# Patient Record
Sex: FEMALE | Race: WHITE | Employment: OTHER | RURAL
[De-identification: names, ages, dates, MRNs, and addresses within clinical notes are randomized per-mention and may not be internally consistent; named-entity substitution may affect disease eponyms.]

---

## 2017-01-06 ENCOUNTER — OFFICE VISIT (OUTPATIENT)
Dept: FAMILY MEDICINE CLINIC | Age: 47
End: 2017-01-06

## 2017-01-06 VITALS — TEMPERATURE: 98.3 F

## 2017-01-06 DIAGNOSIS — Z91.09 MULTIPLE ENVIRONMENTAL ALLERGIES: Primary | ICD-10-CM

## 2017-01-06 NOTE — PROGRESS NOTES
Progress Note    Patient: Claire Martinez MRN: [id number]  SSN: [id number]    YOB: 1970  Age: 40 y.o. Sex: female        Chief Complaint   Patient presents with    Immunization/Injection         Subjective: Claire Martinez is a 40 y.o. female who presents for routine allergy injection. She denies any symptoms , reactions or allergies that would exclude them from being immunized today. Risks and adverse reactions were discussed and the VIS was given to them. All questions were addressed. She was observed for 15 min post injection. There were no reactions observed. Cindy Sheth, NP        Encounter Diagnoses   Name Primary?  Multiple environmental allergies Yes             Current and past medical information:    Current Medications after this visit[de-identified]   Current Outpatient Prescriptions   Medication Sig    allergy injection 1 ml left upper and 1 ml left lower arm    allergy injection 1 ml right arm    fentaNYL (DURAGESIC) 75 mcg/hr 1 Patch by TransDERmal route every fourty-eight (48) hours for 30 days. For chronic pain. [employer] brand only  Indications: CHRONIC PAIN WITH OPIOID TOLERANCE, SEVERE PAIN WITH OPIOID TOLERANCE    oxyCODONE-acetaminophen (PERCOCET 10)  mg per tablet Take 0.5-1 Tabs by mouth every six (6) hours as needed for Pain for up to 30 days. Indications: PAIN    glucose blood VI test strips (ON CALL EXPRESS TEST STRIP) strip Test blood sugar twice daily DX: E11.9    Lancets misc Test Blood Sugar twice daily. DX Code: E11.9    pravastatin (PRAVACHOL) 40 mg tablet Take 1 Tab by mouth nightly for 360 days.  fenofibrate nanocrystallized (TRICOR) 145 mg tablet Take 1 Tab by mouth daily for 360 days. Indications: HYPERTRIGLYCERIDEMIA    acyclovir (ZOVIRAX) 400 mg tablet Take 1 Tab by mouth daily for 90 days. Indications: GENITAL HERPES SIMPLEX    glimepiride (AMARYL) 4 mg tablet TAKE ONE TABLET BY MOUTH EVERY MORNING.     polyethylene glycol (MIRALAX) 17 gram/dose powder     DEXILANT 60 mg CpDB TAKE ONE CAPSULE BY MOUTH EVERY DAY    topiramate (TOPAMAX) 25 mg tablet TAKE ONE TABLET BY MOUTH AT BEDTIME    ranitidine (ZANTAC) 150 mg tablet TAKE ONE TABLET BY MOUTH TWICE DAILY    naproxen (NAPROSYN) 500 mg tablet TAKE ONE TABLET BY MOUTH TWICE A DAY WITH FOOD    lidocaine (XYLOCAINE) 2 % solution Take 5 mL by mouth as needed for Pain.  fluticasone-salmeterol (ADVAIR DISKUS) 250-50 mcg/dose diskus inhaler Take 1 Puff by inhalation every twelve (12) hours.  albuterol (PROAIR HFA) 90 mcg/actuation inhaler Take 2 Puffs by inhalation every four (4) hours as needed for Wheezing. Take 2 Puffs inhaled by mouth Every 4 Hours.  Blood-Glucose Meter (FREESTYLE FREEDOM LITE) monitoring kit Check Blood sugar once daily    tiZANidine (ZANAFLEX) 4 mg tablet Take 1 Tab by mouth two (2) times a day.  senna (SENNA) 8.6 mg tablet Take 1 Tab by mouth daily.  cyclobenzaprine (FLEXERIL) 10 mg tablet Take 1 Tab by mouth three (3) times daily as needed for Muscle Spasm(s) for up to 30 days. As needed  Indications: MUSCLE SPASM    rizatriptan (MAXALT) 10 mg tablet TAKE ONE TABLET BY MOUTH ONCE AS NEEDED MAY REPEAT IN 2 HOURS IF NEEDED    guaiFENesin ER (MUCINEX) 600 mg ER tablet Take 1 Tab by mouth two (2) times a day.  EPINEPHrine (EPIPEN) 0.3 mg/0.3 mL (1:1,000) injection 0.3 mL by IntraMUSCular route once as needed for 1 dose.  fluticasone (FLONASE) 50 mcg/actuation nasal spray Apply one-two sprays to skin prior to patch change for skin irritation. No current facility-administered medications for this visit.         Patient Active Problem List    Diagnosis Date Noted    Multiple environmental allergies 01/19/2015    Musculoskeletal pain 09/16/2014    Migraine without aura 07/09/2013    Chronic generalized pain disorder 09/08/2012    Encounter for long-term (current) use of high-risk medication 09/08/2012    Headache 09/08/2012    Persistent disorder of initiating or maintaining sleep 09/08/2012    Fibromyalgia syndrome 08/08/2012    Restless leg syndrome 08/08/2012    Obesity 06/11/2012    Diabetes mellitus ([address] 06/11/2012    Hypertension 06/11/2012       Past Medical History   Diagnosis Date    Depression     Diabetes ([facility]     Dysthymic disorder     Fibromyalgia     Fibromyalgia syndrome 8/8/2012    Headache(784.0) 9/8/2012    Hypercholesterolemia     Left ear pain     Migraine     Mixed hyperlipidemia     Musculoskeletal pain 9/16/2014       Allergies   Allergen Reactions    Nexium [Esomeprazole Magnesium] Unknown (comments)    Pcn [Penicillins] Unknown (comments)       Past Surgical History   Procedure Laterality Date    Hx heent      Hx gyn       Partial Hysterectomy    Hx tubal ligation         Social History     Social History    Marital status: SINGLE     Spouse name: N/A    Number of children: N/A    Years of education: N/A     Social History Main Topics    Smoking status: Former Smoker    Smokeless tobacco: Never Used      Comment: quit in 2010    Alcohol use No    Drug use: No    Sexual activity: Not Asked     Other Topics Concern    None     Social History Narrative       Review of Systems   Constitutional: Negative. Negative for chills, diaphoresis, fever, malaise/fatigue and weight loss. HENT: Negative for congestion, hearing loss and sore throat. Eyes: Negative. Negative for blurred vision, double vision and photophobia. Respiratory: Negative. Negative for cough, hemoptysis, sputum production, shortness of breath and wheezing. Cardiovascular: Negative. Negative for chest pain, palpitations, orthopnea, claudication, leg swelling and PND. Gastrointestinal: Negative. Negative for abdominal pain, blood in stool, constipation, diarrhea, heartburn, melena, nausea and vomiting. Genitourinary: Negative. Negative for dysuria, flank pain, frequency, hematuria and urgency. Musculoskeletal: Negative. Negative for back pain, falls, joint pain, myalgias and neck pain. Skin: Negative. Neurological: Negative. Negative for dizziness, tingling, tremors, sensory change, speech change, focal weakness, seizures, loss of consciousness, weakness and headaches. Endo/Heme/Allergies: Positive for environmental allergies. Negative for polydipsia. Does not bruise/bleed easily. Psychiatric/Behavioral: Negative. Negative for depression, hallucinations, memory loss, substance abuse and suicidal ideas. The patient is not nervous/anxious and does not have insomnia. Objective:     Vitals:    01/06/17 1320   Temp: 98.3 °F (36.8 °C)   TempSrc: Oral      There is no height or weight on file to calculate BMI. Physical Exam   Constitutional: She is oriented to person, place, and time and well-developed, well-nourished, and in no distress. No distress. HENT:   Head: Normocephalic and atraumatic. Right Ear: External ear normal.   Left Ear: External ear normal.   Nose: Nose normal.   Mouth/Throat: Uvula is midline and oropharynx is clear and moist. No oropharyngeal exudate. Neck: Trachea normal and normal range of motion. Neck supple. No JVD present. Carotid bruit is not present. No tracheal deviation present. No thyroid mass and no thyromegaly present. Cardiovascular: Normal rate, regular rhythm, normal heart sounds and intact distal pulses. Exam reveals no gallop and no friction rub. No murmur heard. Pulmonary/Chest: Effort normal and breath sounds normal. No stridor. No respiratory distress. She has no wheezes. She has no rales. She exhibits no tenderness. Abdominal: Soft. Bowel sounds are normal. She exhibits no distension and no mass. There is no tenderness. There is no rebound and no guarding. Musculoskeletal: Normal range of motion. She exhibits no edema, tenderness or deformity. Lymphadenopathy:     She has no cervical adenopathy.    Neurological: She is alert and oriented to person, place, and time. She has normal reflexes. She displays normal reflexes. No cranial nerve deficit. She exhibits normal muscle tone. Gait normal. Coordination normal. GCS score is 15. Skin: Skin is warm and dry. No rash noted. She is not diaphoretic. No erythema. No pallor. Psychiatric: Mood, memory, affect and judgment normal.   Nursing note and vitals reviewed. Health Maintenance Due   Topic Date Due    Pneumococcal 19-64 Medium Risk (1 of 1 - PPSV23) 09/26/1990    EYE EXAM RETINAL OR DILATED Q1  03/10/2016    BREAST CANCER SCRN MAMMOGRAM  06/02/2016       Assessment and orders:       ICD-10-CM ICD-9-CM    1. Multiple environmental allergies Z91.09 V15.09 KS IMMUNOTHERAPY, 2+ INJECTIONS      allergy injection      allergy injection         Plan of care:  Discussed diagnoses in detail with patient. Medication risks/benefits/side effects discussed with patient. All of the patient's questions were addressed. The patient understands and agrees with our plan of care. The patient knows to call back if they are unsure of or forget any changes we discussed today or if the symptoms change. The patient received an After-Visit Summary which contains VS, orders, medication list and allergy list. This can be used as a \"mini-medical record\" should they have to seek medical care while out of town.     Patient Care Team:  Maximilian Griffith MD as PCP - General (Family Practice)  Julia Lima MD (Podiatry)  Pricila Hunter, KUMAR as Ambulatory Care Navigator ([facility])  Alexandre Taylor MD as Physician (Neurology)  Liana Vann, [address] (Physician Assistant)  Karime Lee MD (Rheumatology)  Esha Sexton MD as Physician (Sleep Medicine)    Follow-up Disposition: Not on File    Future Appointments  Date Time Provider Dary Buck   11/20/2017 10:00 AM Esha Sexton  [address]       Signed By: Kelly Romo NP     January 6, 2017

## 2017-01-26 ENCOUNTER — OFFICE VISIT (OUTPATIENT)
Dept: PAIN MANAGEMENT | Age: 47
End: 2017-01-26

## 2017-01-26 VITALS
SYSTOLIC BLOOD PRESSURE: 122 MMHG | DIASTOLIC BLOOD PRESSURE: 84 MMHG | WEIGHT: 189 LBS | HEART RATE: 94 BPM | BODY MASS INDEX: 28.64 KG/M2 | HEIGHT: 68 IN

## 2017-01-26 DIAGNOSIS — M79.7 FIBROMYALGIA SYNDROME: ICD-10-CM

## 2017-01-26 DIAGNOSIS — G89.4 CHRONIC PAIN SYNDROME: Primary | ICD-10-CM

## 2017-01-26 DIAGNOSIS — G89.29 CHRONIC GENERALIZED PAIN DISORDER: ICD-10-CM

## 2017-01-26 DIAGNOSIS — R52 CHRONIC GENERALIZED PAIN DISORDER: ICD-10-CM

## 2017-01-26 DIAGNOSIS — Z79.899 ENCOUNTER FOR LONG-TERM (CURRENT) USE OF HIGH-RISK MEDICATION: ICD-10-CM

## 2017-01-26 RX ORDER — FENTANYL 25 UG/1
1 PATCH TRANSDERMAL
Qty: 15 PATCH | Refills: 0 | Status: SHIPPED | OUTPATIENT
Start: 2017-01-26 | End: 2017-01-26 | Stop reason: SDUPTHER

## 2017-01-26 RX ORDER — OXYCODONE AND ACETAMINOPHEN 10; 325 MG/1; MG/1
1 TABLET ORAL
Qty: 120 TAB | Refills: 0 | Status: SHIPPED | OUTPATIENT
Start: 2017-01-26 | End: 2017-03-22 | Stop reason: SDUPTHER

## 2017-01-26 RX ORDER — FENTANYL 50 UG/1
1 PATCH TRANSDERMAL
Qty: 15 PATCH | Refills: 0 | Status: SHIPPED | OUTPATIENT
Start: 2017-01-26 | End: 2017-01-26 | Stop reason: SDUPTHER

## 2017-01-26 RX ORDER — FENTANYL 25 UG/1
1 PATCH TRANSDERMAL
Qty: 15 PATCH | Refills: 0 | Status: SHIPPED | OUTPATIENT
Start: 2017-01-26 | End: 2017-03-10

## 2017-01-26 RX ORDER — FENTANYL 50 UG/1
1 PATCH TRANSDERMAL
Qty: 15 PATCH | Refills: 0 | Status: SHIPPED | OUTPATIENT
Start: 2017-01-26 | End: 2017-03-22 | Stop reason: SDUPTHER

## 2017-01-26 RX ORDER — OXYCODONE AND ACETAMINOPHEN 10; 325 MG/1; MG/1
1 TABLET ORAL
Qty: 120 TAB | Refills: 0 | Status: SHIPPED | OUTPATIENT
Start: 2017-01-26 | End: 2017-01-26 | Stop reason: SDUPTHER

## 2017-01-26 NOTE — MR AVS SNAPSHOT
Visit Information Date & Time Provider Department Dept. Phone Encounter #  
 1/26/2017  2:40 PM Gary Don, [address] [place] [address] for Pain Management [phone] Follow-up Instructions Return in about 2 months (around 3/26/2017). Your Appointments 11/20/2017 10:00 AM  
Any with Mayr Trevizo MD  
454 VSS Monitoring ([facility]) Appt Note: yearly follow up, bring machine. [address] [facility] [address] [id number] [facility] [id number] Upcoming Health Maintenance Date Due Pneumococcal 19-64 Medium Risk (1 of 1 - PPSV23) 9/26/1990 EYE EXAM RETINAL OR DILATED Q1 3/10/2016 BREAST CANCER SCRN MAMMOGRAM 6/2/2016 HEMOGLOBIN A1C Q6M 4/4/2017 FOOT EXAM Q1 10/4/2017 MICROALBUMIN Q1 10/4/2017 LIPID PANEL Q1 10/4/2017 DTaP/Tdap/Td series (2 - Td) 9/26/2026 Allergies as of 1/26/2017  Review Complete On: 1/26/2017 By: YADIRA Hendrickson Severity Noted Reaction Type Reactions Nexium [Esomeprazole Magnesium]  06/11/2012    Unknown (comments) Pcn [Penicillins]  06/11/2012    Unknown (comments) Current Immunizations  Reviewed on 10/4/2016 Name Date Influenza Vaccine 9/26/2016, 10/20/2015, 10/9/2014, 10/1/2013 Tdap 9/26/2016 Not reviewed this visit Vitals BP Pulse Height(growth percentile) Weight(growth percentile) BMI OB Status 122/84 94 5' 8\" (1.727 m) 189 lb (85.7 kg) 28.74 kg/m2 Hysterectomy Smoking Status Former Smoker Vitals History BMI and BSA Data Body Mass Index Body Surface Area 28.74 kg/m 2 2.03 m 2 Preferred Pharmacy Pharmacy Name Phone [facility] [phone] Your Updated Medication List  
  
   
This list is accurate as of: 1/26/17  3:09 PM.  Always use your most recent med list.  
  
  
  
  
 albuterol 90 mcg/actuation inhaler Commonly known as:  PROAIR HFA Take 2 Puffs by inhalation every four (4) hours as needed for Wheezing. Take 2 Puffs inhaled by mouth Every 4 Hours. * allergy injection 1 ml left upper and 1 ml left lower arm * allergy injection 1 ml right arm Blood-Glucose Meter monitoring kit Commonly known as:  FREESTYLE FREEDOM LITE Check Blood sugar once daily  
  
 cyclobenzaprine 10 mg tablet Commonly known as:  FLEXERIL Take 1 Tab by mouth three (3) times daily as needed for Muscle Spasm(s) for up to 30 days. As needed  Indications: MUSCLE SPASM DEXILANT 60 mg Cpdb Generic drug:  Dexlansoprazole TAKE ONE CAPSULE BY MOUTH EVERY DAY  
  
 EPINEPHrine 0.3 mg/0.3 mL injection Commonly known as:  EPIPEN  
0.3 mL by IntraMUSCular route once as needed for 1 dose. fenofibrate nanocrystallized 145 mg tablet Commonly known as:  [employer] Take 1 Tab by mouth daily for 360 days. Indications: HYPERTRIGLYCERIDEMIA * fentaNYL 25 mcg/hr PATCH Commonly known as:  DURAGESIC  
1 Patch by TransDERmal route every fourty-eight (48) hours. Max Daily Amount: 1 Patch. For chronic pain (due to fill 1/26/17) [employer] brand only * fentaNYL 50 mcg/hr PATCH Commonly known as:  DURAGESIC  
1 Patch by TransDERmal route every fourty-eight (48) hours. Max Daily Amount: 1 Patch. For chronic pain (due to fill 2/24/17) [employer] brand only  
  
 fluticasone 50 mcg/actuation nasal spray Commonly known as:  Bishop Keller Apply one-two sprays to skin prior to patch change for skin irritation. fluticasone-salmeterol 250-50 mcg/dose diskus inhaler Commonly known as:  ADVAIR DISKUS Take 1 Puff by inhalation every twelve (12) hours. glimepiride 4 mg tablet Commonly known as:  AMARYL  
TAKE ONE TABLET BY MOUTH EVERY MORNING. glucose blood VI test strips strip Commonly known as:  ON CALL EXPRESS TEST STRIP Test blood sugar twice daily DX: E11.9  
  
 guaiFENesin  mg ER tablet Commonly known as:  [employer] Take 1 Tab by mouth two (2) times a day. Lancets Misc Test Blood Sugar twice daily. DX Code: E11.9  
  
 lidocaine 2 % solution Commonly known as:  XYLOCAINE Take 5 mL by mouth as needed for Pain. naproxen 500 mg tablet Commonly known as:  NAPROSYN  
TAKE ONE TABLET BY MOUTH TWICE A DAY WITH FOOD  
  
 oxyCODONE-acetaminophen  mg per tablet Commonly known as:  PERCOCET 10 Take 1 Tab by mouth every six (6) hours as needed for Pain for up to 30 days. Max Daily Amount: 4 Tabs. Due to fill 2/24/17  
  
 polyethylene glycol 17 gram/dose powder Commonly known as:  MIRALAX  
  
 pravastatin 40 mg tablet Commonly known as:  PRAVACHOL Take 1 Tab by mouth nightly for 360 days. raNITIdine 150 mg tablet Commonly known as:  ZANTAC TAKE ONE TABLET BY MOUTH TWICE DAILY  
  
 rizatriptan 10 mg tablet Commonly known as:  Kristi Cory TAKE ONE TABLET BY MOUTH ONCE AS NEEDED MAY REPEAT IN 2 HOURS IF NEEDED Senna 8.6 mg tablet Generic drug:  senna Take 1 Tab by mouth daily. tiZANidine 4 mg tablet Commonly known as:  Joeline Mourning Take 1 Tab by mouth two (2) times a day. topiramate 25 mg tablet Commonly known as:  TOPAMAX TAKE ONE TABLET BY MOUTH AT BEDTIME  
  
 * Notice: This list has 4 medication(s) that are the same as other medications prescribed for you. Read the directions carefully, and ask your doctor or other care provider to review them with you. Prescriptions Printed Refills  
 oxyCODONE-acetaminophen (PERCOCET 10)  mg per tablet 0 Sig: Take 1 Tab by mouth every six (6) hours as needed for Pain for up to 30 days. Max Daily Amount: 4 Tabs. Due to fill 2/24/17 Class: Print  Route: Oral  
 fentaNYL (DURAGESIC) 25 mcg/hr PATCH 0  
 Si Patch by TransDERmal route every fourty-eight (48) hours. Max Daily Amount: 1 Patch. For chronic pain (due to fill 17) [employer]in[employer]ro[employer] brand only Class: Print Route: TransDERmal  
 fentaNYL (DURAGESIC) 50 mcg/hr PATCH 0 Si Patch by TransDERmal route every fourty-eight (48) hours. Max Daily Amount: 1 Patch. For chronic pain (due to fill 17) mallinckrodt brand only Class: Print Route: TransDERmal  
  
Follow-up Instructions Return in about 2 months (around 3/26/2017). Patient Instructions 1. Restart fentanyl 25 mcg every 48 hours for chronic pain; month two you will increase to 50 mcg every 48 hours 2  Continue percocet as prescribed 3  Follow up in one month Introducing hospitals & [facility] SERVICES! Effie Randolph introduces [employer] patient portal. Now you can access parts of your medical record, email your doctor's office, and request medication refills online. 1. In your internet browser, go to [email or web address] 2. Click on the First Time User? Click Here link in the Sign In box. You will see the New Member Sign Up page. 3. Enter your [employer] Access Code exactly as it appears below. You will not need to use this code after youve completed the sign-up process. If you do not sign up before the expiration date, you must request a new code. · [employer] Access Code: [email or web address] Expires: 3/7/2017  9:06 AM 
 
4. Enter the last four digits of your Social Security Number (xxxx) and Date of Birth (mm/dd/yyyy) as indicated and click Submit. You will be taken to the next sign-up page. 5. Create a [employer] ID. This will be your [employer] login ID and cannot be changed, so think of one that is secure and easy to remember. 6. Create a [employer] password. You can change your password at any time. 7. Enter your Password Reset Question and Answer. This can be used at a later time if you forget your password. 8. Enter your e-mail address. You will receive e-mail notification when new information is available in [address] 9. Click Sign Up. You can now view and download portions of your medical record. 10. Click the Download Summary menu link to download a portable copy of your medical information. If you have questions, please visit the Frequently Asked Questions section of the [employer] website. Remember, [employer] is NOT to be used for urgent needs. For medical emergencies, dial 911. Now available from your iPhone and Android! Please provide this summary of care documentation to your next provider. Your primary care clinician is listed as Alicja Cleveland. If you have any questions after today's visit, please call [phone].

## 2017-01-26 NOTE — PROGRESS NOTES
HISTORY OF PRESENT ILLNESS  Darrian Borges is a 40 y.o. female. HPI  The patient presents today for follow up of chronic generalized pain related to FMS. She also suffers from peripheral neuropathy and headaches. Her son was recently involved in a critical MVA which is why she missed her last appointment and had to reschedule. She has not had a patch on in a couple of weeks. She was stretching her percocet out. She had to go to the ED for increased pain/withdrawals. She tried calling here but did not receive a phone call back. She went to [facility] ED. She received ativan, morphine and phenergan. She received a prescription for ativan. The patient denies any significant changes since last f/u. She tolerates medications without side effects. Darrian Borges reports no change in sleep or constipation. The patient reports 70% relief with current medications. She describes her pain as constant and burning. Weather, activity, stress and lack of sleep all seem to aggravate her pain. Rest and medication alleviate her pain. She does not work. 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 She does not ride the horse. She has had PT in the past and it was not beneficial.  She does use compounding cream which helps some. She has used a TENS unit and it aggravate her pain level. She never had any sort of injections. She is skeptical of having injections. She reports the topamax manages her migraine headaches. She has not had any migraine headaches since taking the topamax. The patient reports functional improvement and QOL with pain medication. Review of Systems   Constitutional: Positive for fever and malaise/fatigue. Negative for chills and weight loss. HENT: Positive for congestion and sore throat. Eyes: Negative for blurred vision and double vision. Respiratory: Positive for cough and shortness of breath (from URI/h/o asthma).          Nonsmoker/quit 4.5 years ago Cardiovascular: Negative for chest pain. Gastrointestinal: Positive for heartburn. Negative for constipation, diarrhea, nausea and vomiting. Genitourinary: Negative. Musculoskeletal: Positive for back pain, joint pain, myalgias and neck pain. Negative for falls. Skin: Positive for rash. Neurological: Positive for dizziness, tingling, sensory change and headaches (sees a \"specialist for that\"). Negative for tremors, seizures and weakness. Endo/Heme/Allergies: Positive for environmental allergies (gets injections every other weeks for seasonal allergies). Does not bruise/bleed easily. Psychiatric/Behavioral: Positive for depression. Negative for suicidal ideas. The patient is nervous/anxious and has insomnia. Does not see psychiatry       Physical Exam   Constitutional: She is oriented to person, place, and time. She appears well-developed and well-nourished. No distress. HENT:   Head: Normocephalic. Eyes: Conjunctivae and EOM are normal. Pupils are equal, round, and reactive to light.   glasses   Neck: Normal range of motion. Painful ROM   Pulmonary/Chest: Effort normal. No respiratory distress. Musculoskeletal: She exhibits tenderness (neck/lumbar). She exhibits no edema. Cervical back: She exhibits decreased range of motion, tenderness, pain and spasm. Lumbar back: She exhibits decreased range of motion, tenderness, pain and spasm. Neurological: She is alert and oriented to person, place, and time. No cranial nerve deficit (grossly). Gait (antalgic) abnormal.   CN 2-12 grossly intact   Psychiatric: She has a normal mood and affect. Her behavior is normal. Judgment and thought content normal.   Nursing note and vitals reviewed. ASSESSMENT and PLAN  Encounter Diagnoses   Name Primary?     Chronic pain syndrome Yes    Encounter for long-term (current) use of high-risk medication     Fibromyalgia syndrome     Chronic generalized pain disorder      Orders Placed This Encounter    DISCONTD: fentaNYL (Elridge Diaz) 25 mcg/hr PATCH    DISCONTD: oxyCODONE-acetaminophen (PERCOCET 10)  mg per tablet    oxyCODONE-acetaminophen (PERCOCET 10)  mg per tablet    DISCONTD: fentaNYL (DURAGESIC) 50 mcg/hr PATCH    fentaNYL (DURAGESIC) 25 mcg/hr PATCH    fentaNYL (DURAGESIC) 50 mcg/hr PATCH     Patient Instructions   1.   Restart fentanyl 25 mcg every 48 hours for chronic pain; month two you will increase to 50 mcg every 48 hours  2  Continue percocet as prescribed  3  Follow up in one month

## 2017-01-26 NOTE — PATIENT INSTRUCTIONS
1.  Restart fentanyl 25 mcg every 48 hours for chronic pain; month two you will increase to 50 mcg every 48 hours  2  Continue percocet as prescribed  3  Follow up in one month

## 2017-01-26 NOTE — PROGRESS NOTES
Nursing Notes    Patient presents to the office today in follow-up. Patient rates her pain at 6/10 on the numerical pain scale. Reviewed medications with counts as follows:    Rx Date filled Qty Dispensed Pill Count Last Dose Short   Fentanyl 75 mcg/hr  12/23/16 15 0 Last patch was worn 01/18/17 no   Percocet 10/325 mg 12/23/16 120 0 today no                            POC UDS was not performed in office today    Any new labs or imaging since last appointment? NO    Have you been to an emergency room (ER) or urgent care clinic since your last visit? YES. Pt went to the ER because she was withdrawling from her medication. She ran out because she had to reschedule her appt. Pt's son was in critical condition           Have you been hospitalized since your last visit? NO     If yes, where, when, and reason for visit? Have you seen or consulted any other health care providers outside of the [address] [place] [address]  since your last visit? NO     If yes, where, when, and reason for visit? HM deferred to pcp.

## 2017-01-31 ENCOUNTER — OFFICE VISIT (OUTPATIENT)
Dept: FAMILY MEDICINE CLINIC | Age: 47
End: 2017-01-31

## 2017-01-31 VITALS — TEMPERATURE: 97.5 F

## 2017-01-31 DIAGNOSIS — Z91.09 MULTIPLE ENVIRONMENTAL ALLERGIES: Primary | ICD-10-CM

## 2017-01-31 RX ORDER — LORAZEPAM 1 MG/1
TABLET ORAL
COMMUNITY
Start: 2017-01-25 | End: 2019-04-11

## 2017-01-31 NOTE — PROGRESS NOTES
Messi Tristan is a 40 y.o. female who presents for routine immunizations. She denies any symptoms , reactions or allergies that would exclude them from being immunized today. Risks and adverse reactions were discussed and the VIS was given to them. All questions were addressed. She was observed for 15 min post injection. There were no reactions observed.     Cindy Barros, NP

## 2017-02-22 ENCOUNTER — OFFICE VISIT (OUTPATIENT)
Dept: FAMILY MEDICINE CLINIC | Age: 47
End: 2017-02-22

## 2017-02-22 VITALS
TEMPERATURE: 97.4 F | BODY MASS INDEX: 28.67 KG/M2 | WEIGHT: 189.2 LBS | RESPIRATION RATE: 20 BRPM | SYSTOLIC BLOOD PRESSURE: 121 MMHG | OXYGEN SATURATION: 97 % | HEART RATE: 76 BPM | HEIGHT: 68 IN | DIASTOLIC BLOOD PRESSURE: 79 MMHG

## 2017-02-22 DIAGNOSIS — Z91.09 MULTIPLE ENVIRONMENTAL ALLERGIES: Primary | ICD-10-CM

## 2017-02-22 NOTE — PROGRESS NOTES
Reviewed record in preparation for visit and have necessary documentation  Pt did not bring medication to office visit for review  Information was given to pt on Advanced Directives, Living Will  opportunity was given for questions  Goals that were addressed and/or need to be completed during or after this appointment include   Health Maintenance Due   Topic Date Due    Pneumococcal 19-64 Medium Risk (1 of 1 - PPSV23) 09/26/1990    EYE EXAM RETINAL OR DILATED Q1  03/10/2016    BREAST CANCER SCRN MAMMOGRAM  06/02/2016

## 2017-02-22 NOTE — PROGRESS NOTES
Divine Gloria is a 40 y.o. female who presents for routine allergy injections. She denies any symptoms , reactions or allergies that would exclude them from being immunized today. Risks and adverse reactions were discussed and the VIS was given to them. All questions were addressed. She was observed for 15 min post injection. There were no reactions observed.     Cindy Dewitt, NP

## 2017-03-10 ENCOUNTER — OFFICE VISIT (OUTPATIENT)
Dept: FAMILY MEDICINE CLINIC | Age: 47
End: 2017-03-10

## 2017-03-10 VITALS
BODY MASS INDEX: 28.79 KG/M2 | SYSTOLIC BLOOD PRESSURE: 103 MMHG | HEART RATE: 83 BPM | HEIGHT: 68 IN | WEIGHT: 190 LBS | RESPIRATION RATE: 16 BRPM | OXYGEN SATURATION: 96 % | TEMPERATURE: 98.3 F | DIASTOLIC BLOOD PRESSURE: 63 MMHG

## 2017-03-10 DIAGNOSIS — R10.13 DYSPEPSIA: ICD-10-CM

## 2017-03-10 DIAGNOSIS — J45.30 MILD PERSISTENT ASTHMA WITHOUT COMPLICATION: ICD-10-CM

## 2017-03-10 DIAGNOSIS — E11.40 TYPE 2 DIABETES MELLITUS WITH DIABETIC NEUROPATHY, WITHOUT LONG-TERM CURRENT USE OF INSULIN (HCC): Primary | ICD-10-CM

## 2017-03-10 DIAGNOSIS — G43.019 INTRACTABLE MIGRAINE WITHOUT AURA AND WITHOUT STATUS MIGRAINOSUS: ICD-10-CM

## 2017-03-10 DIAGNOSIS — Z91.09 MULTIPLE ENVIRONMENTAL ALLERGIES: ICD-10-CM

## 2017-03-10 DIAGNOSIS — E78.2 MIXED HYPERLIPIDEMIA: ICD-10-CM

## 2017-03-10 DIAGNOSIS — K21.9 GASTROESOPHAGEAL REFLUX DISEASE WITHOUT ESOPHAGITIS: ICD-10-CM

## 2017-03-10 DIAGNOSIS — M79.18 MUSCULOSKELETAL PAIN: ICD-10-CM

## 2017-03-10 RX ORDER — OXYCODONE AND ACETAMINOPHEN 10; 325 MG/1; MG/1
1 TABLET ORAL
COMMUNITY
End: 2017-05-17 | Stop reason: SDUPTHER

## 2017-03-10 RX ORDER — KETOCONAZOLE 20 MG/ML
SHAMPOO TOPICAL
Qty: 120 ML | Refills: 2 | Status: SHIPPED | OUTPATIENT
Start: 2017-03-10 | End: 2017-07-29 | Stop reason: SDUPTHER

## 2017-03-10 RX ORDER — RIZATRIPTAN BENZOATE 10 MG/1
TABLET ORAL
Qty: 90 TAB | Refills: 1 | Status: SHIPPED | OUTPATIENT
Start: 2017-03-10 | End: 2019-04-03 | Stop reason: SDUPTHER

## 2017-03-10 RX ORDER — FLUTICASONE PROPIONATE 50 MCG
SPRAY, SUSPENSION (ML) NASAL
Qty: 1 BOTTLE | Refills: 5 | Status: SHIPPED | OUTPATIENT
Start: 2017-03-10 | End: 2018-02-22 | Stop reason: SDUPTHER

## 2017-03-10 RX ORDER — FLUOCINONIDE 0.5 MG/G
CREAM TOPICAL 2 TIMES DAILY
Qty: 15 G | Refills: 2 | Status: SHIPPED | OUTPATIENT
Start: 2017-03-10 | End: 2017-07-29 | Stop reason: SDUPTHER

## 2017-03-10 RX ORDER — RANITIDINE 150 MG/1
TABLET, FILM COATED ORAL
Qty: 180 TAB | Refills: 1 | Status: SHIPPED | OUTPATIENT
Start: 2017-03-10 | End: 2017-08-26 | Stop reason: SDUPTHER

## 2017-03-10 RX ORDER — FLUTICASONE PROPIONATE AND SALMETEROL 250; 50 UG/1; UG/1
1 POWDER RESPIRATORY (INHALATION) EVERY 12 HOURS
Qty: 3 INHALER | Refills: 1 | Status: SHIPPED | OUTPATIENT
Start: 2017-03-10 | End: 2017-08-28 | Stop reason: SDUPTHER

## 2017-03-10 RX ORDER — FENOFIBRATE 145 MG/1
145 TABLET, COATED ORAL DAILY
Qty: 90 TAB | Refills: 1 | Status: SHIPPED | OUTPATIENT
Start: 2017-03-10 | End: 2017-11-24 | Stop reason: SDUPTHER

## 2017-03-10 RX ORDER — TOPIRAMATE 25 MG/1
TABLET ORAL
Qty: 90 TAB | Refills: 1 | Status: SHIPPED | OUTPATIENT
Start: 2017-03-10 | End: 2019-04-03 | Stop reason: SDUPTHER

## 2017-03-10 RX ORDER — TIZANIDINE 4 MG/1
4 TABLET ORAL 2 TIMES DAILY
Qty: 60 TAB | Refills: 0 | Status: CANCELLED | OUTPATIENT
Start: 2017-03-10

## 2017-03-10 RX ORDER — PRAVASTATIN SODIUM 40 MG/1
40 TABLET ORAL
Qty: 90 TAB | Refills: 1 | Status: SHIPPED | OUTPATIENT
Start: 2017-03-10 | End: 2017-06-14 | Stop reason: SDUPTHER

## 2017-03-10 RX ORDER — DEXLANSOPRAZOLE 60 MG/1
CAPSULE, DELAYED RELEASE ORAL
Qty: 90 CAP | Refills: 1 | Status: SHIPPED | OUTPATIENT
Start: 2017-03-10 | End: 2018-06-14 | Stop reason: SDUPTHER

## 2017-03-10 RX ORDER — ALBUTEROL SULFATE 90 UG/1
2 AEROSOL, METERED RESPIRATORY (INHALATION)
Qty: 1 INHALER | Refills: 2 | Status: SHIPPED | OUTPATIENT
Start: 2017-03-10

## 2017-03-10 NOTE — MR AVS SNAPSHOT
Visit Information Date & Time Provider Department Dept. Phone Encounter #  
 3/10/2017  8:20 AM Krunal Fong MD [address] [place] [address] [phone] [id number] Your Appointments 3/14/2017  9:00 AM  
ROUTINE CARE with Krunal Fong MD  
[address] [place] [address]) Appt Note: allergy injection [address]  
[phone]  
  
   
 [place] [address]  
  
    
 3/22/2017  2:40 PM  
Follow Up with YADIRA Neely [facility] (ELLIE SCHEDULING) Appt Note: return in 2 months [address] [address]  
[phone] [address] [facility]  
  
    
 4/4/2017  9:00 AM  
ROUTINE CARE with Krunal Fong MD  
[address] [place] [address]) Appt Note: [facility] [address] [facility] [facility] [address]  
[phone]  
  
    
 4/25/2017 10:20 AM  
ROUTINE CARE with Krunal Fong MD  
[address] [place] [address]) Appt Note: allergy injection [address] [facility] [place] [address] [address]  
[phone] 5/16/2017 10:20 AM  
ROUTINE CARE with Krunal Fong MD  
[address] [place] [address]) Appt Note: allergy injection [address]  
[phone]  
  
    
 6/6/2017 10:00 AM  
ROUTINE CARE with Krunal Fong MD  
[address] [place] [address]) Appt Note: allergy injection [address] [facility] [place] [address] [address]  
[phone]  
  
    
 11/20/2017 10:00 AM  
Any with Barb Henderson MD  
[email or web address] ([address]) Appt Note: yearly follow up, bring machine. [address] [facility] [address] [id number] [phone] [address] [phone] Upcoming Health Maintenance Date Due Pneumococcal 19-64 Medium Risk (1 of 1 - PPSV23) 9/26/1990 EYE EXAM RETINAL OR DILATED Q1 3/10/2016 BREAST CANCER SCRN MAMMOGRAM 6/2/2016 HEMOGLOBIN A1C Q6M 4/4/2017 FOOT EXAM Q1 10/4/2017 MICROALBUMIN Q1 10/4/2017 LIPID PANEL Q1 10/4/2017 DTaP/Tdap/Td series (2 - Td) 9/26/2026 Allergies as of 3/10/2017  Review Complete On: 3/10/2017 By: Corey Maynard LPN Severity Noted Reaction Type Reactions Esomeprazole Magnesium  10/19/2010    Hives Nexium [Esomeprazole Magnesium]  06/11/2012    Unknown (comments) Pcn [Penicillins]  06/11/2012    Unknown (comments) Current Immunizations  Reviewed on 10/4/2016 Name Date Influenza Vaccine 9/26/2016, 10/20/2015, 10/9/2014, 10/1/2013 Tdap 9/26/2016 Not reviewed this visit You Were Diagnosed With   
  
 Codes Comments Type 2 diabetes mellitus with diabetic neuropathy, without long-term current use of insulin (HCC)    -  Primary ICD-10-CM: E11.40 ICD-9-CM: 250.60, 357.2 Mixed hyperlipidemia     ICD-10-CM: E78.2 ICD-9-CM: 272.2 Mild persistent asthma without complication     TVK-54-KN: J45.30 ICD-9-CM: 493.90 Intractable migraine without aura and without status migrainosus     ICD-10-CM: G43.019 
ICD-9-CM: 346.11 Gastroesophageal reflux disease without esophagitis     ICD-10-CM: K21.9 ICD-9-CM: 530.81 Dyspepsia     ICD-10-CM: R10.13 ICD-9-CM: 536.8 Musculoskeletal pain     ICD-10-CM: M79.1 ICD-9-CM: 729.1 Multiple environmental allergies     ICD-10-CM: Z91.09 
ICD-9-CM: V15.09 Vitals BP Pulse Temp Resp Height(growth percentile) Weight(growth percentile) 103/63 (BP 1 Location: Right arm, BP Patient Position: Sitting) 83 98.3 °F (36.8 °C) (Oral) 16 5' 8\" (1.727 m) 190 lb (86.2 kg) SpO2 BMI OB Status Smoking Status 96% 28.89 kg/m2 Hysterectomy Former Smoker Vitals History BMI and BSA Data Body Mass Index Body Surface Area  
 28.89 kg/m 2 2.03 m 2 Preferred Pharmacy Pharmacy Name Phone [facility] [phone] Your Updated Medication List  
  
   
This list is accurate as of: 3/10/17  9:12 AM.  Always use your most recent med list.  
  
  
  
  
 albuterol 90 mcg/actuation inhaler Commonly known as:  PROAIR HFA Take 2 Puffs by inhalation every four (4) hours as needed for Wheezing. Take 2 Puffs inhaled by mouth Every 4 Hours. * allergy injection 1 ml left upper and 1 ml left lower arm * allergy injection 1 ml right arm * allergy injection Vial B left arm 1.0ml  
  
 * allergy injection Vial A right arm 1.0ml  
  
 * allergy injection Vial C left arm 1.0ml Blood-Glucose Meter monitoring kit Commonly known as:  FREESTYLE FREEDOM LITE Check Blood sugar once daily  
  
 cyclobenzaprine 10 mg tablet Commonly known as:  FLEXERIL Take 1 Tab by mouth three (3) times daily as needed for Muscle Spasm(s) for up to 30 days. As needed  Indications: MUSCLE SPASM Dexlansoprazole 60 mg Cpdb Commonly known as:  DEXILANT TAKE ONE CAPSULE BY MOUTH EVERY DAY  
  
 EPINEPHrine 0.3 mg/0.3 mL injection Commonly known as:  EPIPEN  
0.3 mL by IntraMUSCular route once as needed for 1 dose. fenofibrate nanocrystallized 145 mg tablet Commonly known as:  [employer] Take 1 Tab by mouth daily for 360 days. Indications: HYPERTRIGLYCERIDEMIA  
  
 fentaNYL 50 mcg/hr PATCH Commonly known as:  DURAGESIC  
1 Patch by TransDERmal route every fourty-eight (48) hours. Max Daily Amount: 1 Patch. For chronic pain (due to fill 2/24/17) CricHQkrPlanZapt brand only  
  
 fluocinoNIDE 0.05 % topical cream  
Commonly known as:  LIDEX Apply  to affected area two (2) times a day. fluticasone 50 mcg/actuation nasal spray Commonly known as:  Sindhu Serene Apply one-two sprays to skin prior to patch change for skin irritation. fluticasone-salmeterol 250-50 mcg/dose diskus inhaler Commonly known as:  ADVAIR DISKUS Take 1 Puff by inhalation every twelve (12) hours. glimepiride 4 mg tablet Commonly known as:  AMARYL  
TAKE ONE TABLET BY MOUTH EVERY MORNING. glucose blood VI test strips strip Commonly known as:  [employer] EXPRESS TEST STRIP Test blood sugar twice daily DX: E11.9  
  
 guaiFENesin  mg ER tablet Commonly known as:  [employer] Take 1 Tab by mouth two (2) times a day.  
  
 ketoconazole 2 % shampoo Commonly known as:  NIZORAL Apply to scalp and rinse daily, prn Lancets Misc Test Blood Sugar twice daily. DX Code: E11.9  
  
 lidocaine 2 % solution Commonly known as:  XYLOCAINE Take 5 mL by mouth as needed for Pain. LORazepam 1 mg tablet Commonly known as:  ATIVAN  
  
 naproxen 500 mg tablet Commonly known as:  NAPROSYN  
TAKE ONE TABLET BY MOUTH TWICE A DAY WITH FOOD PERCOCET  mg per tablet Generic drug:  oxyCODONE-acetaminophen Take 1 Tab by mouth.  
  
 polyethylene glycol 17 gram/dose powder Commonly known as:  MIRALAX  
  
 pravastatin 40 mg tablet Commonly known as:  PRAVACHOL Take 1 Tab by mouth nightly for 360 days. raNITIdine 150 mg tablet Commonly known as:  ZANTAC TAKE ONE TABLET BY MOUTH TWICE DAILY  
  
 rizatriptan 10 mg tablet Commonly known as:  Adelene Canal TAKE ONE TABLET BY MOUTH ONCE AS NEEDED MAY REPEAT IN 2 HOURS IF NEEDED  
  
 topiramate 25 mg tablet Commonly known as:  TOPAMAX TAKE ONE TABLET BY MOUTH AT BEDTIME  
  
 * Notice: This list has 5 medication(s) that are the same as other medications prescribed for you. Read the directions carefully, and ask your doctor or other care provider to review them with you. Prescriptions Sent to Pharmacy  Refills  
 pravastatin (PRAVACHOL) 40 mg tablet 1  
 Sig: Take 1 Tab by mouth nightly for 360 days. Class: Normal  
 Pharmacy:  [address] Ph #: [phone] Route: Oral  
 fenofibrate nanocrystallized (TRICOR) 145 mg tablet 1 Sig: Take 1 Tab by mouth daily for 360 days. Indications: HYPERTRIGLYCERIDEMIA Class: Normal  
 Pharmacy:  [address] Ph #: [phone] Route: Oral  
 Dexlansoprazole (DEXILANT) 60 mg CpDB 1 Sig: TAKE ONE CAPSULE BY MOUTH EVERY DAY Class: Normal  
 Pharmacy:  [address] Ph #: [phone]  
 topiramate (TOPAMAX) 25 mg tablet 1 Sig: TAKE ONE TABLET BY MOUTH AT BEDTIME Class: Normal  
 Pharmacy:  [address] Ph #: [phone]  
 raNITIdine (ZANTAC) 150 mg tablet 1 Sig: TAKE ONE TABLET BY MOUTH TWICE DAILY Class: Normal  
 Pharmacy:  [address] Ph #: [phone]  
 fluticasone-salmeterol (ADVAIR DISKUS) 250-50 mcg/dose diskus inhaler 1 Sig: Take 1 Puff by inhalation every twelve (12) hours. Class: Normal  
 Pharmacy:  [address] Ph #: [phone] Route: Inhalation  
 albuterol (PROAIR HFA) 90 mcg/actuation inhaler 2 Sig: Take 2 Puffs by inhalation every four (4) hours as needed for Wheezing. Take 2 Puffs inhaled by mouth Every 4 Hours. Class: Normal  
 Pharmacy:  [address] Ph #: [phone] Route: Inhalation  
 rizatriptan (MAXALT) 10 mg tablet 1 Sig: TAKE ONE TABLET BY MOUTH ONCE AS NEEDED MAY REPEAT IN 2 HOURS IF NEEDED Class: Normal  
 Pharmacy:  [address] Ph #: [phone]  
 fluticasone (FLONASE) 50 mcg/actuation nasal spray 5 Sig: Apply one-two sprays to skin prior to patch change for skin irritation. Class: Normal  
 Pharmacy:  [address] Ph #: [phone] ketoconazole (NIZORAL) 2 % shampoo 2 Sig: Apply to scalp and rinse daily, prn  
 Class: Normal  
 Pharmacy:  [address] Ph #: [phone]  
 fluocinoNIDE (LIDEX) 0.05 % topical cream 2 Sig: Apply  to affected area two (2) times a day. Class: Normal  
 Pharmacy:  [address] Ph #: [phone] Route: Topical  
  
We Performed the Following CBC W/O DIFF [83559 CPT(R)] HEMOGLOBIN A1C WITH EAG [89197 CPT(R)] LIPID PANEL [33475 CPT(R)] METABOLIC PANEL, COMPREHENSIVE [18720 CPT(R)] REFERRAL TO GASTROENTEROLOGY [QMP49 Custom] Comments:  
 Please evaluate patient for episodes of abdominal pain and irregular BMs. Dr. Davina Bray TSH 3RD GENERATION [81457 CPT(R)] Referral Information Referral ID Referred By Referred To  
  
 [id number] Blossom VELASQUEZ Not Available Visits Status Start Date End Date 1 New Request 3/10/17 3/10/18 If your referral has a status of pending review or denied, additional information will be sent to support the outcome of this decision. Patient Instructions Learning About Diabetes Food Guidelines Your Care Instructions Meal planning is important to manage diabetes. It helps keep your blood sugar at a target level (which you set with your doctor). You don't have to eat special foods. You can eat what your family eats, including sweets once in a while. But you do have to pay attention to how often you eat and how much you eat of certain foods. You may want to work with a dietitian or a certified diabetes educator (CDE) to help you plan meals and snacks. A dietitian or CDE can also help you lose weight if that is one of your goals. What should you know about eating carbs? Managing the amount of carbohydrate (carbs) you eat is an important part of healthy meals when you have diabetes. Carbohydrate is found in many foods. · Learn which foods have carbs. And learn the amounts of carbs in different foods. ¨ Bread, cereal, pasta, and rice have about 15 grams of carbs in a serving. A serving is 1 slice of bread (1 ounce), ½ cup of cooked cereal, or 1/3 cup of cooked pasta or rice. ¨ Fruits have 15 grams of carbs in a serving. A serving is 1 small fresh fruit, such as an apple or orange; ½ of a banana; ½ cup of cooked or canned fruit; ½ cup of fruit juice; 1 cup of melon or raspberries; or 2 tablespoons of dried fruit. ¨ Milk and no-sugar-added yogurt have 15 grams of carbs in a serving. A serving is 1 cup of milk or 2/3 cup of no-sugar-added yogurt. ¨ Starchy vegetables have 15 grams of carbs in a serving. A serving is ½ cup of mashed potatoes or sweet potato; 1 cup winter squash; ½ of a small baked potato; ½ cup of cooked beans; or ½ cup cooked corn or green peas. · Learn how much carbs to eat each day and at each meal. A dietitian or CDE can teach you how to keep track of the amount of carbs you eat. This is called carbohydrate counting. · If you are not sure how to count carbohydrate grams, use the Plate Method to plan meals. It is a good, quick way to make sure that you have a balanced meal. It also helps you spread carbs throughout the day. ¨ Divide your plate by types of foods. Put non-starchy vegetables on half the plate, meat or other protein food on one-quarter of the plate, and a grain or starchy vegetable in the final quarter of the plate. To this you can add a small piece of fruit and 1 cup of milk or yogurt, depending on how many carbs you are supposed to eat at a meal. 
· Try to eat about the same amount of carbs at each meal. Do not \"save up\" your daily allowance of carbs to eat at one meal. 
· Proteins have very little or no carbs per serving. Examples of proteins are beef, chicken, turkey, fish, eggs, tofu, cheese, cottage cheese, and peanut butter.  A serving size of meat is 3 ounces, which is about the size of a deck of cards. Examples of meat substitute serving sizes (equal to 1 ounce of meat) are 1/4 cup of cottage cheese, 1 egg, 1 tablespoon of peanut butter, and ½ cup of tofu. How can you eat out and still eat healthy? · Learn to estimate the serving sizes of foods that have carbohydrate. If you measure food at home, it will be easier to estimate the amount in a serving of restaurant food. · If the meal you order has too much carbohydrate (such as potatoes, corn, or baked beans), ask to have a low-carbohydrate food instead. Ask for a salad or green vegetables. · If you use insulin, check your blood sugar before and after eating out to help you plan how much to eat in the future. · If you eat more carbohydrate at a meal than you had planned, take a walk or do other exercise. This will help lower your blood sugar. What else should you know? · Limit saturated fat, such as the fat from meat and dairy products. This is a healthy choice because people who have diabetes are at higher risk of heart disease. So choose lean cuts of meat and nonfat or low-fat dairy products. Use olive or canola oil instead of butter or shortening when cooking. · Don't skip meals. Your blood sugar may drop too low if you skip meals and take insulin or certain medicines for diabetes. · Check with your doctor before you drink alcohol. Alcohol can cause your blood sugar to drop too low. Alcohol can also cause a bad reaction if you take certain diabetes medicines. Follow-up care is a key part of your treatment and safety. Be sure to make and go to all appointments, and call your doctor if you are having problems. It's also a good idea to know your test results and keep a list of the medicines you take. Where can you learn more? Go to http://carolina-raiza.info/. Enter D547 in the search box to learn more about \"Learning About Diabetes Food Guidelines. \" Current as of: May 23, 2016 Content Version: 11.1 © 3535-0637 Healthwise, Incorporated. Care instructions adapted under license by [employer] (which disclaims liability or warranty for this information). If you have questions about a medical condition or this instruction, always ask your healthcare professional. [address] any warranty or liability for your use of this information. Introducing [facility]! Ni Lee introduces ideaForge patient portal. Now you can access parts of your medical record, email your doctor's office, and request medication refills online. 1. In your internet browser, go to https://[email or web address]. [employer]/Gu[employer] Systems 2. Click on the First Time User? Click Here link in the Sign In box. You will see the New Member Sign Up page. 3. Enter your ideaForge Access Code exactly as it appears below. You will not need to use this code after youve completed the sign-up process. If you do not sign up before the expiration date, you must request a new code. · ideaForge Access Code: [email or web address] Expires: 6/8/2017  9:12 AM 
 
4. Enter the last four digits of your Social Security Number (xxxx) and Date of Birth (mm/dd/yyyy) as indicated and click Submit. You will be taken to the next sign-up page. 5. Create a ideaForge ID. This will be your ideaForge login ID and cannot be changed, so think of one that is secure and easy to remember. 6. Create a ideaForge password. You can change your password at any time. 7. Enter your Password Reset Question and Answer. This can be used at a later time if you forget your password. 8. Enter your e-mail address. You will receive e-mail notification when new information is available in [address] 9. Click Sign Up. You can now view and download portions of your medical record. 10. Click the Download Summary menu link to download a portable copy of your medical information.  
 
If you have questions, please visit the Frequently Asked Questions section of the Meograph. Remember, [employer] is NOT to be used for urgent needs. For medical emergencies, dial 911. Now available from your iPhone and Android! Please provide this summary of care documentation to your next provider. Your primary care clinician is listed as Elisha Seay. If you have any questions after today's visit, please call [phone].

## 2017-03-10 NOTE — PATIENT INSTRUCTIONS

## 2017-03-10 NOTE — PROGRESS NOTES
Reviewed record in preparation for visit and have necessary documentation  Pt did not bring medication to office visit for review  Opportunity was given for questions  Goals that were addressed and/or need to be completed after this appointment include   Health Maintenance Due   Topic Date Due    Pneumococcal 19-64 Medium Risk (1 of 1 - PPSV23) 09/26/1990    EYE EXAM RETINAL OR DILATED Q1  03/10/2016    BREAST CANCER SCRN MAMMOGRAM  06/02/2016    HEMOGLOBIN A1C Q6M  04/04/2017

## 2017-03-11 LAB
ALBUMIN SERPL-MCNC: 4.2 G/DL (ref 3.5–5.5)
ALBUMIN/GLOB SERPL: 1.5 {RATIO} (ref 1.1–2.5)
ALP SERPL-CCNC: 107 IU/L (ref 39–117)
ALT SERPL-CCNC: 19 IU/L (ref 0–32)
AST SERPL-CCNC: 10 IU/L (ref 0–40)
BILIRUB SERPL-MCNC: 0.4 MG/DL (ref 0–1.2)
BUN SERPL-MCNC: 9 MG/DL (ref 6–24)
BUN/CREAT SERPL: 13 (ref 9–23)
CALCIUM SERPL-MCNC: 9.4 MG/DL (ref 8.7–10.2)
CHLORIDE SERPL-SCNC: 97 MMOL/L (ref 96–106)
CHOLEST SERPL-MCNC: 194 MG/DL (ref 100–199)
CO2 SERPL-SCNC: 26 MMOL/L (ref 18–29)
CREAT SERPL-MCNC: 0.71 MG/DL (ref 0.57–1)
ERYTHROCYTE [DISTWIDTH] IN BLOOD BY AUTOMATED COUNT: 14.8 % (ref 12.3–15.4)
EST. AVERAGE GLUCOSE BLD GHB EST-MCNC: 169 MG/DL
GLOBULIN SER CALC-MCNC: 2.8 G/DL (ref 1.5–4.5)
GLUCOSE SERPL-MCNC: 231 MG/DL (ref 65–99)
HBA1C MFR BLD: 7.5 % (ref 4.8–5.6)
HCT VFR BLD AUTO: 38.6 % (ref 34–46.6)
HDLC SERPL-MCNC: 30 MG/DL
HGB BLD-MCNC: 12.7 G/DL (ref 11.1–15.9)
LDLC SERPL CALC-MCNC: 136 MG/DL (ref 0–99)
MCH RBC QN AUTO: 27.7 PG (ref 26.6–33)
MCHC RBC AUTO-ENTMCNC: 32.9 G/DL (ref 31.5–35.7)
MCV RBC AUTO: 84 FL (ref 79–97)
PLATELET # BLD AUTO: 251 X10E3/UL (ref 150–379)
POTASSIUM SERPL-SCNC: 4.8 MMOL/L (ref 3.5–5.2)
PROT SERPL-MCNC: 7 G/DL (ref 6–8.5)
RBC # BLD AUTO: 4.58 X10E6/UL (ref 3.77–5.28)
SODIUM SERPL-SCNC: 138 MMOL/L (ref 134–144)
TRIGL SERPL-MCNC: 138 MG/DL (ref 0–149)
TSH SERPL DL<=0.005 MIU/L-ACNC: 1.14 UIU/ML (ref 0.45–4.5)
VLDLC SERPL CALC-MCNC: 28 MG/DL (ref 5–40)
WBC # BLD AUTO: 7 X10E3/UL (ref 3.4–10.8)

## 2017-03-15 ENCOUNTER — TELEPHONE (OUTPATIENT)
Dept: FAMILY MEDICINE CLINIC | Age: 47
End: 2017-03-15

## 2017-03-15 ENCOUNTER — OFFICE VISIT (OUTPATIENT)
Dept: FAMILY MEDICINE CLINIC | Age: 47
End: 2017-03-15

## 2017-03-15 VITALS
DIASTOLIC BLOOD PRESSURE: 68 MMHG | WEIGHT: 193 LBS | OXYGEN SATURATION: 97 % | BODY MASS INDEX: 29.25 KG/M2 | RESPIRATION RATE: 16 BRPM | HEART RATE: 77 BPM | TEMPERATURE: 98.1 F | SYSTOLIC BLOOD PRESSURE: 108 MMHG | HEIGHT: 68 IN

## 2017-03-15 DIAGNOSIS — E11.40 TYPE 2 DIABETES MELLITUS WITH DIABETIC NEUROPATHY, WITHOUT LONG-TERM CURRENT USE OF INSULIN (HCC): Primary | ICD-10-CM

## 2017-03-15 DIAGNOSIS — Z91.09 MULTIPLE ENVIRONMENTAL ALLERGIES: ICD-10-CM

## 2017-03-15 DIAGNOSIS — Z12.31 SCREENING MAMMOGRAM, ENCOUNTER FOR: ICD-10-CM

## 2017-03-15 NOTE — PATIENT INSTRUCTIONS
Your doctor has recommended that you have an eye exam done. You can contact one of the below offices to schedule your own appointment. Once you have the visit, please ask their office to send us a copy for your record here. Jono Mayorga                     [phone]  Dr. Homero Leal                          [phone]  Dr. Luke Ewing                           [phone]  [address]             [phone]         Mammogram: About This Test  What is it? A mammogram is an X-ray of the breast that is used to screen for breast cancer. This test can find tumors that are too small for you or your doctor to feel. Cancer is most easily treated and cured when it is found at an early stage. Why is this test done? A mammogram is done to:  · Look for breast cancer in women who don't have symptoms. · Find breast cancer in women who have symptoms. Symptoms of breast cancer may include a lump or thickening in the breast, nipple discharge, or dimpling of the skin on one area of the breast.  · Find an area of suspicious breast tissue to remove for an exam under a microscope (biopsy). How can you prepare for the test?  · Tell your doctor if you:  ¨ Are or might be pregnant. ¨ Are breastfeeding. ¨ Have breast implants. ¨ Have previously had a breast biopsy. · On the day of the test, don't use any deodorant, perfume, powders, or ointments. What happens before the test?  · You will need to take off any jewelry that might interfere with the X-ray pictures. · You will need to take off your clothes above the waist.  · You will be given a cloth or paper gown to use during the test.  What happens during the test?  · You usually stand during a mammogram.  · One at a time, your breasts will be placed on a flat plate that contains the X-ray film.   · Another plate is then pressed firmly against your breast to help flatten out the breast tissue. You may be asked to lift your arm. · For a few seconds while the X-ray picture is being taken, you will need to hold your breath. · At least two pictures are taken of each breast. One is taken from the top and one from the side. What else should you know about the test?  · The X-ray plate will feel cold when you place your breast on it. Having your breasts flattened and squeezed isn't comfortable. But it is necessary to flatten out the breast tissue to get the best pictures. · Mammograms do not prevent breast cancer or reduce a woman's risk of developing cancer. · Most things that are found during a mammogram are not breast cancer. How long does the test take? · The test will take about 10 to 15 minutes. You may be in the clinic for up to an hour. What happens after the test?  · You will probably be able to go home right away. · You can go back to your usual activities right away. Follow-up care is a key part of your treatment and safety. Be sure to make and go to all appointments, and call your doctor if you are having problems. It's also a good idea to keep a list of the medicines you take. Ask your doctor when you can expect to have your test results. Where can you learn more? Go to http://carolina-raiza.info/. Enter C948 in the search box to learn more about \"Mammogram: About This Test.\"  Current as of: July 26, 2016  Content Version: 11.1  © 2764-0720 [employer]. Care instructions adapted under license by [employer] (which disclaims liability or warranty for this information). If you have questions about a medical condition or this instruction, always ask your healthcare professional. [place] [address] any warranty or liability for your use of this information.

## 2017-03-15 NOTE — PROGRESS NOTES
Reviewed record in preparation for visit and have necessary documentation  Pt did not bring medication to office visit for review    Goals that were addressed and/or need to be completed during or after this appointment include   Health Maintenance Due   Topic Date Due    Pneumococcal 19-64 Medium Risk (1 of 1 - PPSV23) 09/26/1990    EYE EXAM RETINAL OR DILATED Q1  03/10/2016    BREAST CANCER SCRN MAMMOGRAM  06/02/2016

## 2017-03-15 NOTE — LETTER
3/15/2017 10:28 AM 
 
Ms. Trell Mendoza [address] [facility] [address] [id number] Dear Trell Mendoza: 
 
Please find your most recent results below. Resulted Orders METABOLIC PANEL, COMPREHENSIVE Result Value Ref Range Glucose 231 (H) 65 - 99 mg/dL BUN 9 6 - 24 mg/dL Creatinine 0.71 0.57 - 1.00 mg/dL GFR est non- >59 mL/min/1.73 GFR est  >59 mL/min/1.73  
 BUN/Creatinine ratio 13 9 - 23 Sodium 138 134 - 144 mmol/L Potassium 4.8 3.5 - 5.2 mmol/L Chloride 97 96 - 106 mmol/L  
 CO2 26 18 - 29 mmol/L Calcium 9.4 8.7 - 10.2 mg/dL Protein, total 7.0 6.0 - 8.5 g/dL Albumin 4.2 3.5 - 5.5 g/dL GLOBULIN, TOTAL 2.8 1.5 - 4.5 g/dL A-G Ratio 1.5 1.1 - 2.5 Comment: **Effective March 13, 2017 the reference interval** 
  for A/G Ratio will be changing to: Age                Male          Female 0 -  7 days       1.1 - 2.3       1.1 - 2.3 
          8 - 30 days       1.2 - 2.8       1.2 - 2.8 
          1 -  6 months     1.3 - 3.6       1.3 - 3.6 
   7 months -  5 years      1.5 - 2.6       1.5 - 2.6 
             > 5 years      1.2 - 2.2       1.2 - 2.2 Bilirubin, total 0.4 0.0 - 1.2 mg/dL Alk. phosphatase 107 39 - 117 IU/L  
 AST (SGOT) 10 0 - 40 IU/L  
 ALT (SGPT) 19 0 - 32 IU/L Narrative Performed at:  [address]  [id number] : Brayden Smyth MD, Phone:  [phone] LIPID PANEL Result Value Ref Range Cholesterol, total 194 100 - 199 mg/dL Triglyceride 138 0 - 149 mg/dL HDL Cholesterol 30 (L) >39 mg/dL VLDL, calculated 28 5 - 40 mg/dL LDL, calculated 136 (H) 0 - 99 mg/dL Narrative Performed at:  [address]  [id number] : Brayden Smyth MD, Phone:  [phone] HEMOGLOBIN A1C WITH EAG Result Value Ref Range Hemoglobin A1c 7.5 (H) 4.8 - 5.6 % Comment: Pre-diabetes: 5.7 - 6.4 Diabetes: >6.4 Glycemic control for adults with diabetes: <7.0 Estimated average glucose 169 mg/dL Narrative Performed at:  [address]  [id number] : Jagruti Malone MD, Phone:  [phone] CBC W/O DIFF Result Value Ref Range WBC 7.0 3.4 - 10.8 x10E3/uL  
 RBC 4.58 3.77 - 5.28 x10E6/uL HGB 12.7 11.1 - 15.9 g/dL HCT 38.6 34.0 - 46.6 % MCV 84 79 - 97 fL  
 MCH 27.7 26.6 - 33.0 pg  
 MCHC 32.9 31.5 - 35.7 g/dL  
 RDW 14.8 12.3 - 15.4 % PLATELET 472 681 - 190 x10E3/uL Narrative Performed at:  [address]  [id number] : Jagruti Malone MD, Phone:  [phone] TSH 3RD GENERATION Result Value Ref Range TSH 1.140 0.450 - 4.500 uIU/mL Narrative Performed at:  [address]  [id number] : Jagruti Malone MD, Phone:  [phone] Please call me if you have any questions: [phone] Sincerely, Alicja Cleveland MD

## 2017-03-15 NOTE — MR AVS SNAPSHOT
Visit Information Date & Time Provider Department Dept. Phone Encounter #  
 3/15/2017  9:40 AM Alicja Cleveland MD 7 [facility] [id number] Your Appointments 3/22/2017  2:40 PM  
Follow Up with YADIRA Branch [facility] (ELLIE SCHEDULING) Appt Note: return in 2 months [address] [id number]  
[phone]  
  
    
 4/4/2017  9:00 AM  
ROUTINE CARE with Alicja Cleveland MD  
[address] [place] [address]) Appt Note: allergy injection [address]  
  
   
 [address] [address]  
  
    
 4/25/2017 10:20 AM  
ROUTINE CARE with Alicja Cleveland MD  
[address] [place] [address]) Appt Note: allergy injection [address] [address]  
[phone] 5/16/2017 10:20 AM  
ROUTINE CARE with Alicja Cleveland MD  
[address] [place] [address]) Appt Note: allergy injection [address]  
[phone]  
  
    
 6/6/2017 10:00 AM  
ROUTINE CARE with Alicja Cleveland MD  
[address] [place] [address]) Appt Note: allergy injection [address] [address]  
[phone]  
  
    
 11/20/2017 10:00 AM  
Any with Elizabeth Plunkett MD  
[address] [facility] ([facility]) Appt Note: yearly follow up, bring machine. [address] [id number] [address] [facility] [address] Upcoming Health Maintenance Date Due Pneumococcal 19-64 Medium Risk (1 of 1 - PPSV23) 9/26/1990 EYE EXAM RETINAL OR DILATED Q1 3/10/2016 BREAST CANCER SCRN MAMMOGRAM 6/2/2016 HEMOGLOBIN A1C Q6M 9/10/2017 FOOT EXAM Q1 10/4/2017 MICROALBUMIN Q1 10/4/2017 LIPID PANEL Q1 3/10/2018 DTaP/Tdap/Td series (2 - Td) 9/26/2026 Allergies as of 3/15/2017  Review Complete On: 3/15/2017 By: Carlos Brown MD  
  
 Severity Noted Reaction Type Reactions Esomeprazole Magnesium  10/19/2010    Hives Nexium [Esomeprazole Magnesium]  06/11/2012    Unknown (comments) Pcn [Penicillins]  06/11/2012    Unknown (comments) Current Immunizations  Reviewed on 10/4/2016 Name Date Influenza Vaccine 9/26/2016, 10/20/2015, 10/9/2014, 10/1/2013 Tdap 9/26/2016 Not reviewed this visit You Were Diagnosed With   
  
 Codes Comments Type 2 diabetes mellitus with diabetic neuropathy, without long-term current use of insulin (HCC)    -  Primary ICD-10-CM: E11.40 ICD-9-CM: 250.60, 357.2 Multiple environmental allergies     ICD-10-CM: Z91.09 
ICD-9-CM: V15.09 Screening mammogram, encounter for     ICD-10-CM: Z12.31 
ICD-9-CM: V76.12 Vitals BP Pulse Temp Resp Height(growth percentile) Weight(growth percentile) 108/68 (BP 1 Location: Right arm, BP Patient Position: Sitting) 77 98.1 °F (36.7 °C) (Oral) 16 5' 8\" (1.727 m) 193 lb (87.5 kg) SpO2 BMI OB Status Smoking Status 97% 29.35 kg/m2 Hysterectomy Former Smoker Vitals History BMI and BSA Data Body Mass Index Body Surface Area  
 29.35 kg/m 2 2.05 m 2 Preferred Pharmacy Pharmacy Name Phone [address] [address] [phone] Your Updated Medication List  
  
   
This list is accurate as of: 3/15/17 10:31 AM.  Always use your most recent med list.  
  
  
  
  
 albuterol 90 mcg/actuation inhaler Commonly known as:  PROAIR HFA Take 2 Puffs by inhalation every four (4) hours as needed for Wheezing. Take 2 Puffs inhaled by mouth Every 4 Hours. * allergy injection Set c Left arm * allergy injection Set B Left arm * allergy injection Set A Right arm Blood-Glucose Meter monitoring kit Commonly known as:  FREESTYLE FREEDOM LITE Check Blood sugar once daily  
  
 cyclobenzaprine 10 mg tablet Commonly known as:  FLEXERIL Take 1 Tab by mouth three (3) times daily as needed for Muscle Spasm(s) for up to 30 days. As needed  Indications: MUSCLE SPASM Dexlansoprazole 60 mg Cpdb Commonly known as:  DEXILANT TAKE ONE CAPSULE BY MOUTH EVERY DAY  
  
 dulaglutide 0.75 mg/0.5 mL sub-q pen Commonly known as:  TRULICITY  
0.5 mL by SubCUTAneous route every seven (7) days. EPINEPHrine 0.3 mg/0.3 mL injection Commonly known as:  EPIPEN  
0.3 mL by IntraMUSCular route once as needed for 1 dose. fenofibrate nanocrystallized 145 mg tablet Commonly known as:  [employer] Take 1 Tab by mouth daily for 360 days. Indications: HYPERTRIGLYCERIDEMIA  
  
 fentaNYL 50 mcg/hr PATCH Commonly known as:  DURAGESIC  
1 Patch by TransDERmal route every fourty-eight (48) hours. Max Daily Amount: 1 Patch. For chronic pain (due to fill 2/24/17) [employer] brand only  
  
 fluocinoNIDE 0.05 % topical cream  
Commonly known as:  LIDEX Apply  to affected area two (2) times a day. fluticasone 50 mcg/actuation nasal spray Commonly known as:  Kimberly Reges Apply one-two sprays to skin prior to patch change for skin irritation. fluticasone-salmeterol 250-50 mcg/dose diskus inhaler Commonly known as:  ADVAIR DISKUS Take 1 Puff by inhalation every twelve (12) hours. glimepiride 4 mg tablet Commonly known as:  AMARYL  
TAKE ONE TABLET BY MOUTH EVERY MORNING. glucose blood VI test strips strip Commonly known as:  ON CALL EXPRESS TEST STRIP Test blood sugar twice daily DX: E11.9  
  
 guaiFENesin  mg ER tablet Commonly known as:  [employer] Take 1 Tab by mouth two (2) times a day.  
  
 ketoconazole 2 % shampoo Commonly known as:  NIZORAL Apply to scalp and rinse daily, prn Lancets Misc Test Blood Sugar twice daily. DX Code: E11.9  
  
 lidocaine 2 % solution Commonly known as:  XYLOCAINE Take 5 mL by mouth as needed for Pain. LORazepam 1 mg tablet Commonly known as:  ATIVAN  
  
 naproxen 500 mg tablet Commonly known as:  NAPROSYN  
TAKE ONE TABLET BY MOUTH TWICE A DAY WITH FOOD PERCOCET  mg per tablet Generic drug:  oxyCODONE-acetaminophen Take 1 Tab by mouth.  
  
 polyethylene glycol 17 gram/dose powder Commonly known as:  MIRALAX  
  
 pravastatin 40 mg tablet Commonly known as:  PRAVACHOL Take 1 Tab by mouth nightly for 360 days. raNITIdine 150 mg tablet Commonly known as:  ZANTAC TAKE ONE TABLET BY MOUTH TWICE DAILY  
  
 rizatriptan 10 mg tablet Commonly known as:  Micheal Em TAKE ONE TABLET BY MOUTH ONCE AS NEEDED MAY REPEAT IN 2 HOURS IF NEEDED  
  
 topiramate 25 mg tablet Commonly known as:  TOPAMAX TAKE ONE TABLET BY MOUTH AT BEDTIME  
  
 * Notice: This list has 3 medication(s) that are the same as other medications prescribed for you. Read the directions carefully, and ask your doctor or other care provider to review them with you. Prescriptions Sent to Pharmacy Refills  
 dulaglutide (TRULICITY) 8.48 LR/0.6 mL sub-q pen 0 Si.5 mL by SubCUTAneous route every seven (7) days. Class: Normal  
 Pharmacy: [address] [place] [address] #: [phone] Route: SubCUTAneous We Performed the Following IMMUNOTHERAPY, 2+ INJECTIONS [id number] CPT(R)] REFERRAL TO OPTOMETRY [id number] Custom] Comments:  
 Patient with DM2 in need of eye exam. 
Patient will make appointment. Mitch. To-Do List   
 03/15/2017 Imaging:  ESSENCE MAMMO BI SCREENING INCL CAD Referral Information Referral ID Referred By Referred To  
  
 [id number] Erika VELASQUEZ Not Available Visits Status Start Date End Date 1 New Request 3/15/17 3/15/18 If your referral has a status of pending review or denied, additional information will be sent to support the outcome of this decision. Patient Instructions Your doctor has recommended that you have an eye exam done. You can contact one of the below offices to schedule your own appointment. Once you have the visit, please ask their office to send us a copy for your record here. [address] [facility]                     [phone] Dr. Farida Bunn                          [phone] Dr. Mireille Wade                           [phone] [address] [place] [address]                               [phone] [address] [place] [address] Physicians             [phone] Mammogram: About This Test 
What is it? A mammogram is an X-ray of the breast that is used to screen for breast cancer. This test can find tumors that are too small for you or your doctor to feel. Cancer is most easily treated and cured when it is found at an early stage. Why is this test done? A mammogram is done to: 
· Look for breast cancer in women who don't have symptoms. · Find breast cancer in women who have symptoms. Symptoms of breast cancer may include a lump or thickening in the breast, nipple discharge, or dimpling of the skin on one area of the breast. 
· Find an area of suspicious breast tissue to remove for an exam under a microscope (biopsy). How can you prepare for the test? 
· Tell your doctor if you: ¨ Are or might be pregnant. ¨ Are breastfeeding. ¨ Have breast implants. ¨ Have previously had a breast biopsy. · On the day of the test, don't use any deodorant, perfume, powders, or ointments. What happens before the test? 
· You will need to take off any jewelry that might interfere with the X-ray pictures.  
· You will need to take off your clothes above the waist. 
· You will be given a cloth or paper gown to use during the test. 
 What happens during the test? 
· You usually stand during a mammogram. 
· One at a time, your breasts will be placed on a flat plate that contains the X-ray film. · Another plate is then pressed firmly against your breast to help flatten out the breast tissue. You may be asked to lift your arm. · For a few seconds while the X-ray picture is being taken, you will need to hold your breath. · At least two pictures are taken of each breast. One is taken from the top and one from the side. What else should you know about the test? 
· The X-ray plate will feel cold when you place your breast on it. Having your breasts flattened and squeezed isn't comfortable. But it is necessary to flatten out the breast tissue to get the best pictures. · Mammograms do not prevent breast cancer or reduce a woman's risk of developing cancer. · Most things that are found during a mammogram are not breast cancer. How long does the test take? · The test will take about 10 to 15 minutes. You may be in the clinic for up to an hour. What happens after the test? 
· You will probably be able to go home right away. · You can go back to your usual activities right away. Follow-up care is a key part of your treatment and safety. Be sure to make and go to all appointments, and call your doctor if you are having problems. It's also a good idea to keep a list of the medicines you take. Ask your doctor when you can expect to have your test results. Where can you learn more? Go to http://carolina-raiza.info/. Enter N283 in the search box to learn more about \"Mammogram: About This Test.\" Current as of: July 26, 2016 Content Version: 11.1 © 0655-6559 [employer]. Care instructions adapted under license by [employer] (which disclaims liability or warranty for this information).  If you have questions about a medical condition or this instruction, always ask your healthcare professional. [employer] disclaims any warranty or liability for your use of this information. Introducing [facility]! [facility] introduces The Smartphone Physical patient portal. Now you can access parts of your medical record, email your doctor's office, and request medication refills online. 1. In your internet browser, go to [email or web address] 2. Click on the First Time User? Click Here link in the Sign In box. You will see the New Member Sign Up page. 3. Enter your The Smartphone Physical Access Code exactly as it appears below. You will not need to use this code after youve completed the sign-up process. If you do not sign up before the expiration date, you must request a new code. · The Smartphone Physical Access Code: [email or web address] Expires: 6/8/2017 10:12 AM 
 
4. Enter the last four digits of your Social Security Number (xxxx) and Date of Birth (mm/dd/yyyy) as indicated and click Submit. You will be taken to the next sign-up page. 5. Create a The Smartphone Physical ID. This will be your The Smartphone Physical login ID and cannot be changed, so think of one that is secure and easy to remember. 6. Create a The Smartphone Physical password. You can change your password at any time. 7. Enter your Password Reset Question and Answer. This can be used at a later time if you forget your password. 8. Enter your e-mail address. You will receive e-mail notification when new information is available in [address] 9. Click Sign Up. You can now view and download portions of your medical record. 10. Click the Download Summary menu link to download a portable copy of your medical information. If you have questions, please visit the Frequently Asked Questions section of the The Smartphone Physical website. Remember, The Smartphone Physical is NOT to be used for urgent needs. For medical emergencies, dial 911. Now available from your iPhone and Android! Please provide this summary of care documentation to your next provider. Your primary care clinician is listed as Alicja Cleveland. If you have any questions after today's visit, please call [phone].

## 2017-03-17 NOTE — PROGRESS NOTES
Chief Complaint   Patient presents with    Medication Refill     also needs cream and shampoo, not on med list    Request For New Medication     Trulicity     she is a 40y.o. year old female who presents for allergy injection. Patient with multiple co-morbidities. Patient with hx of DM2, HTN, HLD, migraine, RLS and chronic pain. She is due for lab work. Patient feeling good sans other complaints or concerns at this time. Overall the patient feels well with good energy level. Insulin dependence: no   Pertinent Labs:     Lab Results   Component Value Date/Time    Hemoglobin A1c 7.5 03/10/2017 09:37 AM         Body mass index is 28.89 kg/(m^2). Lab Results  Component Value Date/Time   Cholesterol, total 194 03/10/2017 09:37 AM   HDL Cholesterol 30 03/10/2017 09:37 AM   LDL, calculated 136 03/10/2017 09:37 AM   Triglyceride 138 03/10/2017 09:37 AM         Wt Readings from Last 3 Encounters:   03/10/17 190 lb (86.2 kg)   02/22/17 189 lb 3.2 oz (85.8 kg)        Medications, diet and exercise as means of diabetic control with a goal of an A1C of less than 7.0% discussed. Diabetic foot care and annual eye exam discussed as well. Check blood sugars while fasting just before breakfast on most days and occasionally before dinner. Write down readings in a diabetic log book and bring them to the next visit. Call the office for fasting sugars over 200 or below 75 on two or more occasions. Call immediately if having symptoms of high sugar (frequent urination, always thirsty) or low sugar (dizzy, lethargic, sweaty, nauseated, headache). Our overall goal is to reduce or eliminate the long term consequences of poorly controlled diabetes. Patient expresses understanding and agreement with our plan of care.     Patient Active Problem List   Diagnosis Code    Obesity E66.9    Diabetes mellitus ([facility] [address] E11.9    Hypertension I10    Fibromyalgia syndrome M79.7    Restless leg syndrome G25.81    Chronic generalized pain disorder R52, G89.29    Encounter for long-term (current) use of high-risk medication Z79.899    Headache R51    Persistent disorder of initiating or maintaining sleep G47.00    Migraine without aura G43.009    Musculoskeletal pain M79.1    Multiple environmental allergies Z91.09     Past Surgical History:   Procedure Laterality Date    HX GYN      Partial Hysterectomy    HX HEENT      HX TUBAL LIGATION       Social History     Social History    Marital status: SINGLE     Spouse name: N/A    Number of children: N/A    Years of education: N/A     Occupational History    Not on file. Social History Main Topics    Smoking status: Former Smoker    Smokeless tobacco: Never Used      Comment: quit in 2010    Alcohol use No    Drug use: No    Sexual activity: Not on file     Other Topics Concern    Not on file     Social History Narrative     Family History   Problem Relation Age of Onset    Alcohol abuse Father     Diabetes Mother     Hypertension Mother     Heart Disease Mother     Lupus Mother     Sleep Apnea Mother     Depression Mother     Cancer Maternal Aunt     Arthritis-osteo Other     Asthma Other     Diabetes Other     Elevated Lipids Other     Headache Other     Heart Disease Other     Hypertension Other     Migraines Other     Stroke Other     Bleeding Prob Neg Hx     Lung Disease Neg Hx     Psychiatric Disorder Neg Hx     Mental Retardation Neg Hx      Current Outpatient Prescriptions   Medication Sig    oxyCODONE-acetaminophen (PERCOCET)  mg per tablet Take 1 Tab by mouth.  pravastatin (PRAVACHOL) 40 mg tablet Take 1 Tab by mouth nightly for 360 days.  fenofibrate nanocrystallized (TRICOR) 145 mg tablet Take 1 Tab by mouth daily for 360 days.  Indications: HYPERTRIGLYCERIDEMIA    Dexlansoprazole (DEXILANT) 60 mg CpDB TAKE ONE CAPSULE BY MOUTH EVERY DAY    topiramate (TOPAMAX) 25 mg tablet TAKE ONE TABLET BY MOUTH AT BEDTIME    raNITIdine (ZANTAC) 150 mg tablet TAKE ONE TABLET BY MOUTH TWICE DAILY    fluticasone-salmeterol (ADVAIR DISKUS) 250-50 mcg/dose diskus inhaler Take 1 Puff by inhalation every twelve (12) hours.  albuterol (PROAIR HFA) 90 mcg/actuation inhaler Take 2 Puffs by inhalation every four (4) hours as needed for Wheezing. Take 2 Puffs inhaled by mouth Every 4 Hours.  rizatriptan (MAXALT) 10 mg tablet TAKE ONE TABLET BY MOUTH ONCE AS NEEDED MAY REPEAT IN 2 HOURS IF NEEDED    fluticasone (FLONASE) 50 mcg/actuation nasal spray Apply one-two sprays to skin prior to patch change for skin irritation.  ketoconazole (NIZORAL) 2 % shampoo Apply to scalp and rinse daily, prn    fluocinoNIDE (LIDEX) 0.05 % topical cream Apply  to affected area two (2) times a day.  fentaNYL (DURAGESIC) 50 mcg/hr PATCH 1 Patch by TransDERmal route every fourty-eight (48) hours. Max Daily Amount: 1 Patch. For chronic pain (due to fill 2/24/17) [employer] brand only    glucose blood VI test strips (ON CALL EXPRESS TEST STRIP) strip Test blood sugar twice daily DX: E11.9    Lancets misc Test Blood Sugar twice daily. DX Code: E11.9    glimepiride (AMARYL) 4 mg tablet TAKE ONE TABLET BY MOUTH EVERY MORNING.  polyethylene glycol (MIRALAX) 17 gram/dose powder     naproxen (NAPROSYN) 500 mg tablet TAKE ONE TABLET BY MOUTH TWICE A DAY WITH FOOD    lidocaine (XYLOCAINE) 2 % solution Take 5 mL by mouth as needed for Pain.  Blood-Glucose Meter (FREESTYLE FREEDOM LITE) monitoring kit Check Blood sugar once daily    guaiFENesin ER (MUCINEX) 600 mg ER tablet Take 1 Tab by mouth two (2) times a day.  EPINEPHrine (EPIPEN) 0.3 mg/0.3 mL (1:1,000) injection 0.3 mL by IntraMUSCular route once as needed for 1 dose.  allergy injection Set c Left arm    allergy injection Set B Left arm    allergy injection Set A Right arm    dulaglutide (TRULICITY) 4.76 CD/6.7 mL sub-q pen 0.5 mL by SubCUTAneous route every seven (7) days.     LORazepam (ATIVAN) 1 mg tablet     cyclobenzaprine (FLEXERIL) 10 mg tablet Take 1 Tab by mouth three (3) times daily as needed for Muscle Spasm(s) for up to 30 days. As needed  Indications: MUSCLE SPASM     No current facility-administered medications for this visit. Allergies   Allergen Reactions    Esomeprazole Magnesium Hives    Nexium [Esomeprazole Magnesium] Unknown (comments)    Pcn [Penicillins] Unknown (comments)       Review of Systems:  Constitutional: Negative for fatigue, malaise  Resp: Negative for cough, wheezing or SOB  CV: Negative for chest pain, dizziness or palpitations  GI: Negative for nausea or abdominal pain  MS: Negative myalgias or arthralgias   Neuro: Negative for HA, weakness or paresthesia  Psych: Negative for depression or anxiety     Vitals:    03/10/17 0821   BP: 103/63   Pulse: 83   Resp: 16   Temp: 98.3 °F (36.8 °C)   TempSrc: Oral   SpO2: 96%   Weight: 190 lb (86.2 kg)   Height: 5' 8\" (1.727 m)       Physical Examination:  General: Well developed, well nourished, in no acute distress  Head: Normocephalic, atraumatic  Eyes: Sclera clear, EOMI  Neck: Normal range of motion  Respiratory: symmetrical, unlabored effort  Cardiovascular: Regular rate and rhythm  Extremities: Full range of motion, normal gait  Neurologic: No focal deficits  Psych: Active, alert and oriented. Affect appropriate     Max Ashton was seen today for medication refill and request for new medication. Diagnoses and all orders for this visit:    Type 2 diabetes mellitus with diabetic neuropathy, without long-term current use of insulin (HCC)  -     METABOLIC PANEL, COMPREHENSIVE  -     LIPID PANEL  -     HEMOGLOBIN A1C WITH EAG  -     CBC W/O DIFF  -     TSH 3RD GENERATION    Mixed hyperlipidemia  -     pravastatin (PRAVACHOL) 40 mg tablet; Take 1 Tab by mouth nightly for 360 days. -     fenofibrate nanocrystallized (TRICOR) 145 mg tablet; Take 1 Tab by mouth daily for 360 days.  Indications: HYPERTRIGLYCERIDEMIA  -     METABOLIC PANEL, COMPREHENSIVE  -     LIPID PANEL    Mild persistent asthma without complication  -     fluticasone-salmeterol (ADVAIR DISKUS) 250-50 mcg/dose diskus inhaler; Take 1 Puff by inhalation every twelve (12) hours. -     albuterol (PROAIR HFA) 90 mcg/actuation inhaler; Take 2 Puffs by inhalation every four (4) hours as needed for Wheezing. Take 2 Puffs inhaled by mouth Every 4 Hours. -     CBC W/O DIFF    Intractable migraine without aura and without status migrainosus  -     topiramate (TOPAMAX) 25 mg tablet; TAKE ONE TABLET BY MOUTH AT BEDTIME  -     rizatriptan (MAXALT) 10 mg tablet; TAKE ONE TABLET BY MOUTH ONCE AS NEEDED MAY REPEAT IN 2 HOURS IF NEEDED    Gastroesophageal reflux disease without esophagitis  -     Dexlansoprazole (DEXILANT) 60 mg CpDB; TAKE ONE CAPSULE BY MOUTH EVERY DAY  -     raNITIdine (ZANTAC) 150 mg tablet; TAKE ONE TABLET BY MOUTH TWICE DAILY  -     CBC W/O DIFF  -     REFERRAL TO GASTROENTEROLOGY    Dyspepsia  -     REFERRAL TO GASTROENTEROLOGY    Musculoskeletal pain    Multiple environmental allergies  -     fluticasone (FLONASE) 50 mcg/actuation nasal spray; Apply one-two sprays to skin prior to patch change for skin irritation. Other orders  -     ketoconazole (NIZORAL) 2 % shampoo; Apply to scalp and rinse daily, prn  -     fluocinoNIDE (LIDEX) 0.05 % topical cream; Apply  to affected area two (2) times a day. I have discussed the diagnosis with the patient and the intended plan as seen in the above orders. The patient expresses understanding and agreement with our plan of care. The patient has received an after-visit summary. The patient knows to call our office if there are any questions or concerns regarding diagnosis and treatment plans. I have discussed medication side effects and warnings with the patient as well. Follow-up Disposition:  Return in about 1 week (around 3/17/2017), or if symptoms worsen or fail to improve.

## 2017-03-21 NOTE — PROGRESS NOTES
Chief Complaint   Patient presents with    Allergy Injection    Results     she is a 40y.o. year old female who presents for allergy injection. Patient with multiple co-morbidities. Patient with hx of DM2, HTN, HLD, migraine, RLS and chronic pain. She is wanting to try GLP-1 agonist for her diabetes. Patient feeling good sans other complaints or concerns at this time. Overall the patient feels well with good energy level. Insulin dependence: no   Pertinent Labs:     Lab Results   Component Value Date/Time    Hemoglobin A1c 7.5 03/10/2017 09:37 AM         Body mass index is 29.35 kg/(m^2). Lab Results  Component Value Date/Time   Cholesterol, total 194 03/10/2017 09:37 AM   HDL Cholesterol 30 03/10/2017 09:37 AM   LDL, calculated 136 03/10/2017 09:37 AM   Triglyceride 138 03/10/2017 09:37 AM         Wt Readings from Last 3 Encounters:   03/10/17 190 lb (86.2 kg)   02/22/17 189 lb 3.2 oz (85.8 kg)        Medications, diet and exercise as means of diabetic control with a goal of an A1C of less than 7.0% discussed. Diabetic foot care and annual eye exam discussed as well. Check blood sugars while fasting just before breakfast on most days and occasionally before dinner. Write down readings in a diabetic log book and bring them to the next visit. Call the office for fasting sugars over 200 or below 75 on two or more occasions. Call immediately if having symptoms of high sugar (frequent urination, always thirsty) or low sugar (dizzy, lethargic, sweaty, nauseated, headache). Our overall goal is to reduce or eliminate the long term consequences of poorly controlled diabetes. Patient expresses understanding and agreement with our plan of care.     Patient Active Problem List   Diagnosis Code    Obesity E66.9    Diabetes mellitus ([facility] [address] E11.9    Hypertension I10    Fibromyalgia syndrome M79.7    Restless leg syndrome G25.81    Chronic generalized pain disorder R52, Y60.13    Encounter for long-term (current) use of high-risk medication Z79.899    Headache R51    Persistent disorder of initiating or maintaining sleep G47.00    Migraine without aura G43.009    Musculoskeletal pain M79.1    Multiple environmental allergies Z91.09     Past Surgical History:   Procedure Laterality Date    HX GYN      Partial Hysterectomy    HX HEENT      HX TUBAL LIGATION       Social History     Social History    Marital status: SINGLE     Spouse name: N/A    Number of children: N/A    Years of education: N/A     Occupational History    Not on file. Social History Main Topics    Smoking status: Former Smoker    Smokeless tobacco: Never Used      Comment: quit in 2010    Alcohol use No    Drug use: No    Sexual activity: Not on file     Other Topics Concern    Not on file     Social History Narrative     Family History   Problem Relation Age of Onset    Alcohol abuse Father     Diabetes Mother     Hypertension Mother     Heart Disease Mother     Lupus Mother     Sleep Apnea Mother     Depression Mother     Cancer Maternal Aunt     Arthritis-osteo Other     Asthma Other     Diabetes Other     Elevated Lipids Other     Headache Other     Heart Disease Other     Hypertension Other     Migraines Other     Stroke Other     Bleeding Prob Neg Hx     Lung Disease Neg Hx     Psychiatric Disorder Neg Hx     Mental Retardation Neg Hx      Current Outpatient Prescriptions   Medication Sig    allergy injection Set c Left arm    allergy injection Set B Left arm    allergy injection Set A Right arm    dulaglutide (TRULICITY) 4.90 NM/9.1 mL sub-q pen 0.5 mL by SubCUTAneous route every seven (7) days.  oxyCODONE-acetaminophen (PERCOCET)  mg per tablet Take 1 Tab by mouth.  pravastatin (PRAVACHOL) 40 mg tablet Take 1 Tab by mouth nightly for 360 days.  fenofibrate nanocrystallized (TRICOR) 145 mg tablet Take 1 Tab by mouth daily for 360 days.  Indications: HYPERTRIGLYCERIDEMIA    Dexlansoprazole (DEXILANT) 60 mg CpDB TAKE ONE CAPSULE BY MOUTH EVERY DAY    topiramate (TOPAMAX) 25 mg tablet TAKE ONE TABLET BY MOUTH AT BEDTIME    raNITIdine (ZANTAC) 150 mg tablet TAKE ONE TABLET BY MOUTH TWICE DAILY    fluticasone-salmeterol (ADVAIR DISKUS) 250-50 mcg/dose diskus inhaler Take 1 Puff by inhalation every twelve (12) hours.  albuterol (PROAIR HFA) 90 mcg/actuation inhaler Take 2 Puffs by inhalation every four (4) hours as needed for Wheezing. Take 2 Puffs inhaled by mouth Every 4 Hours.  rizatriptan (MAXALT) 10 mg tablet TAKE ONE TABLET BY MOUTH ONCE AS NEEDED MAY REPEAT IN 2 HOURS IF NEEDED    fluticasone (FLONASE) 50 mcg/actuation nasal spray Apply one-two sprays to skin prior to patch change for skin irritation.  ketoconazole (NIZORAL) 2 % shampoo Apply to scalp and rinse daily, prn    fluocinoNIDE (LIDEX) 0.05 % topical cream Apply  to affected area two (2) times a day.  LORazepam (ATIVAN) 1 mg tablet     fentaNYL (DURAGESIC) 50 mcg/hr PATCH 1 Patch by TransDERmal route every fourty-eight (48) hours. Max Daily Amount: 1 Patch. For chronic pain (due to fill 2/24/17) [employer] brand only    glucose blood VI test strips (ON CALL EXPRESS TEST STRIP) strip Test blood sugar twice daily DX: E11.9    Lancets misc Test Blood Sugar twice daily. DX Code: E11.9    glimepiride (AMARYL) 4 mg tablet TAKE ONE TABLET BY MOUTH EVERY MORNING.  polyethylene glycol (MIRALAX) 17 gram/dose powder     naproxen (NAPROSYN) 500 mg tablet TAKE ONE TABLET BY MOUTH TWICE A DAY WITH FOOD    lidocaine (XYLOCAINE) 2 % solution Take 5 mL by mouth as needed for Pain.  Blood-Glucose Meter (FREESTYLE FREEDOM LITE) monitoring kit Check Blood sugar once daily    guaiFENesin ER (MUCINEX) 600 mg ER tablet Take 1 Tab by mouth two (2) times a day.  EPINEPHrine (EPIPEN) 0.3 mg/0.3 mL (1:1,000) injection 0.3 mL by IntraMUSCular route once as needed for 1 dose.     cyclobenzaprine (FLEXERIL) 10 mg tablet Take 1 Tab by mouth three (3) times daily as needed for Muscle Spasm(s) for up to 30 days. As needed  Indications: MUSCLE SPASM     No current facility-administered medications for this visit. Allergies   Allergen Reactions    Esomeprazole Magnesium Hives    Nexium [Esomeprazole Magnesium] Unknown (comments)    Pcn [Penicillins] Unknown (comments)       Review of Systems:  Constitutional: Negative for fatigue, malaise  Resp: Negative for cough, wheezing or SOB  CV: Negative for chest pain, dizziness or palpitations  GI: Negative for nausea or abdominal pain  MS: Negative myalgias or arthralgias   Neuro: Negative for HA, weakness or paresthesia  Psych: Negative for depression or anxiety     Vitals:    03/15/17 0937   BP: 108/68   Pulse: 77   Resp: 16   Temp: 98.1 °F (36.7 °C)   TempSrc: Oral   SpO2: 97%   Weight: 193 lb (87.5 kg)   Height: 5' 8\" (1.727 m)       Physical Examination:  General: Well developed, well nourished, in no acute distress  Head: Normocephalic, atraumatic  Eyes: Sclera clear, EOMI  Neck: Normal range of motion  Respiratory: symmetrical, unlabored effort  Cardiovascular: Regular rate and rhythm  Extremities: Full range of motion, normal gait  Neurologic: No focal deficits  Psych: Active, alert and oriented. Affect appropriate     [facility] was seen today for medication refill and request for new medication. Diagnoses and all orders for this visit:    [facility] was seen today for allergy injection and results. Diagnoses and all orders for this visit:    Type 2 diabetes mellitus with diabetic neuropathy, without long-term current use of insulin ([facility])  -     REFERRAL TO OPTOMETRY  -     dulaglutide (TRULICITY) 3.13 OL/7.0 mL sub-q pen; 0.5 mL by SubCUTAneous route every seven (7) days. Multiple environmental allergies  -     allergy injection; Set c Left arm  -     allergy injection; Set B Left arm  -     allergy injection;  Set A Right arm  -     IMMUNOTHERAPY, 2+ INJECTIONS    Screening mammogram, encounter for  -     [facility] MAMMO BI SCREENING INCL CAD; Future    Other orders  -     Cancel: OK IMMUNOTHERAPY, 2+ INJECTIONS         I have discussed the diagnosis with the patient and the intended plan as seen in the above orders. The patient expresses understanding and agreement with our plan of care. The patient has received an after-visit summary. The patient knows to call our office if there are any questions or concerns regarding diagnosis and treatment plans. I have discussed medication side effects and warnings with the patient as well. Over half of the 25 minutes face to face with Messi Tristan consisted of counseling and discussing treatment plans in reference to her DM2 management and multiple co-morbidities.     Follow-up Disposition: Not on File

## 2017-03-22 ENCOUNTER — OFFICE VISIT (OUTPATIENT)
Dept: PAIN MANAGEMENT | Age: 47
End: 2017-03-22

## 2017-03-22 VITALS
SYSTOLIC BLOOD PRESSURE: 125 MMHG | WEIGHT: 193 LBS | BODY MASS INDEX: 29.35 KG/M2 | HEART RATE: 81 BPM | DIASTOLIC BLOOD PRESSURE: 74 MMHG

## 2017-03-22 DIAGNOSIS — M79.7 FIBROMYALGIA: ICD-10-CM

## 2017-03-22 DIAGNOSIS — G47.00 PERSISTENT DISORDER OF INITIATING OR MAINTAINING SLEEP: ICD-10-CM

## 2017-03-22 DIAGNOSIS — G89.4 CHRONIC PAIN SYNDROME: Primary | ICD-10-CM

## 2017-03-22 DIAGNOSIS — G63 POLYNEUROPATHY ASSOCIATED WITH UNDERLYING DISEASE (HCC): ICD-10-CM

## 2017-03-22 DIAGNOSIS — Z79.899 ENCOUNTER FOR LONG-TERM (CURRENT) USE OF MEDICATIONS: ICD-10-CM

## 2017-03-22 LAB
ALCOHOL UR POC: NORMAL
AMPHETAMINES UR POC: NEGATIVE
BARBITURATES UR POC: NEGATIVE
BENZODIAZEPINES UR POC: NEGATIVE
BUPRENORPHINE UR POC: NEGATIVE
CANNABINOIDS UR POC: NEGATIVE
CARISOPRODOL UR POC: NORMAL
COCAINE UR POC: NEGATIVE
FENTANYL UR POC: NORMAL
MDMA/ECSTASY UR POC: NEGATIVE
METHADONE UR POC: NEGATIVE
METHAMPHETAMINE UR POC: NEGATIVE
METHYLPHENIDATE UR POC: NORMAL
OPIATES UR POC: NEGATIVE
OXYCODONE UR POC: NORMAL
PHENCYCLIDINE UR POC: NEGATIVE
PROPOXYPHENE UR POC: NORMAL
TRAMADOL UR POC: NORMAL
TRICYCLICS UR POC: NORMAL

## 2017-03-22 RX ORDER — NALOXONE HYDROCHLORIDE 4 MG/.1ML
4 SPRAY NASAL AS NEEDED
Qty: 1 BOX | Refills: 1 | Status: SHIPPED | OUTPATIENT
Start: 2017-03-22

## 2017-03-22 RX ORDER — FENTANYL 50 UG/1
1 PATCH TRANSDERMAL
Qty: 15 PATCH | Refills: 0 | Status: SHIPPED | OUTPATIENT
Start: 2017-03-22 | End: 2017-05-17 | Stop reason: SDUPTHER

## 2017-03-22 RX ORDER — FENTANYL 50 UG/1
1 PATCH TRANSDERMAL
Qty: 15 PATCH | Refills: 0 | Status: SHIPPED | OUTPATIENT
Start: 2017-03-22 | End: 2017-09-05 | Stop reason: SDUPTHER

## 2017-03-22 RX ORDER — OXYCODONE AND ACETAMINOPHEN 10; 325 MG/1; MG/1
1 TABLET ORAL
Qty: 120 TAB | Refills: 0 | Status: SHIPPED | OUTPATIENT
Start: 2017-03-22 | End: 2017-04-21

## 2017-03-22 NOTE — PROGRESS NOTES
Nursing Notes    Patient presents to the office today in follow-up. Patient rates her pain at 5/10 on the numerical pain scale. Reviewed medications with counts as follows:    Rx Date filled Qty Dispensed Pill Count Last Dose Short   Fentanyl 50 2/27/17 15 2+1 on Placed 3/21/17 no   Percocet  2/27/17 120 62 3/21/17 no         Comments:     POC UDS was performed in office today    Any new labs or imaging since last appointment? YES, blood work for diabetic check    Have you been to an emergency room (ER) or urgent care clinic since your last visit? NO            Have you been hospitalized since your last visit? NO     If yes, where, when, and reason for visit? Have you seen or consulted any other health care providers outside of the [address] [place] [address]  since your last visit? NO     If yes, where, when, and reason for visit? Ms. Ludmila Spear has a reminder for a \"due or due soon\" health maintenance. I have asked that she contact her primary care provider for follow-up on this health maintenance.

## 2017-03-22 NOTE — PATIENT INSTRUCTIONS
1.  Continue medications as prescribed. 2.  Follow up in two months  3.   Naloxone nasal spray issued

## 2017-03-22 NOTE — MR AVS SNAPSHOT
Visit Information Date & Time Provider Department Dept. Phone Encounter #  
 3/22/2017  2:40 PM Nathan Blancas, [address] [place] [address] for Pain Management [phone] [id number] Follow-up Instructions Return in about 2 months (around 5/22/2017). Your Appointments 4/4/2017  9:00 AM  
ROUTINE CARE with Vicky eBaver MD  
[facility]) Appt Note: [facility] [address] [facility] [address]  
  
   
 [address] [facility] [address] [place] [id number]  
  
    
 4/25/2017 10:20 AM  
ROUTINE CARE with Vicky Beaver MD  
[address] [facility]) Appt Note: [facility] injection [address] [place] [address]  
[phone] 5/16/2017 10:20 AM  
ROUTINE CARE with Vicky Beaver MD  
[facility]) Appt Note: [facility] [address] [facility] [address] [facility]   
[phone]  
  
    
 6/6/2017 10:00 AM  
ROUTINE CARE with Vicky Beaver MD  
[address] [facility]) Appt Note: [facility] [address] [facility] [address] [place] [address]  
[phone]  
  
    
 11/20/2017 10:00 AM  
Any with Claudette Sensing, MD  
[address] ([facility]) Appt Note: yearly follow up, bring machine. [address] [id number] [address] [facility] [phone] Upcoming Health Maintenance Date Due Pneumococcal 19-64 Medium Risk (1 of 1 - PPSV23) 9/26/1990 EYE EXAM RETINAL OR DILATED Q1 3/10/2016 BREAST CANCER SCRN MAMMOGRAM 6/2/2016 HEMOGLOBIN A1C Q6M 9/10/2017 FOOT EXAM Q1 10/4/2017 MICROALBUMIN Q1 10/4/2017 LIPID PANEL Q1 3/10/2018 DTaP/Tdap/Td series (2 - Td) 9/26/2026 Allergies as of 3/22/2017  Review Complete On: 3/22/2017 By: YADIRA Kemp Severity Noted Reaction Type Reactions Esomeprazole Magnesium  10/19/2010    Hives Nexium [Esomeprazole Magnesium]  06/11/2012    Unknown (comments) Pcn [Penicillins]  06/11/2012    Unknown (comments) Current Immunizations  Reviewed on 10/4/2016 Name Date Influenza Vaccine 9/26/2016, 10/20/2015, 10/9/2014, 10/1/2013 Tdap 9/26/2016 Not reviewed this visit You Were Diagnosed With   
  
 Codes Comments Chronic pain syndrome    -  Primary ICD-10-CM: G89.4 ICD-9-CM: 338.4 Encounter for long-term (current) use of medications     ICD-10-CM: Z79.899 ICD-9-CM: V58.69 Chronic migraine     ICD-10-CM: Y81.378 ICD-9-CM: 346.70 Persistent disorder of initiating or maintaining sleep     ICD-10-CM: G47.00 ICD-9-CM: 307.42 Fibromyalgia     ICD-10-CM: M79.7 ICD-9-CM: 729.1 Polyneuropathy associated with underlying disease ([facility] [address]     ICD-10-CM: G25 ICD-9-CM: 357.4 Vitals BP Pulse Weight(growth percentile) BMI OB Status Smoking Status 125/74 81 193 lb (87.5 kg) 29.35 kg/m2 Hysterectomy Former Smoker BMI and BSA Data Body Mass Index Body Surface Area  
 29.35 kg/m 2 2.05 m 2 Preferred Pharmacy Pharmacy Name Phone [address] [place], [place] - [address] [phone] Your Updated Medication List  
  
   
This list is accurate as of: 3/22/17  3:35 PM.  Always use your most recent med list.  
  
  
  
  
 albuterol 90 mcg/actuation inhaler Commonly known as:  PROAIR HFA Take 2 Puffs by inhalation every four (4) hours as needed for Wheezing. Take 2 Puffs inhaled by mouth Every 4 Hours. * allergy injection Set c Left arm * allergy injection Set B Left arm * allergy injection Set A Right arm Blood-Glucose Meter monitoring kit Commonly known as:  FREESTYLE FREEDOM LITE  
 Check Blood sugar once daily  
  
 cyclobenzaprine 10 mg tablet Commonly known as:  FLEXERIL Take 1 Tab by mouth three (3) times daily as needed for Muscle Spasm(s) for up to 30 days. As needed  Indications: MUSCLE SPASM Dexlansoprazole 60 mg Cpdb Commonly known as:  DEXILANT TAKE ONE CAPSULE BY MOUTH EVERY DAY  
  
 dulaglutide 0.75 mg/0.5 mL sub-q pen Commonly known as:  TRULICITY  
0.5 mL by SubCUTAneous route every seven (7) days. EPINEPHrine 0.3 mg/0.3 mL injection Commonly known as:  EPIPEN  
0.3 mL by IntraMUSCular route once as needed for 1 dose. fenofibrate nanocrystallized 145 mg tablet Commonly known as:  [employer] Take 1 Tab by mouth daily for 360 days. Indications: HYPERTRIGLYCERIDEMIA * fentaNYL 50 mcg/hr PATCH Commonly known as:  DURAGESIC  
1 Patch by TransDERmal route every fourty-eight (48) hours. Max Daily Amount: 1 Patch. For chronic pain (due to fill 4/26/17) [employer]ckrodt brand only * fentaNYL 50 mcg/hr PATCH Commonly known as:  DURAGESIC  
1 Patch by TransDERmal route every fourty-eight (48) hours. Max Daily Amount: 1 Patch. For chronic pain (due to fill 3/28/17)  
  
 fluocinoNIDE 0.05 % topical cream  
Commonly known as:  LIDEX Apply  to affected area two (2) times a day. fluticasone 50 mcg/actuation nasal spray Commonly known as:  Wilkerson Muse Apply one-two sprays to skin prior to patch change for skin irritation. fluticasone-salmeterol 250-50 mcg/dose diskus inhaler Commonly known as:  ADVAIR DISKUS Take 1 Puff by inhalation every twelve (12) hours. glimepiride 4 mg tablet Commonly known as:  AMARYL  
TAKE ONE TABLET BY MOUTH EVERY MORNING. glucose blood VI test strips strip Commonly known as:  [employer] TEST STRIP Test blood sugar twice daily DX: E11.9  
  
 guaiFENesin  mg ER tablet Commonly known as:  [employer] Take 1 Tab by mouth two (2) times a day.  
  
 ketoconazole 2 % shampoo Commonly known as:  NIZORAL Apply to scalp and rinse daily, prn Lancets Misc Test Blood Sugar twice daily. DX Code: E11.9  
  
 lidocaine 2 % solution Commonly known as:  XYLOCAINE Take 5 mL by mouth as needed for Pain. LORazepam 1 mg tablet Commonly known as:  ATIVAN  
  
 naloxone 4 mg/actuation Spry 4 mg by Nasal route as needed for up to 2 doses. Indications: OPIOID TOXICITY  
  
 naproxen 500 mg tablet Commonly known as:  NAPROSYN  
TAKE ONE TABLET BY MOUTH TWICE A DAY WITH FOOD * PERCOCET  mg per tablet Generic drug:  oxyCODONE-acetaminophen Take 1 Tab by mouth. * oxyCODONE-acetaminophen  mg per tablet Commonly known as:  PERCOCET 10 Take 1 Tab by mouth every six (6) hours as needed for Pain for up to 30 days. Max Daily Amount: 4 Tabs. Due to fill 4/26/17 * oxyCODONE-acetaminophen  mg per tablet Commonly known as:  PERCOCET 10 Take 1 Tab by mouth every six (6) hours as needed for Pain for up to 30 days. Max Daily Amount: 4 Tabs. Due to fill 3/28/17  
  
 polyethylene glycol 17 gram/dose powder Commonly known as:  MIRALAX  
  
 pravastatin 40 mg tablet Commonly known as:  PRAVACHOL Take 1 Tab by mouth nightly for 360 days. raNITIdine 150 mg tablet Commonly known as:  ZANTAC TAKE ONE TABLET BY MOUTH TWICE DAILY  
  
 rizatriptan 10 mg tablet Commonly known as:  Gudelia Poot TAKE ONE TABLET BY MOUTH ONCE AS NEEDED MAY REPEAT IN 2 HOURS IF NEEDED  
  
 topiramate 25 mg tablet Commonly known as:  TOPAMAX TAKE ONE TABLET BY MOUTH AT BEDTIME  
  
 * Notice: This list has 8 medication(s) that are the same as other medications prescribed for you. Read the directions carefully, and ask your doctor or other care provider to review them with you. Prescriptions Printed  Refills  
 oxyCODONE-acetaminophen (PERCOCET 10)  mg per tablet 0  
 Sig: Take 1 Tab by mouth every six (6) hours as needed for Pain for up to 30 days. Max Daily Amount: 4 Tabs. Due to fill 17 Class: Print Route: Oral  
 oxyCODONE-acetaminophen (PERCOCET 10)  mg per tablet 0 Sig: Take 1 Tab by mouth every six (6) hours as needed for Pain for up to 30 days. Max Daily Amount: 4 Tabs. Due to fill 3/28/17 Class: Print Route: Oral  
 fentaNYL (DURAGESIC) 50 mcg/hr PATCH 0 Si Patch by TransDERmal route every fourty-eight (48) hours. Max Daily Amount: 1 Patch. For chronic pain (due to fill 17) mallinckrodt brand only Class: Print Route: TransDERmal  
 fentaNYL (DURAGESIC) 50 mcg/hr PATCH 0 Si Patch by TransDERmal route every fourty-eight (48) hours. Max Daily Amount: 1 Patch. For chronic pain (due to fill 3/28/17) Class: Print Route: TransDERmal  
  
Prescriptions Sent to Pharmacy Refills  
 naloxone 4 mg/actuation spry 1 Si mg by Nasal route as needed for up to 2 doses. Indications: OPIOID TOXICITY Class: Normal  
 Pharmacy: [address] [facility] #: [phone] Route: Nasal  
  
We Performed the Following AMB POC DRUG SCREEN () [ [facility]] DRUG SCREEN [[id number] Custom] Follow-up Instructions Return in about 2 months (around 2017). Patient Instructions 1. Continue medications as prescribed. 2.  Follow up in two months 3. Naloxone nasal spray issued [facility]! Irais Vergara introduces [employer] patient portal. Now you can access parts of your medical record, email your doctor's office, and request medication refills online. 1. In your internet browser, go to [email or web address] 2. Click on the First Time User? Click Here link in the Sign In box. You will see the New Member Sign Up page. 3. Enter your [employer] Access Code exactly as it appears below.  You will not need to use this code after youve completed the sign-up process. If you do not sign up before the expiration date, you must request a new code. · [employer] Access Code: [email or web address] Expires: 6/8/2017 10:12 AM 
 
4. Enter the last four digits of your Social Security Number (xxxx) and Date of Birth (mm/dd/yyyy) as indicated and click Submit. You will be taken to the next sign-up page. 5. Create a [employer] ID. This will be your [employer] login ID and cannot be changed, so think of one that is secure and easy to remember. 6. Create a [employer] password. You can change your password at any time. 7. Enter your Password Reset Question and Answer. This can be used at a later time if you forget your password. 8. Enter your e-mail address. You will receive e-mail notification when new information is available in [address] 9. Click Sign Up. You can now view and download portions of your medical record. 10. Click the Download Summary menu link to download a portable copy of your medical information. If you have questions, please visit the Frequently Asked Questions section of the [employer] website. Remember, [employer] is NOT to be used for urgent needs. For medical emergencies, dial 911. Now available from your iPhone and Android! Please provide this summary of care documentation to your next provider. Your primary care clinician is listed as Ivan Abdullahi. If you have any questions after today's visit, please call [phone].

## 2017-03-22 NOTE — PROGRESS NOTES
HISTORY OF PRESENT ILLNESS  Park Shankar is a 40 y.o. female. HPI  The patient presents today for follow up of chronic generalized pain related to FMS. She also suffers from peripheral neuropathy and headaches. Because the patient's current regimen places him/her at increased risk for possible overdose, a prescription for naloxone nasal spray is being provided. The patient understands that this medication is only to be used in the setting of a possible overdose and that inadvertent use of this medication could precipitate overt withdrawal.    Measuring clinical outcomes of chronic pain patients: score 12 ; the lower the number the better the outcome. She tolerated titrating back up on the fentanyl. She was on 75 mcg prior to having to make the adjustment when her son was in the MVA. Discussion today about staying with her current regimen for now. She reports she was alternating between the dilaudid and percocet for breakthrough. Advised that we will keep her where she is now and reevaluate her doses at subsequent appointments. She agrees with staying with current doses which she tolerates and is managing her pain for the most part, increases her function and quality of life. She describes her pain as constant and burning. Weather, activity, stress and lack of sleep all seem to aggravate her pain. Rest and medication alleviate her pain. She does not work. 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 She does not ride the horse. She has had PT in the past and it was not beneficial.  She does use compounding cream which helps some. She has used a TENS unit and it aggravate her pain level. She never had any sort of injections. She is skeptical of having injections. She reports the topamax manages her migraine headaches. She has not had any migraine headaches since taking the topamax. The patient denies any significant changes since last f/u.   She tolerates medications without side effects. Any Blanco reports no change in sleep or constipation. She does not take a sleep aid. She is on cpap machine. She is taking miralax for constipation. The patient reports 40% relief with current medications. The patient reports functional improvement and QOL with pain medication.  appears consistent  UDS 9/15/16 reviewed and appears consistent    Review of Systems   Constitutional: Positive for fever and malaise/fatigue. Negative for chills and weight loss. HENT: Positive for congestion and sore throat. Eyes: Negative for blurred vision and double vision. Respiratory: Positive for cough and shortness of breath (from URI/h/o asthma). Nonsmoker/quit 4.5 years ago   Cardiovascular: Negative for chest pain. Gastrointestinal: Positive for heartburn. Negative for constipation, diarrhea, nausea and vomiting. Genitourinary: Negative. Musculoskeletal: Positive for back pain, joint pain, myalgias and neck pain. Negative for falls. Skin: Positive for rash. Neurological: Positive for dizziness, tingling, sensory change and headaches (sees a \"specialist for that\"). Negative for tremors, seizures and weakness. Endo/Heme/Allergies: Positive for environmental allergies (gets injections every other weeks for seasonal allergies). Does not bruise/bleed easily. Psychiatric/Behavioral: Positive for depression. Negative for suicidal ideas. The patient is nervous/anxious and has insomnia. Does not see psychiatry       Physical Exam   Constitutional: She is oriented to person, place, and time. She appears well-developed and well-nourished. No distress. HENT:   Head: Normocephalic. Eyes: Conjunctivae and EOM are normal. Pupils are equal, round, and reactive to light.   glasses   Neck: Normal range of motion. Painful ROM   Pulmonary/Chest: Effort normal. No respiratory distress. Musculoskeletal: She exhibits tenderness (neck/lumbar). She exhibits no edema. Cervical back: She exhibits decreased range of motion, tenderness, pain and spasm. Lumbar back: She exhibits decreased range of motion, tenderness, pain and spasm. Neurological: She is alert and oriented to person, place, and time. No cranial nerve deficit (grossly). Gait (antalgic) abnormal.   CN 2-12 grossly intact   Psychiatric: She has a normal mood and affect. Her behavior is normal. Judgment and thought content normal.   Nursing note and vitals reviewed. ASSESSMENT and PLAN  Encounter Diagnoses   Name Primary?  Chronic pain syndrome Yes    Encounter for long-term (current) use of medications     Chronic migraine     Persistent disorder of initiating or maintaining sleep     Fibromyalgia     Polyneuropathy associated with underlying disease ([facility] [address]      Orders Placed This Encounter    DRUG SCREEN    AMB POC DRUG SCREEN ()    naloxone 4 mg/actuation spry    oxyCODONE-acetaminophen (PERCOCET 10)  mg per tablet    oxyCODONE-acetaminophen (PERCOCET 10)  mg per tablet    fentaNYL (DURAGESIC) 50 mcg/hr PATCH    fentaNYL (DURAGESIC) 50 mcg/hr PATCH      Patient Instructions   1. Continue medications as prescribed. 2.  Follow up in two months  3. Naloxone nasal spray issued      40 minutes spent in visit face to face with the patient.     >50% of time spent counseling and coordinating care

## 2017-04-04 ENCOUNTER — OFFICE VISIT (OUTPATIENT)
Dept: FAMILY MEDICINE CLINIC | Age: 47
End: 2017-04-04

## 2017-04-04 VITALS
DIASTOLIC BLOOD PRESSURE: 60 MMHG | HEIGHT: 68 IN | SYSTOLIC BLOOD PRESSURE: 88 MMHG | HEART RATE: 68 BPM | RESPIRATION RATE: 18 BRPM | TEMPERATURE: 97.8 F

## 2017-04-04 DIAGNOSIS — Z91.09 MULTIPLE ENVIRONMENTAL ALLERGIES: Primary | ICD-10-CM

## 2017-04-21 DIAGNOSIS — E11.40 TYPE 2 DIABETES MELLITUS WITH DIABETIC NEUROPATHY, WITHOUT LONG-TERM CURRENT USE OF INSULIN (HCC): ICD-10-CM

## 2017-04-25 ENCOUNTER — OFFICE VISIT (OUTPATIENT)
Dept: FAMILY MEDICINE CLINIC | Age: 47
End: 2017-04-25

## 2017-04-25 VITALS
HEART RATE: 77 BPM | DIASTOLIC BLOOD PRESSURE: 74 MMHG | HEIGHT: 68 IN | OXYGEN SATURATION: 96 % | WEIGHT: 192 LBS | TEMPERATURE: 98.2 F | RESPIRATION RATE: 18 BRPM | SYSTOLIC BLOOD PRESSURE: 115 MMHG | BODY MASS INDEX: 29.1 KG/M2

## 2017-04-25 DIAGNOSIS — Z91.09 MULTIPLE ENVIRONMENTAL ALLERGIES: Primary | ICD-10-CM

## 2017-05-11 ENCOUNTER — OFFICE VISIT (OUTPATIENT)
Dept: FAMILY MEDICINE CLINIC | Age: 47
End: 2017-05-11

## 2017-05-11 VITALS
TEMPERATURE: 97.7 F | RESPIRATION RATE: 16 BRPM | HEART RATE: 77 BPM | OXYGEN SATURATION: 96 % | HEIGHT: 68 IN | BODY MASS INDEX: 28.64 KG/M2 | WEIGHT: 189 LBS | SYSTOLIC BLOOD PRESSURE: 96 MMHG | DIASTOLIC BLOOD PRESSURE: 68 MMHG

## 2017-05-11 DIAGNOSIS — Z91.09 MULTIPLE ENVIRONMENTAL ALLERGIES: ICD-10-CM

## 2017-05-11 DIAGNOSIS — F32.9 DEPRESSION, REACTIVE: Primary | ICD-10-CM

## 2017-05-11 RX ORDER — LUBIPROSTONE 24 UG/1
CAPSULE, GELATIN COATED ORAL
COMMUNITY
Start: 2017-04-25 | End: 2021-11-12

## 2017-05-11 RX ORDER — ERYTHROMYCIN 250 MG/1
TABLET, DELAYED RELEASE ORAL
COMMUNITY
Start: 2017-04-25 | End: 2018-09-20 | Stop reason: ALTCHOICE

## 2017-05-11 RX ORDER — FLUOXETINE HYDROCHLORIDE 20 MG/1
20 CAPSULE ORAL DAILY
Qty: 30 CAP | Refills: 2 | Status: SHIPPED | OUTPATIENT
Start: 2017-05-11 | End: 2018-11-02

## 2017-05-11 NOTE — MR AVS SNAPSHOT
Visit Information Date & Time Provider Department Dept. Phone Encounter #  
 5/11/2017  3:10 PM Artist Liang, [address] [phone] [id number] Your Appointments 6/5/2017  3:15 PM  
Follow Up with Marc Arredondo MD  
[address] [facility] [address]) Appt Note: return in 2 months; follow up [address] [address]  
[phone] [facility] [address] [address]  
  
    
 6/6/2017 10:00 AM  
ROUTINE CARE with Tameka Saenz MD  
[address]) Appt Note: allergy injection 2005 A [facility] [address]   
[phone]  
  
   
 [address] [place] [address]  
  
    
 11/20/2017 10:00 AM  
Any with Paz Bonilla MD  
[address] ([address]) Appt Note: yearly follow up, bring machine. [address] [employer] [place] [id number] 0238 [facility] [id number] Upcoming Health Maintenance Date Due Pneumococcal 19-64 Medium Risk (1 of 1 - PPSV23) 9/26/1990 EYE EXAM RETINAL OR DILATED Q1 3/10/2016 BREAST CANCER SCRN MAMMOGRAM 6/2/2016 INFLUENZA AGE 9 TO ADULT 8/1/2017 HEMOGLOBIN A1C Q6M 9/10/2017 FOOT EXAM Q1 10/4/2017 MICROALBUMIN Q1 10/4/2017 LIPID PANEL Q1 3/10/2018 DTaP/Tdap/Td series (2 - Td) 9/26/2026 Allergies as of 5/11/2017  Review Complete On: 5/11/2017 By: Arlene Nichols LPN Severity Noted Reaction Type Reactions Esomeprazole Magnesium  10/19/2010    Hives Nexium [Esomeprazole Magnesium]  06/11/2012    Unknown (comments) Pcn [Penicillins]  06/11/2012    Unknown (comments) Current Immunizations  Reviewed on 10/4/2016 Name Date Influenza Vaccine 9/26/2016, 10/20/2015, 10/9/2014, 10/1/2013 Tdap 9/26/2016 Not reviewed this visit You Were Diagnosed With   
  
 Codes Comments Depression, reactive    -  Primary ICD-10-CM: F32.9 ICD-9-CM: 300.4 Multiple environmental allergies     ICD-10-CM: Z91.09 
ICD-9-CM: V15.09 Vitals BP Pulse Temp Resp Height(growth percentile) Weight(growth percentile) 96/68 (BP 1 Location: Right arm, BP Patient Position: Sitting) 77 97.7 °F (36.5 °C) (Oral) 16 5' 8\" (1.727 m) 189 lb (85.7 kg) SpO2 BMI OB Status Smoking Status 96% 28.74 kg/m2 Hysterectomy Former Smoker Vitals History BMI and BSA Data Body Mass Index Body Surface Area 28.74 kg/m 2 2.03 m 2 [facility] [address] [facility] [address] [address] [facility] [phone] Your Updated Medication List  
  
   
This list is accurate as of: 5/11/17  4:49 PM.  Always use your most recent med list.  
  
  
  
  
 albuterol 90 mcg/actuation inhaler Commonly known as:  PROAIR HFA Take 2 Puffs by inhalation every four (4) hours as needed for Wheezing. Take 2 Puffs inhaled by mouth Every 4 Hours. * allergy injection Set C  
  
 * allergy injection Set B  
  
 * allergy injection Set A  
  
 * allergy injection  
now * allergy injection  
now * allergy injection  
now AMITIZA 24 mcg capsule Generic drug:  lubiPROStone Blood-Glucose Meter monitoring kit Commonly known as:  FREESTYLE FREEDOM LITE Check Blood sugar once daily  
  
 cyclobenzaprine 10 mg tablet Commonly known as:  FLEXERIL Take 1 Tab by mouth three (3) times daily as needed for Muscle Spasm(s) for up to 30 days. As needed  Indications: MUSCLE SPASM Dexlansoprazole 60 mg Cpdb Commonly known as:  DEXILANT TAKE ONE CAPSULE BY MOUTH EVERY DAY  
  
 dulaglutide 0.75 mg/0.5 mL sub-q pen Commonly known as:  TRULICITY  
0.5 mL by SubCUTAneous route every seven (7) days. EPINEPHrine 0.3 mg/0.3 mL injection Commonly known as:  EPIPEN  
0.3 mL by IntraMUSCular route once as needed for 1 dose. KIMBERLEY- mg tablet Generic drug:  erythromycin  
  
 fenofibrate nanocrystallized 145 mg tablet Commonly known as:  [employer] Take 1 Tab by mouth daily for 360 days. Indications: HYPERTRIGLYCERIDEMIA * fentaNYL 50 mcg/hr PATCH Commonly known as:  DURAGESIC  
1 Patch by TransDERmal route every fourty-eight (48) hours. Max Daily Amount: 1 Patch. For chronic pain (due to fill 4/26/17) [place] brand only * fentaNYL 50 mcg/hr PATCH Commonly known as:  DURAGESIC  
1 Patch by TransDERmal route every fourty-eight (48) hours. Max Daily Amount: 1 Patch. For chronic pain (due to fill 3/28/17)  
  
 fluocinoNIDE 0.05 % topical cream  
Commonly known as:  LIDEX Apply  to affected area two (2) times a day. FLUoxetine 20 mg capsule Commonly known as:  PROzac Take 1 Cap by mouth daily. fluticasone 50 mcg/actuation nasal spray Commonly known as:  Chales Copa Apply one-two sprays to skin prior to patch change for skin irritation. fluticasone-salmeterol 250-50 mcg/dose diskus inhaler Commonly known as:  ADVAIR DISKUS Take 1 Puff by inhalation every twelve (12) hours. glimepiride 4 mg tablet Commonly known as:  AMARYL  
TAKE ONE TABLET BY MOUTH EVERY MORNING. glucose blood VI test strips strip Commonly known as:  ON CALL EXPRESS TEST STRIP Test blood sugar twice daily DX: E11.9  
  
 guaiFENesin  mg ER tablet Commonly known as:  [place] [address] Take 1 Tab by mouth two (2) times a day.  
  
 ketoconazole 2 % shampoo Commonly known as:  NIZORAL Apply to scalp and rinse daily, prn Lancets Misc Test Blood Sugar twice daily. DX Code: E11.9  
  
 lidocaine 2 % solution Commonly known as:  XYLOCAINE Take 5 mL by mouth as needed for Pain. LORazepam 1 mg tablet Commonly known as:  ATIVAN  
  
 naloxone 4 mg/actuation Spry 4 mg by Nasal route as needed for up to 2 doses. Indications: OPIOID TOXICITY  
  
 naproxen 500 mg tablet Commonly known as:  NAPROSYN  
TAKE ONE TABLET BY MOUTH TWICE A DAY WITH FOOD PERCOCET  mg per tablet Generic drug:  oxyCODONE-acetaminophen Take 1 Tab by mouth.  
  
 polyethylene glycol 17 gram/dose powder Commonly known as:  MIRALAX  
  
 pravastatin 40 mg tablet Commonly known as:  PRAVACHOL Take 1 Tab by mouth nightly for 360 days. raNITIdine 150 mg tablet Commonly known as:  ZANTAC TAKE ONE TABLET BY MOUTH TWICE DAILY  
  
 rizatriptan 10 mg tablet Commonly known as:  [place]ne David TAKE ONE TABLET BY MOUTH ONCE AS NEEDED MAY REPEAT IN 2 HOURS IF NEEDED  
  
 topiramate 25 mg tablet Commonly known as:  TOPAMAX TAKE ONE TABLET BY MOUTH AT BEDTIME  
  
 * Notice: This list has 8 medication(s) that are the same as other medications prescribed for you. Read the directions carefully, and ask your doctor or other care provider to review them with you. Prescriptions Sent to Pharmacy Refills FLUoxetine (PROZAC) 20 mg capsule 2 Sig: Take 1 Cap by mouth daily. Class: Normal  
 Pharmacy: [address] [place] [address] #: [phone] Route: Oral  
  
We Performed the Following AR IMMUNOTHERAPY, 2+ INJECTIONS [id number] CPT(R)] REFERRAL TO PSYCHOLOGY [[facility]] Comments:  
 Please evaluate patient for reactive depression. Referral Information Referral ID Referred By Referred To  
  
 [id number] Cassandra Wilsonhamzah Not Available Visits Status Start Date End Date 1 New Request 5/11/17 5/11/18 If your referral has a status of pending review or denied, additional information will be sent to support the outcome of this decision. Patient Instructions Grief (Actual/Anticipated): Care Instructions Your Care Instructions Grief is your emotional reaction to a major loss. The words \"sorrow\" and \"heartache\" often are used to describe feelings of grief. You feel grief when you lose a beloved person, pet, place, or thing. It is also natural to feel grief when you lose a valued way of life, such as a job, marriage, or good health. You may begin to grieve before a loss occurs. You may grieve for a loved one who is sick and dying. Children and adults often feel the pain of loss before a big move or divorce. This type of grief helps you get ready for a loss. Grief is different for each person. There is no \"normal\" or \"expected\" period of time for grieving. Some people adjust to their loss within a couple of months. Others may take 2 years or longer, especially if their lives were changed a lot or if the loss was sudden and shocking. Grieving can cause problems such as headaches, loss of appetite, and trouble with thinking or sleeping. You may withdraw from friends and family and behave in ways that are unusual for you. Grief may cause you to question your beliefs and views about life. Grief is natural and does not require medical treatment. But if you have trouble sleeping, it may help to take sleeping pills for a short time. It may help to talk with people who have been through or are going through similar losses. You may also want to talk to a counselor about your feelings. Talking about your loss, sharing your cares and concerns, and getting support from others are important parts of healthy grieving. Follow-up care is a key part of your treatment and safety. Be sure to make and go to all appointments, and call your doctor if you are having problems. Its also a good idea to know your test results and keep a list of the medicines you take. How can you care for yourself at home? · Get enough sleep. Your mind helps make sense of your life while you sleep.  Missing sleep can lead to illness and make it harder for you to deal with your grief. · Eat healthy foods. Try to avoid eating only foods that give you comfort. Ask someone to join you for a meal if you do not like eating alone. Consider taking a multivitamin every day. · Get some exercise every day. Even a walk can help you deal with your grief. Other exercises, such as yoga, can also help you manage stress. · Comfort yourself. Take time to look at photos or use special items that make you feel better. · Stay involved in your life. Do not withdraw from the activities you enjoy. People you know at work, [employer], clubs, or other groups can help you get through your period of grief. · Think about joining a support group to help you deal with your grief. There are many support groups to help people recover from grief. When should you call for help? Be sure to contact your doctor if: 
· You feel that life is meaningless, or you think about killing yourself. · A grieving person you know talks about hurting himself or herself. · You have any of the following problems that last for 2 or more weeks: 
¨ You feel sad a lot or cry all the time. ¨ You have trouble sleeping, or you sleep too much. ¨ You find it hard to concentrate, make decisions, or remember things. ¨ You change how you normally eat. ¨ You feel guilty about the death or loss you have suffered. ¨ You are using alcohol or drugs to help you cope with your loss. Where can you learn more? Go to http://carolina-raiza.info/. Enter H249 in the search box to learn more about \"Grief (Actual/Anticipated): Care Instructions. \" Current as of: February 24, 2016 Content Version: 11.2 © 6782-0718 [employer]. Care instructions adapted under license by [employer] (which disclaims liability or warranty for this information).  If you have questions about a medical condition or this instruction, always ask your healthcare professional. [employer] disclaims any warranty or liability for your use of this information. Introducing [facility]! [facility] introduces [employer] patient portal. Now you can access parts of your medical record, email your doctor's office, and request medication refills online. 1. In your internet browser, go to [email or web address] 2. Click on the First Time User? Click Here link in the Sign In box. You will see the New Member Sign Up page. 3. Enter your [employer] Access Code exactly as it appears below. You will not need to use this code after youve completed the sign-up process. If you do not sign up before the expiration date, you must request a new code. · [employer] Access Code: [email or web address] Expires: 6/8/2017 10:12 AM 
 
4. Enter the last four digits of your Social Security Number (xxxx) and Date of Birth (mm/dd/yyyy) as indicated and click Submit. You will be taken to the next sign-up page. 5. Create a [employer] ID. This will be your [employer] login ID and cannot be changed, so think of one that is secure and easy to remember. 6. Create a [employer] password. You can change your password at any time. 7. Enter your Password Reset Question and Answer. This can be used at a later time if you forget your password. 8. Enter your e-mail address. You will receive e-mail notification when new information is available in [address] 9. Click Sign Up. You can now view and download portions of your medical record. 10. Click the Download Summary menu link to download a portable copy of your medical information. If you have questions, please visit the Frequently Asked Questions section of the [employer] website. Remember, [employer] is NOT to be used for urgent needs. For medical emergencies, dial 911. Now available from your iPhone and Android! Please provide this summary of care documentation to your next provider. Your primary care clinician is listed as Rxoanna Pelaez. If you have any questions after today's visit, please call [phone].

## 2017-05-11 NOTE — PATIENT INSTRUCTIONS
Grief (Actual/Anticipated): Care Instructions  Your Care Instructions  Grief is your emotional reaction to a major loss. The words \"sorrow\" and \"heartache\" often are used to describe feelings of grief. You feel grief when you lose a beloved person, pet, place, or thing. It is also natural to feel grief when you lose a valued way of life, such as a job, marriage, or good health. You may begin to grieve before a loss occurs. You may grieve for a loved one who is sick and dying. Children and adults often feel the pain of loss before a big move or divorce. This type of grief helps you get ready for a loss. Grief is different for each person. There is no \"normal\" or \"expected\" period of time for grieving. Some people adjust to their loss within a couple of months. Others may take 2 years or longer, especially if their lives were changed a lot or if the loss was sudden and shocking. Grieving can cause problems such as headaches, loss of appetite, and trouble with thinking or sleeping. You may withdraw from friends and family and behave in ways that are unusual for you. Grief may cause you to question your beliefs and views about life. Grief is natural and does not require medical treatment. But if you have trouble sleeping, it may help to take sleeping pills for a short time. It may help to talk with people who have been through or are going through similar losses. You may also want to talk to a counselor about your feelings. Talking about your loss, sharing your cares and concerns, and getting support from others are important parts of healthy grieving. Follow-up care is a key part of your treatment and safety. Be sure to make and go to all appointments, and call your doctor if you are having problems. Its also a good idea to know your test results and keep a list of the medicines you take. How can you care for yourself at home? · Get enough sleep. Your mind helps make sense of your life while you sleep. Missing sleep can lead to illness and make it harder for you to deal with your grief. · Eat healthy foods. Try to avoid eating only foods that give you comfort. Ask someone to join you for a meal if you do not like eating alone. Consider taking a multivitamin every day. · Get some exercise every day. Even a walk can help you deal with your grief. Other exercises, such as yoga, can also help you manage stress. · Comfort yourself. Take time to look at photos or use special items that make you feel better. · Stay involved in your life. Do not withdraw from the activities you enjoy. People you know at work, [employer], clubs, or other groups can help you get through your period of grief. · Think about joining a support group to help you deal with your grief. There are many support groups to help people recover from grief. When should you call for help? Be sure to contact your doctor if:  · You feel that life is meaningless, or you think about killing yourself. · A grieving person you know talks about hurting himself or herself. · You have any of the following problems that last for 2 or more weeks:  ¨ You feel sad a lot or cry all the time. ¨ You have trouble sleeping, or you sleep too much. ¨ You find it hard to concentrate, make decisions, or remember things. ¨ You change how you normally eat. ¨ You feel guilty about the death or loss you have suffered. ¨ You are using alcohol or drugs to help you cope with your loss. Where can you learn more? Go to http://carolina-raiza.info/. Enter H249 in the search box to learn more about \"Grief (Actual/Anticipated): Care Instructions. \"  Current as of: February 24, 2016  Content Version: 11.2  © 0098-8737 [employer]. Care instructions adapted under license by [employer] (which disclaims liability or warranty for this information).  If you have questions about a medical condition or this instruction, always ask your healthcare professional. [address] any warranty or liability for your use of this information.

## 2017-05-11 NOTE — PROGRESS NOTES
Reviewed record in preparation for visit and have obtained necessary documentation. Patient did not bring medications to visit for review. Information provided on Advanced Directive, Living Will. Body mass index is 28.74 kg/(m^2).    Health Maintenance Due   Topic Date Due    Pneumococcal 19-64 Medium Risk (1 of 1 - PPSV23) 09/26/1990    EYE EXAM RETINAL OR DILATED Q1  03/10/2016    BREAST CANCER SCRN MAMMOGRAM  06/02/2016

## 2017-05-11 NOTE — PROGRESS NOTES
Halima Siu is an 40 y.o. female who presents with chief concern of allergy shot, depression. Here for allergy shot, not having any active fevers or chills or signs of acute illness. Tolerates injections without difficulty. Depression:  She has fibromyalgia and history of dysthymia. She states \"12:40 PM\" today her father passed away. Her father has been battling heart failure and recently had ICD. He was in [facility] 3. and developed \"double pneumonia\". He turned for the worse in last few days and decision was made for comfort care today. She states he was comfortable when he . She has a good support system with children, ernst. She is NOT on good terms with her mother. She blames her for his down turn of health. Detailed ROS below. Review of Systems, positives are bolded, strike through if denied. GEN:    CV:      Pulm:    Abd/GI:   Psych:  Grief, Sadness,   Skin:    Lower Ext:           Current and past medical information:  I personally reviewed and included updated list below.     Past Medical History:   Diagnosis Date    Depression     Diabetes ([facility] [address]     Dysthymic disorder     Fibromyalgia     Fibromyalgia syndrome 2012    Headache 2012    Hypercholesterolemia     Left ear pain     Migraine     Mixed hyperlipidemia     Musculoskeletal pain 2014       Allergies   Allergen Reactions    Esomeprazole Magnesium Hives    Nexium [Esomeprazole Magnesium] Unknown (comments)    Pcn [Penicillins] Unknown (comments)       Past Surgical History:   Procedure Laterality Date    HX GYN      Partial Hysterectomy    HX HEENT      HX TUBAL LIGATION         Social History     Social History    Marital status: SINGLE     Spouse name: N/A    Number of children: N/A    Years of education: N/A     Social History Main Topics    Smoking status: Former Smoker    Smokeless tobacco: Never Used      Comment: quit in     Alcohol use No    Drug use: No    Sexual activity: Not Asked     Other Topics Concern    None     Social History Narrative           Physical Exam, Abnormal/pertinent findings bolded,     Visit Vitals    BP 96/68 (BP 1 Location: Right arm, BP Patient Position: Sitting)    Pulse 77    Temp 97.7 °F (36.5 °C) (Oral)    Resp 16    Ht 5' 8\" (1.727 m)    Wt 189 lb (85.7 kg)    SpO2 96%    BMI 28.74 kg/m2          GEN:    Alert and Oriented, No acute distress  Psych:  Mood appropriate, No pressured speech, linear thoughts  CV:    Regular Rhythm, S1 and S2 audible, no MRG, no palpable thrills  Pulm:    CTA B/L, no wheezes/rubs  GI/Abd:   Non-tender to palpation, normal bowel sounds x4, no masses, (-) involuntary or voluntary guarding  Neuro:   Lucid, No focal deficits  ENT:    EOMI, Non-icteric sclera, MMM  Neck:   Trachea midline, no lymphadenopathy  :    No suprapubic tenderness  MSK:  Normal gait, FROM in all four extremities  Skin:   No visible Rash, Ecchymosis, or Excoriations  Lower Ext: No edema, No tenderness to palpation, No palpable cords        Assessment/PLAN    1. Depression, reactive  - Her history of dysthymia and fibromyalgia makes her high risk for severe symptoms. Advised to start medication today and start with psychology and follow up closely with us in 2 weeks. She has no Suicidal Ideation today and plans to go to [place] with her support system ([employer]). - REFERRAL TO PSYCHOLOGY  - FLUoxetine (PROZAC) 20 mg capsule; Take 1 Cap by mouth daily. Dispense: 30 Cap; Refill: 2    2. Multiple environmental allergies  Stable. Injection today. - AK IMMUNOTHERAPY, 2+ INJECTIONS  - allergy injection; now  Dispense: 1 mL; Refill: 0  - allergy injection; now  Dispense: 1 mL; Refill: 0  - allergy injection; now  Dispense: 1 mL; Refill: 0      Follow-up Disposition: Not on File  For next visit Coping with fathers death, medication titration. Plan of care:  Discussed diagnoses in detail with patient.    Medication risks/benefits/side effects discussed with patient. All of the patient's questions were addressed. The patient understands and agrees with our plan of care. The patient knows to call back if they are unsure of or forget any changes we discussed today or if the symptoms change. The patient received an After-Visit Summary which contains VS, orders, medication list and allergy list. This can be used as a \"mini-medical record\" should they have to seek medical care while out of town. Gabriel Russ DO  Resident note, PGY-3  Patient discussed with [address] [place] [address] Physician, Dr. Trini Reynolds  Date Time Provider Dary Domingoi   6/5/2017 3:15 PM Hernan Calvert MD [facility] ELLIE SCHED   6/6/2017 10:00 AM Killian Lindsay MD [facility] ELLIE SCHED   11/20/2017 10:00 AM Makenna Sandhu  [address]           Current Medications after this visit[de-identified]       Current Outpatient Prescriptions   Medication Sig    allergy injection now    allergy injection now    allergy injection now    FLUoxetine (PROZAC) 20 mg capsule Take 1 Cap by mouth daily.  dulaglutide (TRULICITY) 1.32 CA/9.8 mL sub-q pen 0.5 mL by SubCUTAneous route every seven (7) days.  naloxone 4 mg/actuation spry 4 mg by Nasal route as needed for up to 2 doses. Indications: OPIOID TOXICITY    fentaNYL (DURAGESIC) 50 mcg/hr PATCH 1 Patch by TransDERmal route every fourty-eight (48) hours. Max Daily Amount: 1 Patch. For chronic pain (due to fill 4/26/17) mallinckrodt brand only    fentaNYL (DURAGESIC) 50 mcg/hr PATCH 1 Patch by TransDERmal route every fourty-eight (48) hours. Max Daily Amount: 1 Patch. For chronic pain (due to fill 3/28/17)    oxyCODONE-acetaminophen (PERCOCET)  mg per tablet Take 1 Tab by mouth.  pravastatin (PRAVACHOL) 40 mg tablet Take 1 Tab by mouth nightly for 360 days.  fenofibrate nanocrystallized (TRICOR) 145 mg tablet Take 1 Tab by mouth daily for 360 days.  Indications: HYPERTRIGLYCERIDEMIA    Dexlansoprazole (DEXILANT) 60 mg CpDB TAKE ONE CAPSULE BY MOUTH EVERY DAY    topiramate (TOPAMAX) 25 mg tablet TAKE ONE TABLET BY MOUTH AT BEDTIME    raNITIdine (ZANTAC) 150 mg tablet TAKE ONE TABLET BY MOUTH TWICE DAILY    fluticasone-salmeterol (ADVAIR DISKUS) 250-50 mcg/dose diskus inhaler Take 1 Puff by inhalation every twelve (12) hours.  albuterol (PROAIR HFA) 90 mcg/actuation inhaler Take 2 Puffs by inhalation every four (4) hours as needed for Wheezing. Take 2 Puffs inhaled by mouth Every 4 Hours.  rizatriptan (MAXALT) 10 mg tablet TAKE ONE TABLET BY MOUTH ONCE AS NEEDED MAY REPEAT IN 2 HOURS IF NEEDED    fluticasone (FLONASE) 50 mcg/actuation nasal spray Apply one-two sprays to skin prior to patch change for skin irritation.  ketoconazole (NIZORAL) 2 % shampoo Apply to scalp and rinse daily, prn    fluocinoNIDE (LIDEX) 0.05 % topical cream Apply  to affected area two (2) times a day.  LORazepam (ATIVAN) 1 mg tablet     glucose blood VI test strips (ON CALL EXPRESS TEST STRIP) strip Test blood sugar twice daily DX: E11.9    Lancets misc Test Blood Sugar twice daily. DX Code: E11.9    glimepiride (AMARYL) 4 mg tablet TAKE ONE TABLET BY MOUTH EVERY MORNING.  polyethylene glycol (MIRALAX) 17 gram/dose powder     naproxen (NAPROSYN) 500 mg tablet TAKE ONE TABLET BY MOUTH TWICE A DAY WITH FOOD    lidocaine (XYLOCAINE) 2 % solution Take 5 mL by mouth as needed for Pain.  Blood-Glucose Meter (FREESTYLE FREEDOM LITE) monitoring kit Check Blood sugar once daily    guaiFENesin ER (MUCINEX) 600 mg ER tablet Take 1 Tab by mouth two (2) times a day.  EPINEPHrine (EPIPEN) 0.3 mg/0.3 mL (1:1,000) injection 0.3 mL by IntraMUSCular route once as needed for 1 dose.     KIMBERLEY- mg tablet     AMITIZA 24 mcg capsule     allergy injection Set C    allergy injection Set B    allergy injection Set A    cyclobenzaprine (FLEXERIL) 10 mg tablet Take 1 Tab by mouth three (3) times daily as needed for Muscle Spasm(s) for up to 30 days. As needed  Indications: MUSCLE SPASM     No current facility-administered medications for this visit.

## 2017-05-12 NOTE — PROGRESS NOTES
I discussed the findings, assessment and plan in detail with the resident and agree with the resident's findings and plan as documented in the resident's note. Mino Borden M.D.

## 2017-05-17 RX ORDER — OXYCODONE AND ACETAMINOPHEN 10; 325 MG/1; MG/1
1 TABLET ORAL
Qty: 120 TAB | Refills: 0 | Status: SHIPPED | OUTPATIENT
Start: 2017-05-17 | End: 2017-06-05 | Stop reason: SDUPTHER

## 2017-05-17 RX ORDER — FENTANYL 50 UG/1
1 PATCH TRANSDERMAL
Qty: 15 PATCH | Refills: 0 | Status: SHIPPED | OUTPATIENT
Start: 2017-05-17 | End: 2017-06-05 | Stop reason: SDUPTHER

## 2017-05-17 NOTE — TELEPHONE ENCOUNTER
Attempted to call the pt to let her know that we are working on her prescriptions. She was not able to be reached. A message was left for the pt that we had received her message. The number for the nurse triage line was provided.

## 2017-05-17 NOTE — TELEPHONE ENCOUNTER
The pt was called by the  and made aware that the provider will not be in the office for her appt. She was offered another appt and will need medications. A  was obtained and there were no aberrancies noted. She is tolerating her medication well. She is not due for a UDS at this time.

## 2017-06-05 ENCOUNTER — OFFICE VISIT (OUTPATIENT)
Dept: PAIN MANAGEMENT | Age: 47
End: 2017-06-05

## 2017-06-05 VITALS
SYSTOLIC BLOOD PRESSURE: 133 MMHG | WEIGHT: 186 LBS | BODY MASS INDEX: 28.19 KG/M2 | HEART RATE: 67 BPM | HEIGHT: 68 IN | DIASTOLIC BLOOD PRESSURE: 87 MMHG

## 2017-06-05 DIAGNOSIS — M79.7 FIBROMYALGIA SYNDROME: ICD-10-CM

## 2017-06-05 DIAGNOSIS — G89.29 CHRONIC GENERALIZED PAIN DISORDER: ICD-10-CM

## 2017-06-05 DIAGNOSIS — M79.18 MUSCULOSKELETAL PAIN: ICD-10-CM

## 2017-06-05 DIAGNOSIS — G43.019 INTRACTABLE MIGRAINE WITHOUT AURA AND WITHOUT STATUS MIGRAINOSUS: Primary | ICD-10-CM

## 2017-06-05 DIAGNOSIS — R52 CHRONIC GENERALIZED PAIN DISORDER: ICD-10-CM

## 2017-06-05 RX ORDER — OXYCODONE AND ACETAMINOPHEN 10; 325 MG/1; MG/1
1 TABLET ORAL
Qty: 120 TAB | Refills: 0 | Status: SHIPPED | OUTPATIENT
Start: 2017-08-04 | End: 2017-09-05 | Stop reason: SDUPTHER

## 2017-06-05 RX ORDER — FENTANYL 50 UG/1
1 PATCH TRANSDERMAL
Qty: 15 PATCH | Refills: 0 | Status: SHIPPED | OUTPATIENT
Start: 2017-07-05 | End: 2017-06-05 | Stop reason: SDUPTHER

## 2017-06-05 RX ORDER — OXYCODONE AND ACETAMINOPHEN 10; 325 MG/1; MG/1
1 TABLET ORAL
Qty: 120 TAB | Refills: 0 | Status: SHIPPED | OUTPATIENT
Start: 2017-07-05 | End: 2017-06-05 | Stop reason: SDUPTHER

## 2017-06-05 RX ORDER — FENTANYL 50 UG/1
1 PATCH TRANSDERMAL
Qty: 15 PATCH | Refills: 0 | Status: SHIPPED | OUTPATIENT
Start: 2017-06-05 | End: 2017-06-05 | Stop reason: SDUPTHER

## 2017-06-05 RX ORDER — OXYCODONE AND ACETAMINOPHEN 10; 325 MG/1; MG/1
1 TABLET ORAL
Qty: 120 TAB | Refills: 0 | Status: SHIPPED | OUTPATIENT
Start: 2017-06-05 | End: 2017-06-05 | Stop reason: SDUPTHER

## 2017-06-05 RX ORDER — FENTANYL 50 UG/1
1 PATCH TRANSDERMAL
Qty: 15 PATCH | Refills: 0 | Status: SHIPPED | OUTPATIENT
Start: 2017-08-04 | End: 2017-09-05 | Stop reason: SDUPTHER

## 2017-06-05 NOTE — PROGRESS NOTES
HISTORY OF PRESENT ILLNESS  Casey Corraels is a 40 y.o. female. HPI Comments: Meds help with pain control and quality of life. No new side effects reported today. Visit survey reviewed and will be scanned.  reviewed. Recent average level of pain(out of 10)-5  Chief complaint, headaches  Pain all over-history fibromyalgia  Chronic pain  Using compound cream  Fentanyl patch 50 mcg every 2 day  Oxycodone 10 mg 4 times a day as needed  Medication helps improve general activity, mood, walking, sleep, enjoyment of life      Measuring clinical outcomes of chronic pain patients. This was reviewed today. The survey will be scanned. Please see the survey for details. Total score-7      Review of Systems   Constitutional: Positive for fever and malaise/fatigue. Negative for chills and weight loss. HENT: Positive for congestion and sore throat. Eyes: Negative for blurred vision and double vision. Respiratory: Positive for cough and shortness of breath (from URI/h/o asthma). Nonsmoker/quit 4.5 years ago   Cardiovascular: Negative for chest pain. Gastrointestinal: Positive for heartburn. Negative for constipation, diarrhea, nausea and vomiting. Genitourinary: Negative. Musculoskeletal: Positive for back pain, joint pain, myalgias and neck pain. Negative for falls. Skin: Positive for rash. Neurological: Positive for dizziness, tingling, sensory change and headaches (sees a \"specialist for that\"). Negative for tremors, seizures and weakness. Endo/Heme/Allergies: Positive for environmental allergies (gets injections every other weeks for seasonal allergies). Does not bruise/bleed easily. Psychiatric/Behavioral: Positive for depression. Negative for suicidal ideas. The patient is nervous/anxious and has insomnia. Does not see psychiatry       Physical Exam   Constitutional: She appears well-developed and well-nourished. She is cooperative. She does not have a sickly appearance.    HENT: Head: Normocephalic and atraumatic. Right Ear: External ear normal. No drainage. Left Ear: External ear normal. No drainage. Nose: Nose normal.   Eyes: Lids are normal. Right eye exhibits no discharge. Left eye exhibits no discharge. Right conjunctiva has no hemorrhage. Left conjunctiva has no hemorrhage. Neck: Neck supple. No tracheal deviation present. No thyroid mass present. Pulmonary/Chest: Effort normal. No respiratory distress. Neurological: She is alert. No cranial nerve deficit. Skin: Skin is intact. No rash noted. Psychiatric: Her speech is normal. Her affect is not angry. She does not express inappropriate judgment. Nursing note and vitals reviewed. ASSESSMENT and PLAN  Encounter Diagnoses   Name Primary?  Intractable migraine without aura and without status migrainosus Yes    Fibromyalgia syndrome     Chronic generalized pain disorder     Musculoskeletal pain    No indicators for recent medication misuse.  reviewed. Pain Meds and Quality Of Life have been reviewed. Nonpharmacologic therapy and non-opioid pharmacologic therapy were considered. If opioid therapy is prescribed, this is only if the expected benefits are anticipated to outweigh risks. Possible changes to treatment plan considered. Support/education given as needed. Today-medications are as listed. No significant changes to medications. Follow up -- 3 months.

## 2017-06-05 NOTE — PROGRESS NOTES
Nursing Notes    Patient presents to the office today in follow-up. Reviewed medications with counts as follows:    Rx Date filled Qty Dispensed Pill Count Last Dose Short   Fentanyl 50 mcg 5/25/17 15 9 1 on no   perocet 10/325 5/25/17 120 131 today no   Ms. Alyssa Merritt has a reminder for a \"due or due soon\" health maintenance. I have asked that she contact her primary care provider for follow-up on this health maintenance. POC UDS was not performed in office today    Any new labs or imaging since last appointment? NO    Have you been to an emergency room (ER) or urgent care clinic since your last visit? NO            Have you been hospitalized since your last visit? NO     If yes, where, when, and reason for visit? Have you seen or consulted any other health care providers outside of the [address] [place] [address]  since your last visit? NO     If yes, where, when, and reason for visit?

## 2017-06-05 NOTE — MR AVS SNAPSHOT
Visit Information Date & Time Provider Department Dept. Phone Encounter #  
 6/5/2017  3:15 PM Corinna Pollock MD [address] for Pain Management [phone] Follow-up Instructions Return in about 3 months (around 9/5/2017). Your Appointments 6/27/2017  1:00 PM  
ROUTINE CARE with Claudia Kumar MD  
[employer] [address]) Appt Note: allergy injection 2005 [facility] [address] [facility]   
[phone]  
  
   
 [address] [place] [address]  
  
    
 11/20/2017 10:00 AM  
Any with Estefanía Roberts MD  
[address] ([address]) Appt Note: yearly follow up, bring machine. [address] [facility] [address] [id number] 9[address] [facility] [phone] Upcoming Health Maintenance Date Due Pneumococcal 19-64 Medium Risk (1 of 1 - PPSV23) 9/26/1990 EYE EXAM RETINAL OR DILATED Q1 3/10/2016 BREAST CANCER SCRN MAMMOGRAM 6/2/2016 INFLUENZA AGE 9 TO ADULT 8/1/2017 HEMOGLOBIN A1C Q6M 9/10/2017 FOOT EXAM Q1 10/4/2017 MICROALBUMIN Q1 10/4/2017 LIPID PANEL Q1 3/10/2018 DTaP/Tdap/Td series (2 - Td) 9/26/2026 Allergies as of 6/5/2017  Review Complete On: 6/5/2017 By: Corinna Pollock MD  
  
 Severity Noted Reaction Type Reactions Esomeprazole Magnesium  10/19/2010    Hives Nexium [Esomeprazole Magnesium]  06/11/2012    Unknown (comments) Pcn [Penicillins]  06/11/2012    Unknown (comments) Current Immunizations  Reviewed on 10/4/2016 Name Date Influenza Vaccine 9/26/2016, 10/20/2015, 10/9/2014, 10/1/2013 Tdap 9/26/2016 Not reviewed this visit You Were Diagnosed With   
  
 Codes Comments  Intractable migraine without aura and without status migrainosus    -  Primary ICD-10-CM: G43.019 
ICD-9-CM: 346.11   
 Fibromyalgia syndrome     ICD-10-CM: M79.7 ICD-9-CM: 729.1 Chronic generalized pain disorder     ICD-10-CM: R52, G89.29 ICD-9-CM: 780.96, 338.29 Musculoskeletal pain     ICD-10-CM: M79.1 ICD-9-CM: 729.1 Vitals BP Pulse Height(growth percentile) Weight(growth percentile) BMI OB Status 133/87 67 5' 8\" (1.727 m) 186 lb (84.4 kg) 28.28 kg/m2 Hysterectomy Smoking Status Former Smoker Vitals History BMI and BSA Data Body Mass Index Body Surface Area  
 28.28 kg/m 2 2.01 m 2 Preferred Pharmacy Pharmacy Name Phone [address] [place], [place] - [phone] Your Updated Medication List  
  
   
This list is accurate as of: 6/5/17  3:46 PM.  Always use your most recent med list.  
  
  
  
  
 albuterol 90 mcg/actuation inhaler Commonly known as:  PROAIR HFA Take 2 Puffs by inhalation every four (4) hours as needed for Wheezing. Take 2 Puffs inhaled by mouth Every 4 Hours. * allergy injection Set C  
  
 * allergy injection Set B  
  
 * allergy injection Set A  
  
 * allergy injection  
now * allergy injection  
now * allergy injection  
now AMITIZA 24 mcg capsule Generic drug:  lubiPROStone Blood-Glucose Meter monitoring kit Commonly known as:  FREESTYLE FREEDOM LITE Check Blood sugar once daily  
  
 cyclobenzaprine 10 mg tablet Commonly known as:  FLEXERIL Take 1 Tab by mouth three (3) times daily as needed for Muscle Spasm(s) for up to 30 days. As needed  Indications: MUSCLE SPASM Dexlansoprazole 60 mg Cpdb Commonly known as:  DEXILANT TAKE ONE CAPSULE BY MOUTH EVERY DAY  
  
 dulaglutide 0.75 mg/0.5 mL sub-q pen Commonly known as:  TRULICITY  
0.5 mL by SubCUTAneous route every seven (7) days. EPINEPHrine 0.3 mg/0.3 mL injection Commonly known as:  EPIPEN  
0.3 mL by IntraMUSCular route once as needed for 1 dose. KIMBERLEY- mg tablet Generic drug:  erythromycin  
  
 fenofibrate nanocrystallized 145 mg tablet Commonly known as:  [employer] Take 1 Tab by mouth daily for 360 days. Indications: HYPERTRIGLYCERIDEMIA * fentaNYL 50 mcg/hr PATCH Commonly known as:  DURAGESIC  
1 Patch by TransDERmal route every fourty-eight (48) hours. Max Daily Amount: 1 Patch. For chronic pain (due to fill 3/28/17) * fentaNYL 50 mcg/hr PATCH Commonly known as:  DURAGESIC  
1 Patch by TransDERmal route every fourty-eight (48) hours for 30 days. Max Daily Amount: 1 Patch. For chronic pain      mallinckrodt brand only Start taking on:  7/5/2017  
  
 fluocinoNIDE 0.05 % topical cream  
Commonly known as:  LIDEX Apply  to affected area two (2) times a day. FLUoxetine 20 mg capsule Commonly known as:  PROzac Take 1 Cap by mouth daily. fluticasone 50 mcg/actuation nasal spray Commonly known as:  Ellie Tristin Apply one-two sprays to skin prior to patch change for skin irritation. fluticasone-salmeterol 250-50 mcg/dose diskus inhaler Commonly known as:  ADVAIR DISKUS Take 1 Puff by inhalation every twelve (12) hours. glimepiride 4 mg tablet Commonly known as:  AMARYL  
TAKE ONE TABLET BY MOUTH EVERY MORNING. glucose blood VI test strips strip Commonly known as:  ON CALL EXPRESS TEST STRIP Test blood sugar twice daily DX: E11.9  
  
 guaiFENesin  mg ER tablet Commonly known as:  [employer] Take 1 Tab by mouth two (2) times a day.  
  
 ketoconazole 2 % shampoo Commonly known as:  NIZORAL Apply to scalp and rinse daily, prn Lancets Misc Test Blood Sugar twice daily. DX Code: E11.9  
  
 lidocaine 2 % solution Commonly known as:  XYLOCAINE Take 5 mL by mouth as needed for Pain. LORazepam 1 mg tablet Commonly known as:  ATIVAN  
  
 naloxone 4 mg/actuation Spry 4 mg by Nasal route as needed for up to 2 doses. Indications: OPIOID TOXICITY naproxen 500 mg tablet Commonly known as:  NAPROSYN  
TAKE ONE TABLET BY MOUTH TWICE A DAY WITH FOOD  
  
 oxyCODONE-acetaminophen  mg per tablet Commonly known as:  PERCOCET Take 1 Tab by mouth four (4) times daily as needed for Pain for up to 30 days. Max Daily Amount: 4 Tabs. For breakthrough pain. Start taking on:  2017 polyethylene glycol 17 gram/dose powder Commonly known as:  MIRALAX  
  
 pravastatin 40 mg tablet Commonly known as:  PRAVACHOL Take 1 Tab by mouth nightly for 360 days. raNITIdine 150 mg tablet Commonly known as:  ZANTAC TAKE ONE TABLET BY MOUTH TWICE DAILY  
  
 rizatriptan 10 mg tablet Commonly known as:  Issa Pellant TAKE ONE TABLET BY MOUTH ONCE AS NEEDED MAY REPEAT IN 2 HOURS IF NEEDED  
  
 topiramate 25 mg tablet Commonly known as:  TOPAMAX TAKE ONE TABLET BY MOUTH AT BEDTIME  
  
 * Notice: This list has 8 medication(s) that are the same as other medications prescribed for you. Read the directions carefully, and ask your doctor or other care provider to review them with you. Prescriptions Printed Refills  
 fentaNYL (DURAGESIC) 50 mcg/hr PATCH 0 Starting on: 2017 Si Patch by TransDERmal route every fourty-eight (48) hours for 30 days. Max Daily Amount: 1 Patch. For chronic pain      mallinckrodt brand only Class: Print Route: TransDERmal  
 oxyCODONE-acetaminophen (PERCOCET)  mg per tablet 0 Starting on: 2017 Sig: Take 1 Tab by mouth four (4) times daily as needed for Pain for up to 30 days. Max Daily Amount: 4 Tabs. For breakthrough pain. Class: Print Route: Oral  
  
Follow-up Instructions Return in about 3 months (around 2017). Introducing [facility]! [facility] introduces Spokeable patient portal. Now you can access parts of your medical record, email your doctor's office, and request medication refills online.    
 
1. In your internet browser, go to [email or web address] 2. Click on the First Time User? Click Here link in the Sign In box. You will see the New Member Sign Up page. 3. Enter your [employer] Access Code exactly as it appears below. You will not need to use this code after youve completed the sign-up process. If you do not sign up before the expiration date, you must request a new code. · [employer] Access Code: [email or web address] Expires: 6/8/2017 10:12 AM 
 
4. Enter the last four digits of your Social Security Number (xxxx) and Date of Birth (mm/dd/yyyy) as indicated and click Submit. You will be taken to the next sign-up page. 5. Create a Ph[employer]ure ID. This will be your [employer] login ID and cannot be changed, so think of one that is secure and easy to remember. 6. Create a [employer] password. You can change your password at any time. 7. Enter your Password Reset Question and Answer. This can be used at a later time if you forget your password. 8. Enter your e-mail address. You will receive e-mail notification when new information is available in [address] 9. Click Sign Up. You can now view and download portions of your medical record. 10. Click the Download Summary menu link to download a portable copy of your medical information. If you have questions, please visit the Frequently Asked Questions section of the [employer] website. Remember, [employer] is NOT to be used for urgent needs. For medical emergencies, dial 911. Now available from your iPhone and Android! Please provide this summary of care documentation to your next provider. Your primary care clinician is listed as Vandana Pablo. If you have any questions after today's visit, please call [phone].

## 2017-06-08 ENCOUNTER — TELEPHONE (OUTPATIENT)
Dept: PAIN MANAGEMENT | Age: 47
End: 2017-06-08

## 2017-06-08 NOTE — TELEPHONE ENCOUNTER
When the pt was here for her appt on 06/05/17 she verbally requested a refill on her compound cream. She stated that she gets this medication through [employer]. I called [facility] to do a verbal order over the phone and they do not have the pt every having a prescription in their database. I called the pt to get some clarification and she was not able to be reached. A message was left for the pt and the number for the nurse triage line was provided.

## 2017-08-28 DIAGNOSIS — J45.30 MILD PERSISTENT ASTHMA WITHOUT COMPLICATION: ICD-10-CM

## 2017-08-30 RX ORDER — FLUTICASONE PROPIONATE AND SALMETEROL 250; 50 UG/1; UG/1
1 POWDER RESPIRATORY (INHALATION) EVERY 12 HOURS
Qty: 3 INHALER | Refills: 1 | Status: SHIPPED | OUTPATIENT
Start: 2017-08-30 | End: 2019-04-03 | Stop reason: SDUPTHER

## 2017-09-05 ENCOUNTER — OFFICE VISIT (OUTPATIENT)
Dept: PAIN MANAGEMENT | Age: 47
End: 2017-09-05

## 2017-09-05 VITALS
DIASTOLIC BLOOD PRESSURE: 64 MMHG | WEIGHT: 186 LBS | TEMPERATURE: 98.2 F | HEART RATE: 74 BPM | RESPIRATION RATE: 22 BRPM | BODY MASS INDEX: 28.28 KG/M2 | SYSTOLIC BLOOD PRESSURE: 101 MMHG

## 2017-09-05 DIAGNOSIS — R52 CHRONIC GENERALIZED PAIN DISORDER: ICD-10-CM

## 2017-09-05 DIAGNOSIS — M79.7 FIBROMYALGIA SYNDROME: Primary | ICD-10-CM

## 2017-09-05 DIAGNOSIS — Z87.898 HISTORY OF HEADACHE: ICD-10-CM

## 2017-09-05 DIAGNOSIS — Z79.899 ENCOUNTER FOR LONG-TERM (CURRENT) USE OF HIGH-RISK MEDICATION: ICD-10-CM

## 2017-09-05 DIAGNOSIS — G25.81 RESTLESS LEG SYNDROME: ICD-10-CM

## 2017-09-05 DIAGNOSIS — M79.18 MUSCULOSKELETAL PAIN: ICD-10-CM

## 2017-09-05 DIAGNOSIS — G89.29 CHRONIC GENERALIZED PAIN DISORDER: ICD-10-CM

## 2017-09-05 LAB
ALCOHOL UR POC: NORMAL
AMPHETAMINES UR POC: NEGATIVE
BARBITURATES UR POC: NEGATIVE
BENZODIAZEPINES UR POC: NEGATIVE
BUPRENORPHINE UR POC: NORMAL
CANNABINOIDS UR POC: NEGATIVE
CARISOPRODOL UR POC: NORMAL
COCAINE UR POC: NEGATIVE
FENTANYL UR POC: NORMAL
MDMA/ECSTASY UR POC: NEGATIVE
METHADONE UR POC: NEGATIVE
METHAMPHETAMINE UR POC: NEGATIVE
METHYLPHENIDATE UR POC: NEGATIVE
OPIATES UR POC: NEGATIVE
OXYCODONE UR POC: NORMAL
PHENCYCLIDINE UR POC: NORMAL
PROPOXYPHENE UR POC: NORMAL
TRAMADOL UR POC: NORMAL
TRICYCLICS UR POC: NEGATIVE

## 2017-09-05 RX ORDER — OXYCODONE AND ACETAMINOPHEN 10; 325 MG/1; MG/1
1 TABLET ORAL
Qty: 120 TAB | Refills: 0 | Status: SHIPPED | OUTPATIENT
Start: 2017-10-04 | End: 2017-12-04 | Stop reason: SDUPTHER

## 2017-09-05 RX ORDER — FENTANYL 50 UG/1
1 PATCH TRANSDERMAL
Qty: 15 PATCH | Refills: 0 | Status: SHIPPED | OUTPATIENT
Start: 2017-10-04 | End: 2017-12-04 | Stop reason: SDUPTHER

## 2017-09-05 RX ORDER — OXYCODONE AND ACETAMINOPHEN 10; 325 MG/1; MG/1
1 TABLET ORAL
Qty: 120 TAB | Refills: 0 | Status: SHIPPED | OUTPATIENT
Start: 2017-09-05 | End: 2017-12-04 | Stop reason: SDUPTHER

## 2017-09-05 RX ORDER — FENTANYL 50 UG/1
1 PATCH TRANSDERMAL
Qty: 15 PATCH | Refills: 0 | Status: SHIPPED | OUTPATIENT
Start: 2017-09-05 | End: 2017-12-04 | Stop reason: SDUPTHER

## 2017-09-05 RX ORDER — OXYCODONE AND ACETAMINOPHEN 10; 325 MG/1; MG/1
1 TABLET ORAL
Qty: 120 TAB | Refills: 0 | Status: SHIPPED | OUTPATIENT
Start: 2017-11-03 | End: 2017-12-04 | Stop reason: SDUPTHER

## 2017-09-05 RX ORDER — FENTANYL 50 UG/1
1 PATCH TRANSDERMAL
Qty: 15 PATCH | Refills: 0 | Status: SHIPPED | OUTPATIENT
Start: 2017-11-03 | End: 2017-12-04 | Stop reason: SDUPTHER

## 2017-09-05 NOTE — PROGRESS NOTES
Nursing Notes    Patient presents to the office today in follow-up. Patient rates her pain at 6/10 on the numerical pain scale. Reviewed medications with counts as follows:    Rx Date filled Qty Dispensed Pill Count Last Dose Short   Fentanyl 50mcg 08/26/17 15 10 09/04/17 No    Percocet 10/325mg 08/26/17 120 95 This am  No                                     Comments:     POC UDS was performed in office today    Any new labs or imaging since last appointment? NO    Have you been to an emergency room (ER) or urgent care clinic since your last visit? NO            Have you been hospitalized since your last visit? NO     If yes, where, when, and reason for visit? Have you seen or consulted any other health care providers outside of the [address] [place] [address]  since your last visit? NO     If yes, where, when, and reason for visit? HM deferred to pcp.

## 2017-09-05 NOTE — PROGRESS NOTES
HISTORY OF PRESENT ILLNESS  Shahbaz Anderson is a 40 y.o. female. HPI Comments: Meds help with pain control and quality of life. No new side effects reported today. Visit survey reviewed and will be scanned.  reviewed. Recent average level of pain(out of 10)-5  Chief complaint, pain all over, history fibromyalgia  She does also have a history of headaches and sees a neurologist up in the [place] area  Using a compound cream as needed. She reports they have not mailed this to her recently. She reports some benefit. We previously looked into having this restarted and I am not sure why she is not receiving the cream.  I will asked my nurse to look into this again. Using fentanyl patch 50 mcg every 48 hour  Percocet 10 mg 4 times a day as needed  60-70% complete relief in the past 30 days      Measuring clinical outcomes of chronic pain patients. This was reviewed today. The survey will be scanned. Please see the survey for details. Total score-6    Back Pain    Associated symptoms include a fever, headaches (sees a \"specialist for that\") and tingling. Pertinent negatives include no chest pain, no weight loss and no weakness. Review of Systems   Constitutional: Positive for fever and malaise/fatigue. Negative for chills and weight loss. HENT: Positive for congestion and sore throat. Eyes: Negative for blurred vision and double vision. Respiratory: Positive for cough and shortness of breath (from URI/h/o asthma). Nonsmoker/quit 4.5 years ago   Cardiovascular: Negative for chest pain. Gastrointestinal: Positive for heartburn. Negative for constipation, diarrhea, nausea and vomiting. Genitourinary: Negative. Musculoskeletal: Positive for back pain, joint pain, myalgias and neck pain. Negative for falls. Skin: Positive for rash. Neurological: Positive for dizziness, tingling, sensory change and headaches (sees a \"specialist for that\"). Negative for tremors, seizures and weakness. Endo/Heme/Allergies: Positive for environmental allergies (gets injections every other weeks for seasonal allergies). Does not bruise/bleed easily. Psychiatric/Behavioral: Positive for depression. Negative for suicidal ideas. The patient is nervous/anxious and has insomnia. Does not see psychiatry       Physical Exam   Constitutional: She appears well-developed and well-nourished. She is cooperative. She does not have a sickly appearance. HENT:   Head: Normocephalic and atraumatic. Right Ear: External ear normal. No drainage. Left Ear: External ear normal. No drainage. Nose: Nose normal.   Eyes: Lids are normal. Right eye exhibits no discharge. Left eye exhibits no discharge. Right conjunctiva has no hemorrhage. Left conjunctiva has no hemorrhage. Neck: Neck supple. No tracheal deviation present. No thyroid mass present. Pulmonary/Chest: Effort normal. No respiratory distress. Neurological: She is alert. No cranial nerve deficit. Skin: Skin is intact. No rash noted. Psychiatric: Her speech is normal. Her affect is not angry. She does not express inappropriate judgment. Nursing note and vitals reviewed. ASSESSMENT and PLAN  Encounter Diagnoses   Name Primary?  Fibromyalgia syndrome Yes    Encounter for long-term (current) use of high-risk medication     Restless leg syndrome     Chronic generalized pain disorder     Musculoskeletal pain     History of headache    No indicators for recent medication misuse.  reviewed. Pain Meds and Quality Of Life have been reviewed. Nonpharmacologic therapy and non-opioid pharmacologic therapy were considered. If opioid therapy is prescribed, this is only if the expected benefits are anticipated to outweigh risks. Possible changes to treatment plan considered. Support/education given as needed. Today-medications are as listed. No significant changes to medications. Follow up -- 3 months.

## 2017-09-05 NOTE — MR AVS SNAPSHOT
Visit Information Date & Time Provider Department Dept. Phone Encounter #  
 9/5/2017  9:45 AM Lolis Childress MD [facility] [id number] Follow-up Instructions Return in about 3 months (around 12/5/2017). Follow-up and Disposition History Your Appointments 11/20/2017 10:00 AM  
Any with Wade Mcgowan MD  
[facility] ([address]) Appt Note: yearly follow up, bring machine. [address] [facility] [id number] [facility] [id number] Upcoming Health Maintenance Date Due Pneumococcal 19-64 Medium Risk (1 of 1 - PPSV23) 9/26/1990 EYE EXAM RETINAL OR DILATED Q1 3/10/2016 BREAST CANCER SCRN MAMMOGRAM 6/2/2016 INFLUENZA AGE 9 TO ADULT 8/1/2017 HEMOGLOBIN A1C Q6M 9/10/2017 FOOT EXAM Q1 10/4/2017 MICROALBUMIN Q1 10/4/2017 LIPID PANEL Q1 3/10/2018 DTaP/Tdap/Td series (2 - Td) 9/26/2026 Allergies as of 9/5/2017  Review Complete On: 9/5/2017 By: Lolis Childress MD  
  
 Severity Noted Reaction Type Reactions Esomeprazole Magnesium  10/19/2010    Hives Nexium [Esomeprazole Magnesium]  06/11/2012    Unknown (comments) Pcn [Penicillins]  06/11/2012    Unknown (comments) Current Immunizations  Reviewed on 10/4/2016 Name Date Influenza Vaccine 9/26/2016, 10/20/2015, 10/9/2014, 10/1/2013 Tdap 9/26/2016 Not reviewed this visit You Were Diagnosed With   
  
 Codes Comments Fibromyalgia syndrome    -  Primary ICD-10-CM: M79.7 ICD-9-CM: 729.1 Encounter for long-term (current) use of high-risk medication     ICD-10-CM: Z79.899 ICD-9-CM: V58.69 Restless leg syndrome     ICD-10-CM: G25.81 ICD-9-CM: 333.94 Chronic generalized pain disorder     ICD-10-CM: R52, G89.29 ICD-9-CM: 780.96, 338.29 Musculoskeletal pain     ICD-10-CM: M79.1 ICD-9-CM: 729.1 History of headache     ICD-10-CM: Z87.898 ICD-9-CM: V13.89 Vitals BP Pulse Temp Resp Weight(growth percentile) BMI  
 101/64 (BP 1 Location: Left arm, BP Patient Position: Sitting) 74 98.2 °F (36.8 °C) (Oral) 22 186 lb (84.4 kg) 28.28 kg/m2 OB Status Smoking Status Hysterectomy Former Smoker Vitals History BMI and BSA Data Body Mass Index Body Surface Area  
 28.28 kg/m 2 2.01 m 2 Preferred Pharmacy Pharmacy Name Phone  [address] [phone] Your Updated Medication List  
  
   
This list is accurate as of: 9/5/17 10:47 AM.  Always use your most recent med list.  
  
  
  
  
 albuterol 90 mcg/actuation inhaler Commonly known as:  PROAIR HFA Take 2 Puffs by inhalation every four (4) hours as needed for Wheezing. Take 2 Puffs inhaled by mouth Every 4 Hours. * allergy injection Set C  
  
 * allergy injection Set B  
  
 * allergy injection Set A  
  
 * allergy injection  
now * allergy injection  
now * allergy injection  
now AMITIZA 24 mcg capsule Generic drug:  lubiPROStone Blood-Glucose Meter monitoring kit Commonly known as:  FREESTYLE FREEDOM LITE Check Blood sugar once daily  
  
 cyclobenzaprine 10 mg tablet Commonly known as:  FLEXERIL Take 1 Tab by mouth three (3) times daily as needed for Muscle Spasm(s) for up to 30 days. As needed  Indications: MUSCLE SPASM Dexlansoprazole 60 mg Cpdb Commonly known as:  DEXILANT TAKE ONE CAPSULE BY MOUTH EVERY DAY  
  
 EPINEPHrine 0.3 mg/0.3 mL injection Commonly known as:  EPIPEN  
0.3 mL by IntraMUSCular route once as needed for 1 dose. KIMBERLEY- mg tablet Generic drug:  erythromycin  
  
 fenofibrate nanocrystallized 145 mg tablet Commonly known as:  [employer] Take 1 Tab by mouth daily for 360 days. Indications: HYPERTRIGLYCERIDEMIA  * fentaNYL 50 mcg/hr PATCH  
 Commonly known as:  DURAGESIC  
1 Patch by TransDERmal route every fourty-eight (48) hours. Max Daily Amount: 1 Patch. For chronic pain (due to fill 3/28/17) * fentaNYL 50 mcg/hr PATCH Commonly known as:  DURAGESIC  
1 Patch by TransDERmal route every fourty-eight (48) hours for 30 days. Max Daily Amount: 1 Patch. For chronic pain      m[employer]inckrodt brand only Start taking on:  10/4/2017 * fentaNYL 50 mcg/hr PATCH Commonly known as:  DURAGESIC  
1 Patch by TransDERmal route every fourty-eight (48) hours for 30 days. Max Daily Amount: 1 Patch. For chronic pain      m[employer]inckrodt brand only Start taking on:  11/3/2017  
  
 fluocinoNIDE 0.05 % topical cream  
Commonly known as:  LIDEX APPLY TO THE AFFECTED AREA TOPICALLY TWO TIMES A DAY FLUoxetine 20 mg capsule Commonly known as:  PROzac Take 1 Cap by mouth daily. fluticasone 50 mcg/actuation nasal spray Commonly known as:  Rich Miramontes Apply one-two sprays to skin prior to patch change for skin irritation. fluticasone-salmeterol 250-50 mcg/dose diskus inhaler Commonly known as:  ADVAIR DISKUS Take 1 Puff by inhalation every twelve (12) hours. glimepiride 4 mg tablet Commonly known as:  AMARYL  
TAKE ONE TABLET BY MOUTH EVERY MORNING. glucose blood VI test strips strip Commonly known as:  ON CALL EXPRESS TEST STRIP Test blood sugar twice daily DX: E11.9  
  
 guaiFENesin  mg ER tablet Commonly known as:  [employer] Take 1 Tab by mouth two (2) times a day.  
  
 ketoconazole 2 % shampoo Commonly known as:  NIZORAL  
APPLY TO SCALP AND RINSE   DAILY AS NEEDED Lancets Misc Test Blood Sugar twice daily. DX Code: E11.9  
  
 lidocaine 2 % solution Commonly known as:  XYLOCAINE Take 5 mL by mouth as needed for Pain. LORazepam 1 mg tablet Commonly known as:  ATIVAN  
  
 naloxone 4 mg/actuation Spry 4 mg by Nasal route as needed for up to 2 doses. Indications: OPIOID TOXICITY naproxen 500 mg tablet Commonly known as:  NAPROSYN  
TAKE ONE TABLET BY MOUTH TWICE A DAY WITH FOOD  
  
 * oxyCODONE-acetaminophen  mg per tablet Commonly known as:  PERCOCET Take 1 Tab by mouth four (4) times daily as needed for Pain for up to 30 days. Max Daily Amount: 4 Tabs. For breakthrough pain. * oxyCODONE-acetaminophen  mg per tablet Commonly known as:  PERCOCET Take 1 Tab by mouth four (4) times daily as needed for Pain for up to 30 days. Max Daily Amount: 4 Tabs. For breakthrough pain. Start taking on:  10/4/2017 * oxyCODONE-acetaminophen  mg per tablet Commonly known as:  PERCOCET Take 1 Tab by mouth four (4) times daily as needed for Pain for up to 30 days. Max Daily Amount: 4 Tabs. For breakthrough pain. Start taking on:  11/3/2017 polyethylene glycol 17 gram/dose powder Commonly known as:  MIRALAX  
  
 pravastatin 40 mg tablet Commonly known as:  PRAVACHOL Take 1 Tab by mouth nightly for 360 days. raNITIdine 150 mg tablet Commonly known as:  ZANTAC TAKE 1 TABLET TWICE A DAY  
  
 rizatriptan 10 mg tablet Commonly known as:  Alric Ehrenberg TAKE ONE TABLET BY MOUTH ONCE AS NEEDED MAY REPEAT IN 2 HOURS IF NEEDED  
  
 topiramate 25 mg tablet Commonly known as:  TOPAMAX TAKE ONE TABLET BY MOUTH AT BEDTIME  
  
 TRULICITY 6.37 AG/9.3 mL sub-q pen Generic drug:  dulaglutide INJECT 0.5 ML UNDER THE SKIN ONCE EVERY 7 DAYS * Notice: This list has 12 medication(s) that are the same as other medications prescribed for you. Read the directions carefully, and ask your doctor or other care provider to review them with you. Prescriptions Printed Refills  
 fentaNYL (DURAGESIC) 50 mcg/hr PATCH 0 Si Patch by TransDERmal route every fourty-eight (48) hours. Max Daily Amount: 1 Patch. For chronic pain (due to fill 3/28/17) Class: Print  Route: TransDERmal  
 fentaNYL (DURAGESIC) 50 mcg/hr PATCH 0  
 Starting on: 11/3/2017 Si Patch by TransDERmal route every fourty-eight (48) hours for 30 days. Max Daily Amount: 1 Patch. For chronic pain      m[employer]in[place]rodt brand only Class: Print Route: TransDERmal  
 oxyCODONE-acetaminophen (PERCOCET)  mg per tablet 0 Sig: Take 1 Tab by mouth four (4) times daily as needed for Pain for up to 30 days. Max Daily Amount: 4 Tabs. For breakthrough pain. Class: Print Route: Oral  
 fentaNYL (DURAGESIC) 50 mcg/hr PATCH 0 Starting on: 10/4/2017 Si Patch by TransDERmal route every fourty-eight (48) hours for 30 days. Max Daily Amount: 1 Patch. For chronic pain      m[place]in[place]rodt brand only Class: Print Route: TransDERmal  
 oxyCODONE-acetaminophen (PERCOCET)  mg per tablet 0 Starting on: 11/3/2017 Sig: Take 1 Tab by mouth four (4) times daily as needed for Pain for up to 30 days. Max Daily Amount: 4 Tabs. For breakthrough pain. Class: Print Route: Oral  
 oxyCODONE-acetaminophen (PERCOCET)  mg per tablet 0 Starting on: 10/4/2017 Sig: Take 1 Tab by mouth four (4) times daily as needed for Pain for up to 30 days. Max Daily Amount: 4 Tabs. For breakthrough pain. Class: Print Route: Oral  
  
We Performed the Following AMB POC DRUG SCREEN () [ [facility]] DRUG SCREEN [[id number] Custom] Follow-up Instructions Return in about 3 months (around 2017). Introducing \[facility] [place] [facility]\"" & HEALTH SERVICES! [facility] introduces [employer] patient portal. Now you can access parts of your medical record, email your doctor's office, and request medication refills online. 1. In your internet browser, go to [email or web address] 2. Click on the First Time User? Click Here link in the Sign In box. You will see the New Member Sign Up page. 3. Enter your [employer] Access Code exactly as it appears below. You will not need to use this code after youve completed the sign-up process.  If you do not sign up before the expiration date, you must request a new code. · [employer] Access Code: [email or web address] Expires: 12/4/2017 10:47 AM 
 
4. Enter the last four digits of your Social Security Number (xxxx) and Date of Birth (mm/dd/yyyy) as indicated and click Submit. You will be taken to the next sign-up page. 5. Create a [employer] ID. This will be your [employer] login ID and cannot be changed, so think of one that is secure and easy to remember. 6. Create a [employer] password. You can change your password at any time. 7. Enter your Password Reset Question and Answer. This can be used at a later time if you forget your password. 8. Enter your e-mail address. You will receive e-mail notification when new information is available in [address] 9. Click Sign Up. You can now view and download portions of your medical record. 10. Click the Download Summary menu link to download a portable copy of your medical information. If you have questions, please visit the Frequently Asked Questions section of the [employer] website. Remember, [employer] is NOT to be used for urgent needs. For medical emergencies, dial 911. Now available from your iPhone and Android! Please provide this summary of care documentation to your next provider. Your primary care clinician is listed as Ova Macadamia. If you have any questions after today's visit, please call [phone].

## 2017-09-08 DIAGNOSIS — G43.019 INTRACTABLE MIGRAINE WITHOUT AURA AND WITHOUT STATUS MIGRAINOSUS: ICD-10-CM

## 2017-09-08 DIAGNOSIS — K21.9 GASTROESOPHAGEAL REFLUX DISEASE WITHOUT ESOPHAGITIS: ICD-10-CM

## 2017-09-08 RX ORDER — DEXLANSOPRAZOLE 60 MG/1
CAPSULE, DELAYED RELEASE ORAL
Qty: 90 CAP | Refills: 1 | OUTPATIENT
Start: 2017-09-08

## 2017-09-08 RX ORDER — TOPIRAMATE 25 MG/1
TABLET ORAL
Qty: 90 TAB | Refills: 1 | OUTPATIENT
Start: 2017-09-08

## 2017-09-08 RX ORDER — RIZATRIPTAN BENZOATE 10 MG/1
TABLET ORAL
Qty: 90 TAB | Refills: 1 | OUTPATIENT
Start: 2017-09-08

## 2017-09-12 ENCOUNTER — TELEPHONE (OUTPATIENT)
Dept: FAMILY MEDICINE CLINIC | Age: 47
End: 2017-09-12

## 2017-09-12 NOTE — TELEPHONE ENCOUNTER
Returned call to [address] [place] [address] and advised per Dr. Casey Darnell, patient has not been seen since 5/2017 and needs appointment for follow up. [facility] states they will advise patient.

## 2017-09-12 NOTE — TELEPHONE ENCOUNTER
----- Message from Jennifer Hodge sent at 9/12/2017 12:01 PM EDT -----  Regarding: Dr. Lorie Euceda is requesting a call back to confirm the status of the refill request for Rx's Dexilant 60mg, Rizatriptan 10mg, and Topiramate 25mg that were faxed to the office on 8/26/17. [phone]

## 2017-09-27 ENCOUNTER — OFFICE VISIT (OUTPATIENT)
Dept: FAMILY MEDICINE CLINIC | Age: 47
End: 2017-09-27

## 2017-09-27 VITALS
HEIGHT: 68 IN | SYSTOLIC BLOOD PRESSURE: 122 MMHG | OXYGEN SATURATION: 98 % | HEART RATE: 75 BPM | RESPIRATION RATE: 20 BRPM | BODY MASS INDEX: 27.8 KG/M2 | WEIGHT: 183.4 LBS | TEMPERATURE: 98.3 F | DIASTOLIC BLOOD PRESSURE: 86 MMHG

## 2017-09-27 DIAGNOSIS — I10 ESSENTIAL HYPERTENSION: ICD-10-CM

## 2017-09-27 DIAGNOSIS — E11.40 TYPE 2 DIABETES MELLITUS WITH DIABETIC NEUROPATHY, WITHOUT LONG-TERM CURRENT USE OF INSULIN (HCC): ICD-10-CM

## 2017-09-27 DIAGNOSIS — J01.90 ACUTE RHINOSINUSITIS: Primary | ICD-10-CM

## 2017-09-27 DIAGNOSIS — J02.9 SORE THROAT: ICD-10-CM

## 2017-09-27 DIAGNOSIS — Z91.09 MULTIPLE ENVIRONMENTAL ALLERGIES: ICD-10-CM

## 2017-09-27 LAB
S PYO AG THROAT QL: NEGATIVE
VALID INTERNAL CONTROL?: YES

## 2017-09-27 RX ORDER — BENZONATATE 200 MG/1
200 CAPSULE ORAL
Qty: 30 CAP | Refills: 1 | Status: SHIPPED | OUTPATIENT
Start: 2017-09-27 | End: 2017-10-04

## 2017-09-27 RX ORDER — LORATADINE 10 MG/1
10 TABLET ORAL
Qty: 30 TAB | Refills: 0 | Status: SHIPPED | OUTPATIENT
Start: 2017-09-27 | End: 2019-04-02

## 2017-09-27 RX ORDER — AZITHROMYCIN 250 MG/1
TABLET, FILM COATED ORAL
Qty: 6 TAB | Refills: 0 | Status: SHIPPED | OUTPATIENT
Start: 2017-09-27 | End: 2017-10-02

## 2017-09-27 NOTE — PROGRESS NOTES
Health Maintenance Due   Topic Date Due    Pneumococcal 19-64 Medium Risk (1 of 1 - PPSV23) 09/26/1990    EYE EXAM RETINAL OR DILATED Q1  03/10/2016    BREAST CANCER SCRN MAMMOGRAM  06/02/2016    INFLUENZA AGE 9 TO ADULT  08/01/2017    HEMOGLOBIN A1C Q6M  09/10/2017    FOOT EXAM Q1  10/04/2017    MICROALBUMIN Q1  10/04/2017

## 2017-09-27 NOTE — MR AVS SNAPSHOT
Visit Information Date & Time Provider Department Dept. Phone Encounter #  
 9/27/2017  3:00 PM Ned Stock MD [address] [place] [address] [id number] Your Appointments 11/20/2017 10:00 AM  
Any with Tierney Savage MD  
[address] ([address]) Appt Note: yearly follow up, bring machine. [email or web address] [place] [phone] [address] [facility] [phone]  
  
    
 12/4/2017 10:45 AM  
Follow Up with Hari Gordon MD  
[facility] [address]) Appt Note: return in 3 months [address] [place] [id number] [phone] [facility] [phone] Upcoming Health Maintenance Date Due Pneumococcal 19-64 Medium Risk (1 of 1 - PPSV23) 9/26/1990 EYE EXAM RETINAL OR DILATED Q1 3/10/2016 BREAST CANCER SCRN MAMMOGRAM 6/2/2016 INFLUENZA AGE 9 TO ADULT 8/1/2017 HEMOGLOBIN A1C Q6M 9/10/2017 FOOT EXAM Q1 10/4/2017 MICROALBUMIN Q1 10/4/2017 LIPID PANEL Q1 3/10/2018 DTaP/Tdap/Td series (2 - Td) 9/26/2026 Allergies as of 9/27/2017  Review Complete On: 9/27/2017 By: Ned Stock MD  
  
 Severity Noted Reaction Type Reactions Esomeprazole Magnesium  10/19/2010    Hives Nexium [Esomeprazole Magnesium]  06/11/2012    Unknown (comments) Pcn [Penicillins]  06/11/2012    Unknown (comments) Current Immunizations  Reviewed on 10/4/2016 Name Date Influenza Vaccine 9/26/2016, 10/20/2015, 10/9/2014, 10/1/2013 Tdap 9/26/2016 Not reviewed this visit You Were Diagnosed With   
  
 Codes Comments Acute rhinosinusitis    -  Primary ICD-10-CM: J01.90 ICD-9-CM: 461.9 Sore throat     ICD-10-CM: J02.9 ICD-9-CM: 265  Type 2 diabetes mellitus with diabetic neuropathy, without long-term current use of insulin ([facility] 75.)     ICD-10-CM: E11.40 ICD-9-CM: 250.60, 357.2 Essential hypertension     ICD-10-CM: I10 
ICD-9-CM: 401.9 Vitals BP Pulse Temp Resp Height(growth percentile) Weight(growth percentile) 122/86 75 98.3 °F (36.8 °C) (Oral) 20 5' 8\" (1.727 m) 183 lb 6.4 oz (83.2 kg) SpO2 BMI OB Status Smoking Status 98% 27.89 kg/m2 Hysterectomy Former Smoker Vitals History BMI and BSA Data Body Mass Index Body Surface Area  
 27.89 kg/m 2 2 m 2 Preferred Pharmacy Pharmacy Name Phone [address] [place], [place] - [address] [phone] Your Updated Medication List  
  
   
This list is accurate as of: 9/27/17  3:39 PM.  Always use your most recent med list.  
  
  
  
  
 albuterol 90 mcg/actuation inhaler Commonly known as:  PROAIR HFA Take 2 Puffs by inhalation every four (4) hours as needed for Wheezing. Take 2 Puffs inhaled by mouth Every 4 Hours. * allergy injection Set C  
  
 * allergy injection Set B  
  
 * allergy injection Set A  
  
 * allergy injection  
now * allergy injection  
now * allergy injection  
now AMITIZA 24 mcg capsule Generic drug:  lubiPROStone  
  
 azithromycin 250 mg tablet Commonly known as:  Saralee Anis Take 2 tablets today, then take 1 tablet daily  
  
 benzonatate 200 mg capsule Commonly known as:  TESSALON Take 1 Cap by mouth three (3) times daily as needed for Cough for up to 7 days. Blood-Glucose Meter monitoring kit Commonly known as:  FREESTYLE FREEDOM LITE Check Blood sugar once daily  
  
 cyclobenzaprine 10 mg tablet Commonly known as:  FLEXERIL Take 1 Tab by mouth three (3) times daily as needed for Muscle Spasm(s) for up to 30 days. As needed  Indications: MUSCLE SPASM Dexlansoprazole 60 mg Cpdb Commonly known as:  DEXILANT TAKE ONE CAPSULE BY MOUTH EVERY DAY  
  
 EPINEPHrine 0.3 mg/0.3 mL injection Commonly known as:  EPIPEN  
0.3 mL by IntraMUSCular route once as needed for 1 dose. KIMBERLEY- mg tablet Generic drug:  erythromycin  
  
 fenofibrate nanocrystallized 145 mg tablet Commonly known as:  [employer] Take 1 Tab by mouth daily for 360 days. Indications: HYPERTRIGLYCERIDEMIA * fentaNYL 50 mcg/hr PATCH Commonly known as:  DURAGESIC  
1 Patch by TransDERmal route every fourty-eight (48) hours. Max Daily Amount: 1 Patch. For chronic pain (due to fill 3/28/17) * fentaNYL 50 mcg/hr PATCH Commonly known as:  DURAGESIC  
1 Patch by TransDERmal route every fourty-eight (48) hours for 30 days. Max Daily Amount: 1 Patch. For chronic pain      m[place]rodt brand only Start taking on:  10/4/2017 * fentaNYL 50 mcg/hr PATCH Commonly known as:  DURAGESIC  
1 Patch by TransDERmal route every fourty-eight (48) hours for 30 days. Max Daily Amount: 1 Patch. For chronic pain      m[place]inckrodt brand only Start taking on:  11/3/2017  
  
 fluocinoNIDE 0.05 % topical cream  
Commonly known as:  LIDEX APPLY TO THE AFFECTED AREA TOPICALLY TWO TIMES A DAY FLUoxetine 20 mg capsule Commonly known as:  PROzac Take 1 Cap by mouth daily. fluticasone 50 mcg/actuation nasal spray Commonly known as:  Star Serve Apply one-two sprays to skin prior to patch change for skin irritation. fluticasone-salmeterol 250-50 mcg/dose diskus inhaler Commonly known as:  ADVAIR DISKUS Take 1 Puff by inhalation every twelve (12) hours. glimepiride 4 mg tablet Commonly known as:  AMARYL  
TAKE ONE TABLET BY MOUTH EVERY MORNING. glucose blood VI test strips strip Commonly known as:  ON CALL EXPRESS TEST STRIP Test blood sugar twice daily DX: E11.9  
  
 guaiFENesin  mg ER tablet Commonly known as:  [employer] Dalton Take 1 Tab by mouth two (2) times a day.  
  
 ketoconazole 2 % shampoo Commonly known as:  NIZORAL  
APPLY TO SCALP AND RINSE   DAILY AS NEEDED  
  
 Lancets Misc Test Blood Sugar twice daily. DX Code: E11.9  
  
 lidocaine 2 % solution Commonly known as:  XYLOCAINE Take 5 mL by mouth as needed for Pain.  
  
 loratadine 10 mg tablet Commonly known as:  Sharilyn Feil Take 1 Tab by mouth nightly as needed for Rhinitis. LORazepam 1 mg tablet Commonly known as:  ATIVAN  
  
 naloxone 4 mg/actuation nasal spray Commonly known as:  NARCAN  
4 mg by Nasal route as needed for up to 2 doses. Indications: OPIOID TOXICITY  
  
 naproxen 500 mg tablet Commonly known as:  NAPROSYN  
TAKE ONE TABLET BY MOUTH TWICE A DAY WITH FOOD  
  
 * oxyCODONE-acetaminophen  mg per tablet Commonly known as:  PERCOCET Take 1 Tab by mouth four (4) times daily as needed for Pain for up to 30 days. Max Daily Amount: 4 Tabs. For breakthrough pain. * oxyCODONE-acetaminophen  mg per tablet Commonly known as:  PERCOCET Take 1 Tab by mouth four (4) times daily as needed for Pain for up to 30 days. Max Daily Amount: 4 Tabs. For breakthrough pain. Start taking on:  10/4/2017 * oxyCODONE-acetaminophen  mg per tablet Commonly known as:  PERCOCET Take 1 Tab by mouth four (4) times daily as needed for Pain for up to 30 days. Max Daily Amount: 4 Tabs. For breakthrough pain. Start taking on:  11/3/2017 pneumococcal 23-sandip ps vaccine 25 mcg/0.5 mL injection Commonly known as:  PNEUMOVAX 23  
0.5 mL by IntraMUSCular route PRIOR TO DISCHARGE for 1 dose. polyethylene glycol 17 gram/dose powder Commonly known as:  MIRALAX  
  
 pravastatin 40 mg tablet Commonly known as:  PRAVACHOL Take 1 Tab by mouth nightly for 360 days. raNITIdine 150 mg tablet Commonly known as:  ZANTAC TAKE 1 TABLET TWICE A DAY  
  
 rizatriptan 10 mg tablet Commonly known as:  Alric Lafourche TAKE ONE TABLET BY MOUTH ONCE AS NEEDED MAY REPEAT IN 2 HOURS IF NEEDED  
  
 topiramate 25 mg tablet Commonly known as:  TOPAMAX TAKE ONE TABLET BY MOUTH AT BEDTIME  
  
 TRULICITY 9.81 IC/1.5 mL sub-q pen Generic drug:  dulaglutide INJECT 0.5 ML UNDER THE SKIN ONCE EVERY 7 DAYS * Notice: This list has 12 medication(s) that are the same as other medications prescribed for you. Read the directions carefully, and ask your doctor or other care provider to review them with you. Prescriptions Sent to Pharmacy Refills  
 azithromycin (ZITHROMAX) 250 mg tablet 0 Sig: Take 2 tablets today, then take 1 tablet daily Class: Normal  
 Pharmacy: [address] [place] [address] #: [phone]  
 benzonatate (TESSALON) 200 mg capsule 1 Sig: Take 1 Cap by mouth three (3) times daily as needed for Cough for up to 7 days. Class: Normal  
 Pharmacy: [address] [place] [address] #: [phone] Route: Oral  
 pneumococcal 23-sandip ps vaccine (PNEUMOVAX 23) 25 mcg/0.5 mL injection 0 Si.5 mL by IntraMUSCular route PRIOR TO DISCHARGE for 1 dose. Class: Normal  
 Pharmacy: [address] [place] [address] #: [phone] Route: IntraMUSCular  
 loratadine (CLARITIN) 10 mg tablet 0 Sig: Take 1 Tab by mouth nightly as needed for Rhinitis. Class: Normal  
 Pharmacy: [address] [place] [address] #: [phone] Route: Oral  
  
We Performed the Following AMB POC RAPID STREP A [30656 CPT(R)] CBC WITH AUTOMATED DIFF [23450 CPT(R)] HEMOGLOBIN A1C WITH EAG [20606 CPT(R)] LIPID PANEL [57039 CPT(R)] METABOLIC PANEL, COMPREHENSIVE [68446 CPT(R)] TSH 3RD GENERATION [35064 CPT(R)] Patient Instructions Learning About Diabetes Food Guidelines Your Care Instructions Meal planning is important to manage diabetes. It helps keep your blood sugar at a target level (which you set with your doctor).  You don't have to eat special foods. You can eat what your family eats, including sweets once in a while. But you do have to pay attention to how often you eat and how much you eat of certain foods. You may want to work with a dietitian or a certified diabetes educator (CDE) to help you plan meals and snacks. A dietitian or CDE can also help you lose weight if that is one of your goals. What should you know about eating carbs? Managing the amount of carbohydrate (carbs) you eat is an important part of healthy meals when you have diabetes. Carbohydrate is found in many foods. · Learn which foods have carbs. And learn the amounts of carbs in different foods. ¨ Bread, cereal, pasta, and rice have about 15 grams of carbs in a serving. A serving is 1 slice of bread (1 ounce), ½ cup of cooked cereal, or 1/3 cup of cooked pasta or rice. ¨ Fruits have 15 grams of carbs in a serving. A serving is 1 small fresh fruit, such as an apple or orange; ½ of a banana; ½ cup of cooked or canned fruit; ½ cup of fruit juice; 1 cup of melon or raspberries; or 2 tablespoons of dried fruit. ¨ Milk and no-sugar-added yogurt have 15 grams of carbs in a serving. A serving is 1 cup of milk or 2/3 cup of no-sugar-added yogurt. ¨ Starchy vegetables have 15 grams of carbs in a serving. A serving is ½ cup of mashed potatoes or sweet potato; 1 cup winter squash; ½ of a small baked potato; ½ cup of cooked beans; or ½ cup cooked corn or green peas. · Learn how much carbs to eat each day and at each meal. A dietitian or CDE can teach you how to keep track of the amount of carbs you eat. This is called carbohydrate counting. · If you are not sure how to count carbohydrate grams, use the Plate Method to plan meals. It is a good, quick way to make sure that you have a balanced meal. It also helps you spread carbs throughout the day. ¨ Divide your plate by types of foods.  Put non-starchy vegetables on half the plate, meat or other protein food on one-quarter of the plate, and a grain or starchy vegetable in the final quarter of the plate. To this you can add a small piece of fruit and 1 cup of milk or yogurt, depending on how many carbs you are supposed to eat at a meal. 
· Try to eat about the same amount of carbs at each meal. Do not \"save up\" your daily allowance of carbs to eat at one meal. 
· Proteins have very little or no carbs per serving. Examples of proteins are beef, chicken, turkey, fish, eggs, tofu, cheese, cottage cheese, and peanut butter. A serving size of meat is 3 ounces, which is about the size of a deck of cards. Examples of meat substitute serving sizes (equal to 1 ounce of meat) are 1/4 cup of cottage cheese, 1 egg, 1 tablespoon of peanut butter, and ½ cup of tofu. How can you eat out and still eat healthy? · Learn to estimate the serving sizes of foods that have carbohydrate. If you measure food at home, it will be easier to estimate the amount in a serving of restaurant food. · If the meal you order has too much carbohydrate (such as potatoes, corn, or baked beans), ask to have a low-carbohydrate food instead. Ask for a salad or green vegetables. · If you use insulin, check your blood sugar before and after eating out to help you plan how much to eat in the future. · If you eat more carbohydrate at a meal than you had planned, take a walk or do other exercise. This will help lower your blood sugar. What else should you know? · Limit saturated fat, such as the fat from meat and dairy products. This is a healthy choice because people who have diabetes are at higher risk of heart disease. So choose lean cuts of meat and nonfat or low-fat dairy products. Use olive or canola oil instead of butter or shortening when cooking. · Don't skip meals. Your blood sugar may drop too low if you skip meals and take insulin or certain medicines for diabetes. · Check with your doctor before you drink alcohol. Alcohol can cause your blood sugar to drop too low. Alcohol can also cause a bad reaction if you take certain diabetes medicines. Follow-up care is a key part of your treatment and safety. Be sure to make and go to all appointments, and call your doctor if you are having problems. It's also a good idea to know your test results and keep a list of the medicines you take. Where can you learn more? Go to http://carolina-raiza.info/. Enter A504 in the search box to learn more about \"Learning About Diabetes Food Guidelines. \" Current as of: March 13, 2017 Content Version: 11.3 © 1473-7389 [employer]. Care instructions adapted under license by [employer] Assistant (which disclaims liability or warranty for this information). If you have questions about a medical condition or this instruction, always ask your healthcare professional. [address] any warranty or liability for your use of this information. Introducing [facility]! Orlando Galloway introduces Doctor.com patient portal. Now you can access parts of your medical record, email your doctor's office, and request medication refills online. 1. In your internet browser, go to [email or web address] 2. Click on the First Time User? Click Here link in the Sign In box. You will see the New Member Sign Up page. 3. Enter your Doctor.com Access Code exactly as it appears below. You will not need to use this code after youve completed the sign-up process. If you do not sign up before the expiration date, you must request a new code. · [email or web address] Access Code: [email or web address] Expires: 12/4/2017 10:47 AM 
 
4. Enter the last four digits of your Social Security Number (xxxx) and Date of Birth (mm/dd/yyyy) as indicated and click Submit. You will be taken to the next sign-up page. 5. Create a PaperK[employer] ID. This will be your [employer] login ID and cannot be changed, so think of one that is secure and easy to remember. 6. Create a [employer] password. You can change your password at any time. 7. Enter your Password Reset Question and Answer. This can be used at a later time if you forget your password. 8. Enter your e-mail address. You will receive e-mail notification when new information is available in [address] 9. Click Sign Up. You can now view and download portions of your medical record. 10. Click the Download Summary menu link to download a portable copy of your medical information. If you have questions, please visit the Frequently Asked Questions section of the [employer] website. Remember, [employer] is NOT to be used for urgent needs. For medical emergencies, dial 911. Now available from your iPhone and Android! Please provide this summary of care documentation to your next provider. Your primary care clinician is listed as Douglas Able. If you have any questions after today's visit, please call [phone].

## 2017-09-27 NOTE — PATIENT INSTRUCTIONS
Learning About Diabetes Food Guidelines  Your Care Instructions  Meal planning is important to manage diabetes. It helps keep your blood sugar at a target level (which you set with your doctor). You don't have to eat special foods. You can eat what your family eats, including sweets once in a while. But you do have to pay attention to how often you eat and how much you eat of certain foods. You may want to work with a dietitian or a certified diabetes educator (CDE) to help you plan meals and snacks. A dietitian or CDE can also help you lose weight if that is one of your goals. What should you know about eating carbs? Managing the amount of carbohydrate (carbs) you eat is an important part of healthy meals when you have diabetes. Carbohydrate is found in many foods. · Learn which foods have carbs. And learn the amounts of carbs in different foods. ¨ Bread, cereal, pasta, and rice have about 15 grams of carbs in a serving. A serving is 1 slice of bread (1 ounce), ½ cup of cooked cereal, or 1/3 cup of cooked pasta or rice. ¨ Fruits have 15 grams of carbs in a serving. A serving is 1 small fresh fruit, such as an apple or orange; ½ of a banana; ½ cup of cooked or canned fruit; ½ cup of fruit juice; 1 cup of melon or raspberries; or 2 tablespoons of dried fruit. ¨ Milk and no-sugar-added yogurt have 15 grams of carbs in a serving. A serving is 1 cup of milk or 2/3 cup of no-sugar-added yogurt. ¨ Starchy vegetables have 15 grams of carbs in a serving. A serving is ½ cup of mashed potatoes or sweet potato; 1 cup winter squash; ½ of a small baked potato; ½ cup of cooked beans; or ½ cup cooked corn or green peas. · Learn how much carbs to eat each day and at each meal. A dietitian or CDE can teach you how to keep track of the amount of carbs you eat. This is called carbohydrate counting. · If you are not sure how to count carbohydrate grams, use the Plate Method to plan meals.  It is a good, quick way to make sure that you have a balanced meal. It also helps you spread carbs throughout the day. ¨ Divide your plate by types of foods. Put non-starchy vegetables on half the plate, meat or other protein food on one-quarter of the plate, and a grain or starchy vegetable in the final quarter of the plate. To this you can add a small piece of fruit and 1 cup of milk or yogurt, depending on how many carbs you are supposed to eat at a meal.  · Try to eat about the same amount of carbs at each meal. Do not \"save up\" your daily allowance of carbs to eat at one meal.  · Proteins have very little or no carbs per serving. Examples of proteins are beef, chicken, turkey, fish, eggs, tofu, cheese, cottage cheese, and peanut butter. A serving size of meat is 3 ounces, which is about the size of a deck of cards. Examples of meat substitute serving sizes (equal to 1 ounce of meat) are 1/4 cup of cottage cheese, 1 egg, 1 tablespoon of peanut butter, and ½ cup of tofu. How can you eat out and still eat healthy? · Learn to estimate the serving sizes of foods that have carbohydrate. If you measure food at home, it will be easier to estimate the amount in a serving of restaurant food. · If the meal you order has too much carbohydrate (such as potatoes, corn, or baked beans), ask to have a low-carbohydrate food instead. Ask for a salad or green vegetables. · If you use insulin, check your blood sugar before and after eating out to help you plan how much to eat in the future. · If you eat more carbohydrate at a meal than you had planned, take a walk or do other exercise. This will help lower your blood sugar. What else should you know? · Limit saturated fat, such as the fat from meat and dairy products. This is a healthy choice because people who have diabetes are at higher risk of heart disease. So choose lean cuts of meat and nonfat or low-fat dairy products. Use olive or canola oil instead of butter or shortening when cooking.   · Don't skip meals. Your blood sugar may drop too low if you skip meals and take insulin or certain medicines for diabetes. · Check with your doctor before you drink alcohol. Alcohol can cause your blood sugar to drop too low. Alcohol can also cause a bad reaction if you take certain diabetes medicines. Follow-up care is a key part of your treatment and safety. Be sure to make and go to all appointments, and call your doctor if you are having problems. It's also a good idea to know your test results and keep a list of the medicines you take. Where can you learn more? Go to http://carolinahealthfinchraiza.info/. Enter K410 in the search box to learn more about \"Learning About Diabetes Food Guidelines. \"  Current as of: March 13, 2017  Content Version: 11.3  © 4112-7891 [employer]. Care instructions adapted under license by [employer] (which disclaims liability or warranty for this information). If you have questions about a medical condition or this instruction, always ask your healthcare professional. [address] any warranty or liability for your use of this information. A Healthy Lifestyle: Care Instructions  Your Care Instructions  A healthy lifestyle can help you feel good, stay at a healthy weight, and have plenty of energy for both work and play. A healthy lifestyle is something you can share with your whole family. A healthy lifestyle also can lower your risk for serious health problems, such as high blood pressure, heart disease, and diabetes. You can follow a few steps listed below to improve your health and the health of your family. Follow-up care is a key part of your treatment and safety. Be sure to make and go to all appointments, and call your doctor if you are having problems. Its also a good idea to know your test results and keep a list of the medicines you take. How can you care for yourself at home?   · Do not eat too much sugar, fat, or fast foods. You can still have dessert and treats now and then. The goal is moderation. · Start small to improve your eating habits. Pay attention to portion sizes, drink less juice and soda pop, and eat more fruits and vegetables. ¨ Eat a healthy amount of food. A 3-ounce serving of meat, for example, is about the size of a deck of cards. Fill the rest of your plate with vegetables and whole grains. ¨ Limit the amount of soda and sports drinks you have every day. Drink more water when you are thirsty. ¨ Eat at least 5 servings of fruits and vegetables every day. It may seem like a lot, but it is not hard to reach this goal. A serving or helping is 1 piece of fruit, 1 cup of vegetables, or 2 cups of leafy, raw vegetables. Have an apple or some carrot sticks as an afternoon snack instead of a candy bar. Try to have fruits and/or vegetables at every meal.  · Make exercise part of your daily routine. You may want to start with simple activities, such as walking, bicycling, or slow swimming. Try to be active 30 to 60 minutes every day. You do not need to do all 30 to 60 minutes all at once. For example, you can exercise 3 times a day for 10 or 20 minutes. Moderate exercise is safe for most people, but it is always a good idea to talk to your doctor before starting an exercise program.  · Keep moving. Dylan Puff the lawn, work in the garden, or [employer]. Take the stairs instead of the elevator at work. · If you smoke, quit. People who smoke have an increased risk for heart attack, stroke, cancer, and other lung illnesses. Quitting is hard, but there are ways to boost your chance of quitting tobacco for good. ¨ Use nicotine gum, patches, or lozenges. ¨ Ask your doctor about stop-smoking programs and medicines. ¨ Keep trying.   In addition to reducing your risk of diseases in the future, you will notice some benefits soon after you stop using tobacco. If you have shortness of breath or asthma symptoms, they will likely get better within a few weeks after you quit. · Limit how much alcohol you drink. Moderate amounts of alcohol (up to 2 drinks a day for men, 1 drink a day for women) are okay. But drinking too much can lead to liver problems, high blood pressure, and other health problems. Family health  If you have a family, there are many things you can do together to improve your health. · Eat meals together as a family as often as possible. · Eat healthy foods. This includes fruits, vegetables, lean meats and dairy, and whole grains. · Include your family in your fitness plan. Most people think of activities such as jogging or tennis as the way to fitness, but there are many ways you and your family can be more active. Anything that makes you breathe hard and gets your heart pumping is exercise. Here are some tips:  ¨ Walk to do errands or to take your child to school or the bus. ¨ Go for a family bike ride after dinner instead of watching TV. Where can you learn more? Go to http://carolina-raiza.info/. Enter S994 in the search box to learn more about \"A Healthy Lifestyle: Care Instructions. \"  Current as of: July 26, 2016  Content Version: 11.3  © 2965-7355 [employer]. Care instructions adapted under license by [employer] (which disclaims liability or warranty for this information). If you have questions about a medical condition or this instruction, always ask your healthcare professional. [place] [address] any warranty or liability for your use of this information.

## 2017-09-28 LAB
ALBUMIN SERPL-MCNC: 4.2 G/DL (ref 3.5–5.5)
ALBUMIN/GLOB SERPL: 1.5 {RATIO} (ref 1.2–2.2)
ALP SERPL-CCNC: 117 IU/L (ref 39–117)
ALT SERPL-CCNC: 18 IU/L (ref 0–32)
AST SERPL-CCNC: 18 IU/L (ref 0–40)
BASOPHILS # BLD AUTO: 0 X10E3/UL (ref 0–0.2)
BASOPHILS NFR BLD AUTO: 0 %
BILIRUB SERPL-MCNC: 0.5 MG/DL (ref 0–1.2)
BUN SERPL-MCNC: 6 MG/DL (ref 6–24)
BUN/CREAT SERPL: 9 (ref 9–23)
CALCIUM SERPL-MCNC: 9.6 MG/DL (ref 8.7–10.2)
CHLORIDE SERPL-SCNC: 100 MMOL/L (ref 96–106)
CHOLEST SERPL-MCNC: 250 MG/DL (ref 100–199)
CO2 SERPL-SCNC: 26 MMOL/L (ref 18–29)
CREAT SERPL-MCNC: 0.67 MG/DL (ref 0.57–1)
EOSINOPHIL # BLD AUTO: 0.1 X10E3/UL (ref 0–0.4)
EOSINOPHIL NFR BLD AUTO: 2 %
ERYTHROCYTE [DISTWIDTH] IN BLOOD BY AUTOMATED COUNT: 14.2 % (ref 12.3–15.4)
EST. AVERAGE GLUCOSE BLD GHB EST-MCNC: 157 MG/DL
GLOBULIN SER CALC-MCNC: 2.8 G/DL (ref 1.5–4.5)
GLUCOSE SERPL-MCNC: 180 MG/DL (ref 65–99)
HBA1C MFR BLD: 7.1 % (ref 4.8–5.6)
HCT VFR BLD AUTO: 43.4 % (ref 34–46.6)
HDLC SERPL-MCNC: 28 MG/DL
HGB BLD-MCNC: 14 G/DL (ref 11.1–15.9)
IMM GRANULOCYTES # BLD: 0 X10E3/UL (ref 0–0.1)
IMM GRANULOCYTES NFR BLD: 0 %
LDLC SERPL CALC-MCNC: 165 MG/DL (ref 0–99)
LYMPHOCYTES # BLD AUTO: 2.4 X10E3/UL (ref 0.7–3.1)
LYMPHOCYTES NFR BLD AUTO: 30 %
MCH RBC QN AUTO: 27.6 PG (ref 26.6–33)
MCHC RBC AUTO-ENTMCNC: 32.3 G/DL (ref 31.5–35.7)
MCV RBC AUTO: 85 FL (ref 79–97)
MONOCYTES # BLD AUTO: 0.4 X10E3/UL (ref 0.1–0.9)
MONOCYTES NFR BLD AUTO: 5 %
NEUTROPHILS # BLD AUTO: 5 X10E3/UL (ref 1.4–7)
NEUTROPHILS NFR BLD AUTO: 63 %
PLATELET # BLD AUTO: 274 X10E3/UL (ref 150–379)
POTASSIUM SERPL-SCNC: 4.8 MMOL/L (ref 3.5–5.2)
PROT SERPL-MCNC: 7 G/DL (ref 6–8.5)
RBC # BLD AUTO: 5.08 X10E6/UL (ref 3.77–5.28)
SODIUM SERPL-SCNC: 140 MMOL/L (ref 134–144)
TRIGL SERPL-MCNC: 284 MG/DL (ref 0–149)
TSH SERPL DL<=0.005 MIU/L-ACNC: 0.85 UIU/ML (ref 0.45–4.5)
VLDLC SERPL CALC-MCNC: 57 MG/DL (ref 5–40)
WBC # BLD AUTO: 8 X10E3/UL (ref 3.4–10.8)

## 2017-09-28 NOTE — PROGRESS NOTES
Chief Complaint   Patient presents with    Sore Throat    Facial Pain    Nausea     she is a 56y.o. year old female who presents for Upper Respiratory Symptoms. Patient complains of F/C, congestion, sore throat, dry cough and bilateral sinus pain for several days. nausea and no vomiting. she has not had  swollen glands and night sweats. Symptoms are moderate. There is a hx of asthma. There is a hx of allergic rhinitis. There is a hx of tobacco use. Patient with hx of DM2, HTN, HLD, migraine, RLS and chronic pain. She has not had lab work in 6 months. She has been out of town for several months due to illness and death of two friends. Also with recent death of father. Says she is coping appropriately. Says she was getting allergy injection from allergist during this time. Diabetes: This patient is being treating under a comprehensive plan of care for diabetes. Overall the patient feels well with good energy level. Insulin dependence: no   Pertinent Labs:   Lab Results   Component Value Date/Time    Hemoglobin A1c 7.1 09/27/2017 04:49 PM      Body mass index is 27.89 kg/(m^2). Lab Results   Component Value Date/Time    LDL, calculated 165 09/27/2017 04:49 PM        Wt Readings from Last 3 Encounters:   09/27/17 183 lb 6.4 oz (83.2 kg)   09/05/17 186 lb (84.4 kg)   06/05/17 186 lb (84.4 kg)        History   Smoking Status    Former Smoker   Smokeless Tobacco    Never Used     Comment: quit in 2010        Medications, diet and exercise as means of diabetic control with a goal of an A1C of less than 7.0% discussed. Diabetic foot care and annual eye exam discussed as well. Check blood sugars while fasting just before breakfast on most days and occasionally before dinner. Write down readings in a diabetic log book and bring them to the next visit. Call the office for fasting sugars over 200 or below 75 on two or more occasions.   Call immediately if having symptoms of high sugar (frequent urination, always thirsty) or low sugar (dizzy, lethargic, sweaty, nauseated, headache). Our overall goal is to reduce or eliminate the long term consequences of poorly controlled diabetes. Patient expresses understanding and agreement with our plan of care. Hypertension:  The patient reports:  taking medications as instructed, no medication side effects noted, no TIA's, no chest pain on exertion, no dyspnea on exertion, no swelling of ankles. Lifestyle modification/social history: sedentary     BP Readings from Last 3 Encounters:   09/27/17 122/86   09/05/17 101/64   06/05/17 133/87     Lab Results   Component Value Date/Time    Sodium 140 09/27/2017 04:49 PM    Potassium 4.8 09/27/2017 04:49 PM    Chloride 100 09/27/2017 04:49 PM    CO2 26 09/27/2017 04:49 PM    Anion gap 7 01/29/2010 08:59 AM    Glucose 180 09/27/2017 04:49 PM    BUN 6 09/27/2017 04:49 PM    Creatinine 0.67 09/27/2017 04:49 PM    BUN/Creatinine ratio 9 09/27/2017 04:49 PM    GFR est  09/27/2017 04:49 PM    GFR est non- 09/27/2017 04:49 PM    Calcium 9.6 09/27/2017 04:49 PM     Patient advised to log blood pressures at home 2-3 times weekly and bring to next visit. Call office as soon as possible if BP's over 140/90 on multiple occasions or with symptoms of dizziness, chest pain, shortness of breath, headache or ankle swelling. Our goal is to normalize the blood pressure to decrease the risks of strokes and heart attacks. The patient is in agreement with the plan.       Patient Active Problem List   Diagnosis Code    Obesity E66.9    Diabetes mellitus ([facility] [address] E11.9    Hypertension I10    Fibromyalgia syndrome M79.7    Restless leg syndrome G25.81    Chronic generalized pain disorder R52, G89.29    Encounter for long-term (current) use of high-risk medication Z79.899    Headache R51    Persistent disorder of initiating or maintaining sleep G47.00    Migraine without aura G43.009    Musculoskeletal pain M79.1    Multiple environmental allergies Z91.09    History of headache Z87.898     Past Surgical History:   Procedure Laterality Date    HX GYN      Partial Hysterectomy    HX HEENT      HX TUBAL LIGATION       Social History     Social History    Marital status: SINGLE     Spouse name: N/A    Number of children: N/A    Years of education: N/A     Occupational History    Not on file. Social History Main Topics    Smoking status: Former Smoker    Smokeless tobacco: Never Used      Comment: quit in 2010    Alcohol use No    Drug use: No    Sexual activity: Not on file     Other Topics Concern    Not on file     Social History Narrative     Family History   Problem Relation Age of Onset    Alcohol abuse Father     Diabetes Mother     Hypertension Mother     Heart Disease Mother     Lupus Mother     Sleep Apnea Mother     Depression Mother     Cancer Maternal Aunt     Arthritis-osteo Other     Asthma Other     Diabetes Other     Elevated Lipids Other     Headache Other     Heart Disease Other     Hypertension Other     Migraines Other     Stroke Other     Bleeding Prob Neg Hx     Lung Disease Neg Hx     Psychiatric Disorder Neg Hx     Mental Retardation Neg Hx      Current Outpatient Prescriptions   Medication Sig    azithromycin (ZITHROMAX) 250 mg tablet Take 2 tablets today, then take 1 tablet daily    benzonatate (TESSALON) 200 mg capsule Take 1 Cap by mouth three (3) times daily as needed for Cough for up to 7 days.  pneumococcal 23-sandip ps vaccine (PNEUMOVAX 23) 25 mcg/0.5 mL injection 0.5 mL by IntraMUSCular route PRIOR TO DISCHARGE for 1 dose.  loratadine (CLARITIN) 10 mg tablet Take 1 Tab by mouth nightly as needed for Rhinitis.  oxyCODONE-acetaminophen (PERCOCET)  mg per tablet Take 1 Tab by mouth four (4) times daily as needed for Pain for up to 30 days. Max Daily Amount: 4 Tabs. For breakthrough pain.     [START ON 10/4/2017] fentaNYL (DURAGESIC) 50 mcg/hr PATCH 1 Patch by TransDERmal route every fourty-eight (48) hours for 30 days. Max Daily Amount: 1 Patch. For chronic pain      m[place] brand only    fluticasone-salmeterol (ADVAIR DISKUS) 250-50 mcg/dose diskus inhaler Take 1 Puff by inhalation every twelve (12) hours.  raNITIdine (ZANTAC) 150 mg tablet TAKE 1 TABLET TWICE A DAY    ketoconazole (NIZORAL) 2 % shampoo APPLY TO SCALP AND RINSE   DAILY AS NEEDED    fluocinoNIDE (LIDEX) 0.05 % topical cream APPLY TO THE AFFECTED AREA TOPICALLY TWO TIMES A DAY    TRULICITY 3.76 JI/1.5 mL sub-q pen INJECT 0.5 ML UNDER THE SKIN ONCE EVERY 7 DAYS    pravastatin (PRAVACHOL) 40 mg tablet Take 1 Tab by mouth nightly for 360 days.  KIMBERLEY- mg tablet     AMITIZA 24 mcg capsule     FLUoxetine (PROZAC) 20 mg capsule Take 1 Cap by mouth daily.  naloxone 4 mg/actuation spry 4 mg by Nasal route as needed for up to 2 doses. Indications: OPIOID TOXICITY    fenofibrate nanocrystallized (TRICOR) 145 mg tablet Take 1 Tab by mouth daily for 360 days. Indications: HYPERTRIGLYCERIDEMIA    Dexlansoprazole (DEXILANT) 60 mg CpDB TAKE ONE CAPSULE BY MOUTH EVERY DAY    topiramate (TOPAMAX) 25 mg tablet TAKE ONE TABLET BY MOUTH AT BEDTIME    albuterol (PROAIR HFA) 90 mcg/actuation inhaler Take 2 Puffs by inhalation every four (4) hours as needed for Wheezing. Take 2 Puffs inhaled by mouth Every 4 Hours.  rizatriptan (MAXALT) 10 mg tablet TAKE ONE TABLET BY MOUTH ONCE AS NEEDED MAY REPEAT IN 2 HOURS IF NEEDED    fluticasone (FLONASE) 50 mcg/actuation nasal spray Apply one-two sprays to skin prior to patch change for skin irritation.  LORazepam (ATIVAN) 1 mg tablet     glucose blood VI test strips (ON CALL EXPRESS TEST STRIP) strip Test blood sugar twice daily DX: E11.9    Lancets misc Test Blood Sugar twice daily. DX Code: E11.9    glimepiride (AMARYL) 4 mg tablet TAKE ONE TABLET BY MOUTH EVERY MORNING.     polyethylene glycol (MIRALAX) 17 gram/dose powder     naproxen (NAPROSYN) 500 mg tablet TAKE ONE TABLET BY MOUTH TWICE A DAY WITH FOOD    Blood-Glucose Meter (FREESTYLE FREEDOM LITE) monitoring kit Check Blood sugar once daily    guaiFENesin ER (MUCINEX) 600 mg ER tablet Take 1 Tab by mouth two (2) times a day.  EPINEPHrine (EPIPEN) 0.3 mg/0.3 mL (1:1,000) injection 0.3 mL by IntraMUSCular route once as needed for 1 dose.  fentaNYL (DURAGESIC) 50 mcg/hr PATCH 1 Patch by TransDERmal route every fourty-eight (48) hours. Max Daily Amount: 1 Patch. For chronic pain (due to fill 3/28/17)    [START ON 11/3/2017] fentaNYL (DURAGESIC) 50 mcg/hr PATCH 1 Patch by TransDERmal route every fourty-eight (48) hours for 30 days. Max Daily Amount: 1 Patch. For chronic pain      [place] brand only    [START ON 11/3/2017] oxyCODONE-acetaminophen (PERCOCET)  mg per tablet Take 1 Tab by mouth four (4) times daily as needed for Pain for up to 30 days. Max Daily Amount: 4 Tabs. For breakthrough pain.  [START ON 10/4/2017] oxyCODONE-acetaminophen (PERCOCET)  mg per tablet Take 1 Tab by mouth four (4) times daily as needed for Pain for up to 30 days. Max Daily Amount: 4 Tabs. For breakthrough pain.  allergy injection now    allergy injection now    allergy injection now    allergy injection Set C    allergy injection Set B    allergy injection Set A    lidocaine (XYLOCAINE) 2 % solution Take 5 mL by mouth as needed for Pain.  cyclobenzaprine (FLEXERIL) 10 mg tablet Take 1 Tab by mouth three (3) times daily as needed for Muscle Spasm(s) for up to 30 days. As needed  Indications: MUSCLE SPASM     No current facility-administered medications for this visit.       Allergies   Allergen Reactions    Esomeprazole Magnesium Hives    Nexium [Esomeprazole Magnesium] Unknown (comments)    Pcn [Penicillins] Unknown (comments)     Review of Symptoms:  Constitutional: Positive for malaise, fever and chills  Skin: Negative for rash or lesion  Head: Positive for facial swelling or tenderness  Eyes: Negative for redness or discharge  Ears: Negative for otalgia or decreased hearing  Nose: Positive for nasal congestion, sinus pressure  Neck: Positive for sore throat, denies lymphadenopathy   Cardiovascular: Negative for chest pain or palpitations  Respiratory: Positive for non-productive cough, denies wheezing or SOB  Gastrointestinal: Positive for nausea, Negative for vomiting or abdominal pain  Neurologic: Negative for headache or dizziness      Vitals:    09/27/17 1458   BP: 122/86   Pulse: 75   Resp: 20   Temp: 98.3 °F (36.8 °C)   TempSrc: Oral   SpO2: 98%   Weight: 183 lb 6.4 oz (83.2 kg)   Height: 5' 8\" (1.727 m)       Physical Examination:  General: Well developed, well nourished, in no acute distress  Skin: Warm and dry sans rash or lesion  Head: Normocephalic, atraumatic  Eyes: Sclera clear, EOMI, PERRL  Ears: tympanic membranes normal in appearance  Nose: mucosal edema with rhinorrhea  Oropharynx: posterior erythema, no exudate   Neck: Normal range of motion, no lymphadenopathy  Cardiovascular: normal S1, S2, regular rate and rhythm  Respiratory: Clear to auscultation bilaterally with symmetrical, unlabored effort  Extremities: Full range of motion  Neurologic: Active, alert and oriented    Akanksha Rutherford was seen today for medication refill and request for new medication. Diagnoses and all orders for this visit:    1. Acute rhinosinusitis  -     azithromycin (ZITHROMAX) 250 mg tablet; Take 2 tablets today, then take 1 tablet daily    2. Sore throat  -     AMB POC RAPID STREP A  -     benzonatate (TESSALON) 200 mg capsule; Take 1 Cap by mouth three (3) times daily as needed for Cough for up to 7 days. 3. Multiple environmental allergies  -     loratadine (CLARITIN) 10 mg tablet; Take 1 Tab by mouth nightly as needed for Rhinitis.     4. Type 2 diabetes mellitus with diabetic neuropathy, without long-term current use of insulin ([facility])  -     LIPID PANEL  -     METABOLIC PANEL, COMPREHENSIVE  -     HEMOGLOBIN A1C WITH EAG  -     CBC WITH AUTOMATED DIFF    5. Essential hypertension  -     METABOLIC PANEL, COMPREHENSIVE  -     TSH 3RD GENERATION    Other orders  -     pneumococcal 23-sandip ps vaccine (PNEUMOVAX 23) 25 mcg/0.5 mL injection; 0.5 mL by IntraMUSCular route PRIOR TO DISCHARGE for 1 dose. Plan of care:  Diagnoses were discussed in detail with patient. Medication risks/benefits/side effects discussed with patient. All of the patient's questions were addressed and answered to apparent satisfaction. The patient understands and agrees with our plan of care. The patient knows to call back if they have questions about the plan of care or if symptoms change. The patient received an After-Visit Summary which contains VS, diagnoses, orders, allergy and medication lists. Over half of the 40 minutes face to face with Yanick Hui consisted of counseling and discussing treatment plans in reference to her DM2 management, her multiple co-morbidities and recent death of father and two friends. Future Appointments  Date Time Provider Dary Buck   10/9/2017 2:00 PM Gabriel Willis MD [facility] ELLIE SCHED   11/20/2017 10:00 AM Doc Churchill MD [address] [place] [address]   12/4/2017 10:45 AM Brendon Castro MD [address]         Follow-up Disposition:  Return in about 3 months (around 12/27/2017), or if symptoms worsen or fail to improve.

## 2017-10-11 ENCOUNTER — OFFICE VISIT (OUTPATIENT)
Dept: FAMILY MEDICINE CLINIC | Age: 47
End: 2017-10-11

## 2017-10-11 VITALS
HEART RATE: 86 BPM | TEMPERATURE: 96 F | DIASTOLIC BLOOD PRESSURE: 74 MMHG | SYSTOLIC BLOOD PRESSURE: 110 MMHG | WEIGHT: 184.4 LBS | HEIGHT: 68 IN | RESPIRATION RATE: 18 BRPM | OXYGEN SATURATION: 96 % | BODY MASS INDEX: 27.95 KG/M2

## 2017-10-11 DIAGNOSIS — E11.40 TYPE 2 DIABETES MELLITUS WITH DIABETIC NEUROPATHY, WITHOUT LONG-TERM CURRENT USE OF INSULIN (HCC): ICD-10-CM

## 2017-10-11 DIAGNOSIS — E78.2 MIXED HYPERLIPIDEMIA: Primary | ICD-10-CM

## 2017-10-11 RX ORDER — PRAVASTATIN SODIUM 40 MG/1
80 TABLET ORAL
Qty: 90 TAB | Refills: 1
Start: 2017-10-11 | End: 2018-03-22 | Stop reason: SDUPTHER

## 2017-10-11 NOTE — PROGRESS NOTES
Reviewed record in preparation for visit and have necessary documentation  Pt did not bring medication to office visit for review    Goals that were addressed and/or need to be completed during or after this appointment include   Health Maintenance Due   Topic Date Due    Pneumococcal 19-64 Medium Risk (1 of 1 - PPSV23) 09/26/1990    EYE EXAM RETINAL OR DILATED Q1  03/10/2016    BREAST CANCER SCRN MAMMOGRAM  06/02/2016    INFLUENZA AGE 9 TO ADULT  08/01/2017    FOOT EXAM Q1  10/04/2017    MICROALBUMIN Q1  10/04/2017

## 2017-10-11 NOTE — MR AVS SNAPSHOT
Visit Information Date & Time Provider Department Dept. Phone Encounter #  
 10/11/2017 10:20 AM Madonna Avina MD [facility] [place] [id number] Follow-up Instructions Return in about 4 weeks (around 11/8/2017), or if symptoms worsen or fail to improve. Your Appointments 11/20/2017 10:00 AM  
Any with Luis Fernando Alcantar MD  
[address] ([facility]) Appt Note: yearly follow up, bring machine. [facility] [id number] [facility] [address]  
  
    
 12/4/2017 10:45 AM  
Follow Up with Justin Tapia MD  
[facility]) Appt Note: return in 3 months [address] [facility] [address]  
[phone]  [id number] Upcoming Health Maintenance Date Due Pneumococcal 19-64 Medium Risk (1 of 1 - PPSV23) 9/26/1990 EYE EXAM RETINAL OR DILATED Q1 3/10/2016 BREAST CANCER SCRN MAMMOGRAM 6/2/2016 INFLUENZA AGE 9 TO ADULT 8/1/2017 FOOT EXAM Q1 10/4/2017 MICROALBUMIN Q1 10/4/2017 HEMOGLOBIN A1C Q6M 3/27/2018 LIPID PANEL Q1 9/27/2018 DTaP/Tdap/Td series (2 - Td) 9/26/2026 Allergies as of 10/11/2017  Review Complete On: 10/11/2017 By: Madonna Avina MD  
  
 Severity Noted Reaction Type Reactions Esomeprazole Magnesium  10/19/2010    Hives Nexium [Esomeprazole Magnesium]  06/11/2012    Unknown (comments) Pcn [Penicillins]  06/11/2012    Unknown (comments) Current Immunizations  Reviewed on 10/4/2016 Name Date Influenza Vaccine 9/26/2016, 10/20/2015, 10/9/2014, 10/1/2013 Tdap 9/26/2016 Not reviewed this visit You Were Diagnosed With   
  
 Codes Comments Mixed hyperlipidemia    -  Primary ICD-10-CM: O86.5 ICD-9-CM: 272.2 Type 2 diabetes mellitus with diabetic neuropathy, without long-term current use of insulin (HCC)     ICD-10-CM: E11.40 ICD-9-CM: 250.60, 357.2 Vitals BP Pulse Temp Resp Height(growth percentile) Weight(growth percentile) 110/74 (BP 1 Location: Right arm, BP Patient Position: Sitting) 86 96 °F (35.6 °C) (Oral) 18 5' 8\" (1.727 m) 184 lb 6.4 oz (83.6 kg) SpO2 BMI OB Status Smoking Status 96% 28.04 kg/m2 Hysterectomy Former Smoker Vitals History BMI and BSA Data Body Mass Index Body Surface Area 28.04 kg/m 2 2 m 2 Preferred Pharmacy Pharmacy Name Phone [address] [place], [place] - [phone] Your Updated Medication List  
  
   
This list is accurate as of: 10/11/17 11:24 AM.  Always use your most recent med list.  
  
  
  
  
 albuterol 90 mcg/actuation inhaler Commonly known as:  PROAIR HFA Take 2 Puffs by inhalation every four (4) hours as needed for Wheezing. Take 2 Puffs inhaled by mouth Every 4 Hours. * allergy injection Set C  
  
 * allergy injection Set B  
  
 * allergy injection Set A  
  
 * allergy injection  
now * allergy injection  
now * allergy injection  
now AMITIZA 24 mcg capsule Generic drug:  lubiPROStone Blood-Glucose Meter monitoring kit Commonly known as:  FREESTYLE FREEDOM LITE Check Blood sugar once daily  
  
 cyclobenzaprine 10 mg tablet Commonly known as:  FLEXERIL Take 1 Tab by mouth three (3) times daily as needed for Muscle Spasm(s) for up to 30 days. As needed  Indications: MUSCLE SPASM Dexlansoprazole 60 mg Cpdb Commonly known as:  DEXILANT TAKE ONE CAPSULE BY MOUTH EVERY DAY  
  
 dulaglutide 1.5 mg/0.5 mL sub-q pen Commonly known as:  TRULICITY  
0.5 mL by SubCUTAneous route every seven (7) days. EPINEPHrine 0.3 mg/0.3 mL injection Commonly known as:  Dannis Plush  
 0.3 mL by IntraMUSCular route once as needed for 1 dose. KIMBERLEY- mg tablet Generic drug:  erythromycin  
  
 fenofibrate nanocrystallized 145 mg tablet Commonly known as:  [employer] Take 1 Tab by mouth daily for 360 days. Indications: HYPERTRIGLYCERIDEMIA * fentaNYL 50 mcg/hr PATCH Commonly known as:  DURAGESIC  
1 Patch by TransDERmal route every fourty-eight (48) hours. Max Daily Amount: 1 Patch. For chronic pain (due to fill 3/28/17) * fentaNYL 50 mcg/hr PATCH Commonly known as:  DURAGESIC  
1 Patch by TransDERmal route every fourty-eight (48) hours for 30 days. Max Daily Amount: 1 Patch. For chronic pain      [place] brand only * fentaNYL 50 mcg/hr PATCH Commonly known as:  DURAGESIC  
1 Patch by TransDERmal route every fourty-eight (48) hours for 30 days. Max Daily Amount: 1 Patch. For chronic pain      m[place] brand only Start taking on:  11/3/2017  
  
 fluocinoNIDE 0.05 % topical cream  
Commonly known as:  LIDEX APPLY TO THE AFFECTED AREA TOPICALLY TWO TIMES A DAY FLUoxetine 20 mg capsule Commonly known as:  PROzac Take 1 Cap by mouth daily. fluticasone 50 mcg/actuation nasal spray Commonly known as:  Euna Sivakumar Apply one-two sprays to skin prior to patch change for skin irritation. fluticasone-salmeterol 250-50 mcg/dose diskus inhaler Commonly known as:  ADVAIR DISKUS Take 1 Puff by inhalation every twelve (12) hours. glucose blood VI test strips strip Commonly known as:  ON CALL EXPRESS TEST STRIP Test blood sugar twice daily DX: E11.9  
  
 guaiFENesin  mg ER tablet Commonly known as:  Dalton [employer] Dalton Take 1 Tab by mouth two (2) times a day.  
  
 ketoconazole 2 % shampoo Commonly known as:  NIZORAL  
APPLY TO SCALP AND RINSE   DAILY AS NEEDED Lancets Misc Test Blood Sugar twice daily. DX Code: E11.9  
  
 lidocaine 2 % solution Commonly known as:  XYLOCAINE Take 5 mL by mouth as needed for Pain. loratadine 10 mg tablet Commonly known as:  Mccormick Petri Take 1 Tab by mouth nightly as needed for Rhinitis. LORazepam 1 mg tablet Commonly known as:  ATIVAN  
  
 naloxone 4 mg/actuation nasal spray Commonly known as:  NARCAN  
4 mg by Nasal route as needed for up to 2 doses. Indications: OPIOID TOXICITY  
  
 naproxen 500 mg tablet Commonly known as:  NAPROSYN  
TAKE ONE TABLET BY MOUTH TWICE A DAY WITH FOOD  
  
 * oxyCODONE-acetaminophen  mg per tablet Commonly known as:  PERCOCET Take 1 Tab by mouth four (4) times daily as needed for Pain for up to 30 days. Max Daily Amount: 4 Tabs. For breakthrough pain. * oxyCODONE-acetaminophen  mg per tablet Commonly known as:  PERCOCET Take 1 Tab by mouth four (4) times daily as needed for Pain for up to 30 days. Max Daily Amount: 4 Tabs. For breakthrough pain. Start taking on:  11/3/2017 pneumococcal 23-sandip ps vaccine 25 mcg/0.5 mL injection Commonly known as:  PNEUMOVAX 23  
0.5 mL by IntraMUSCular route PRIOR TO DISCHARGE for 1 dose. polyethylene glycol 17 gram/dose powder Commonly known as:  MIRALAX  
  
 pravastatin 40 mg tablet Commonly known as:  PRAVACHOL Take 2 Tabs by mouth nightly for 360 days. raNITIdine 150 mg tablet Commonly known as:  ZANTAC TAKE 1 TABLET TWICE A DAY  
  
 rizatriptan 10 mg tablet Commonly known as:  Liseth Nearing TAKE ONE TABLET BY MOUTH ONCE AS NEEDED MAY REPEAT IN 2 HOURS IF NEEDED  
  
 topiramate 25 mg tablet Commonly known as:  TOPAMAX TAKE ONE TABLET BY MOUTH AT BEDTIME  
  
 * Notice: This list has 11 medication(s) that are the same as other medications prescribed for you. Read the directions carefully, and ask your doctor or other care provider to review them with you. Prescriptions Sent to Pharmacy Refills  
 dulaglutide (TRULICITY) 1.5 LV/7.9 mL sub-q pen 3 Si.5 mL by SubCUTAneous route every seven (7) days.   
 Class: Normal  
 Pharmacy: [address] [facility] [place], [address] [facility] #: [phone] Route: SubCUTAneous Follow-up Instructions Return in about 4 weeks (around 11/8/2017), or if symptoms worsen or fail to improve. Patient Instructions Influenza Virus Vaccine (By injection) Influenza Virus Vaccine (in-floo-EN-za VYE-jaja VAX-een) Helps prevent infection with influenza (flu) virus. Brand Name(s): Afluria [id number] Formula, Petit Leanna 0330-0747 Formula, FluLaval Quadrivalent 5993-8331 Formula, Fluad 8500-4925 Formula, Fluarix Quadrivalent 7196-7748 Formula, Flublok 0160-9157 Formula, Flucelvax Quadrivalent 3062-8405 Formula, Fluvirin 6838-6737 Formula, Fluzone High-Dose 0100-0145 Formula, Fluzone Intradermal Quadrivalent 9065-6993 Formula, Fluzone Quadrivalent 5163-8790 Formula, Medical Provider Single Use EZ Flu Shot 9136-7374, Medical Provider Single Use EZ Flu Shot 2000-2226 There may be other brand names for this medicine. When This Medicine Should Not Be Used: This vaccine is not right for everyone. You should not receive it if you had an allergic reaction to flu vaccine. If you are allergic to eggs, tell the caregiver who is going to give you the injection. Some brands of this vaccine contain egg proteins and could cause an allergic reaction. How to Use This Medicine:  
Injectable · The vaccine is given as a shot into a muscle or into your skin, usually in the shoulder area. The shot could be given in the thigh for babies and young children. · A nurse or other health provider will give you this medicine. · Read and follow the patient instructions that come with this medicine. Talk to your doctor or pharmacist if you have any questions. · A child who is younger than 5years old and who has not had a flu shot before may need 2 shots.  The second shot should be given about 1 month after the first. 
 · Missed dose: Most people need only 1 dose of the vaccine. If your child needs a second dose, it is important for the vaccine to be given on schedule. If you must cancel an appointment, make a new one right away. Drugs and Foods to Avoid: Ask your doctor or pharmacist before using any other medicine, including over-the-counter medicines, vitamins, and herbal products. · Tell your doctor if you are using a medicine or treatment that weakens your immune system, such as a steroid, radiation, or cancer treatment. This vaccine may not work as well if you are also using these medicines. However, your doctor may still want you to get the vaccine because it can give you some protection. Warnings While Using This Medicine: · Tell your doctor if you are pregnant or breastfeeding or if you have a weak immune system. · Tell your doctor if you ever had an unusual reaction to a flu shot, such as Guillain-Barré syndrome, or if you are allergic to latex. · The flu vaccine may not protect everyone who receives it. This vaccine will not treat flu symptoms if you have already been infected with the virus. Possible Side Effects While Using This Medicine:  
Call your doctor right away if you notice any of these side effects: · Allergic reaction: Itching or hives, swelling in your face or hands, swelling or tingling in your mouth or throat, chest tightness, trouble breathing · Fainting, dizziness, or lightheadedness · Fever over 103 degrees F 
· Seizures · Severe muscle weakness If you notice these less serious side effects, talk with your doctor:  
· Headache, muscle pain, tiredness · Irritability or crying (in a child) · Redness, pain, swelling, soreness, or a lump where the shot was given If you notice other side effects that you think are caused by this medicine, tell your doctor. Call your doctor for medical advice about side effects. You may report side effects to FDA at 5-523-GEZ-8472 © 2017 [address] Information is for End User's use only and may not be sold, redistributed or otherwise used for commercial purposes. The above information is an  only. It is not intended as medical advice for individual conditions or treatments. Talk to your doctor, nurse or pharmacist before following any medical regimen to see if it is safe and effective for you. Learning About High Cholesterol What is high cholesterol? Cholesterol is a type of fat in your blood. It is needed for many body functions, such as making new cells. Cholesterol is made by your body. It also comes from food you eat. If you have too much cholesterol, it starts to build up in your arteries. This is called hardening of the arteries, or atherosclerosis. High cholesterol raises your risk of a heart attack and stroke. There are different types of cholesterol. LDL is the \"bad\" cholesterol. High LDL can raise your risk for heart disease, heart attack, and stroke. HDL is the \"good\" cholesterol. High HDL is linked with a lower risk for heart disease, heart attack, and stroke. Your cholesterol levels help your doctor find out your risk for having a heart attack or stroke. How can you prevent high cholesterol? A heart-healthy lifestyle can help you prevent high cholesterol. This lifestyle helps lower your risk for a heart attack and stroke. · Eat heart-healthy foods. ¨ Eat fruits, vegetables, whole grains (like oatmeal), dried beans and peas, nuts and seeds, soy products (like tofu), and fat-free or low-fat dairy products. ¨ Replace butter, margarine, and hydrogenated or partially hydrogenated oils with olive and canola oils. (Canola oil margarine without trans fat is fine.) ¨ Replace red meat with fish, poultry, and soy protein (like tofu). ¨ Limit processed and packaged foods like chips, crackers, and cookies. · Be active. Exercise can improve your cholesterol level.  Get at least 30 minutes of exercise on most days of the week. Walking is a good choice. You also may want to do other activities, such as running, swimming, cycling, or playing tennis or team sports. · Stay at a healthy weight. Lose weight if you need to. · Don't smoke. If you need help quitting, talk to your doctor about stop-smoking programs and medicines. These can increase your chances of quitting for good. How is high cholesterol treated? The goal of treatment is to reduce your chances of having a heart attack or stroke. The goal is not to lower your cholesterol numbers only. · You may make lifestyle changes, such as eating healthy foods, not smoking, losing weight, and being more active. · You may have to take medicine. Follow-up care is a key part of your treatment and safety. Be sure to make and go to all appointments, and call your doctor if you are having problems. It's also a good idea to know your test results and keep a list of the medicines you take. Where can you learn more? Go to http://carolina-raiza.info/. Enter B040 in the search box to learn more about \"Learning About High Cholesterol. \" Current as of: April 3, 2017 Content Version: 11.3 © 7591-4567 [employer]. Care instructions adapted under license by [employer] (which disclaims liability or warranty for this information). If you have questions about a medical condition or this instruction, always ask your healthcare professional. [place] [address] any warranty or liability for your use of this information. Introducing [facility]! Josefina Mayorga introduces Mixwit patient portal. Now you can access parts of your medical record, email your doctor's office, and request medication refills online. 1. In your internet browser, go to [email or web address] 2. Click on the First Time User? Click Here link in the Sign In box.  You will see the New Member Sign Up page. 3. Enter your Prec[employer] Software Access Code exactly as it appears below. You will not need to use this code after youve completed the sign-up process. If you do not sign up before the expiration date, you must request a new code. · [employer] Software Access Code: [email or web address] Expires: 12/4/2017 10:47 AM 
 
4. Enter the last four digits of your Social Security Number (xxxx) and Date of Birth (mm/dd/yyyy) as indicated and click Submit. You will be taken to the next sign-up page. 5. Create a Precise Software ID. This will be your [employer] Software login ID and cannot be changed, so think of one that is secure and easy to remember. 6. Create a [employer] password. You can change your password at any time. 7. Enter your Password Reset Question and Answer. This can be used at a later time if you forget your password. 8. Enter your e-mail address. You will receive e-mail notification when new information is available in [address]. 9. Click Sign Up. You can now view and download portions of your medical record. 10. Click the Download Summary menu link to download a portable copy of your medical information. If you have questions, please visit the Frequently Asked Questions section of the [employer] website. Remember, [employer] Software is NOT to be used for urgent needs. For medical emergencies, dial 911. Now available from your iPhone and Android! Please provide this summary of care documentation to your next provider. Your primary care clinician is listed as Emile Jean. If you have any questions after today's visit, please call [phone].

## 2017-10-11 NOTE — PATIENT INSTRUCTIONS
Influenza Virus Vaccine (By injection)   Influenza Virus Vaccine (in-floo-EN-za VYE-jaja VAX-een)  Helps prevent infection with influenza (flu) virus. Brand Name(s): Afluria [id number] Formula, Melina Olszewski 1141-6456 Formula, FluLaval Quadrivalent 0047-4650 Formula, Fluad 2964-5724 Formula, Fluarix Quadrivalent 2820-5702 Formula, Flublok 2907-5801 Formula, Flucelvax Quadrivalent 6351-7728 Formula, Fluvirin 5496-2398 Formula, Fluzone High-Dose 1126-1772 Formula, Fluzone Intradermal Quadrivalent 4914-2288 Formula, Fluzone Quadrivalent 5933-8896 Formula, Medical Provider Single Use EZ Flu Shot 7707-7671, Medical Provider Single Use EZ Flu Shot 3931-1208   There may be other brand names for this medicine. When This Medicine Should Not Be Used: This vaccine is not right for everyone. You should not receive it if you had an allergic reaction to flu vaccine. If you are allergic to eggs, tell the caregiver who is going to give you the injection. Some brands of this vaccine contain egg proteins and could cause an allergic reaction. How to Use This Medicine:   Injectable  · The vaccine is given as a shot into a muscle or into your skin, usually in the shoulder area. The shot could be given in the thigh for babies and young children. · A nurse or other health provider will give you this medicine. · Read and follow the patient instructions that come with this medicine. Talk to your doctor or pharmacist if you have any questions. · A child who is younger than 5years old and who has not had a flu shot before may need 2 shots. The second shot should be given about 1 month after the first.  · Missed dose: Most people need only 1 dose of the vaccine. If your child needs a second dose, it is important for the vaccine to be given on schedule. If you must cancel an appointment, make a new one right away.   Drugs and Foods to Avoid:   Ask your doctor or pharmacist before using any other medicine, including over-the-counter medicines, vitamins, and herbal products. · Tell your doctor if you are using a medicine or treatment that weakens your immune system, such as a steroid, radiation, or cancer treatment. This vaccine may not work as well if you are also using these medicines. However, your doctor may still want you to get the vaccine because it can give you some protection. Warnings While Using This Medicine:   · Tell your doctor if you are pregnant or breastfeeding or if you have a weak immune system. · Tell your doctor if you ever had an unusual reaction to a flu shot, such as Guillain-Barré syndrome, or if you are allergic to latex. · The flu vaccine may not protect everyone who receives it. This vaccine will not treat flu symptoms if you have already been infected with the virus. Possible Side Effects While Using This Medicine:   Call your doctor right away if you notice any of these side effects:  · Allergic reaction: Itching or hives, swelling in your face or hands, swelling or tingling in your mouth or throat, chest tightness, trouble breathing  · Fainting, dizziness, or lightheadedness  · Fever over 103 degrees F  · Seizures  · Severe muscle weakness  If you notice these less serious side effects, talk with your doctor:   · Headache, muscle pain, tiredness  · Irritability or crying (in a child)  · Redness, pain, swelling, soreness, or a lump where the shot was given  If you notice other side effects that you think are caused by this medicine, tell your doctor. Call your doctor for medical advice about side effects. You may report side effects to FDA at 8-801-FDA-8976  © 2017 [address] E[address] Reyes Information is for End User's use only and may not be sold, redistributed or otherwise used for commercial purposes. The above information is an  only. It is not intended as medical advice for individual conditions or treatments.  Talk to your doctor, nurse or pharmacist before following any medical regimen to see if it is safe and effective for you. Learning About High Cholesterol  What is high cholesterol? Cholesterol is a type of fat in your blood. It is needed for many body functions, such as making new cells. Cholesterol is made by your body. It also comes from food you eat. If you have too much cholesterol, it starts to build up in your arteries. This is called hardening of the arteries, or atherosclerosis. High cholesterol raises your risk of a heart attack and stroke. There are different types of cholesterol. LDL is the \"bad\" cholesterol. High LDL can raise your risk for heart disease, heart attack, and stroke. HDL is the \"good\" cholesterol. High HDL is linked with a lower risk for heart disease, heart attack, and stroke. Your cholesterol levels help your doctor find out your risk for having a heart attack or stroke. How can you prevent high cholesterol? A heart-healthy lifestyle can help you prevent high cholesterol. This lifestyle helps lower your risk for a heart attack and stroke. · Eat heart-healthy foods. ¨ Eat fruits, vegetables, whole grains (like oatmeal), dried beans and peas, nuts and seeds, soy products (like tofu), and fat-free or low-fat dairy products. ¨ Replace butter, margarine, and hydrogenated or partially hydrogenated oils with olive and canola oils. (Canola oil margarine without trans fat is fine.)  ¨ Replace red meat with fish, poultry, and soy protein (like tofu). ¨ Limit processed and packaged foods like chips, crackers, and cookies. · Be active. Exercise can improve your cholesterol level. Get at least 30 minutes of exercise on most days of the week. Walking is a good choice. You also may want to do other activities, such as running, swimming, cycling, or playing tennis or team sports. · Stay at a healthy weight. Lose weight if you need to. · Don't smoke. If you need help quitting, talk to your doctor about stop-smoking programs and medicines. These can increase your chances of quitting for good. How is high cholesterol treated? The goal of treatment is to reduce your chances of having a heart attack or stroke. The goal is not to lower your cholesterol numbers only. · You may make lifestyle changes, such as eating healthy foods, not smoking, losing weight, and being more active. · You may have to take medicine. Follow-up care is a key part of your treatment and safety. Be sure to make and go to all appointments, and call your doctor if you are having problems. It's also a good idea to know your test results and keep a list of the medicines you take. Where can you learn more? Go to http://carolina-raiza.info/. Enter Q251 in the search box to learn more about \"Learning About High Cholesterol. \"  Current as of: April 3, 2017  Content Version: 11.3  © 0089-1203 [employer]. Care instructions adapted under license by [employer] (which disclaims liability or warranty for this information). If you have questions about a medical condition or this instruction, always ask your healthcare professional. [address] any warranty or liability for your use of this information.

## 2017-10-16 NOTE — PROGRESS NOTES
Chief Complaint   Patient presents with   [facility]     she is a 56y.o. year old female who presents for follow up of lab work. Diabetes worsening and cholesterol uncontrolled. Patient with multiple co-morbidities which include DM2, HTN, HLD, migraine, RLS and chronic pain. Patient denies HA, dizziness, SOB, CP, abdominal pain, dysuria, myalgias or arthralgias. Overall the patient feels well with good energy level. Insulin dependence: no   Pertinent Labs:     Lab Results   Component Value Date/Time    Hemoglobin A1c 7.1 09/27/2017 04:49 PM    Hemoglobin A1c (POC) 6.7 02/08/2016 01:54 PM           Body mass index is 28.04 kg/(m^2). Lab Results  Lab Results   Component Value Date/Time    Cholesterol, total 250 09/27/2017 04:49 PM    HDL Cholesterol 28 09/27/2017 04:49 PM    LDL, calculated 165 09/27/2017 04:49 PM    Triglyceride 284 09/27/2017 04:49 PM    CHOL/HDL Ratio 5.2 01/29/2010 08:59 AM            Wt Readings from Last 3 Encounters:   03/10/17 190 lb (86.2 kg)   02/22/17 189 lb 3.2 oz (85.8 kg)        Medications, diet and exercise as means of diabetic control with a goal of an A1C of less than 7.0% discussed. Diabetic foot care and annual eye exam discussed as well. Check blood sugars while fasting just before breakfast on most days and occasionally before dinner. Write down readings in a diabetic log book and bring them to the next visit. Call the office for fasting sugars over 200 or below 75 on two or more occasions. Call immediately if having symptoms of high sugar (frequent urination, always thirsty) or low sugar (dizzy, lethargic, sweaty, nauseated, headache). Our overall goal is to reduce or eliminate the long term consequences of poorly controlled diabetes. Patient expresses understanding and agreement with our plan of care.   Patient Active Problem List   Diagnosis Code    Obesity E66.9    Diabetes mellitus ([facility] [address] E11.9    Hypertension I10    Fibromyalgia syndrome M79.7    Restless leg syndrome G25.81    Chronic generalized pain disorder R52, G89.29    Encounter for long-term (current) use of high-risk medication Z79.899    Headache R51    Persistent disorder of initiating or maintaining sleep G47.00    Migraine without aura G43.009    Musculoskeletal pain M79.1    Multiple environmental allergies Z91.09    History of headache Z87.898     Past Surgical History:   Procedure Laterality Date    HX GYN      Partial Hysterectomy    HX HEENT      HX TUBAL LIGATION       Social History     Social History    Marital status: SINGLE     Spouse name: N/A    Number of children: N/A    Years of education: N/A     Occupational History    Not on file. Social History Main Topics    Smoking status: Former Smoker    Smokeless tobacco: Never Used      Comment: quit in 2010    Alcohol use No    Drug use: No    Sexual activity: Not on file     Other Topics Concern    Not on file     Social History Narrative     Family History   Problem Relation Age of Onset    Alcohol abuse Father     Diabetes Mother     Hypertension Mother     Heart Disease Mother     Lupus Mother     Sleep Apnea Mother     Depression Mother     Cancer Maternal Aunt     Arthritis-osteo Other     Asthma Other     Diabetes Other     Elevated Lipids Other     Headache Other     Heart Disease Other     Hypertension Other     Migraines Other     Stroke Other     Bleeding Prob Neg Hx     Lung Disease Neg Hx     Psychiatric Disorder Neg Hx     Mental Retardation Neg Hx      Current Outpatient Prescriptions   Medication Sig    dulaglutide (TRULICITY) 1.5 WS/9.3 mL sub-q pen 0.5 mL by SubCUTAneous route every seven (7) days.  pravastatin (PRAVACHOL) 40 mg tablet Take 2 Tabs by mouth nightly for 360 days.     fluocinoNIDE (LIDEX) 0.05 % topical cream APPLY TO THE AFFECTED AREA TOPICALLY TWO TIMES A DAY    ketoconazole (NIZORAL) 2 % shampoo APPLY TO SCALP AND RINSE   DAILY AS NEEDED    pneumococcal 23-sandip ps vaccine (PNEUMOVAX 23) 25 mcg/0.5 mL injection 0.5 mL by IntraMUSCular route PRIOR TO DISCHARGE for 1 dose.  fentaNYL (DURAGESIC) 50 mcg/hr PATCH 1 Patch by TransDERmal route every fourty-eight (48) hours. Max Daily Amount: 1 Patch. For chronic pain (due to fill 3/28/17)    [START ON 11/3/2017] fentaNYL (DURAGESIC) 50 mcg/hr PATCH 1 Patch by TransDERmal route every fourty-eight (48) hours for 30 days. Max Daily Amount: 1 Patch. For chronic pain      mallinckrodt brand only    fentaNYL (DURAGESIC) 50 mcg/hr PATCH 1 Patch by TransDERmal route every fourty-eight (48) hours for 30 days. Max Daily Amount: 1 Patch. For chronic pain      mallinckrodt brand only    [START ON 11/3/2017] oxyCODONE-acetaminophen (PERCOCET)  mg per tablet Take 1 Tab by mouth four (4) times daily as needed for Pain for up to 30 days. Max Daily Amount: 4 Tabs. For breakthrough pain.  oxyCODONE-acetaminophen (PERCOCET)  mg per tablet Take 1 Tab by mouth four (4) times daily as needed for Pain for up to 30 days. Max Daily Amount: 4 Tabs. For breakthrough pain.  fluticasone-salmeterol (ADVAIR DISKUS) 250-50 mcg/dose diskus inhaler Take 1 Puff by inhalation every twelve (12) hours.  raNITIdine (ZANTAC) 150 mg tablet TAKE 1 TABLET TWICE A DAY    KIMBERLEY- mg tablet     AMITIZA 24 mcg capsule     allergy injection now    allergy injection now    allergy injection now    allergy injection Set C    allergy injection Set B    allergy injection Set A    naloxone 4 mg/actuation spry 4 mg by Nasal route as needed for up to 2 doses. Indications: OPIOID TOXICITY    fenofibrate nanocrystallized (TRICOR) 145 mg tablet Take 1 Tab by mouth daily for 360 days.  Indications: HYPERTRIGLYCERIDEMIA    Dexlansoprazole (DEXILANT) 60 mg CpDB TAKE ONE CAPSULE BY MOUTH EVERY DAY    topiramate (TOPAMAX) 25 mg tablet TAKE ONE TABLET BY MOUTH AT BEDTIME    albuterol (PROAIR HFA) 90 mcg/actuation inhaler Take 2 Puffs by inhalation every four (4) hours as needed for Wheezing. Take 2 Puffs inhaled by mouth Every 4 Hours.  rizatriptan (MAXALT) 10 mg tablet TAKE ONE TABLET BY MOUTH ONCE AS NEEDED MAY REPEAT IN 2 HOURS IF NEEDED    fluticasone (FLONASE) 50 mcg/actuation nasal spray Apply one-two sprays to skin prior to patch change for skin irritation.  polyethylene glycol (MIRALAX) 17 gram/dose powder     EPINEPHrine (EPIPEN) 0.3 mg/0.3 mL (1:1,000) injection 0.3 mL by IntraMUSCular route once as needed for 1 dose.  loratadine (CLARITIN) 10 mg tablet Take 1 Tab by mouth nightly as needed for Rhinitis.  FLUoxetine (PROZAC) 20 mg capsule Take 1 Cap by mouth daily.  LORazepam (ATIVAN) 1 mg tablet     glucose blood VI test strips (ON CALL EXPRESS TEST STRIP) strip Test blood sugar twice daily DX: E11.9    Lancets misc Test Blood Sugar twice daily. DX Code: E11.9    naproxen (NAPROSYN) 500 mg tablet TAKE ONE TABLET BY MOUTH TWICE A DAY WITH FOOD    lidocaine (XYLOCAINE) 2 % solution Take 5 mL by mouth as needed for Pain.  Blood-Glucose Meter (FREESTYLE FREEDOM LITE) monitoring kit Check Blood sugar once daily    cyclobenzaprine (FLEXERIL) 10 mg tablet Take 1 Tab by mouth three (3) times daily as needed for Muscle Spasm(s) for up to 30 days. As needed  Indications: MUSCLE SPASM    guaiFENesin ER (MUCINEX) 600 mg ER tablet Take 1 Tab by mouth two (2) times a day. No current facility-administered medications for this visit.       Allergies   Allergen Reactions    Esomeprazole Magnesium Hives    Nexium [Esomeprazole Magnesium] Unknown (comments)    Pcn [Penicillins] Unknown (comments)       Review of Systems:  Constitutional: Negative for fatigue, malaise  Resp: Negative for cough, wheezing or SOB  CV: Negative for chest pain, dizziness or palpitations  GI: Negative for nausea or abdominal pain  MS: Negative myalgias or arthralgias   Neuro: Negative for HA, weakness or paresthesia  Psych: Negative for depression or anxiety     Vitals:    10/11/17 1023   BP: 110/74   Pulse: 86   Resp: 18   Temp: 96 °F (35.6 °C)   TempSrc: Oral   SpO2: 96%   Weight: 184 lb 6.4 oz (83.6 kg)   Height: 5' 8\" (1.727 m)       Physical Examination:  General: Well developed, well nourished, in no acute distress  Head: Normocephalic, atraumatic  Eyes: Sclera clear, EOMI  Neck: Normal range of motion  Respiratory: symmetrical, unlabored effort  Cardiovascular: Regular rate and rhythm  Extremities: Full range of motion, normal gait  Neurologic: No focal deficits  Psych: Active, alert and oriented. Affect appropriate     Lulú Santiago was seen today for medication refill and request for new medication. Diagnoses and all orders for this visit:    Diagnoses and all orders for this visit:    1. Mixed hyperlipidemia  -     pravastatin (PRAVACHOL) 40 mg tablet; Take 2 Tabs by mouth nightly for 360 days. 2. Type 2 diabetes mellitus with diabetic neuropathy, without long-term current use of insulin (HCC)  -     dulaglutide (TRULICITY) 1.5 YE/3.8 mL sub-q pen; 0.5 mL by SubCUTAneous route every seven (7) days. I have discussed the diagnosis with the patient and the intended plan as seen in the above orders. The patient expresses understanding and agreement with our plan of care. The patient has received an after-visit summary. The patient knows to call our office if there are any questions or concerns regarding diagnosis and treatment plans. I have discussed medication side effects and warnings with the patient as well. Over half of the 25 minutes face to face with Jagruti Rico consisted of counseling and discussing treatment plans in reference to her diabetes and HLD management. Follow-up Disposition:  Return in about 4 weeks (around 11/8/2017), or if symptoms worsen or fail to improve.

## 2017-11-09 ENCOUNTER — TELEPHONE (OUTPATIENT)
Dept: FAMILY MEDICINE CLINIC | Age: 47
End: 2017-11-09

## 2017-11-09 NOTE — TELEPHONE ENCOUNTER
----- Message from Erin Boo sent at 11/9/2017 10:14 AM EST -----  Regarding: /Telephone  Pt needs referral for Fibroscan. Best contact number is [phone].

## 2017-11-13 NOTE — TELEPHONE ENCOUNTER
Per Dr. Esperanza Moss why she is requesting a Fibroscan. This is used to identify liver fibrosis and would ordered by GI/ hepatology\".

## 2017-11-21 ENCOUNTER — TELEPHONE (OUTPATIENT)
Dept: SLEEP MEDICINE | Age: 47
End: 2017-11-21

## 2017-12-04 ENCOUNTER — OFFICE VISIT (OUTPATIENT)
Dept: PAIN MANAGEMENT | Age: 47
End: 2017-12-04

## 2017-12-04 VITALS
RESPIRATION RATE: 20 BRPM | BODY MASS INDEX: 27.98 KG/M2 | HEART RATE: 69 BPM | SYSTOLIC BLOOD PRESSURE: 126 MMHG | WEIGHT: 184 LBS | TEMPERATURE: 97.8 F | DIASTOLIC BLOOD PRESSURE: 80 MMHG

## 2017-12-04 DIAGNOSIS — G89.29 CHRONIC GENERALIZED PAIN DISORDER: ICD-10-CM

## 2017-12-04 DIAGNOSIS — R52 CHRONIC GENERALIZED PAIN DISORDER: ICD-10-CM

## 2017-12-04 DIAGNOSIS — M79.7 FIBROMYALGIA SYNDROME: ICD-10-CM

## 2017-12-04 RX ORDER — FENTANYL 50 UG/1
1 PATCH TRANSDERMAL
Qty: 15 PATCH | Refills: 0 | Status: SHIPPED | OUTPATIENT
Start: 2017-12-04 | End: 2018-03-01 | Stop reason: SDUPTHER

## 2017-12-04 RX ORDER — FENTANYL 50 UG/1
1 PATCH TRANSDERMAL
Qty: 15 PATCH | Refills: 0 | Status: SHIPPED | OUTPATIENT
Start: 2018-01-03 | End: 2018-03-01 | Stop reason: SDUPTHER

## 2017-12-04 RX ORDER — OXYCODONE AND ACETAMINOPHEN 10; 325 MG/1; MG/1
1 TABLET ORAL
Qty: 120 TAB | Refills: 0 | Status: SHIPPED | OUTPATIENT
Start: 2017-12-04 | End: 2018-03-01 | Stop reason: SDUPTHER

## 2017-12-04 RX ORDER — OXYCODONE AND ACETAMINOPHEN 10; 325 MG/1; MG/1
1 TABLET ORAL
Qty: 120 TAB | Refills: 0 | Status: SHIPPED | OUTPATIENT
Start: 2018-01-03 | End: 2018-03-01 | Stop reason: SDUPTHER

## 2017-12-04 RX ORDER — FENTANYL 50 UG/1
1 PATCH TRANSDERMAL
Qty: 15 PATCH | Refills: 0 | Status: SHIPPED | OUTPATIENT
Start: 2018-02-02 | End: 2018-03-22 | Stop reason: SDUPTHER

## 2017-12-04 RX ORDER — OXYCODONE AND ACETAMINOPHEN 10; 325 MG/1; MG/1
1 TABLET ORAL
Qty: 120 TAB | Refills: 0 | Status: SHIPPED | OUTPATIENT
Start: 2018-02-02 | End: 2018-03-04

## 2017-12-04 NOTE — MR AVS SNAPSHOT
Visit Information Date & Time Provider Department Dept. Phone Encounter #  
 12/4/2017 10:45 AM Licha Galaviz MD \[facility]\"" [facility] [phone] Follow-up Instructions Return in about 3 months (around 3/4/2018). Upcoming Health Maintenance Date Due Pneumococcal 19-64 Medium Risk (1 of 1 - PPSV23) 9/26/1990 EYE EXAM RETINAL OR DILATED Q1 3/10/2016 BREAST CANCER SCRN MAMMOGRAM 6/2/2016 Influenza Age 5 to Adult 8/1/2017 FOOT EXAM Q1 10/4/2017 MICROALBUMIN Q1 10/4/2017 HEMOGLOBIN A1C Q6M 3/27/2018 LIPID PANEL Q1 9/27/2018 DTaP/Tdap/Td series (2 - Td) 9/26/2026 Allergies as of 12/4/2017  Review Complete On: 12/4/2017 By: Licha Galaviz MD  
  
 Severity Noted Reaction Type Reactions Esomeprazole Magnesium  10/19/2010    Hives Nexium [Esomeprazole Magnesium]  06/11/2012    Unknown (comments) Pcn [Penicillins]  06/11/2012    Unknown (comments) Current Immunizations  Reviewed on 10/4/2016 Name Date Influenza Vaccine 9/26/2016, 10/20/2015, 10/9/2014, 10/1/2013 Tdap 9/26/2016 Not reviewed this visit You Were Diagnosed With   
  
 Codes Comments Chronic generalized pain disorder     ICD-10-CM: R52, G89.29 ICD-9-CM: 780.96, 338.29 Fibromyalgia syndrome     ICD-10-CM: M79.7 ICD-9-CM: 729.1 Vitals BP Pulse Temp Resp Weight(growth percentile) BMI  
 126/80 69 97.8 °F (36.6 °C) 20 184 lb (83.5 kg) 27.98 kg/m2 OB Status Smoking Status Hysterectomy Former Smoker Vitals History BMI and BSA Data Body Mass Index Body Surface Area  
 27.98 kg/m 2 2 m 2 Preferred Pharmacy Pharmacy Name Phone  [address] [phone] Your Updated Medication List  
  
   
This list is accurate as of: 12/4/17 11:28 AM.  Always use your most recent med list.  
  
  
  
  
 albuterol 90 mcg/actuation inhaler Commonly known as:  PROAIR HFA Take 2 Puffs by inhalation every four (4) hours as needed for Wheezing. Take 2 Puffs inhaled by mouth Every 4 Hours. * allergy injection Set C  
  
 * allergy injection Set B  
  
 * allergy injection Set A  
  
 * allergy injection  
now * allergy injection  
now * allergy injection  
now AMITIZA 24 mcg capsule Generic drug:  lubiPROStone Blood-Glucose Meter monitoring kit Commonly known as:  FREESTYLE FREEDOM LITE Check Blood sugar once daily  
  
 cyclobenzaprine 10 mg tablet Commonly known as:  FLEXERIL Take 1 Tab by mouth three (3) times daily as needed for Muscle Spasm(s) for up to 30 days. As needed  Indications: MUSCLE SPASM Dexlansoprazole 60 mg Cpdb Commonly known as:  DEXILANT TAKE ONE CAPSULE BY MOUTH EVERY DAY  
  
 dulaglutide 1.5 mg/0.5 mL sub-q pen Commonly known as:  TRULICITY  
0.5 mL by SubCUTAneous route every seven (7) days. EPINEPHrine 0.3 mg/0.3 mL injection Commonly known as:  EPIPEN  
0.3 mL by IntraMUSCular route once as needed for 1 dose. KIMBERLEY- mg tablet Generic drug:  erythromycin  
  
 fenofibrate nanocrystallized 145 mg tablet Commonly known as:  TRICOR  
TAKE 1 TABLET DAILY FOR    HYPERTRIGLYCERIDEMIA * fentaNYL 50 mcg/hr PATCH Commonly known as:  DURAGESIC  
1 Patch by TransDERmal route every fourty-eight (48) hours for 30 days. Max Daily Amount: 1 Patch. For chronic pain      m[place]rodt brand only * fentaNYL 50 mcg/hr PATCH Commonly known as:  DURAGESIC  
1 Patch by TransDERmal route every fourty-eight (48) hours for 30 days. Max Daily Amount: 1 Patch. For chronic pain      mallinckrodt brand only Start taking on:  1/3/2018 * fentaNYL 50 mcg/hr PATCH Commonly known as:  DURAGESIC  
1 Patch by TransDERmal route every fourty-eight (48) hours. Max Daily Amount: 1 Patch. For chronic pain (due to fill 3/28/17) Start taking on:  2/2/2018 fluocinoNIDE 0.05 % topical cream  
Commonly known as:  LIDEX APPLY TO THE AFFECTED AREA TOPICALLY TWO TIMES A DAY FLUoxetine 20 mg capsule Commonly known as:  PROzac Take 1 Cap by mouth daily. fluticasone 50 mcg/actuation nasal spray Commonly known as:  Cortney Meneses Apply one-two sprays to skin prior to patch change for skin irritation. fluticasone-salmeterol 250-50 mcg/dose diskus inhaler Commonly known as:  ADVAIR DISKUS Take 1 Puff by inhalation every twelve (12) hours. glucose blood VI test strips strip Commonly known as:  ON CALL EXPRESS TEST STRIP Test blood sugar twice daily DX: E11.9  
  
 guaiFENesin  mg ER tablet Commonly known as:  [address] Take 1 Tab by mouth two (2) times a day.  
  
 ketoconazole 2 % shampoo Commonly known as:  NIZORAL  
APPLY TO SCALP AND RINSE   DAILY AS NEEDED Lancets Misc Test Blood Sugar twice daily. DX Code: E11.9  
  
 lidocaine 2 % solution Commonly known as:  XYLOCAINE Take 5 mL by mouth as needed for Pain.  
  
 loratadine 10 mg tablet Commonly known as:  Tomy Britain Take 1 Tab by mouth nightly as needed for Rhinitis. LORazepam 1 mg tablet Commonly known as:  ATIVAN  
  
 naloxone 4 mg/actuation nasal spray Commonly known as:  NARCAN  
4 mg by Nasal route as needed for up to 2 doses. Indications: OPIOID TOXICITY  
  
 naproxen 500 mg tablet Commonly known as:  NAPROSYN  
TAKE ONE TABLET BY MOUTH TWICE A DAY WITH FOOD  
  
 * oxyCODONE-acetaminophen  mg per tablet Commonly known as:  PERCOCET Take 1 Tab by mouth four (4) times daily as needed for Pain for up to 30 days. Max Daily Amount: 4 Tabs. For breakthrough pain. * oxyCODONE-acetaminophen  mg per tablet Commonly known as:  PERCOCET Take 1 Tab by mouth four (4) times daily as needed for Pain for up to 30 days. Max Daily Amount: 4 Tabs. For breakthrough pain. Start taking on:  1/3/2018 * oxyCODONE-acetaminophen  mg per tablet Commonly known as:  PERCOCET Take 1 Tab by mouth four (4) times daily as needed for Pain for up to 30 days. Max Daily Amount: 4 Tabs. For breakthrough pain. Start taking on:  2018 pneumococcal 23-sandip ps vaccine 25 mcg/0.5 mL injection Commonly known as:  PNEUMOVAX 23  
0.5 mL by IntraMUSCular route PRIOR TO DISCHARGE for 1 dose. polyethylene glycol 17 gram/dose powder Commonly known as:  Duane Pollock * pravastatin 40 mg tablet Commonly known as:  PRAVACHOL Take 2 Tabs by mouth nightly for 360 days. * pravastatin 40 mg tablet Commonly known as:  PRAVACHOL  
TAKE 1 TABLET NIGHTLY  
  
 raNITIdine 150 mg tablet Commonly known as:  ZANTAC TAKE 1 TABLET TWICE A DAY  
  
 rizatriptan 10 mg tablet Commonly known as:  Landon Gavin TAKE ONE TABLET BY MOUTH ONCE AS NEEDED MAY REPEAT IN 2 HOURS IF NEEDED  
  
 topiramate 25 mg tablet Commonly known as:  TOPAMAX TAKE ONE TABLET BY MOUTH AT BEDTIME  
  
 * Notice: This list has 14 medication(s) that are the same as other medications prescribed for you. Read the directions carefully, and ask your doctor or other care provider to review them with you. Prescriptions Printed Refills  
 fentaNYL (DURAGESIC) 50 mcg/hr PATCH 0 Starting on: 2018 Si Patch by TransDERmal route every fourty-eight (48) hours. Max Daily Amount: 1 Patch. For chronic pain (due to fill 3/28/17) Class: Print Route: TransDERmal  
 fentaNYL (DURAGESIC) 50 mcg/hr PATCH 0 Si Patch by TransDERmal route every fourty-eight (48) hours for 30 days. Max Daily Amount: 1 Patch. For chronic pain      mallin[place]rodt brand only Class: Print Route: TransDERmal  
 oxyCODONE-acetaminophen (PERCOCET)  mg per tablet 0 Sig: Take 1 Tab by mouth four (4) times daily as needed for Pain for up to 30 days. Max Daily Amount: 4 Tabs. For breakthrough pain. Class: Print  Route: Oral  
 fentaNYL (DURAGESIC) 50 mcg/hr PATCH 0 Starting on: 1/3/2018 Si Patch by TransDERmal route every fourty-eight (48) hours for 30 days. Max Daily Amount: 1 Patch. For chronic pain      mallinckrodt brand only Class: Print Route: TransDERmal  
 oxyCODONE-acetaminophen (PERCOCET)  mg per tablet 0 Starting on: 2018 Sig: Take 1 Tab by mouth four (4) times daily as needed for Pain for up to 30 days. Max Daily Amount: 4 Tabs. For breakthrough pain. Class: Print Route: Oral  
 oxyCODONE-acetaminophen (PERCOCET)  mg per tablet 0 Starting on: 1/3/2018 Sig: Take 1 Tab by mouth four (4) times daily as needed for Pain for up to 30 days. Max Daily Amount: 4 Tabs. For breakthrough pain. Class: Print Route: Oral  
  
Follow-up Instructions Return in about 3 months (around 3/4/2018). Introducing [facility]! Miranda Myers introduces [employer] patient portal. Now you can access parts of your medical record, email your doctor's office, and request medication refills online. 1. In your internet browser, go to [email or web address] 2. Click on the First Time User? Click Here link in the Sign In box. You will see the New Member Sign Up page. 3. Enter your [employer] Access Code exactly as it appears below. You will not need to use this code after youve completed the sign-up process. If you do not sign up before the expiration date, you must request a new code. · [employer] Access Code: [email or web address] Expires: 3/4/2018 11:28 AM 
 
4. Enter the last four digits of your Social Security Number (xxxx) and Date of Birth (mm/dd/yyyy) as indicated and click Submit. You will be taken to the next sign-up page. 5. Create a [employer] ID. This will be your [employer] login ID and cannot be changed, so think of one that is secure and easy to remember. 6. Create a [employer] password. You can change your password at any time. 7. Enter your Password Reset Question and Answer. This can be used at a later time if you forget your password. 8. Enter your e-mail address. You will receive e-mail notification when new information is available in [address] 9. Click Sign Up. You can now view and download portions of your medical record. 10. Click the Download Summary menu link to download a portable copy of your medical information. If you have questions, please visit the Frequently Asked Questions section of the [employer] website. Remember, [employer] is NOT to be used for urgent needs. For medical emergencies, dial 911. Now available from your iPhone and Android! Please provide this summary of care documentation to your next provider. Your primary care clinician is listed as Virginia Dawson. If you have any questions after today's visit, please call [phone].

## 2017-12-04 NOTE — PROGRESS NOTES
Nursing Notes    Patient presents to the office today in follow-up. Patient rates her pain at 6/10 on the numerical pain scale. Reviewed medications with counts as follows:    Rx Date filled Qty Dispensed Pill Count Last Dose Short   Percocet 10/325mg 11/24/17 120 93 This am  No    Fentanyl 50mcg 11/24/17 15 11 12/02/17 No                                   Comments:     POC UDS was not performed in office today    Any new labs or imaging since last appointment? NO    Have you been to an emergency room (ER) or urgent care clinic since your last visit? NO            Have you been hospitalized since your last visit? NO     If yes, where, when, and reason for visit? Have you seen or consulted any other health care providers outside of the [address] [place] [address]  since your last visit? YES     If yes, where, when, and reason for visit? pcp     HM deferred to pcp.

## 2017-12-04 NOTE — PROGRESS NOTES
HISTORY OF PRESENT ILLNESS  Bhumi Romano is a 56 y.o. female. HPI Comments: Visit survey reviewed  Chief complaint- chronic pain syndrome- pain all over, history fibromyalgia  The patient will continue with Percocet 10 mg 4 times a day as needed  Fentanyl patch 50 mcg every 2 day  Medication helps improve general activity, mood, walking, sleep, enjoyment of life  I did inform the patient today that moving forward I will no longer be working for this clinic. The patient will receive a copy of other pain clinics, other pain physicians in the area. She will also be informed that she may certainly continue her follow-up in care with this clinic and I did request a 3 month follow-up with this clinic. She prefers a 3 month follow-up    No new significant side effects reported  Medication continues to help improve quality of life. Medications reviewed including risk and benefits. Recent average level of pain-5    Measurement of clinical outcome for chronic pain patient/ SPAASMS Score Card-            Information reviewed and will be scanned. Total score today-7      Review of Systems   Constitutional: Positive for fever and malaise/fatigue. Negative for chills and weight loss. HENT: Positive for congestion and sore throat. Eyes: Negative for blurred vision and double vision. Respiratory: Positive for cough and shortness of breath (from URI/h/o asthma). Nonsmoker/quit 4.5 years ago   Cardiovascular: Negative for chest pain. Gastrointestinal: Positive for heartburn. Negative for constipation, diarrhea, nausea and vomiting. Genitourinary: Negative. Musculoskeletal: Positive for back pain, joint pain, myalgias and neck pain. Negative for falls. Skin: Positive for rash. Neurological: Positive for dizziness, tingling, sensory change and headaches (sees a \"specialist for that\"). Negative for tremors, seizures and weakness.    Endo/Heme/Allergies: Positive for environmental allergies (gets injections every other weeks for seasonal allergies). Does not bruise/bleed easily. Psychiatric/Behavioral: Positive for depression. Negative for suicidal ideas. The patient is nervous/anxious and has insomnia. Does not see psychiatry       Physical Exam   Constitutional: She appears well-developed and well-nourished. She is cooperative. She does not have a sickly appearance. HENT:   Head: Normocephalic and atraumatic. Right Ear: External ear normal. No drainage. Left Ear: External ear normal. No drainage. Nose: Nose normal.   Eyes: Lids are normal. Right eye exhibits no discharge. Left eye exhibits no discharge. Right conjunctiva has no hemorrhage. Left conjunctiva has no hemorrhage. Neck: Neck supple. No tracheal deviation present. No thyroid mass present. Pulmonary/Chest: Effort normal. No respiratory distress. Neurological: She is alert. No cranial nerve deficit. Skin: Skin is intact. No rash noted. Psychiatric: Her speech is normal. Her affect is not angry. She does not express inappropriate judgment. Nursing note and vitals reviewed. ASSESSMENT and PLAN  Encounter Diagnoses   Name Primary?  Chronic generalized pain disorder     Fibromyalgia syndrome    No indicators for recent medication misuse.  reviewed. Pain Meds and Quality Of Life have been reviewed. Nonpharmacologic therapy and non-opioid pharmacologic therapy were considered. If opioid therapy is prescribed, this is only if the expected benefits are anticipated to outweigh risks. Possible changes to treatment plan considered. Support/education given as needed. Today-medications are as listed. No significant changes to medications. I did encourage the patient to consider trying to decrease the total amount of opioid pain medication she has been using. Follow up -- 3 months.

## 2018-01-16 DIAGNOSIS — E11.40 TYPE 2 DIABETES MELLITUS WITH DIABETIC NEUROPATHY, WITHOUT LONG-TERM CURRENT USE OF INSULIN (HCC): ICD-10-CM

## 2018-01-18 RX ORDER — DULAGLUTIDE 1.5 MG/.5ML
INJECTION, SOLUTION SUBCUTANEOUS
Qty: 2 ML | Refills: 0 | Status: SHIPPED | OUTPATIENT
Start: 2018-01-18 | End: 2018-03-22 | Stop reason: SDUPTHER

## 2018-02-22 DIAGNOSIS — K21.9 GASTROESOPHAGEAL REFLUX DISEASE WITHOUT ESOPHAGITIS: ICD-10-CM

## 2018-02-23 ENCOUNTER — TELEPHONE (OUTPATIENT)
Dept: PAIN MANAGEMENT | Age: 48
End: 2018-02-23

## 2018-02-23 RX ORDER — FLUTICASONE PROPIONATE 50 MCG
SPRAY, SUSPENSION (ML) NASAL
Qty: 16 G | Refills: 0 | Status: SHIPPED | OUTPATIENT
Start: 2018-02-23 | End: 2019-04-03 | Stop reason: SDUPTHER

## 2018-02-23 RX ORDER — RANITIDINE 150 MG/1
TABLET, FILM COATED ORAL
Qty: 60 TAB | Refills: 0 | Status: SHIPPED | OUTPATIENT
Start: 2018-02-23 | End: 2018-06-14 | Stop reason: SDUPTHER

## 2018-02-23 NOTE — TELEPHONE ENCOUNTER
Received call from patient regarding instructions on fentanyl prescription; prescription was written on 12/04/17 with start date of 02/02/18 , however in the sig line , there is \"due to fill on 03/28/17\" ; patient's pharmacy will not fill due to that line; patient advised that there are no providers in office to fix matter at present; patient also advised that if circumstances dictate, she should proceed to nearest emergency dept for withdrawal symptoms; patient advised that matter would be reviewed on Monday.

## 2018-02-23 NOTE — TELEPHONE ENCOUNTER
During previous phone conversation with patient , phone reception was interrupted; upon calling patient back, patient states that she called pharmacy and advised them as to information given to her from this office regarding dates on prescription; upon review and noting by pharmacy that prescription had been written on 12/04/17 , pharmacy agrees to fill prescription (per patient).

## 2018-03-01 ENCOUNTER — OFFICE VISIT (OUTPATIENT)
Dept: PAIN MANAGEMENT | Age: 48
End: 2018-03-01

## 2018-03-01 ENCOUNTER — DOCUMENTATION ONLY (OUTPATIENT)
Dept: PAIN MANAGEMENT | Age: 48
End: 2018-03-01

## 2018-03-01 VITALS
RESPIRATION RATE: 12 BRPM | BODY MASS INDEX: 27.89 KG/M2 | TEMPERATURE: 97.6 F | SYSTOLIC BLOOD PRESSURE: 106 MMHG | DIASTOLIC BLOOD PRESSURE: 72 MMHG | WEIGHT: 184 LBS | HEIGHT: 68 IN | HEART RATE: 72 BPM

## 2018-03-01 DIAGNOSIS — R52 CHRONIC GENERALIZED PAIN DISORDER: Primary | ICD-10-CM

## 2018-03-01 DIAGNOSIS — Z79.899 ENCOUNTER FOR LONG-TERM (CURRENT) USE OF HIGH-RISK MEDICATION: ICD-10-CM

## 2018-03-01 DIAGNOSIS — G89.29 CHRONIC GENERALIZED PAIN DISORDER: Primary | ICD-10-CM

## 2018-03-01 DIAGNOSIS — M79.18 MUSCULOSKELETAL PAIN: ICD-10-CM

## 2018-03-01 DIAGNOSIS — M79.7 FIBROMYALGIA SYNDROME: ICD-10-CM

## 2018-03-01 LAB
ALCOHOL UR POC: NORMAL
AMPHETAMINES UR POC: NORMAL
BARBITURATES UR POC: NORMAL
BENZODIAZEPINES UR POC: NORMAL
BUPRENORPHINE UR POC: NORMAL
CANNABINOIDS UR POC: NORMAL
CARISOPRODOL UR POC: NORMAL
COCAINE UR POC: NORMAL
FENTANYL UR POC: NORMAL
MDMA/ECSTASY UR POC: NORMAL
METHADONE UR POC: NORMAL
METHAMPHETAMINE UR POC: NORMAL
METHYLPHENIDATE UR POC: NORMAL
OPIATES UR POC: NORMAL
OXYCODONE UR POC: NORMAL
PHENCYCLIDINE UR POC: NORMAL
PROPOXYPHENE UR POC: NORMAL
TRAMADOL UR POC: NORMAL
TRICYCLICS UR POC: NORMAL

## 2018-03-01 RX ORDER — OXYCODONE AND ACETAMINOPHEN 10; 325 MG/1; MG/1
1 TABLET ORAL
Qty: 120 TAB | Refills: 0 | Status: SHIPPED | OUTPATIENT
Start: 2018-04-20 | End: 2018-03-22 | Stop reason: SDUPTHER

## 2018-03-01 RX ORDER — FENTANYL 50 UG/1
1 PATCH TRANSDERMAL
Qty: 15 PATCH | Refills: 0 | Status: SHIPPED | OUTPATIENT
Start: 2018-04-20 | End: 2018-03-22 | Stop reason: SDUPTHER

## 2018-03-01 RX ORDER — FENTANYL 50 UG/1
1 PATCH TRANSDERMAL
Qty: 15 PATCH | Refills: 0 | Status: SHIPPED | OUTPATIENT
Start: 2018-05-19 | End: 2018-03-22 | Stop reason: SDUPTHER

## 2018-03-01 RX ORDER — CYCLOBENZAPRINE HCL 10 MG
10 TABLET ORAL
Qty: 60 TAB | Refills: 2 | Status: SHIPPED | OUTPATIENT
Start: 2018-03-01 | End: 2018-06-14 | Stop reason: SDUPTHER

## 2018-03-01 RX ORDER — OXYCODONE AND ACETAMINOPHEN 10; 325 MG/1; MG/1
1 TABLET ORAL
Qty: 120 TAB | Refills: 0 | Status: SHIPPED | OUTPATIENT
Start: 2018-05-19 | End: 2018-03-22 | Stop reason: SDUPTHER

## 2018-03-01 RX ORDER — FENTANYL 50 UG/1
1 PATCH TRANSDERMAL
Qty: 15 PATCH | Refills: 0 | Status: SHIPPED | OUTPATIENT
Start: 2018-03-22 | End: 2018-07-26 | Stop reason: SDUPTHER

## 2018-03-01 RX ORDER — OXYCODONE AND ACETAMINOPHEN 10; 325 MG/1; MG/1
1 TABLET ORAL
Qty: 120 TAB | Refills: 0 | Status: SHIPPED | OUTPATIENT
Start: 2018-03-22 | End: 2018-05-23 | Stop reason: SDUPTHER

## 2018-03-01 RX ORDER — NALOXONE HYDROCHLORIDE 4 MG/.1ML
1 SPRAY NASAL AS NEEDED
Qty: 1 EACH | Refills: 0 | Status: SHIPPED | OUTPATIENT
Start: 2018-03-01 | End: 2018-09-20 | Stop reason: SDUPTHER

## 2018-03-01 NOTE — PROGRESS NOTES
Nursing Notes    Patient presents to the office today in follow-up. Patient rates her pain at 4/10 on the numerical pain scale. Reviewed medications with counts as follows:    Rx Date filled Qty Dispensed Pill Count Last Dose Short   Fentanyl 50 mcg/hr patch 2/23/18 15 12+ 1 wearing now 2/23/18 no   Percocet  mg tab 2/23/18 120 103 today no                                  Comments:     POC UDS was performed in office today per verbal order and correct read back from provider. Any new labs or imaging since last appointment? YES, Back X-Ray,MRI and CT of head. Have you been to an emergency room (ER) or urgent care clinic since your last visit? YES, ER. Have you been hospitalized since your last visit? NO     If yes, where, when, and reason for visit? Have you seen or consulted any other health care providers outside of the [address] [place] [address]  since your last visit? [address]      If yes, where, when, and reason for visit? HM deferred to pcp.

## 2018-03-01 NOTE — PROGRESS NOTES
HISTORY OF PRESENT ILLNESS  Adriana Rivera is a 56 y.o. female    Ms. Lisa Oglesby returns today for f/u of chronic generalized pain related to [address]. She also suffers from peripheral neuropathy and headaches. The patient denies any significant changes in her chronic pain since last f/u. She reports that she will schedule a follow up with her PCP for her diabetes and high cholesterol. We discussed her current condition and medications in detail today. She tolerates medications without side effects. Adriana Rivera reports no change in sleep or constipation. Current medication management consists of:Oxycodone 10/325mg up to 4 times per day as needed, Fentanyl 75 mcg patch every 48 hrs, compound cream use 2-3 times daily as needed for joint pain  Medications are helping with pain control and quality of life. Her pain is 4-6/10 with medication and 7-9/10 without. Pt describes pain as aching, stabbing, and burning. Aggravating factors include standing, sitting, and walking. Relieved with rest, medication, and avoiding painful activities. The patient reports 70% relief with current medications. Current treatment is helping to improve general activity, mood, walking, sleep, enjoyment of life    Measuring clinical outcomes of chronic pain patients: score 11/28; the lower the number the better the outcome. Because the patient's current regimen places him/her at increased risk for possible overdose, a prescription for naloxone nasal spray is being provided. The patient understands that this medication is only to be used in the setting of a possible overdose and that inadvertent use of this medication could precipitate overt withdrawal.  -I discussed Narcan with the patient today. In addition the patient has received Narcan instruction sheet. Pain Meds and Quality Of Life have been reviewed. Nonpharmacologic therapy and non-opioid pharmacologic therapy were considered.   If opioid therapy is prescribed, this is only if the expected benefits are anticipated to outweigh risks. She  is otherwise doing well with no other complaints today. She denies any adverse events including nausea, vomiting, dizziness, increased constipation, hallucinations, or seizures. The patient reports functional improvement and QOL with pain medication. Vitals:    03/01/18 1205   BP: 106/72   Pulse: 72   Resp: 12   Temp: 97.6 °F (36.4 °C)   TempSrc: Oral   Weight: 83.5 kg (184 lb)   Height: 5' 8\" (1.727 m)   PainSc:   4   PainLoc: Generalized         Allergies   Allergen Reactions    Esomeprazole Magnesium Hives    Nexium [Esomeprazole Magnesium] Unknown (comments)    Pcn [Penicillins] Unknown (comments)       Past Surgical History:   Procedure Laterality Date    HX GYN      Partial Hysterectomy    HX HEENT      HX TUBAL LIGATION         ROS   Constitutional: Positive for malaise/fatigue. Negative for chills. HENT: Negative for congestion and sore throat. Eyes: Negative for blurred vision and double vision. Respiratory: Negative for cough. Cardiovascular: Negative for palpitations. Gastrointestinal: Positive for heartburn. Negative for constipation, diarrhea, nausea and vomiting. Genitourinary: Negative. Musculoskeletal: Positive for joint pain and myalgias. Negative for falls. Skin: Positive for rash. Neurological: Positive for dizziness and sensory change. Negative for tremors and seizures. Endo/Heme/Allergies: Positive for environmental allergies. Does not bruise/bleed easily. Psychiatric/Behavioral: Positive for depression. Negative for suicidal ideas. The patient is nervous/anxious and has insomnia.         Physical Exam   Constitutional: She is oriented to person, place, and time. She appears well-developed and well-nourished. No distress. HENT:   Head: Normocephalic. Eyes: EOM are normal.   Neck: Normal range of motion. Painful ROM   Pulmonary/Chest: Effort normal. No respiratory distress. Musculoskeletal: She exhibits tenderness (neck/lumbar ). She exhibits no edema. Cervical back: She exhibits decreased range of motion, tenderness, pain and spasm. Lumbar back: She exhibits decreased range of motion, tenderness, pain and spasm. Neurological: She is alert and oriented to person, place, and time. A cranial nerve deficit is present. Gait (antalgic) abnormal.   Psychiatric: She has a normal mood and affect. Her behavior is normal. Judgment and thought content normal.   Nursing note and vitals reviewed. ASSESSMENT:    1. Chronic generalized pain disorder    2. Musculoskeletal pain    3. Fibromyalgia syndrome    4. Encounter for long-term (current) use of high-risk medication          [place] Prescription Monitoring Program was reviewed which does not demonstrate aberrancies and/or inconsistencies with regard to the historical prescribing of controlled medications to this patient by other providers. Medications were brought to visit today. Pill count was appropriate. When possible, non-drug therapy for chronic pain should be used as a first-line treatment. Physical therapy exercise regimens, chiropractic manipulation, meditation relaxation techniques, cognitive behavior therapy, acupuncture, yoga, Varun Chi,  transcutaneous electrical nerve stimulation (TENS), and application of moist heat can help alleviate pain . Explained that realistic expectations and goals with chronic pain management are to maximize function and minimize pain with the understanding that limitations will exist both in the extent of relief that she may achieve, as well as thresholds of mg strengths that we will not exceed. Our role is to help the patient better cope with chronic pain utilizng a multimodal approach. The patients condition and plan were discussed. All questions were answered. The patient agrees with the plan.      PLAN / Pt Instructions:  Continued current plan with no evidence of addiction or diversion. Stable on current medication without adverse events. 2. Refill Oxycodone 10/325mg up to 4 times per day as needed. 3. Refill Fentanyl 75 mcg patch every 48 hrs as prescribed. 4. Add compound cream use 2-3 times daily as needed for joint pain  5. Add naloxone 4 mg nasal spray, use only for opioid induced respiratory emergency as discussed in office visit today  6. Continue to f/u with neurologist regarding migraines and RLS  7. Discussed risks of addiction, dependency, and opioid induced hyperalgesia. 8. Return to clinic in 3 months      Prescription monitoring program reviewed. The patient  should keep track of total Tylenol intake and make sure liver function tests have been checked with primary care physician. POC UDS today. Pain medications prescribed with the objective of pain relief and improved physical and psychosocial function in this patient. DISPOSITION  · Counseled patient on proper use of prescribed medications. · Counseled patient about chronic medical conditions and their relationship to anxiety and depression and recommended mental health support as needed. · Reviewed with patient self-help tools, home exercise, and lifestyle changes to assist the patient in self-management of symptoms. · Reviewed with patient the treatment plan, goals of treatment plan, and limitations of treatment plan, to include the potential for side effects from medications and procedures. If side effects occur, it is the responsibility of the patient to inform the clinic so that a change in the treatment plan can be made in a safe manner. The patient is advised that stopping prescribed medication may cause an increase in symptoms and possible medication withdrawal symptoms. The patient is informed an emergency room evaluation may be necessary if this occurs.         Spent 25 minutes with patient today reviewing the treatment plan, goals of treatment plan, and limitations of the treatment plan, to include the potential for side effects from medications and procedures. More than 50% of the visit time was spent counseling the patient. Leora Hewitt PA-C 3/1/2018        Note: Please excuse any typographical errors. Voice recognition software was used for this note and may cause mistakes.

## 2018-03-01 NOTE — MR AVS SNAPSHOT
[address] [facility] [address] 
[phone] Patient: Blanca Mejia MRN: [id number] Visit Information Date & Time Provider Department Dept. Phone Encounter #  
 3/1/2018 11:20 AM [facility] [phone] Upcoming Health Maintenance Date Due Pneumococcal 19-64 Medium Risk (1 of 1 - PPSV23) 9/26/1990 EYE EXAM RETINAL OR DILATED Q1 3/10/2016 BREAST CANCER SCRN MAMMOGRAM 6/2/2016 Influenza Age 5 to Adult 8/1/2017 FOOT EXAM Q1 10/4/2017 MICROALBUMIN Q1 10/4/2017 HEMOGLOBIN A1C Q6M 3/27/2018 LIPID PANEL Q1 9/27/2018 DTaP/Tdap/Td series (2 - Td) 9/26/2026 Allergies as of 3/1/2018  Review Complete On: 3/1/2018 By: Piper Narvaez PA-C Severity Noted Reaction Type Reactions Esomeprazole Magnesium  10/19/2010    Hives Nexium [Esomeprazole Magnesium]  06/11/2012    Unknown (comments) Pcn [Penicillins]  06/11/2012    Unknown (comments) Current Immunizations  Reviewed on 10/4/2016 Name Date Influenza Vaccine 9/26/2016, 10/20/2015, 10/9/2014, 10/1/2013 Tdap 9/26/2016 Not reviewed this visit You Were Diagnosed With   
  
 Codes Comments Chronic generalized pain disorder    -  Primary ICD-10-CM: R52, G89.29 ICD-9-CM: 780.96, 338.29 Musculoskeletal pain     ICD-10-CM: M79.1 ICD-9-CM: 729.1 Fibromyalgia syndrome     ICD-10-CM: M79.7 ICD-9-CM: 729.1 Encounter for long-term (current) use of high-risk medication     ICD-10-CM: Z79.899 ICD-9-CM: V58.69 Vitals BP Pulse Temp Resp Height(growth percentile) Weight(growth percentile) 106/72 (BP 1 Location: Right arm, BP Patient Position: Sitting) 72 97.6 °F (36.4 °C) (Oral) 12 5' 8\" (1.727 m) 184 lb (83.5 kg) BMI OB Status Smoking Status 27.98 kg/m2 Hysterectomy Former Smoker Vitals History BMI and BSA Data Body Mass Index Body Surface Area  
 27.98 kg/m 2 2 m 2 Preferred Pharmacy Pharmacy Name Phone [facility] [phone] Your Updated Medication List  
  
   
This list is accurate as of 3/1/18 12:57 PM.  Always use your most recent med list.  
  
  
  
  
 albuterol 90 mcg/actuation inhaler Commonly known as:  PROAIR HFA Take 2 Puffs by inhalation every four (4) hours as needed for Wheezing. Take 2 Puffs inhaled by mouth Every 4 Hours. * allergy injection Set C  
  
 * allergy injection Set B  
  
 * allergy injection Set A  
  
 * allergy injection  
now * allergy injection  
now * allergy injection  
now AMITIZA 24 mcg capsule Generic drug:  lubiPROStone Blood-Glucose Meter monitoring kit Commonly known as:  FREESTYLE FREEDOM LITE Check Blood sugar once daily * cyclobenzaprine 10 mg tablet Commonly known as:  FLEXERIL Take 1 Tab by mouth three (3) times daily as needed for Muscle Spasm(s) for up to 30 days. As needed  Indications: MUSCLE SPASM * cyclobenzaprine 10 mg tablet Commonly known as:  FLEXERIL Take 1 Tab by mouth two (2) times daily as needed for Muscle Spasm(s) for up to 30 days. As needed  Indications: Muscle Spasm Dexlansoprazole 60 mg Cpdb Commonly known as:  DEXILANT TAKE ONE CAPSULE BY MOUTH EVERY DAY  
  
 EPINEPHrine 0.3 mg/0.3 mL injection Commonly known as:  EPIPEN  
0.3 mL by IntraMUSCular route once as needed for 1 dose. KIMBERLEY- mg tablet Generic drug:  erythromycin  
  
 fenofibrate nanocrystallized 145 mg tablet Commonly known as:  TRICOR  
TAKE 1 TABLET DAILY FOR    HYPERTRIGLYCERIDEMIA * fentaNYL 50 mcg/hr PATCH Commonly known as:  DURAGESIC  
1 Patch by TransDERmal route every fourty-eight (48) hours. Max Daily Amount: 1 Patch. For chronic pain (due to fill 3/28/17) * fentaNYL 50 mcg/hr PATCH Commonly known as:  Maira Lantigua  
 1 Patch by TransDERmal route every fourty-eight (48) hours. Max Daily Amount: 1 Patch. For chronic pain (due to fill 3/28/17) Start taking on:  3/22/2018 * fentaNYL 50 mcg/hr PATCH Commonly known as:  DURAGESIC  
1 Patch by TransDERmal route every fourty-eight (48) hours for 30 days. Max Daily Amount: 1 Patch. For chronic pain      [place] brand only Start taking on:  4/20/2018 * fentaNYL 50 mcg/hr PATCH Commonly known as:  DURAGESIC  
1 Patch by TransDERmal route every fourty-eight (48) hours for 30 days. Max Daily Amount: 1 Patch. For chronic pain      m[place] brand only Start taking on:  5/19/2018  
  
 fluocinoNIDE 0.05 % topical cream  
Commonly known as:  LIDEX APPLY TO THE AFFECTED AREA TOPICALLY TWO TIMES A DAY FLUoxetine 20 mg capsule Commonly known as:  PROzac Take 1 Cap by mouth daily. fluticasone 50 mcg/actuation nasal spray Commonly known as:  FLONASE  
USE 1 TO 2 SPRAYS NASALLY  ONCE DAILY  
  
 fluticasone-salmeterol 250-50 mcg/dose diskus inhaler Commonly known as:  ADVAIR DISKUS Take 1 Puff by inhalation every twelve (12) hours. glucose blood VI test strips strip Commonly known as:  ON CALL EXPRESS TEST STRIP Test blood sugar twice daily DX: E11.9  
  
 guaiFENesin  mg ER tablet Commonly known as:  [employer]al[employer] Dalton Take 1 Tab by mouth two (2) times a day.  
  
 ketoconazole 2 % shampoo Commonly known as:  NIZORAL  
APPLY TO SCALP AND RINSE   DAILY AS NEEDED Lancets Misc Test Blood Sugar twice daily. DX Code: E11.9  
  
 lidocaine 2 % solution Commonly known as:  XYLOCAINE Take 5 mL by mouth as needed for Pain.  
  
 loratadine 10 mg tablet Commonly known as:  Leanne Purdy Take 1 Tab by mouth nightly as needed for Rhinitis. LORazepam 1 mg tablet Commonly known as:  ATIVAN  
  
 * naloxone 4 mg/actuation nasal spray Commonly known as:  NARCAN  
4 mg by Nasal route as needed for up to 2 doses.  Indications: OPIOID TOXICITY  
  
 * naloxone 4 mg/actuation nasal spray Commonly known as:  NARCAN  
1 [place] by IntraNASal route as needed. As needed for opioid respiratory emergency only  
  
 naproxen 500 mg tablet Commonly known as:  NAPROSYN  
TAKE ONE TABLET BY MOUTH TWICE A DAY WITH FOOD  
  
 * oxyCODONE-acetaminophen  mg per tablet Commonly known as:  PERCOCET Take 1 Tab by mouth four (4) times daily as needed for Pain for up to 30 days. Max Daily Amount: 4 Tabs. For breakthrough pain. * oxyCODONE-acetaminophen  mg per tablet Commonly known as:  PERCOCET Take 1 Tab by mouth four (4) times daily as needed for Pain for up to 30 days. Max Daily Amount: 4 Tabs. For breakthrough pain. Start taking on:  3/22/2018 * oxyCODONE-acetaminophen  mg per tablet Commonly known as:  PERCOCET Take 1 Tab by mouth four (4) times daily as needed for Pain for up to 30 days. Max Daily Amount: 4 Tabs. For breakthrough pain. Start taking on:  4/20/2018 * oxyCODONE-acetaminophen  mg per tablet Commonly known as:  PERCOCET Take 1 Tab by mouth four (4) times daily as needed for Pain for up to 30 days. Max Daily Amount: 4 Tabs. For breakthrough pain. Start taking on:  5/19/2018 pneumococcal 23-sandip ps vaccine 25 mcg/0.5 mL injection Commonly known as:  PNEUMOVAX 23  
0.5 mL by IntraMUSCular route PRIOR TO DISCHARGE for 1 dose. polyethylene glycol 17 gram/dose powder Commonly known as:  Jairo [place] * pravastatin 40 mg tablet Commonly known as:  PRAVACHOL Take 2 Tabs by mouth nightly for 360 days. * pravastatin 40 mg tablet Commonly known as:  PRAVACHOL  
TAKE 1 TABLET NIGHTLY  
  
 raNITIdine 150 mg tablet Commonly known as:  ZANTAC TAKE 1 TABLET TWICE A DAY  
  
 rizatriptan 10 mg tablet Commonly known as:  Brittany Hurter TAKE ONE TABLET BY MOUTH ONCE AS NEEDED MAY REPEAT IN 2 HOURS IF NEEDED  
  
 topiramate 25 mg tablet Commonly known as:  TOPAMAX TAKE ONE TABLET BY MOUTH AT BEDTIME  
  
 TRULICITY 1.5 FU/1.4 mL sub-q pen Generic drug:  dulaglutide INJECT 0.5 ML SUB-Q EVERY 7 DAYS * Notice: This list has 20 medication(s) that are the same as other medications prescribed for you. Read the directions carefully, and ask your doctor or other care provider to review them with you. Prescriptions Printed Refills  
 fentaNYL (DURAGESIC) 50 mcg/hr PATCH 0 Starting on: 2018 Si Patch by TransDERmal route every fourty-eight (48) hours for 30 days. Max Daily Amount: 1 Patch. For chronic pain      m[place] brand only Class: Print Route: TransDERmal  
 fentaNYL (DURAGESIC) 50 mcg/hr PATCH 0 Starting on: 2018 Si Patch by TransDERmal route every fourty-eight (48) hours for 30 days. Max Daily Amount: 1 Patch. For chronic pain      m[place] brand only Class: Print Route: TransDERmal  
 fentaNYL (DURAGESIC) 50 mcg/hr PATCH 0 Starting on: 3/22/2018 Si Patch by TransDERmal route every fourty-eight (48) hours. Max Daily Amount: 1 Patch. For chronic pain (due to fill 3/28/17) Class: Print Route: TransDERmal  
 oxyCODONE-acetaminophen (PERCOCET)  mg per tablet 0 Starting on: 2018 Sig: Take 1 Tab by mouth four (4) times daily as needed for Pain for up to 30 days. Max Daily Amount: 4 Tabs. For breakthrough pain. Class: Print Route: Oral  
 oxyCODONE-acetaminophen (PERCOCET)  mg per tablet 0 Starting on: 2018 Sig: Take 1 Tab by mouth four (4) times daily as needed for Pain for up to 30 days. Max Daily Amount: 4 Tabs. For breakthrough pain. Class: Print Route: Oral  
 oxyCODONE-acetaminophen (PERCOCET)  mg per tablet 0 Starting on: 3/22/2018 Sig: Take 1 Tab by mouth four (4) times daily as needed for Pain for up to 30 days. Max Daily Amount: 4 Tabs. For breakthrough pain. Class: Print  Route: Oral  
 naloxone (NARCAN) 4 mg/actuation nasal spray 0 [place] by IntraNASal route as needed. As needed for opioid respiratory emergency only Class: Print Route: IntraNASal  
  
Prescriptions Sent to Pharmacy Refills  
 cyclobenzaprine (FLEXERIL) 10 mg tablet 2 Sig: Take 1 Tab by mouth two (2) times daily as needed for Muscle Spasm(s) for up to 30 days. As needed  Indications: Muscle Spasm Class: Normal  
 Pharmacy: [facility] [address] Ph #: [phone] Route: Oral  
  
We Performed the Following AMB POC DRUG SCREEN () [ [facility]] DRUG SCREEN [[id number] Custom] Patient Instructions Preventing Falls: Care Instructions Your Care Instructions Getting around your home safely can be a challenge if you have injuries or health problems that make it easy for you to fall. Loose rugs and furniture in walkways are among the dangers for many older people who have problems walking or who have poor eyesight. People who have conditions such as arthritis, osteoporosis, or dementia also have to be careful not to fall. You can make your home safer with a few simple measures. Follow-up care is a key part of your treatment and safety. Be sure to make and go to all appointments, and call your doctor if you are having problems. It's also a good idea to know your test results and keep a list of the medicines you take. How can you care for yourself at home? Taking care of yourself · You may get dizzy if you do not drink enough water. To prevent dehydration, drink plenty of fluids, enough so that your urine is light yellow or clear like water. Choose water and other caffeine-free clear liquids. If you have kidney, heart, or liver disease and have to limit fluids, talk with your doctor before you increase the amount of fluids you drink. · Exercise regularly to improve your strength, muscle tone, and balance. Walk if you can. Swimming may be a good choice if you cannot walk easily. · Have your vision and hearing checked each year or any time you notice a change. If you have trouble seeing and hearing, you might not be able to avoid objects and could lose your balance. · Know the side effects of the medicines you take. Ask your doctor or pharmacist whether the medicines you take can affect your balance. Sleeping pills or sedatives can affect your balance. · Limit the amount of alcohol you drink. Alcohol can impair your balance and other senses. · Ask your doctor whether calluses or corns on your feet need to be removed. If you wear loose-fitting shoes because of calluses or corns, you can lose your balance and fall. · Talk to your doctor if you have numbness in your feet. Preventing falls at home · Remove raised doorway thresholds, throw rugs, and clutter. Repair loose carpet or raised areas in the floor. · Move furniture and electrical cords to keep them out of walking paths. · Use nonskid floor wax, and wipe up spills right away, especially on ceramic tile floors. · If you use a walker or cane, put rubber tips on it. If you use crutches, clean the bottoms of them regularly with an abrasive pad, such as steel wool. · Keep your house well lit, especially Mercie Pulling, and outside walkways. Use night-lights in areas such as hallways and bathrooms. Add extra light switches or use remote switches (such as switches that go on or off when you clap your hands) to make it easier to turn lights on if you have to get up during the night. · Install sturdy handrails on stairways. · Move items in your cabinets so that the things you use a lot are on the lower shelves (about waist level). · Keep a cordless phone and a flashlight with new batteries by your bed. If possible, put a phone in each of the main rooms of your house, or carry a cell phone in case you fall and cannot reach a phone.  Or, you can wear a device around your neck or wrist. You push a button that sends a signal for help. · Wear low-heeled shoes that fit well and give your feet good support. Use footwear with nonskid soles. Check the heels and soles of your shoes for wear. Repair or replace worn heels or soles. · Do not wear socks without shoes on wood floors. · Walk on the grass when the sidewalks are slippery. If you live in an area that gets snow and ice in the winter, sprinkle salt on slippery steps and sidewalks. Preventing falls in the bath · Install grab bars and nonskid mats inside and outside your shower or tub and near the toilet and sinks. · Use shower chairs and bath benches. · Use a hand-held shower head that will allow you to sit while showering. · Get into a tub or shower by putting the weaker leg in first. Get out of a tub or shower with your strong side first. 
· Repair loose toilet seats and consider installing a raised toilet seat to make getting on and off the toilet easier. · Keep your bathroom door unlocked while you are in the shower. Where can you learn more? Go to http://carolina-raiza.info/. Enter [id number] in the search box to learn more about \"Preventing Falls: Care Instructions. \" Current as of: May 12, 2017 Content Version: 11.4 © 4458-7655 [employer]. Care instructions adapted under license by [employer] (which disclaims liability or warranty for this information). If you have questions about a medical condition or this instruction, always ask your healthcare professional. [place] [address] any warranty or liability for your use of this information. Introducing [facility]! Garry Deleon introduces re[employer] patient portal. Now you can access parts of your medical record, email your doctor's office, and request medication refills online. 1. In your internet browser, go to [email or web address]://[employer]. [employer] 2. Click on the First Time User? Click Here link in the Sign In box. You will see the New Member Sign Up page. 3. Enter your happin! Access Code exactly as it appears below. You will not need to use this code after youve completed the sign-up process. If you do not sign up before the expiration date, you must request a new code. · happin! Access Code: [email or web address] Expires: 3/4/2018 11:28 AM 
 
4. Enter the last four digits of your Social Security Number (xxxx) and Date of Birth (mm/dd/yyyy) as indicated and click Submit. You will be taken to the next sign-up page. 5. Create a happin! ID. This will be your happin! login ID and cannot be changed, so think of one that is secure and easy to remember. 6. Create a happin! password. You can change your password at any time. 7. Enter your Password Reset Question and Answer. This can be used at a later time if you forget your password. 8. Enter your e-mail address. You will receive e-mail notification when new information is available in [address] 9. Click Sign Up. You can now view and download portions of your medical record. 10. Click the Download Summary menu link to download a portable copy of your medical information. If you have questions, please visit the Frequently Asked Questions section of the happin! website. Remember, happin! is NOT to be used for urgent needs. For medical emergencies, dial 911. Now available from your iPhone and Android! Please provide this summary of care documentation to your next provider. Your primary care clinician is listed as Jan Roque. If you have any questions after today's visit, please call [phone].

## 2018-03-01 NOTE — PATIENT INSTRUCTIONS

## 2018-03-22 ENCOUNTER — TELEPHONE (OUTPATIENT)
Dept: PAIN MANAGEMENT | Age: 48
End: 2018-03-22

## 2018-03-22 ENCOUNTER — OFFICE VISIT (OUTPATIENT)
Dept: FAMILY MEDICINE CLINIC | Age: 48
End: 2018-03-22

## 2018-03-22 VITALS
BODY MASS INDEX: 29.46 KG/M2 | HEIGHT: 68 IN | HEART RATE: 75 BPM | RESPIRATION RATE: 18 BRPM | OXYGEN SATURATION: 96 % | DIASTOLIC BLOOD PRESSURE: 78 MMHG | SYSTOLIC BLOOD PRESSURE: 109 MMHG | TEMPERATURE: 98.2 F | WEIGHT: 194.4 LBS

## 2018-03-22 DIAGNOSIS — Z13.39 SCREENING FOR ALCOHOLISM: ICD-10-CM

## 2018-03-22 DIAGNOSIS — Z13.31 SCREENING FOR DEPRESSION: ICD-10-CM

## 2018-03-22 DIAGNOSIS — E78.2 MIXED HYPERLIPIDEMIA: ICD-10-CM

## 2018-03-22 DIAGNOSIS — Z00.00 MEDICARE ANNUAL WELLNESS VISIT, SUBSEQUENT: ICD-10-CM

## 2018-03-22 DIAGNOSIS — E11.40 TYPE 2 DIABETES MELLITUS WITH DIABETIC NEUROPATHY, WITHOUT LONG-TERM CURRENT USE OF INSULIN (HCC): Primary | ICD-10-CM

## 2018-03-22 DIAGNOSIS — Z12.31 VISIT FOR SCREENING MAMMOGRAM: ICD-10-CM

## 2018-03-22 RX ORDER — PRAVASTATIN SODIUM 40 MG/1
80 TABLET ORAL
Qty: 90 TAB | Refills: 1 | Status: SHIPPED | OUTPATIENT
Start: 2018-03-22 | End: 2019-04-12 | Stop reason: SDUPTHER

## 2018-03-22 NOTE — PROGRESS NOTES
Reviewed record in preparation for visit and have necessary documentation  Pt did not bring medication to office visit for review    Goals that were addressed and/or need to be completed during or after this appointment include     Health Maintenance Due   Topic Date Due    Pneumococcal 19-64 Medium Risk (1 of 1 - PPSV23) 09/26/1990    EYE EXAM RETINAL OR DILATED Q1  03/10/2016    BREAST CANCER SCRN MAMMOGRAM  06/02/2016    Influenza Age 5 to Adult  08/01/2017    FOOT EXAM Q1  10/04/2017    MICROALBUMIN Q1  10/04/2017    MEDICARE YEARLY EXAM  03/14/2018    HEMOGLOBIN A1C Q6M  03/27/2018

## 2018-03-22 NOTE — MR AVS SNAPSHOT
Ashely Tobin 
 
 
 [address] [address] 
[phone] Patient: Balbir Schuler MRN: [id number] OPO:0/24/2433 Visit Information Date & Time Provider Department Dept. Phone Encounter #  
 3/22/2018  9:20 AM Jose Black MD [address] [id number] Follow-up Instructions Return in about 3 months (around 6/22/2018). Your Appointments 5/23/2018 12:20 PM  
Follow Up with Dejuan Mcgarry PA-C [address] [place] [address] Pain Management (ELLIE SCHEDULING) Appt Note: 3 months f/u  
 [address] [facility] [address] [phone] [facility] [id number] Upcoming Health Maintenance Date Due Pneumococcal 19-64 Medium Risk (1 of 1 - PPSV23) 9/26/1990 EYE EXAM RETINAL OR DILATED Q1 3/10/2016 BREAST CANCER SCRN MAMMOGRAM 6/2/2016 Influenza Age 5 to Adult 8/1/2017 FOOT EXAM Q1 10/4/2017 MICROALBUMIN Q1 10/4/2017 MEDICARE YEARLY EXAM 3/14/2018 HEMOGLOBIN A1C Q6M 3/27/2018 LIPID PANEL Q1 9/27/2018 DTaP/Tdap/Td series (2 - Td) 9/26/2026 Allergies as of 3/22/2018  Review Complete On: 3/22/2018 By: Jose Black MD  
  
 Severity Noted Reaction Type Reactions Esomeprazole Magnesium  10/19/2010    Hives Nexium [Esomeprazole Magnesium]  06/11/2012    Unknown (comments) Pcn [Penicillins]  06/11/2012    Unknown (comments) Current Immunizations  Reviewed on 10/4/2016 Name Date Influenza Vaccine 9/26/2016, 10/20/2015, 10/9/2014, 10/1/2013 Tdap 9/26/2016 Not reviewed this visit You Were Diagnosed With   
  
 Codes Comments Type 2 diabetes mellitus with diabetic neuropathy, without long-term current use of insulin (HCC)    -  Primary ICD-10-CM: E11.40 ICD-9-CM: 250.60, 357.2 Mixed hyperlipidemia     ICD-10-CM: E78.2 ICD-9-CM: 272.2  Visit for screening mammogram     ICD-10-CM: Z12.31 
 ICD-9-CM: G59.79 Vitals BP Pulse Temp Resp Height(growth percentile) Weight(growth percentile) 109/78 (BP 1 Location: Right arm, BP Patient Position: Sitting) 75 98.2 °F (36.8 °C) (Oral) 18 5' 8\" (1.727 m) 194 lb 6.4 oz (88.2 kg) SpO2 BMI OB Status Smoking Status 96% 29.56 kg/m2 Hysterectomy Former Smoker Vitals History BMI and BSA Data Body Mass Index Body Surface Area  
 29.56 kg/m 2 2.06 m 2 Preferred Pharmacy Pharmacy Name Phone [facility] [phone] Your Updated Medication List  
  
   
This list is accurate as of 3/22/18 10:31 AM.  Always use your most recent med list.  
  
  
  
  
 albuterol 90 mcg/actuation inhaler Commonly known as:  PROAIR HFA Take 2 Puffs by inhalation every four (4) hours as needed for Wheezing. Take 2 Puffs inhaled by mouth Every 4 Hours. * allergy injection Set C  
  
 * allergy injection Set B  
  
 * allergy injection Set A  
  
 * allergy injection  
now * allergy injection  
now * allergy injection  
now AMITIZA 24 mcg capsule Generic drug:  lubiPROStone Blood-Glucose Meter monitoring kit Commonly known as:  FREESTYLE FREEDOM LITE Check Blood sugar once daily * cyclobenzaprine 10 mg tablet Commonly known as:  FLEXERIL Take 1 Tab by mouth three (3) times daily as needed for Muscle Spasm(s) for up to 30 days. As needed  Indications: MUSCLE SPASM * cyclobenzaprine 10 mg tablet Commonly known as:  FLEXERIL Take 1 Tab by mouth two (2) times daily as needed for Muscle Spasm(s) for up to 30 days. As needed  Indications: Muscle Spasm Dexlansoprazole 60 mg Cpdb Commonly known as:  DEXILANT TAKE ONE CAPSULE BY MOUTH EVERY DAY  
  
 dulaglutide 1.5 mg/0.5 mL sub-q pen Commonly known as:  TRULICITY INJECT 0.5 ML SUB-Q EVERY 7 DAYS  
  
 EPINEPHrine 0.3 mg/0.3 mL injection Commonly known as:  Charol Devan  
 0.3 mL by IntraMUSCular route once as needed for 1 dose. KIMBERLEY- mg tablet Generic drug:  erythromycin  
  
 fenofibrate nanocrystallized 145 mg tablet Commonly known as:  TRICOR  
TAKE 1 TABLET DAILY FOR    HYPERTRIGLYCERIDEMIA  
  
 fentaNYL 50 mcg/hr PATCH Commonly known as:  DURAGESIC  
1 Patch by TransDERmal route every fourty-eight (48) hours. Max Daily Amount: 1 Patch. For chronic pain (due to fill 3/28/17)  
  
 fluocinoNIDE 0.05 % topical cream  
Commonly known as:  LIDEX APPLY TO THE AFFECTED AREA TOPICALLY TWO TIMES A DAY FLUoxetine 20 mg capsule Commonly known as:  PROzac Take 1 Cap by mouth daily. fluticasone 50 mcg/actuation nasal spray Commonly known as:  FLONASE  
USE 1 TO 2 SPRAYS NASALLY  ONCE DAILY  
  
 fluticasone-salmeterol 250-50 mcg/dose diskus inhaler Commonly known as:  ADVAIR DISKUS Take 1 Puff by inhalation every twelve (12) hours. glucose blood VI test strips strip Commonly known as:  ON CALL EXPRESS TEST STRIP Test blood sugar twice daily DX: E11.9  
  
 guaiFENesin  mg ER tablet Commonly known as:  Dalton & Dalton Take 1 Tab by mouth two (2) times a day.  
  
 ketoconazole 2 % shampoo Commonly known as:  NIZORAL  
APPLY TO SCALP AND RINSE   DAILY AS NEEDED Lancets Misc Test Blood Sugar twice daily. DX Code: E11.9  
  
 lidocaine 2 % solution Commonly known as:  XYLOCAINE Take 5 mL by mouth as needed for Pain.  
  
 loratadine 10 mg tablet Commonly known as:  Edward Darren Take 1 Tab by mouth nightly as needed for Rhinitis. LORazepam 1 mg tablet Commonly known as:  ATIVAN  
  
 * naloxone 4 mg/actuation nasal spray Commonly known as:  NARCAN  
4 mg by Nasal route as needed for up to 2 doses. Indications: OPIOID TOXICITY  
  
 * naloxone 4 mg/actuation nasal spray Commonly known as:  NARCAN  
1 Spencer by IntraNASal route as needed. As needed for opioid respiratory emergency only  
  
 naproxen 500 mg tablet Commonly known as:  NAPROSYN  
TAKE ONE TABLET BY MOUTH TWICE A DAY WITH FOOD  
  
 oxyCODONE-acetaminophen  mg per tablet Commonly known as:  PERCOCET Take 1 Tab by mouth four (4) times daily as needed for Pain for up to 30 days. Max Daily Amount: 4 Tabs. For breakthrough pain. pneumococcal 23-sandip ps vaccine 25 mcg/0.5 mL injection Commonly known as:  PNEUMOVAX 23  
0.5 mL by IntraMUSCular route PRIOR TO DISCHARGE for 1 dose. polyethylene glycol 17 gram/dose powder Commonly known as:  MIRALAX  
  
 pravastatin 40 mg tablet Commonly known as:  PRAVACHOL Take 2 Tabs by mouth nightly. raNITIdine 150 mg tablet Commonly known as:  ZANTAC TAKE 1 TABLET TWICE A DAY  
  
 rizatriptan 10 mg tablet Commonly known as:  Pheobe Mayo TAKE ONE TABLET BY MOUTH ONCE AS NEEDED MAY REPEAT IN 2 HOURS IF NEEDED  
  
 topiramate 25 mg tablet Commonly known as:  TOPAMAX TAKE ONE TABLET BY MOUTH AT BEDTIME  
  
 * Notice: This list has 10 medication(s) that are the same as other medications prescribed for you. Read the directions carefully, and ask your doctor or other care provider to review them with you. Prescriptions Sent to Pharmacy Refills  
 dulaglutide (TRULICITY) 1.5 FX/2.1 mL sub-q pen 3 Sig: INJECT 0.5 ML SUB-Q EVERY 7 DAYS Class: Normal  
 Pharmacy: [address] [place] [address] Ph #: [phone]  
 pravastatin (PRAVACHOL) 40 mg tablet 1 Sig: Take 2 Tabs by mouth nightly. Class: Normal  
 Pharmacy:  [address] Ph #: [phone] Route: Oral  
  
We Performed the Following AMB POC URINALYSIS DIP STICK MANUAL W/O MICRO [90256 CPT(R)] AMB POC URINE, MICROALBUMIN, SEMIQUANT (3 RESULTS) [26388 CPT(R)] CBC W/O DIFF [48888 CPT(R)] HEMOGLOBIN A1C WITH EAG [48224 CPT(R)] LIPID PANEL [88646 CPT(R)] MAGNESIUM O3911571 CPT(R)] METABOLIC PANEL, COMPREHENSIVE [97218 CPT(R)] TSH 3RD GENERATION [88071 CPT(R)] Follow-up Instructions Return in about 3 months (around 6/22/2018). To-Do List   
 03/22/2018 Imaging:  ESSENCE MAMMO BI SCREENING INCL CAD Patient Instructions Advance Directives: Care Instructions Your Care Instructions An advance directive is a legal way to state your wishes at the end of your life. It tells your family and your doctor what to do if you can no longer say what you want. There are two main types of advance directives. You can change them any time that your wishes change. · A living will tells your family and your doctor your wishes about life support and other treatment. · A durable power of  for health care lets you name a person to make treatment decisions for you when you can't speak for yourself. This person is called a health care agent. If you do not have an advance directive, decisions about your medical care may be made by a doctor or a  who doesn't know you. It may help to think of an advance directive as a gift to the people who care for you. If you have one, they won't have to make tough decisions by themselves. Follow-up care is a key part of your treatment and safety. Be sure to make and go to all appointments, and call your doctor if you are having problems. It's also a good idea to know your test results and keep a list of the medicines you take. How can you care for yourself at home? · Discuss your wishes with your loved ones and your doctor. This way, there are no surprises. · Many states have a unique form. Or you might use a universal form that has been approved by many states. This kind of form can sometimes be completed and stored online. Your electronic copy will then be available wherever you have a connection to the Internet. In most cases, doctors will respect your wishes even if you have a form from a different state. · You don't need a  to do an advance directive. But you may want to get legal advice. · Think about these questions when you prepare an advance directive: ¨ Who do you want to make decisions about your medical care if you are not able to? Many people choose a family member or close friend. ¨ Do you know enough about life support methods that might be used? If not, talk to your doctor so you understand. ¨ What are you most afraid of that might happen? You might be afraid of having pain, losing your independence, or being kept alive by machines. ¨ Where would you prefer to die? Choices include your home, a hospital, or a nursing home. ¨ Would you like to have information about hospice care to support you and your family? ¨ Do you want to donate organs when you die? ¨ Do you want certain Mormon practices performed before you die? If so, put your wishes in the advance directive. · Read your advance directive every year, and make changes as needed. When should you call for help? Be sure to contact your doctor if you have any questions. Where can you learn more? Go to [email or web address]/. Enter R264 in the search box to learn more about \"Advance Directives: Care Instructions. \" Current as of: September 24, 2016 Content Version: 11.4 © 8264-3022 Healthwise, Incorporated. Care instructions adapted under license by [employer] (which disclaims liability or warranty for this information). If you have questions about a medical condition or this instruction, always ask your healthcare professional. [place] [address] any warranty or liability for your use of this information. Introducing [facility]! [facility] introduces [employer] patient portal. Now you can access parts of your medical record, email your doctor's office, and request medication refills online. 1. In your internet browser, go to [email or web address]://[employer]. [employer] 2. Click on the First Time User? Click Here link in the Sign In box. You will see the New Member Sign Up page. 3. Enter your [employer] Access Code exactly as it appears below. You will not need to use this code after youve completed the sign-up process. If you do not sign up before the expiration date, you must request a new code. · [employer] Access Code: [email or web address] Expires: 6/20/2018 10:31 AM 
 
4. Enter the last four digits of your Social Security Number (xxxx) and Date of Birth (mm/dd/yyyy) as indicated and click Submit. You will be taken to the next sign-up page. 5. Create a [employer] ID. This will be your [employer] login ID and cannot be changed, so think of one that is secure and easy to remember. 6. Create a [employer] password. You can change your password at any time. 7. Enter your Password Reset Question and Answer. This can be used at a later time if you forget your password. 8. Enter your e-mail address. You will receive e-mail notification when new information is available in [address] 9. Click Sign Up. You can now view and download portions of your medical record. 10. Click the Download Summary menu link to download a portable copy of your medical information. If you have questions, please visit the Frequently Asked Questions section of the [employer] website. Remember, [employer] is NOT to be used for urgent needs. For medical emergencies, dial 911. Now available from your iPhone and Android! Please provide this summary of care documentation to your next provider. Your primary care clinician is listed as Ashley Orr. If you have any questions after today's visit, please call [phone].

## 2018-03-22 NOTE — PATIENT INSTRUCTIONS
Advance Directives: Care Instructions  Your Care Instructions  An advance directive is a legal way to state your wishes at the end of your life. It tells your family and your doctor what to do if you can no longer say what you want. There are two main types of advance directives. You can change them any time that your wishes change. · A living will tells your family and your doctor your wishes about life support and other treatment. · A durable power of  for health care lets you name a person to make treatment decisions for you when you can't speak for yourself. This person is called a health care agent. If you do not have an advance directive, decisions about your medical care may be made by a doctor or a  who doesn't know you. It may help to think of an advance directive as a gift to the people who care for you. If you have one, they won't have to make tough decisions by themselves. Follow-up care is a key part of your treatment and safety. Be sure to make and go to all appointments, and call your doctor if you are having problems. It's also a good idea to know your test results and keep a list of the medicines you take. How can you care for yourself at home? · Discuss your wishes with your loved ones and your doctor. This way, there are no surprises. · Many states have a unique form. Or you might use a universal form that has been approved by many states. This kind of form can sometimes be completed and stored online. Your electronic copy will then be available wherever you have a connection to the Internet. In most cases, doctors will respect your wishes even if you have a form from a different state. · You don't need a  to do an advance directive. But you may want to get legal advice. · Think about these questions when you prepare an advance directive:  ¨ Who do you want to make decisions about your medical care if you are not able to?  Many people choose a family member or close friend. ¨ Do you know enough about life support methods that might be used? If not, talk to your doctor so you understand. ¨ What are you most afraid of that might happen? You might be afraid of having pain, losing your independence, or being kept alive by machines. ¨ Where would you prefer to die? Choices include your home, a hospital, or a nursing home. ¨ Would you like to have information about hospice care to support you and your family? ¨ Do you want to donate organs when you die? ¨ Do you want certain Worship practices performed before you die? If so, put your wishes in the advance directive. · Read your advance directive every year, and make changes as needed. When should you call for help? Be sure to contact your doctor if you have any questions. Where can you learn more? Go to http://[email or web address]/. Enter R264 in the search box to learn more about \"Advance Directives: Care Instructions. \"  Current as of: September 24, 2016  Content Version: 11.4  © 0063-1966 [employer]. Care instructions adapted under license by [employer] (which disclaims liability or warranty for this information). If you have questions about a medical condition or this instruction, always ask your healthcare professional. [place] [address] any warranty or liability for your use of this information. Medicare Wellness Visit, Female    The best way to live healthy is to have a healthy lifestyle by eating a well-balanced diet, exercising regularly, limiting alcohol and stopping smoking. Regular physical exams and screening tests are another way to keep healthy. Preventive exams provided by your health care provider can find health problems before they become diseases or illnesses. Preventive services including immunizations, screening tests, monitoring and exams can help you take care of your own health.     All people over age 72 should have a pneumovax  and and a prevnar shot to prevent pneumonia. These are once in a lifetime unless you and your provider decide differently. All people over 65 should have a yearly flu shot and a tetanus vaccine every 10 years. A bone mass density to screen for osteoporosis or thinning of the bones should be done every 2 years after 65. Screening for diabetes mellitus with a blood sugar test should be done every year. Glaucoma is a disease of the eye due to increased ocular pressure that can lead to blindness and it should be done every year by an eye professional.    Cardiovascular screening tests that check for elevated lipids (fatty part of blood) which can lead to heart disease and strokes should be done every 5 years. Colorectal screening that evaluates for blood or polyps in your colon should be done yearly as a stool test or every five years as a flexible sigmoidoscope or every 10 years as a colonoscopy up to age 76. Breast cancer screening with a mammogram is recommended biennially  for women age 54-69. Screening for cervical cancer with a pap smear and pelvic exam is recommended for women after age 72 years every 2 years up to age 79 or when the provider and patient decide to stop. If there is a history of cervical abnormalities or other increased risk for cancer then the test is recommended yearly. Hepatitis C screening is also recommended for anyone born between [birth year] through [address]. A shingles vaccine is also recommended once in a lifetime after age 61. Your Medicare Wellness Exam is recommended annually.     Here is a list of your current Health Maintenance items with a due date:  Health Maintenance Due   Topic Date Due    Eye Exam  03/10/2016    Breast Cancer Screening  06/02/2016    Flu Vaccine  08/01/2017    Diabetic Foot Care  10/04/2017    Albumin Urine Test  10/04/2017    Annual Well Visit  03/14/2018    Hemoglobin A1C    03/27/2018

## 2018-03-23 LAB
ALBUMIN SERPL-MCNC: 4.2 G/DL (ref 3.5–5.5)
ALBUMIN/GLOB SERPL: 1.4 {RATIO} (ref 1.2–2.2)
ALP SERPL-CCNC: 117 IU/L (ref 39–117)
ALT SERPL-CCNC: 16 IU/L (ref 0–32)
AST SERPL-CCNC: 15 IU/L (ref 0–40)
BILIRUB SERPL-MCNC: 0.3 MG/DL (ref 0–1.2)
BUN SERPL-MCNC: 9 MG/DL (ref 6–24)
BUN/CREAT SERPL: 15 (ref 9–23)
CALCIUM SERPL-MCNC: 9.2 MG/DL (ref 8.7–10.2)
CHLORIDE SERPL-SCNC: 101 MMOL/L (ref 96–106)
CHOLEST SERPL-MCNC: 215 MG/DL (ref 100–199)
CO2 SERPL-SCNC: 26 MMOL/L (ref 18–29)
CREAT SERPL-MCNC: 0.61 MG/DL (ref 0.57–1)
ERYTHROCYTE [DISTWIDTH] IN BLOOD BY AUTOMATED COUNT: 14.7 % (ref 12.3–15.4)
EST. AVERAGE GLUCOSE BLD GHB EST-MCNC: 160 MG/DL
GFR SERPLBLD CREATININE-BSD FMLA CKD-EPI: 109 ML/MIN/1.73
GFR SERPLBLD CREATININE-BSD FMLA CKD-EPI: 126 ML/MIN/1.73
GLOBULIN SER CALC-MCNC: 3.1 G/DL (ref 1.5–4.5)
GLUCOSE SERPL-MCNC: 182 MG/DL (ref 65–99)
HBA1C MFR BLD: 7.2 % (ref 4.8–5.6)
HCT VFR BLD AUTO: 41 % (ref 34–46.6)
HDLC SERPL-MCNC: 39 MG/DL
HGB BLD-MCNC: 13.2 G/DL (ref 11.1–15.9)
LDLC SERPL CALC-MCNC: 154 MG/DL (ref 0–99)
Lab: NORMAL
MAGNESIUM SERPL-MCNC: 2.2 MG/DL (ref 1.6–2.3)
MCH RBC QN AUTO: 27.3 PG (ref 26.6–33)
MCHC RBC AUTO-ENTMCNC: 32.2 G/DL (ref 31.5–35.7)
MCV RBC AUTO: 85 FL (ref 79–97)
PLATELET # BLD AUTO: 284 X10E3/UL (ref 150–379)
POTASSIUM SERPL-SCNC: 5.2 MMOL/L (ref 3.5–5.2)
PROT SERPL-MCNC: 7.3 G/DL (ref 6–8.5)
RBC # BLD AUTO: 4.84 X10E6/UL (ref 3.77–5.28)
SODIUM SERPL-SCNC: 141 MMOL/L (ref 134–144)
TRIGL SERPL-MCNC: 112 MG/DL (ref 0–149)
TSH SERPL DL<=0.005 MIU/L-ACNC: 1.35 UIU/ML (ref 0.45–4.5)
VLDLC SERPL CALC-MCNC: 22 MG/DL (ref 5–40)
WBC # BLD AUTO: 8 X10E3/UL (ref 3.4–10.8)

## 2018-03-23 NOTE — PROGRESS NOTES
Chief Complaint   Patient presents with    Medication Refill    Labs     she is a 56y.o. year old female who presents for follow up of chronic medical conditions. Patient with multiple co-morbidities which include DM2, HLD, migraine, RLS and chronic pain. Patient denies HA, dizziness, SOB, CP, abdominal pain, dysuria. She is followed by pain management. She is due for lab work. She needs medication refill. Diabetes    Overall the patient feels well with good energy level. Insulin dependence: no   Pertinent Labs:      Medications, diet and exercise as means of diabetic control with a goal of an A1C of less than 7.0% discussed. Diabetic foot care and annual eye exam discussed as well. Check blood sugars while fasting just before breakfast on most days and occasionally before dinner. Write down readings in a diabetic log book and bring them to the next visit. Call the office for fasting sugars over 200 or below 75 on two or more occasions. Call immediately if having symptoms of high sugar (frequent urination, always thirsty) or low sugar (dizzy, lethargic, sweaty, nauseated, headache). Our overall goal is to reduce or eliminate the long term consequences of poorly controlled diabetes. Patient expresses understanding and agreement with our plan of care.     Patient Active Problem List   Diagnosis Code    Obesity E66.9    Diabetes mellitus ([facility] [address] E11.9    Hypertension I10    Fibromyalgia syndrome M79.7    Restless leg syndrome G25.81    Chronic generalized pain disorder R52, G89.29    Encounter for long-term (current) use of high-risk medication Z79.899    Headache R51    Persistent disorder of initiating or maintaining sleep G47.00    Migraine without aura G43.009    Musculoskeletal pain M79.1    Multiple environmental allergies Z91.09    History of headache Z87.898     Past Surgical History:   Procedure Laterality Date    HX GYN      Partial Hysterectomy    HX HEENT      HX TUBAL LIGATION       Social History     Social History    Marital status: SINGLE     Spouse name: N/A    Number of children: N/A    Years of education: N/A     Occupational History    Not on file. Social History Main Topics    Smoking status: Former Smoker    Smokeless tobacco: Never Used      Comment: quit in 2010    Alcohol use No    Drug use: No    Sexual activity: Not on file     Other Topics Concern    Not on file     Social History Narrative     Family History   Problem Relation Age of Onset    Alcohol abuse Father     Diabetes Mother     Hypertension Mother     Heart Disease Mother     Lupus Mother     Sleep Apnea Mother     Depression Mother     Cancer Maternal Aunt     Arthritis-osteo Other     Asthma Other     Diabetes Other     Elevated Lipids Other     Headache Other     Heart Disease Other     Hypertension Other     Migraines Other     Stroke Other     Bleeding Prob Neg Hx     Lung Disease Neg Hx     Psychiatric Disorder Neg Hx     Mental Retardation Neg Hx      Current Outpatient Prescriptions   Medication Sig    dulaglutide (TRULICITY) 1.5 LC/0.0 mL sub-q pen INJECT 0.5 ML SUB-Q EVERY 7 DAYS    pravastatin (PRAVACHOL) 40 mg tablet Take 2 Tabs by mouth nightly.  fentaNYL (DURAGESIC) 50 mcg/hr PATCH 1 Patch by TransDERmal route every fourty-eight (48) hours. Max Daily Amount: 1 Patch. For chronic pain (due to fill 3/28/17)    oxyCODONE-acetaminophen (PERCOCET)  mg per tablet Take 1 Tab by mouth four (4) times daily as needed for Pain for up to 30 days. Max Daily Amount: 4 Tabs. For breakthrough pain.  cyclobenzaprine (FLEXERIL) 10 mg tablet Take 1 Tab by mouth two (2) times daily as needed for Muscle Spasm(s) for up to 30 days.  As needed  Indications: Muscle Spasm    fluticasone (FLONASE) 50 mcg/actuation nasal spray USE 1 TO 2 SPRAYS NASALLY  ONCE DAILY    fenofibrate nanocrystallized (TRICOR) 145 mg tablet TAKE 1 TABLET DAILY FOR    HYPERTRIGLYCERIDEMIA  fluocinoNIDE (LIDEX) 0.05 % topical cream APPLY TO THE AFFECTED AREA TOPICALLY TWO TIMES A DAY    ketoconazole (NIZORAL) 2 % shampoo APPLY TO SCALP AND RINSE   DAILY AS NEEDED    pneumococcal 23-sandip ps vaccine (PNEUMOVAX 23) 25 mcg/0.5 mL injection 0.5 mL by IntraMUSCular route PRIOR TO DISCHARGE for 1 dose.  fluticasone-salmeterol (ADVAIR DISKUS) 250-50 mcg/dose diskus inhaler Take 1 Puff by inhalation every twelve (12) hours.  allergy injection now    allergy injection now    allergy injection now    allergy injection Set C    allergy injection Set B    allergy injection Set A    Dexlansoprazole (DEXILANT) 60 mg CpDB TAKE ONE CAPSULE BY MOUTH EVERY DAY    topiramate (TOPAMAX) 25 mg tablet TAKE ONE TABLET BY MOUTH AT BEDTIME    albuterol (PROAIR HFA) 90 mcg/actuation inhaler Take 2 Puffs by inhalation every four (4) hours as needed for Wheezing. Take 2 Puffs inhaled by mouth Every 4 Hours.  rizatriptan (MAXALT) 10 mg tablet TAKE ONE TABLET BY MOUTH ONCE AS NEEDED MAY REPEAT IN 2 HOURS IF NEEDED    glucose blood VI test strips (ON CALL EXPRESS TEST STRIP) strip Test blood sugar twice daily DX: E11.9    Lancets misc Test Blood Sugar twice daily. DX Code: E11.9    lidocaine (XYLOCAINE) 2 % solution Take 5 mL by mouth as needed for Pain.  Blood-Glucose Meter (FREESTYLE FREEDOM LITE) monitoring kit Check Blood sugar once daily    EPINEPHrine (EPIPEN) 0.3 mg/0.3 mL (1:1,000) injection 0.3 mL by IntraMUSCular route once as needed for 1 dose.  naloxone (NARCAN) 4 mg/actuation nasal spray 1 [place] by IntraNASal route as needed. As needed for opioid respiratory emergency only    raNITIdine (ZANTAC) 150 mg tablet TAKE 1 TABLET TWICE A DAY    loratadine (CLARITIN) 10 mg tablet Take 1 Tab by mouth nightly as needed for Rhinitis.  KIMBERLEY- mg tablet     AMITIZA 24 mcg capsule     FLUoxetine (PROZAC) 20 mg capsule Take 1 Cap by mouth daily.     naloxone 4 mg/actuation spry 4 mg by Nasal route as needed for up to 2 doses. Indications: OPIOID TOXICITY    LORazepam (ATIVAN) 1 mg tablet     polyethylene glycol (MIRALAX) 17 gram/dose powder     naproxen (NAPROSYN) 500 mg tablet TAKE ONE TABLET BY MOUTH TWICE A DAY WITH FOOD    cyclobenzaprine (FLEXERIL) 10 mg tablet Take 1 Tab by mouth three (3) times daily as needed for Muscle Spasm(s) for up to 30 days. As needed  Indications: MUSCLE SPASM    guaiFENesin ER (MUCINEX) 600 mg ER tablet Take 1 Tab by mouth two (2) times a day. No current facility-administered medications for this visit. Allergies   Allergen Reactions    Esomeprazole Magnesium Hives    Nexium [Esomeprazole Magnesium] Unknown (comments)    Pcn [Penicillins] Unknown (comments)       Review of Systems:  Constitutional: Negative for fatigue, malaise  Resp: Negative for cough, wheezing or SOB  CV: Negative for chest pain, dizziness or palpitations  GI: Negative for nausea or abdominal pain  MS: Negative for acute myalgias or arthralgias   Neuro: Negative for HA, weakness or paresthesia  Psych: Negative for depression or anxiety     Vitals:    03/22/18 0917   BP: 109/78   Pulse: 75   Resp: 18   Temp: 98.2 °F (36.8 °C)   TempSrc: Oral   SpO2: 96%   Weight: 194 lb 6.4 oz (88.2 kg)   Height: 5' 8\" (1.727 m)       Physical Examination:  General: Well developed, well nourished, in no acute distress  Head: Normocephalic, atraumatic  Eyes: Sclera clear, EOMI  Neck: Normal range of motion  Respiratory: CTAB with symmetrical, unlabored effort  Cardiovascular: Normal S1, S2, Regular rate and rhythm  Extremities: Full range of motion, normal gait  Neurologic: No focal deficits  Psych: Active, alert and oriented. Affect appropriate       Diagnoses and all orders for this visit:    1.  Type 2 diabetes mellitus with diabetic neuropathy, without long-term current use of insulin (HCC)  -     dulaglutide (TRULICITY) 1.5 CV/6.6 mL sub-q pen; INJECT 0.5 ML SUB-Q EVERY 7 DAYS  -     LIPID PANEL  - METABOLIC PANEL, COMPREHENSIVE  -     MAGNESIUM  -     HEMOGLOBIN A1C WITH EAG  -     TSH 3RD GENERATION  -     CBC W/O DIFF  -     AMB POC URINALYSIS DIP STICK MANUAL W/O MICRO  -     AMB POC URINE, MICROALBUMIN, SEMIQUANT (3 RESULTS)    2. Mixed hyperlipidemia  -     pravastatin (PRAVACHOL) 40 mg tablet; Take 2 Tabs by mouth nightly. -     LIPID PANEL  -     METABOLIC PANEL, COMPREHENSIVE    3. Visit for screening mammogram  -     [facility] MAMMO BI SCREENING INCL CAD; Future         I have discussed the diagnosis with the patient and the intended plan as seen in the above orders. The patient expresses understanding and agreement with our plan of care. The patient has received an after-visit summary. The patient knows to call our office if there are any questions or concerns regarding diagnosis and treatment plans. I have discussed medication side effects and warnings with the patient as well. Over half of the 25 minutes face to face with Valla Babinski consisted of counseling and discussing treatment plans in reference to her diabetes and HLD management. Follow-up Disposition:  Return in about 3 months (around 6/22/2018). The following Annual Medicare Wellness Exam is distinct and separate from the medical evaluation and decision making. This is the Subsequent Medicare Annual Wellness Exam, performed 12 months or more after the Initial AWV or the last Subsequent AWV    I have reviewed the patient's medical history in detail and updated the computerized patient record.      History     Past Medical History:   Diagnosis Date    Depression     Diabetes ([facility] [address]     Dysthymic disorder     Fibromyalgia     Fibromyalgia syndrome 8/8/2012    Headache(784.0) 9/8/2012    Hypercholesterolemia     Left ear pain     Migraine     Mixed hyperlipidemia     Musculoskeletal pain 9/16/2014      Past Surgical History:   Procedure Laterality Date    HX GYN      Partial Hysterectomy    HX HEENT      HX TUBAL LIGATION       Current Outpatient Prescriptions   Medication Sig Dispense Refill    dulaglutide (TRULICITY) 1.5 QJ/6.3 mL sub-q pen INJECT 0.5 ML SUB-Q EVERY 7 DAYS 4 Pen 3    pravastatin (PRAVACHOL) 40 mg tablet Take 2 Tabs by mouth nightly. 90 Tab 1    fentaNYL (DURAGESIC) 50 mcg/hr PATCH 1 Patch by TransDERmal route every fourty-eight (48) hours. Max Daily Amount: 1 Patch. For chronic pain (due to fill 3/28/17) 15 Patch 0    oxyCODONE-acetaminophen (PERCOCET)  mg per tablet Take 1 Tab by mouth four (4) times daily as needed for Pain for up to 30 days. Max Daily Amount: 4 Tabs. For breakthrough pain. 120 Tab 0    cyclobenzaprine (FLEXERIL) 10 mg tablet Take 1 Tab by mouth two (2) times daily as needed for Muscle Spasm(s) for up to 30 days. As needed  Indications: Muscle Spasm 60 Tab 2    fluticasone (FLONASE) 50 mcg/actuation nasal spray USE 1 TO 2 SPRAYS NASALLY  ONCE DAILY 16 g 0    fenofibrate nanocrystallized (TRICOR) 145 mg tablet TAKE 1 TABLET DAILY FOR    HYPERTRIGLYCERIDEMIA 90 Tab 1    fluocinoNIDE (LIDEX) 0.05 % topical cream APPLY TO THE AFFECTED AREA TOPICALLY TWO TIMES A DAY 15 g 2    ketoconazole (NIZORAL) 2 % shampoo APPLY TO SCALP AND RINSE   DAILY AS NEEDED 120 mL 2    pneumococcal 23-sandip ps vaccine (PNEUMOVAX 23) 25 mcg/0.5 mL injection 0.5 mL by IntraMUSCular route PRIOR TO DISCHARGE for 1 dose. 1 mL 0    fluticasone-salmeterol (ADVAIR DISKUS) 250-50 mcg/dose diskus inhaler Take 1 Puff by inhalation every twelve (12) hours.  3 Inhaler 1    allergy injection now 1 mL 0    allergy injection now 1 mL 0    allergy injection now 1 mL 0    allergy injection Set C 1 mL 0    allergy injection Set B 1 mL 0    allergy injection Set A 1 mL 0    Dexlansoprazole (DEXILANT) 60 mg CpDB TAKE ONE CAPSULE BY MOUTH EVERY DAY 90 Cap 1    topiramate (TOPAMAX) 25 mg tablet TAKE ONE TABLET BY MOUTH AT BEDTIME 90 Tab 1    albuterol (PROAIR HFA) 90 mcg/actuation inhaler Take 2 Puffs by inhalation every four (4) hours as needed for Wheezing. Take 2 Puffs inhaled by mouth Every 4 Hours. 1 Inhaler 2    rizatriptan (MAXALT) 10 mg tablet TAKE ONE TABLET BY MOUTH ONCE AS NEEDED MAY REPEAT IN 2 HOURS IF NEEDED 90 Tab 1    glucose blood VI test strips (ON CALL EXPRESS TEST STRIP) strip Test blood sugar twice daily DX: E11.9 100 Strip 3    Lancets misc Test Blood Sugar twice daily. DX Code: E11.9 100 Each 3    lidocaine (XYLOCAINE) 2 % solution Take 5 mL by mouth as needed for Pain. 100 mL 0    Blood-Glucose Meter (FREESTYLE FREEDOM LITE) monitoring kit Check Blood sugar once daily 1 Kit 0    EPINEPHrine (EPIPEN) 0.3 mg/0.3 mL (1:1,000) injection 0.3 mL by IntraMUSCular route once as needed for 1 dose. 2 Each 2    naloxone (NARCAN) 4 mg/actuation nasal spray 1 [place] by IntraNASal route as needed. As needed for opioid respiratory emergency only 1 Each 0    raNITIdine (ZANTAC) 150 mg tablet TAKE 1 TABLET TWICE A DAY 60 Tab 0    loratadine (CLARITIN) 10 mg tablet Take 1 Tab by mouth nightly as needed for Rhinitis. 30 Tab 0    KIMBERLEY- mg tablet       AMITIZA 24 mcg capsule       FLUoxetine (PROZAC) 20 mg capsule Take 1 Cap by mouth daily. 30 Cap 2    naloxone 4 mg/actuation spry 4 mg by Nasal route as needed for up to 2 doses. Indications: OPIOID TOXICITY 1 Box 1    LORazepam (ATIVAN) 1 mg tablet       polyethylene glycol (MIRALAX) 17 gram/dose powder       naproxen (NAPROSYN) 500 mg tablet TAKE ONE TABLET BY MOUTH TWICE A DAY WITH FOOD 60 Tab 2    cyclobenzaprine (FLEXERIL) 10 mg tablet Take 1 Tab by mouth three (3) times daily as needed for Muscle Spasm(s) for up to 30 days. As needed  Indications: MUSCLE SPASM 60 Tab 5    guaiFENesin ER (MUCINEX) 600 mg ER tablet Take 1 Tab by mouth two (2) times a day.  30 Tab 0     Allergies   Allergen Reactions    Esomeprazole Magnesium Hives    Nexium [Esomeprazole Magnesium] Unknown (comments)    Pcn [Penicillins] Unknown (comments)     Family History   Problem Relation Age of Onset    Alcohol abuse Father     Diabetes Mother     Hypertension Mother     Heart Disease Mother     Lupus Mother     Sleep Apnea Mother     Depression Mother     Cancer Maternal Aunt     Arthritis-osteo Other     Asthma Other     Diabetes Other     Elevated Lipids Other     Headache Other     Heart Disease Other     Hypertension Other     Migraines Other     Stroke Other     Bleeding Prob Neg Hx     Lung Disease Neg Hx     Psychiatric Disorder Neg Hx     Mental Retardation Neg Hx      Social History   Substance Use Topics    Smoking status: Former Smoker    Smokeless tobacco: Never Used      Comment: quit in 2010    Alcohol use No     Patient Active Problem List   Diagnosis Code    Obesity E66.9    Diabetes mellitus ([facility] [address] E11.9    Hypertension I10    Fibromyalgia syndrome M79.7    Restless leg syndrome G25.81    Chronic generalized pain disorder R52, G89.29    Encounter for long-term (current) use of high-risk medication Z79.899    Headache R51    Persistent disorder of initiating or maintaining sleep G47.00    Migraine without aura G43.009    Musculoskeletal pain M79.1    Multiple environmental allergies Z91.09    History of headache Z87.898       Depression Risk Factor Screening:     PHQ over the last two weeks 3/1/2018   PHQ Not Done Active Diagnosis of Depression or Bipolar Disorder   Little interest or pleasure in doing things -   Feeling down, depressed or hopeless -   Total Score PHQ 2 -   Trouble falling or staying asleep, or sleeping too much -   Feeling tired or having little energy -   Poor appetite or overeating -   Feeling bad about yourself - or that you are a failure or have let yourself or your family down -   Trouble concentrating on things such as school, work, reading or watching TV -   Moving or speaking so slowly that other people could have noticed; or the opposite being so fidgety that others notice -   Thoughts of being better off dead, or hurting yourself in some way -     Alcohol Risk Factor Screening: You do not drink alcohol or very rarely. Functional Ability and Level of Safety:   Hearing Loss  Hearing is good. Activities of Daily Living  The home contains: no safety equipment. Patient does total self care    Fall Risk  Fall Risk Assessment, last 12 mths 3/1/2018   Able to walk? Yes   Fall in past 12 months? Yes   Fall with injury? Yes   Number of falls in past 12 months 1   Fall Risk Score 2       Abuse Screen  Patient is not abused    Cognitive Screening   Evaluation of Cognitive Function:  Has your family/caregiver stated any concerns about your memory: no  Normal    Patient Care Team   Patient Care Team:  Natalia Simon MD as PCP - General (Family Practice)  Bethany Garcia MD (Podiatry)  Luz Naidu RN as Ambulatory Care Navigator ([facility])  Hodan Dangelo MD as Physician (Neurology)  YADIRA Finney (Physician Assistant)  Marc Carlos MD (Rheumatology)  Saira Rojo MD as Physician (Sleep Medicine)    Assessment/Plan   Education and counseling provided:  Are appropriate based on today's review and evaluation  End-of-Life planning (with patient's consent)    Diagnoses and all orders for this visit:    1. Medicare annual wellness visit, subsequent    2. Screening for alcoholism  -     Annual  Alcohol Screen 15 min ()    3.  Screening for depression  -     Depression Screen Annual        Health Maintenance Due   Topic Date Due    EYE EXAM RETINAL OR DILATED Q1  03/10/2016    BREAST CANCER SCRN MAMMOGRAM  06/02/2016    Influenza Age 9 to Adult  08/01/2017    FOOT EXAM Q1  10/04/2017    MICROALBUMIN Q1  10/04/2017    MEDICARE YEARLY EXAM  03/14/2018    HEMOGLOBIN A1C Q6M  03/27/2018

## 2018-05-23 ENCOUNTER — OFFICE VISIT (OUTPATIENT)
Dept: PAIN MANAGEMENT | Age: 48
End: 2018-05-23

## 2018-05-23 VITALS
SYSTOLIC BLOOD PRESSURE: 113 MMHG | HEART RATE: 63 BPM | RESPIRATION RATE: 18 BRPM | WEIGHT: 183 LBS | DIASTOLIC BLOOD PRESSURE: 80 MMHG | HEIGHT: 68 IN | TEMPERATURE: 98.6 F | BODY MASS INDEX: 27.74 KG/M2

## 2018-05-23 DIAGNOSIS — Z79.899 ENCOUNTER FOR LONG-TERM (CURRENT) USE OF HIGH-RISK MEDICATION: ICD-10-CM

## 2018-05-23 DIAGNOSIS — M79.7 FIBROMYALGIA SYNDROME: ICD-10-CM

## 2018-05-23 DIAGNOSIS — R52 CHRONIC GENERALIZED PAIN DISORDER: Primary | ICD-10-CM

## 2018-05-23 DIAGNOSIS — G89.29 CHRONIC GENERALIZED PAIN DISORDER: Primary | ICD-10-CM

## 2018-05-23 DIAGNOSIS — M79.18 MUSCULOSKELETAL PAIN: ICD-10-CM

## 2018-05-23 RX ORDER — OXYCODONE AND ACETAMINOPHEN 10; 325 MG/1; MG/1
1 TABLET ORAL
Qty: 115 TAB | Refills: 0 | Status: SHIPPED | OUTPATIENT
Start: 2018-06-23 | End: 2018-07-23

## 2018-05-23 RX ORDER — FENTANYL 50 UG/1
1 PATCH TRANSDERMAL
Qty: 15 PATCH | Refills: 0 | Status: SHIPPED | OUTPATIENT
Start: 2018-06-23 | End: 2018-07-23

## 2018-05-23 NOTE — MR AVS SNAPSHOT
[address] [address] 
[phone] Patient: Vimal Marie MRN: [id number] RNS:[id number] Visit Information Date & Time Provider Department Dept. Phone Encounter #  
 5/23/2018 12:20 PM Jorge George [address] for Pain Management  Your Appointments 6/22/2018  9:40 AM  
ROUTINE CARE with Levy Cedillo MD  
[address]) Appt Note: 3 month f/u  
 [address] [address]  
[place] [address] [address] Upcoming Health Maintenance Date Due  
 EYE EXAM RETINAL OR DILATED Q1 3/10/2016 BREAST CANCER SCRN MAMMOGRAM 6/2/2016 FOOT EXAM Q1 10/4/2017 MICROALBUMIN Q1 10/4/2017 Influenza Age 5 to Adult 8/1/2018 HEMOGLOBIN A1C Q6M 9/22/2018 LIPID PANEL Q1 3/22/2019 MEDICARE YEARLY EXAM 3/23/2019 DTaP/Tdap/Td series (2 - Td) 9/26/2026 Allergies as of 5/23/2018  Review Complete On: 5/23/2018 By: Natalya Pitt PA-C Severity Noted Reaction Type Reactions Esomeprazole Magnesium  10/19/2010    Hives Other reaction(s): mild rash/itching Nexium [Esomeprazole Magnesium]  06/11/2012    Unknown (comments) Pcn [Penicillins]  06/11/2012    Unknown (comments) Current Immunizations  Reviewed on 3/22/2018 Name Date Influenza Vaccine 9/26/2016, 10/20/2015, 10/9/2014, 10/1/2013 Pneumococcal Polysaccharide (PPSV-23) 3/20/2018 Tdap 9/26/2016 Not reviewed this visit You Were Diagnosed With   
  
 Codes Comments Chronic generalized pain disorder    -  Primary ICD-10-CM: R52, G89.29 ICD-9-CM: 780.96, 338.29 Fibromyalgia syndrome     ICD-10-CM: M79.7 ICD-9-CM: 729.1 Musculoskeletal pain     ICD-10-CM: M79.1 ICD-9-CM: 729.1 Encounter for long-term (current) use of high-risk medication     ICD-10-CM: Z79.899 ICD-9-CM: V58.69 Vitals BP Pulse Temp Resp Height(growth percentile) Weight(growth percentile) 113/80 (BP 1 Location: Right arm, BP Patient Position: Sitting) 63 98.6 °F (37 °C) (Oral) 18 5' 8\" (1.727 m) 183 lb (83 kg) BMI OB Status Smoking Status 27.83 kg/m2 Hysterectomy Former Smoker BMI and BSA Data Body Mass Index Body Surface Area  
 27.83 kg/m 2 2 m 2 Preferred Pharmacy Pharmacy Name Phone [facility] [phone] Your Updated Medication List  
  
   
This list is accurate as of 5/23/18  1:25 PM.  Always use your most recent med list.  
  
  
  
  
 albuterol 90 mcg/actuation inhaler Commonly known as:  PROAIR HFA Take 2 Puffs by inhalation every four (4) hours as needed for Wheezing. Take 2 Puffs inhaled by mouth Every 4 Hours. * allergy injection Set C  
  
 * allergy injection Set B  
  
 * allergy injection Set A  
  
 * allergy injection  
now * allergy injection  
now * allergy injection  
now AMITIZA 24 mcg capsule Generic drug:  lubiPROStone Blood-Glucose Meter monitoring kit Commonly known as:  FREESTYLE FREEDOM LITE Check Blood sugar once daily * cyclobenzaprine 10 mg tablet Commonly known as:  FLEXERIL Take 1 Tab by mouth three (3) times daily as needed for Muscle Spasm(s) for up to 30 days. As needed  Indications: MUSCLE SPASM * cyclobenzaprine 10 mg tablet Commonly known as:  FLEXERIL Take 1 Tab by mouth two (2) times daily as needed for Muscle Spasm(s) for up to 30 days. As needed  Indications: Muscle Spasm Dexlansoprazole 60 mg Cpdb Commonly known as:  DEXILANT TAKE ONE CAPSULE BY MOUTH EVERY DAY  
  
 dulaglutide 1.5 mg/0.5 mL sub-q pen Commonly known as:  TRULICITY INJECT 0.5 ML SUB-Q EVERY 7 DAYS  
  
 EPINEPHrine 0.3 mg/0.3 mL injection Commonly known as:  Kerry Morrow  
 0.3 mL by IntraMUSCular route once as needed for 1 dose. KIMBERLEY- mg tablet Generic drug:  erythromycin  
  
 fenofibrate nanocrystallized 145 mg tablet Commonly known as:  TRICOR  
TAKE 1 TABLET DAILY FOR    HYPERTRIGLYCERIDEMIA * fentaNYL 50 mcg/hr PATCH Commonly known as:  DURAGESIC  
1 Patch by TransDERmal route every fourty-eight (48) hours. Max Daily Amount: 1 Patch. For chronic pain (due to fill 3/28/17) * fentaNYL 50 mcg/hr PATCH Commonly known as:  DURAGESIC  
1 Patch by TransDERmal route every fourty-eight (48) hours for 30 days. Max Daily Amount: 1 Patch. For chronic pain      m[place]ckrodt brand only Start taking on:  6/23/2018  
  
 fluocinoNIDE 0.05 % topical cream  
Commonly known as:  LIDEX APPLY TO THE AFFECTED AREA TOPICALLY TWO TIMES A DAY FLUoxetine 20 mg capsule Commonly known as:  PROzac Take 1 Cap by mouth daily. * fluticasone 50 mcg/actuation nasal spray Commonly known as:  FLONASE  
USE 1 TO 2 SPRAYS NASALLY  ONCE DAILY * fluticasone 50 mcg/actuation nasal spray Commonly known as:  FLONASE  
USE 1 TO 2 SPRAYS NASALLY  ONCE DAILY  
  
 fluticasone-salmeterol 250-50 mcg/dose diskus inhaler Commonly known as:  ADVAIR DISKUS Take 1 Puff by inhalation every twelve (12) hours. glucose blood VI test strips strip Commonly known as:  ON CALL EXPRESS TEST STRIP Test blood sugar twice daily DX: E11.9  
  
 guaiFENesin  mg ER tablet Commonly known as:  Dal[employer] Dalton Take 1 Tab by mouth two (2) times a day.  
  
 ketoconazole 2 % shampoo Commonly known as:  NIZORAL  
APPLY TO SCALP AND RINSE   DAILY AS NEEDED Lancets Misc Test Blood Sugar twice daily. DX Code: E11.9  
  
 lidocaine 2 % solution Commonly known as:  XYLOCAINE Take 5 mL by mouth as needed for Pain.  
  
 loratadine 10 mg tablet Commonly known as:  Jaison Vivas Take 1 Tab by mouth nightly as needed for Rhinitis. LORazepam 1 mg tablet Commonly known as:  ATIVAN  
  
 * naloxone 4 mg/actuation nasal spray Commonly known as:  NARCAN  
4 mg by Nasal route as needed for up to 2 doses. Indications: OPIOID TOXICITY  
  
 * naloxone 4 mg/actuation nasal spray Commonly known as:  NARCAN  
1 [place] by IntraNASal route as needed. As needed for opioid respiratory emergency only  
  
 naproxen 500 mg tablet Commonly known as:  NAPROSYN  
TAKE ONE TABLET BY MOUTH TWICE A DAY WITH FOOD  
  
 oxyCODONE-acetaminophen  mg per tablet Commonly known as:  PERCOCET Take 1 Tab by mouth four (4) times daily as needed for Pain for up to 30 days. Max Daily Amount: 4 Tabs. For breakthrough pain. (max 115) Start taking on:  6/23/2018 pneumococcal 23-sandip ps vaccine 25 mcg/0.5 mL injection Commonly known as:  PNEUMOVAX 23  
0.5 mL by IntraMUSCular route PRIOR TO DISCHARGE for 1 dose. polyethylene glycol 17 gram/dose powder Commonly known as:  MIRALAX  
  
 pravastatin 40 mg tablet Commonly known as:  PRAVACHOL Take 2 Tabs by mouth nightly. * raNITIdine 150 mg tablet Commonly known as:  ZANTAC TAKE 1 TABLET TWICE A DAY  
  
 * raNITIdine 150 mg tablet Commonly known as:  ZANTAC TAKE 1 TABLET TWICE A DAY  
  
 rizatriptan 10 mg tablet Commonly known as:  Al Avers TAKE ONE TABLET BY MOUTH ONCE AS NEEDED MAY REPEAT IN 2 HOURS IF NEEDED  
  
 topiramate 25 mg tablet Commonly known as:  TOPAMAX TAKE ONE TABLET BY MOUTH AT BEDTIME  
  
 * Notice: This list has 16 medication(s) that are the same as other medications prescribed for you. Read the directions carefully, and ask your doctor or other care provider to review them with you. Prescriptions Printed Refills  
 oxyCODONE-acetaminophen (PERCOCET)  mg per tablet 0 Starting on: 6/23/2018 Sig: Take 1 Tab by mouth four (4) times daily as needed for Pain for up to 30 days. Max Daily Amount: 4 Tabs. For breakthrough pain. (max 115) Class: Print Route: Oral  
 fentaNYL (DURAGESIC) 50 mcg/hr PATCH 0 Starting on: 2018 Si Patch by TransDERmal route every fourty-eight (48) hours for 30 days. Max Daily Amount: 1 Patch. For chronic pain      mallinckrodt brand only Class: Print Route: TransDERmal  
  
Introducing [facility]! [facility] introduces SmartVault patient portal. Now you can access parts of your medical record, email your doctor's office, and request medication refills online. 1. In your internet browser, go to [email or web address] 2. Click on the First Time User? Click Here link in the Sign In box. You will see the New Member Sign Up page. 3. Enter your SmartVault Access Code exactly as it appears below. You will not need to use this code after youve completed the sign-up process. If you do not sign up before the expiration date, you must request a new code. · SmartVault Access Code: [email or web address] Expires: 2018 10:31 AM 
 
4. Enter the last four digits of your Social Security Number (xxxx) and Date of Birth (mm/dd/yyyy) as indicated and click Submit. You will be taken to the next sign-up page. 5. Create a SmartVault ID. This will be your SmartVault login ID and cannot be changed, so think of one that is secure and easy to remember. 6. Create a SmartVault password. You can change your password at any time. 7. Enter your Password Reset Question and Answer. This can be used at a later time if you forget your password. 8. Enter your e-mail address. You will receive e-mail notification when new information is available in [address] 9. Click Sign Up. You can now view and download portions of your medical record. 10. Click the Download Summary menu link to download a portable copy of your medical information. If you have questions, please visit the Frequently Asked Questions section of the SmartVault website. Remember, SmartVault is NOT to be used for urgent needs. For medical emergencies, dial 911. Now available from your iPhone and Android! Please provide this summary of care documentation to your next provider. Your primary care clinician is listed as Ata Nichols. If you have any questions after today's visit, please call [phone].

## 2018-05-23 NOTE — PROGRESS NOTES
Nursing Notes    Patient presents to the office today in follow-up. Patient rates her pain at 7/10 on the numerical pain scale. Reviewed medications with counts as follows:    Rx Date filled Qty Dispensed Pill Count Last Dose Short   Fentanyl 50 mcg 04/20/18 15 0+1on today no   Percocet  mg 04/20/18 120 12 This a.m no                                POC UDS was not performed in office today    Any new labs or imaging since last appointment? NO    Have you been to an emergency room (ER) or urgent care clinic since your last visit? NO            Have you been hospitalized since your last visit? NO     If yes, where, when, and reason for visit? Have you seen or consulted any other health care providers outside of the [address] [place] [address]  since your last visit? YES, PCP     If yes, where, when, and reason for visit? Ms. Ankur Brady has a reminder for a \"due or due soon\" health maintenance. I have asked that she contact her primary care provider for follow-up on this health maintenance.

## 2018-05-23 NOTE — PROGRESS NOTES
HISTORY OF PRESENT ILLNESS  Tawanda Ansari is a 56 y.o. female    Ms. Judi De La Paz returns today for f/u of chronic generalized pain related to FMS.  She also suffers from peripheral neuropathy and headaches.      The patient denies any significant changes in her chronic pain since last f/u. She reports good pain management with her current regiment. She reports that she will schedule a follow up with her PCP for her diabetes and high cholesterol. We discussed her current condition and medications in detail today. She tolerates medications without side effects. Krystle Correa reports no change in sleep or constipation. Patient understands current practice transition and taper plan in future. Patient is planning on transferring her continued pain management care to another practice. She will sign a medical release form today. I will provide 1 month transition prescription. We will assist patient with any medical records transfer as needed. Current MME dose as of today:140     Current medication management consists of:Oxycodone 10/325mg up to 4 times per day as needed, Fentanyl 75 mcg patch every 48 hrs, compound cream use 2-3 times daily as needed for joint pain  Medications are helping with pain control and quality of life. Her pain is 4-6/10 with medication and 7-9/10 without.  Pt describes pain as aching, stabbing, and burning. Aggravating factors include standing, sitting, and walking. Relieved with rest, medication, and avoiding painful activities. The patient reports 70% relief with current medications.   Current treatment is helping to improve general activity, mood, walking, sleep, enjoyment of life     Pain Meds and Quality Of Life have been reviewed. Nonpharmacologic therapy and non-opioid pharmacologic therapy were considered. If opioid therapy is prescribed, this is only if the expected benefits are anticipated to outweigh risks. She  is otherwise doing well with no other complaints today.  She denies any adverse events including nausea, vomiting, dizziness, increased constipation, hallucinations, or seizures. The patient reports functional improvement and QOL with pain medication. Vitals:    05/23/18 1236   BP: 113/80   Pulse: 63   Resp: 18   Temp: 98.6 °F (37 °C)   TempSrc: Oral   Weight: 83 kg (183 lb)   Height: 5' 8\" (1.727 m)   PainSc:   6   PainLoc: Back         Allergies   Allergen Reactions    Esomeprazole Magnesium Hives     Other reaction(s): mild rash/itching    Nexium [Esomeprazole Magnesium] Unknown (comments)    Pcn [Penicillins] Unknown (comments)       Past Surgical History:   Procedure Laterality Date    HX GYN      Partial Hysterectomy    HX HEENT      HX TUBAL LIGATION         ROS   Constitutional: Positive for malaise/fatigue. Negative for chills. HENT: Negative for congestion and sore throat.    Eyes: Negative for blurred vision and double vision. Respiratory: Negative for cough.    Cardiovascular: Negative for palpitations. Gastrointestinal: Positive for heartburn. Negative for constipation, diarrhea, nausea and vomiting. Genitourinary: Negative.    Musculoskeletal: Positive for joint pain and myalgias. Negative for falls. Skin: Positive for rash. Neurological: Positive for dizziness and sensory change. Negative for tremors and seizures. Endo/Heme/Allergies: Positive for environmental allergies. Does not bruise/bleed easily. Psychiatric/Behavioral: Positive for depression. Negative for suicidal ideas. The patient is nervous/anxious and has insomnia.      Physical Exam   Constitutional: She is oriented to person, place, and time. She appears well-developed and well-nourished. No distress. HENT:   Head: Normocephalic. Eyes: EOM are normal.   Neck: Normal range of motion. Painful ROM   Pulmonary/Chest: Effort normal. No respiratory distress. Musculoskeletal: She exhibits tenderness (neck/lumbar ).  She exhibits no edema.        Cervical back: She exhibits decreased range of motion, tenderness, pain and spasm.        Lumbar back: She exhibits decreased range of motion, tenderness, pain and spasm. Neurological: She is alert and oriented to person, place, and time. A cranial nerve deficit is present. Gait (antalgic) abnormal.   Psychiatric: She has a normal mood and affect. Her behavior is normal. Judgment and thought content normal.   Nursing note and vitals reviewed.       ASSESSMENT:    1. Chronic generalized pain disorder    2. Fibromyalgia syndrome    3. Musculoskeletal pain    4. Encounter for long-term (current) use of high-risk medication          COMM: [address] [employer] Program was reviewed which does not demonstrate aberrancies and/or inconsistencies with regard to the historical prescribing of controlled medications to this patient by other providers. Medications were brought to visit today. Pill count was appropriate. When possible, non-drug therapy for chronic pain should be used as a first-line treatment. Physical therapy exercise regimens, chiropractic manipulation, meditation relaxation techniques, cognitive behavior therapy, acupuncture, yoga, Varun Chi,  transcutaneous electrical nerve stimulation (TENS), and application of moist heat can help alleviate pain . Explained that realistic expectations and goals with chronic pain management are to maximize function and minimize pain with the understanding that limitations will exist both in the extent of relief that she may achieve, as well as thresholds of mg strengths that we will not exceed. Our role is to help the patient better cope with chronic pain utilizng a multimodal approach. The patients condition and plan were discussed. All questions were answered. The patient agrees with the plan. PLAN / Pt Instructions:  1. Continued current plan with no evidence of addiction or diversion. Stable on current medication without adverse events.    2. Refill and decreased dose to oxycodone/acetaminophen 10/325mg up to 4 times per day as needed. 3. Refill and decreased to fentanyl 50 mcg patch every 48 hrs as prescribed. 4. Continue compound cream use 2-3 times daily as needed for joint pain  5. Continue to f/u with neurologist regarding migraines and RLS  6. Discussed risks of addiction, dependency, and opioid induced hyperalgesia. 7. Patient plans on moving her pain management care to another provider. 8. She will tentatively schedule for 3 month follow-up. We will assist with medical records transfer as needed. Prescription monitoring program reviewed. The patient  should keep track of total Tylenol intake and make sure liver function tests have been checked with primary care physician. Pain medications prescribed with the objective of pain relief and improved physical and psychosocial function in this patient. DISPOSITION  · Counseled patient on proper use of prescribed medications. · Counseled patient about chronic medical conditions and their relationship to anxiety and depression and recommended mental health support as needed. · Reviewed with patient self-help tools, home exercise, and lifestyle changes to assist the patient in self-management of symptoms. · Reviewed with patient the treatment plan, goals of treatment plan, and limitations of treatment plan, to include the potential for side effects from medications and procedures. If side effects occur, it is the responsibility of the patient to inform the clinic so that a change in the treatment plan can be made in a safe manner. The patient is advised that stopping prescribed medication may cause an increase in symptoms and possible medication withdrawal symptoms. The patient is informed an emergency room evaluation may be necessary if this occurs.         Spent 25 minutes with patient today reviewing the treatment plan, goals of treatment plan, and limitations of the treatment plan, to include the potential for side effects from medications and procedures. More than 50% of the visit time was spent counseling the patient. Natalya Pitt PA-C 5/23/2018        Note: Please excuse any typographical errors. Voice recognition software was used for this note and may cause mistakes.

## 2018-05-24 ENCOUNTER — TELEPHONE (OUTPATIENT)
Dept: PAIN MANAGEMENT | Age: 48
End: 2018-05-24

## 2018-06-14 ENCOUNTER — OFFICE VISIT (OUTPATIENT)
Dept: FAMILY MEDICINE CLINIC | Age: 48
End: 2018-06-14

## 2018-06-14 VITALS
HEIGHT: 68 IN | BODY MASS INDEX: 27.74 KG/M2 | WEIGHT: 183 LBS | DIASTOLIC BLOOD PRESSURE: 71 MMHG | RESPIRATION RATE: 18 BRPM | OXYGEN SATURATION: 97 % | TEMPERATURE: 98.1 F | SYSTOLIC BLOOD PRESSURE: 107 MMHG | HEART RATE: 75 BPM

## 2018-06-14 DIAGNOSIS — E11.40 TYPE 2 DIABETES MELLITUS WITH DIABETIC NEUROPATHY, WITHOUT LONG-TERM CURRENT USE OF INSULIN (HCC): ICD-10-CM

## 2018-06-14 DIAGNOSIS — R52 CHRONIC GENERALIZED PAIN DISORDER: ICD-10-CM

## 2018-06-14 DIAGNOSIS — E11.40 TYPE 2 DIABETES MELLITUS WITH DIABETIC NEUROPATHY, WITHOUT LONG-TERM CURRENT USE OF INSULIN (HCC): Primary | ICD-10-CM

## 2018-06-14 DIAGNOSIS — G89.29 CHRONIC GENERALIZED PAIN DISORDER: ICD-10-CM

## 2018-06-14 DIAGNOSIS — E78.2 MIXED HYPERLIPIDEMIA: ICD-10-CM

## 2018-06-14 DIAGNOSIS — Z91.09 MULTIPLE ENVIRONMENTAL ALLERGIES: ICD-10-CM

## 2018-06-14 DIAGNOSIS — M79.7 FIBROMYALGIA SYNDROME: ICD-10-CM

## 2018-06-14 DIAGNOSIS — I10 ESSENTIAL HYPERTENSION: ICD-10-CM

## 2018-06-14 DIAGNOSIS — K21.9 GASTROESOPHAGEAL REFLUX DISEASE WITHOUT ESOPHAGITIS: ICD-10-CM

## 2018-06-14 RX ORDER — MONTELUKAST SODIUM 10 MG/1
10 TABLET ORAL DAILY
COMMUNITY
End: 2018-06-14 | Stop reason: SDUPTHER

## 2018-06-14 RX ORDER — CYCLOBENZAPRINE HCL 10 MG
10 TABLET ORAL
Qty: 180 TAB | Refills: 1 | Status: SHIPPED | OUTPATIENT
Start: 2018-06-14 | End: 2019-10-07 | Stop reason: SDUPTHER

## 2018-06-14 RX ORDER — TRIAMCINOLONE ACETONIDE 1 MG/G
OINTMENT TOPICAL 2 TIMES DAILY
Qty: 15 G | Refills: 1 | Status: SHIPPED | OUTPATIENT
Start: 2018-06-14 | End: 2019-04-11

## 2018-06-14 RX ORDER — CYCLOBENZAPRINE HCL 10 MG
TABLET ORAL
COMMUNITY
End: 2019-04-03 | Stop reason: SDUPTHER

## 2018-06-14 RX ORDER — DEXLANSOPRAZOLE 60 MG/1
CAPSULE, DELAYED RELEASE ORAL
Qty: 90 CAP | Refills: 1 | Status: SHIPPED | OUTPATIENT
Start: 2018-06-14 | End: 2019-04-03 | Stop reason: SDUPTHER

## 2018-06-14 RX ORDER — MONTELUKAST SODIUM 10 MG/1
10 TABLET ORAL DAILY
Qty: 90 TAB | Refills: 1 | Status: SHIPPED | OUTPATIENT
Start: 2018-06-14 | End: 2019-04-03 | Stop reason: SDUPTHER

## 2018-06-14 RX ORDER — ACYCLOVIR 400 MG/1
400 TABLET ORAL
COMMUNITY
End: 2019-04-03 | Stop reason: SDUPTHER

## 2018-06-14 NOTE — PROGRESS NOTES
Chief Complaint   Patient presents with    Fibromyalgia     pain management- [facility] [address] [place] [address], [address]     she is a 56y.o. year old female who presents for follow up of her chronic medical conditions. Patient with multiple co-morbidities which include DM2, HLD, migraine, RLS, fibromyalgia and chronic pain. Patient denies HA, dizziness, SOB, CP, abdominal pain, dysuria. She was followed by pain management in [place]. There has been a change in the physician at that practice and the patient received a letter stating that there will be changes in opioid pain management. She is requesting referral to another pain management practice in [facility]. She is due for lab work. She needs medication refills. Diabetes: This patient is being treating under a comprehensive plan of care for diabetes. Overall the patient feels well with good energy level. Insulin dependence: no   Pertinent Labs:   Lab Results   Component Value Date/Time    Hemoglobin A1c 7.2 (H) 03/22/2018 12:40 PM      Body mass index is 27.83 kg/(m^2). Lab Results   Component Value Date/Time    LDL, calculated 154 (H) 03/22/2018 12:40 PM        Wt Readings from Last 3 Encounters:   06/14/18 183 lb (83 kg)   05/23/18 183 lb (83 kg)   03/22/18 194 lb 6.4 oz (88.2 kg)        History   Smoking Status    Former Smoker   Smokeless Tobacco    Never Used     Comment: quit in 2010        Medications, diet and exercise as means of diabetic control with a goal of an A1C of less than 7.0% discussed. Diabetic foot care and annual eye exam discussed as well. Check blood sugars while fasting just before breakfast on most days and occasionally before dinner. Write down readings in a diabetic log book and bring them to the next visit. Call the office for fasting sugars over 200 or below 75 on two or more occasions.   Call immediately if having symptoms of high sugar (frequent urination, always thirsty) or low sugar (dizzy, lethargic, sweaty, nauseated, headache). Our overall goal is to reduce or eliminate the long term consequences of poorly controlled diabetes. Patient expresses understanding and agreement with our plan of care. Patient Active Problem List   Diagnosis Code    Obesity E66.9    Diabetes mellitus ([facility] [address] E11.9    Hypertension I10    Fibromyalgia syndrome M79.7    Restless leg syndrome G25.81    Chronic generalized pain disorder R52, G89.29    Encounter for long-term (current) use of high-risk medication Z79.899    Headache R51    Persistent disorder of initiating or maintaining sleep G47.00    Migraine without aura G43.009    Musculoskeletal pain M79.1    Multiple environmental allergies Z91.09    History of headache Z87.898    Mixed hyperlipidemia E78.2     Past Surgical History:   Procedure Laterality Date    HX GYN      Partial Hysterectomy    HX HEENT      HX TUBAL LIGATION       Social History     Social History    Marital status: SINGLE     Spouse name: N/A    Number of children: N/A    Years of education: N/A     Occupational History    Not on file.      Social History Main Topics    Smoking status: Former Smoker    Smokeless tobacco: Never Used      Comment: quit in 2010    Alcohol use No    Drug use: No    Sexual activity: Not on file     Other Topics Concern    Not on file     Social History Narrative     Family History   Problem Relation Age of Onset    Alcohol abuse Father     Diabetes Mother     Hypertension Mother     Heart Disease Mother     Lupus Mother     Sleep Apnea Mother     Depression Mother     Cancer Maternal Aunt     Arthritis-osteo Other     Asthma Other     Diabetes Other     Elevated Lipids Other     Headache Other     Heart Disease Other     Hypertension Other     Migraines Other     Stroke Other     Bleeding Prob Neg Hx     Lung Disease Neg Hx     Psychiatric Disorder Neg Hx     Mental Retardation Neg Hx      Current Outpatient Prescriptions   Medication Sig    cyclobenzaprine (FLEXERIL) 10 mg tablet Take  by mouth three (3) times daily as needed for Muscle Spasm(s).  acyclovir (ZOVIRAX) 400 mg tablet Take 400 mg by mouth every four (4) hours (while awake).  Dexlansoprazole (DEXILANT) 60 mg CpDB TAKE ONE CAPSULE BY MOUTH EVERY DAY    montelukast (SINGULAIR) 10 mg tablet Take 1 Tab by mouth daily.  cyclobenzaprine (FLEXERIL) 10 mg tablet Take 1 Tab by mouth two (2) times daily as needed for Muscle Spasm(s) for up to 30 days. As needed  Indications: Muscle Spasm    triamcinolone acetonide (KENALOG) 0.1 % ointment Apply  to affected area two (2) times a day. use thin layer    oxyCODONE-acetaminophen (PERCOCET)  mg per tablet Take 1 Tab by mouth four (4) times daily as needed for Pain for up to 30 days. Max Daily Amount: 4 Tabs. For breakthrough pain. (max 115)    fentaNYL (DURAGESIC) 50 mcg/hr PATCH 1 Patch by TransDERmal route every fourty-eight (48) hours for 30 days. Max Daily Amount: 1 Patch. For chronic pain      [place] brand only    raNITIdine (ZANTAC) 150 mg tablet TAKE 1 TABLET TWICE A DAY    pravastatin (PRAVACHOL) 40 mg tablet Take 2 Tabs by mouth nightly.  fentaNYL (DURAGESIC) 50 mcg/hr PATCH 1 Patch by TransDERmal route every fourty-eight (48) hours. Max Daily Amount: 1 Patch. For chronic pain (due to fill 3/28/17)    naloxone [facility]) 4 mg/actuation nasal spray 1 [place] by IntraNASal route as needed.  As needed for opioid respiratory emergency only    fluticasone (FLONASE) 50 mcg/actuation nasal spray USE 1 TO 2 SPRAYS NASALLY  ONCE DAILY    fenofibrate nanocrystallized (TRICOR) 145 mg tablet TAKE 1 TABLET DAILY FOR    HYPERTRIGLYCERIDEMIA    fluocinoNIDE (LIDEX) 0.05 % topical cream APPLY TO THE AFFECTED AREA TOPICALLY TWO TIMES A DAY    ketoconazole (NIZORAL) 2 % shampoo APPLY TO SCALP AND RINSE   DAILY AS NEEDED    pneumococcal 23-sandip ps vaccine (PNEUMOVAX 23) 25 mcg/0.5 mL injection 0.5 mL by IntraMUSCular route PRIOR TO DISCHARGE for 1 dose.  loratadine (CLARITIN) 10 mg tablet Take 1 Tab by mouth nightly as needed for Rhinitis.  fluticasone-salmeterol (ADVAIR DISKUS) 250-50 mcg/dose diskus inhaler Take 1 Puff by inhalation every twelve (12) hours.  KIMBERLEY- mg tablet     AMITIZA 24 mcg capsule     allergy injection now    allergy injection now    allergy injection now    FLUoxetine (PROZAC) 20 mg capsule Take 1 Cap by mouth daily.  allergy injection Set C    allergy injection Set B    allergy injection Set A    naloxone 4 mg/actuation spry 4 mg by Nasal route as needed for up to 2 doses. Indications: OPIOID TOXICITY    topiramate (TOPAMAX) 25 mg tablet TAKE ONE TABLET BY MOUTH AT BEDTIME    albuterol (PROAIR HFA) 90 mcg/actuation inhaler Take 2 Puffs by inhalation every four (4) hours as needed for Wheezing. Take 2 Puffs inhaled by mouth Every 4 Hours.  rizatriptan (MAXALT) 10 mg tablet TAKE ONE TABLET BY MOUTH ONCE AS NEEDED MAY REPEAT IN 2 HOURS IF NEEDED    LORazepam (ATIVAN) 1 mg tablet     glucose blood VI test strips (ON CALL EXPRESS TEST STRIP) strip Test blood sugar twice daily DX: E11.9    Lancets misc Test Blood Sugar twice daily. DX Code: E11.9    polyethylene glycol (MIRALAX) 17 gram/dose powder     naproxen (NAPROSYN) 500 mg tablet TAKE ONE TABLET BY MOUTH TWICE A DAY WITH FOOD    Blood-Glucose Meter (FREESTYLE FREEDOM LITE) monitoring kit Check Blood sugar once daily    guaiFENesin ER (MUCINEX) 600 mg ER tablet Take 1 Tab by mouth two (2) times a day.  dulaglutide (TRULICITY) 1.5 LH/6.4 mL sub-q pen INJECT 0.5 ML SUB-Q EVERY 7 DAYS    EPINEPHrine (EPIPEN) 0.3 mg/0.3 mL (1:1,000) injection 0.3 mL by IntraMUSCular route once as needed for 1 dose. No current facility-administered medications for this visit.       Allergies   Allergen Reactions    Esomeprazole Magnesium Hives     Other reaction(s): mild rash/itching    Nexium [Esomeprazole Magnesium] Unknown (comments)    Pcn [Penicillins] Unknown (comments)       Review of Systems:  Constitutional: Negative for fatigue or malaise  Endo: Negative for unusual thirst or weight changes  HEENT: Negative for acute hearing or vision changes  Cardiovascular: Negative for dizziness, chest pain or palpitations  Respiratory: Negative for cough, wheezing or SOB  Gastreintestinal: Negative for nausea or abdominal pain  Genital/urinary: Negative for dysuria or voiding dysfunction  Muscoloskeletal: see HPI, Negative for myalgias or arthralgias   Neurological: Negative for headache, weakness or paresthesia  Skin: Negative for rash or lesion  Psychological: Negative for depression or anxiety        Vitals:    06/14/18 1109   BP: 107/71   Pulse: 75   Resp: 18   Temp: 98.1 °F (36.7 °C)   TempSrc: Oral   SpO2: 97%   Weight: 183 lb (83 kg)   Height: 5' 8\" (1.727 m)       Physical Examination:  General: Well developed, well nourished, in no acute distress  Head: Normocephalic, atraumatic  Eyes: Sclera clear, EOMI  Neck: Normal range of motion  Respiratory: CTAB with symmetrical, unlabored effort  Cardiovascular: Normal S1, S2, Regular rate and rhythm  Extremities: Full range of motion, no edema  Neurologic: normal strength, No focal deficits  Skin: no rash or lesion appreciated  Psych: Active, alert and oriented. Affect appropriate       Diagnoses and all orders for this visit:    1. Type 2 diabetes mellitus with diabetic neuropathy, without long-term current use of insulin (HCC)  -     LIPID PANEL; Future  -     MAGNESIUM; Future  -     METABOLIC PANEL, COMPREHENSIVE; Future  -     HEMOGLOBIN A1C WITH EAG; Future  -     TSH 3RD GENERATION; Future  -     CBC W/O DIFF; Future    2. Fibromyalgia syndrome  -     REFERRAL TO PAIN MANAGEMENT  -     cyclobenzaprine (FLEXERIL) 10 mg tablet; Take 1 Tab by mouth two (2) times daily as needed for Muscle Spasm(s) for up to 30 days. As needed  Indications: Muscle Spasm    3.  Chronic generalized pain disorder  -     REFERRAL TO PAIN MANAGEMENT    4. Essential hypertension  -     MAGNESIUM; Future  -     METABOLIC PANEL, COMPREHENSIVE; Future  -     TSH 3RD GENERATION; Future    5. Mixed hyperlipidemia  -     LIPID PANEL; Future  -     METABOLIC PANEL, COMPREHENSIVE; Future    6. Gastroesophageal reflux disease without esophagitis  -     Dexlansoprazole (DEXILANT) 60 mg CpDB; TAKE ONE CAPSULE BY MOUTH EVERY DAY    7. Multiple environmental allergies  -     montelukast (SINGULAIR) 10 mg tablet; Take 1 Tab by mouth daily. Other orders  -     triamcinolone acetonide (KENALOG) 0.1 % ointment; Apply  to affected area two (2) times a day. use thin layer      Plan of care:  Diagnoses were discussed in detail with patient. Medication risks/benefits/side effects discussed with patient. All of the patient's questions were addressed and answered to apparent satisfaction. The patient understands and agrees with our plan of care. The patient knows to call back if they have questions about the plan of care or if symptoms change. The patient received an After-Visit Summary which contains VS, diagnoses, orders, allergy and medication lists. Over half of the 40 minutes face to face with Nesha Garduno consisted of counseling and discussing treatment plans in reference to her chronic pain and multiple co-morbidities. No future appointments. Follow-up Disposition:  Return in about 4 months (around 10/14/2018), or if symptoms worsen or fail to improve.

## 2018-06-14 NOTE — PROGRESS NOTES
Reviewed record in preparation for visit and have necessary documentation  Pt did not bring medication to office visit for review  Goals that were addressed and/or need to be completed during or after this appointment include   Health Maintenance Due   Topic Date Due    EYE EXAM RETINAL OR DILATED Q1  03/10/2016    BREAST CANCER SCRN MAMMOGRAM  06/02/2016    FOOT EXAM Q1  10/04/2017    MICROALBUMIN Q1  10/04/2017

## 2018-06-27 ENCOUNTER — TELEPHONE (OUTPATIENT)
Dept: FAMILY MEDICINE CLINIC | Age: 48
End: 2018-06-27

## 2018-06-27 NOTE — TELEPHONE ENCOUNTER
Received information that [facility] will not agree to see the patient. Dr. Alysa Gasca office in [place] does take the patient's insurance. Need to see if she is agreeable to seeing this doctor. Attempted to reach patient without success. Will try again later.

## 2018-07-26 ENCOUNTER — OFFICE VISIT (OUTPATIENT)
Dept: PAIN MANAGEMENT | Age: 48
End: 2018-07-26

## 2018-07-26 VITALS
BODY MASS INDEX: 27.74 KG/M2 | HEART RATE: 66 BPM | TEMPERATURE: 98.4 F | DIASTOLIC BLOOD PRESSURE: 76 MMHG | RESPIRATION RATE: 18 BRPM | WEIGHT: 183 LBS | SYSTOLIC BLOOD PRESSURE: 119 MMHG | HEIGHT: 68 IN

## 2018-07-26 DIAGNOSIS — G89.29 CHRONIC GENERALIZED PAIN DISORDER: ICD-10-CM

## 2018-07-26 DIAGNOSIS — Z79.899 ENCOUNTER FOR LONG-TERM (CURRENT) USE OF HIGH-RISK MEDICATION: ICD-10-CM

## 2018-07-26 DIAGNOSIS — G89.29 CHRONIC PAIN OF MULTIPLE JOINTS: Primary | ICD-10-CM

## 2018-07-26 DIAGNOSIS — M79.18 MUSCULOSKELETAL PAIN: ICD-10-CM

## 2018-07-26 DIAGNOSIS — G89.4 CHRONIC PAIN SYNDROME: ICD-10-CM

## 2018-07-26 DIAGNOSIS — R52 CHRONIC GENERALIZED PAIN DISORDER: ICD-10-CM

## 2018-07-26 DIAGNOSIS — M25.50 CHRONIC PAIN OF MULTIPLE JOINTS: Primary | ICD-10-CM

## 2018-07-26 RX ORDER — FENTANYL 50 UG/1
1 PATCH TRANSDERMAL
Qty: 10 PATCH | Refills: 0 | Status: SHIPPED | OUTPATIENT
Start: 2018-07-26 | End: 2018-08-25

## 2018-07-26 RX ORDER — OXYCODONE AND ACETAMINOPHEN 10; 325 MG/1; MG/1
1 TABLET ORAL
Qty: 120 TAB | Refills: 0 | Status: SHIPPED | OUTPATIENT
Start: 2018-07-26 | End: 2018-08-25

## 2018-07-26 NOTE — PROGRESS NOTES
Referral date 06/11/12, source PCP and for chronic widespread pain. HPI:  Darrian Borges is a 56 y.o. female here for f/u visit for ongoing evaluation of chronic widespread pain. Pt was last seen here on 05/24/18. Pt denies interval changes on the character or distribution of pain. Pain is located widespread throughout body. The pain is described as aching, stabbing, burning to upper body, lower extremities burning, aching, pins and needles. Pain at its best is 3/10. Pain at its worse is 8/10. The pain is worsened by randomly good days and bad days, standing at sink. Symptoms are relieved by nothing. Pt has tried aquatic therapy, acunpunture, TENS, walking, lyrica, neurontin with no perceived benefit. Pt has horses and is active in their care. Patient has not had SCS trial, intrathecal pump. Social History     Social History    Marital status: SINGLE     Spouse name: N/A    Number of children: N/A    Years of education: N/A     Occupational History    Not on file.      Social History Main Topics    Smoking status: Former Smoker    Smokeless tobacco: Never Used      Comment: quit in 2010    Alcohol use No    Drug use: No    Sexual activity: Not on file     Other Topics Concern    Not on file     Social History Narrative     Family History   Problem Relation Age of Onset    Alcohol abuse Father     Diabetes Mother     Hypertension Mother     Heart Disease Mother     Lupus Mother     Sleep Apnea Mother     Depression Mother     Cancer Maternal Aunt     Arthritis-osteo Other     Asthma Other     Diabetes Other     Elevated Lipids Other     Headache Other     Heart Disease Other     Hypertension Other     Migraines Other     Stroke Other     Bleeding Prob Neg Hx     Lung Disease Neg Hx     Psychiatric Disorder Neg Hx     Mental Retardation Neg Hx      Allergies   Allergen Reactions    Esomeprazole Magnesium Hives     Other reaction(s): mild rash/itching    Nexium [Esomeprazole Magnesium] Unknown (comments)    Pcn [Penicillins] Unknown (comments)     Past Medical History:   Diagnosis Date    Depression     Diabetes ([facility] [address]     Dysthymic disorder     Fibromyalgia     Fibromyalgia syndrome 8/8/2012    Headache(784.0) 9/8/2012    Hypercholesterolemia     Left ear pain     Migraine     Mixed hyperlipidemia     Musculoskeletal pain 9/16/2014     Past Surgical History:   Procedure Laterality Date    HX GYN      Partial Hysterectomy    HX HEENT      HX TUBAL LIGATION       Current Outpatient Prescriptions on File Prior to Visit   Medication Sig    cyclobenzaprine (FLEXERIL) 10 mg tablet Take  by mouth three (3) times daily as needed for Muscle Spasm(s).  acyclovir (ZOVIRAX) 400 mg tablet Take 400 mg by mouth every four (4) hours (while awake).  Dexlansoprazole (DEXILANT) 60 mg CpDB TAKE ONE CAPSULE BY MOUTH EVERY DAY    montelukast (SINGULAIR) 10 mg tablet Take 1 Tab by mouth daily.  triamcinolone acetonide (KENALOG) 0.1 % ointment Apply  to affected area two (2) times a day. use thin layer    raNITIdine (ZANTAC) 150 mg tablet TAKE 1 TABLET TWICE A DAY    dulaglutide (TRULICITY) 1.5 NC/5.4 mL sub-q pen INJECT 0.5 ML SUB-Q EVERY 7 DAYS    pravastatin (PRAVACHOL) 40 mg tablet Take 2 Tabs by mouth nightly.  fluticasone (FLONASE) 50 mcg/actuation nasal spray USE 1 TO 2 SPRAYS NASALLY  ONCE DAILY    fenofibrate nanocrystallized (TRICOR) 145 mg tablet TAKE 1 TABLET DAILY FOR    HYPERTRIGLYCERIDEMIA    fluocinoNIDE (LIDEX) 0.05 % topical cream APPLY TO THE AFFECTED AREA TOPICALLY TWO TIMES A DAY    ketoconazole (NIZORAL) 2 % shampoo APPLY TO SCALP AND RINSE   DAILY AS NEEDED    pneumococcal 23-sandip ps vaccine (PNEUMOVAX 23) 25 mcg/0.5 mL injection 0.5 mL by IntraMUSCular route PRIOR TO DISCHARGE for 1 dose.  loratadine (CLARITIN) 10 mg tablet Take 1 Tab by mouth nightly as needed for Rhinitis.     fluticasone-salmeterol (ADVAIR DISKUS) 250-50 mcg/dose diskus inhaler Take 1 Puff by inhalation every twelve (12) hours.  KIMBERLEY- mg tablet     AMITIZA 24 mcg capsule     allergy injection now    allergy injection Set C    allergy injection Set B    allergy injection Set A    naloxone 4 mg/actuation spry 4 mg by Nasal route as needed for up to 2 doses. Indications: OPIOID TOXICITY    topiramate (TOPAMAX) 25 mg tablet TAKE ONE TABLET BY MOUTH AT BEDTIME    albuterol (PROAIR HFA) 90 mcg/actuation inhaler Take 2 Puffs by inhalation every four (4) hours as needed for Wheezing. Take 2 Puffs inhaled by mouth Every 4 Hours.  rizatriptan (MAXALT) 10 mg tablet TAKE ONE TABLET BY MOUTH ONCE AS NEEDED MAY REPEAT IN 2 HOURS IF NEEDED    LORazepam (ATIVAN) 1 mg tablet     glucose blood VI test strips (ON CALL EXPRESS TEST STRIP) strip Test blood sugar twice daily DX: E11.9    Lancets misc Test Blood Sugar twice daily. DX Code: E11.9    polyethylene glycol (MIRALAX) 17 gram/dose powder     naproxen (NAPROSYN) 500 mg tablet TAKE ONE TABLET BY MOUTH TWICE A DAY WITH FOOD    Blood-Glucose Meter (FREESTYLE FREEDOM LITE) monitoring kit Check Blood sugar once daily    guaiFENesin ER (MUCINEX) 600 mg ER tablet Take 1 Tab by mouth two (2) times a day.  EPINEPHrine (EPIPEN) 0.3 mg/0.3 mL (1:1,000) injection 0.3 mL by IntraMUSCular route once as needed for 1 dose.  cyclobenzaprine (FLEXERIL) 10 mg tablet Take 1 Tab by mouth two (2) times daily as needed for Muscle Spasm(s) for up to 30 days. As needed  Indications: Muscle Spasm    naloxone (NARCAN) 4 mg/actuation nasal spray 1 [place] by IntraNASal route as needed. As needed for opioid respiratory emergency only    FLUoxetine (PROZAC) 20 mg capsule Take 1 Cap by mouth daily. No current facility-administered medications on file prior to visit.          ROS:  Denies fever, chills, nausea, vomiting, diarrhea, constipation, abdominal pain, chest pain, shortness or breath/trouble breathing, weakness, trouble swallowing, changes in vision, changes in hearing, falls, dizziness, bladder incontinence, bowel incontinence, depression, anxiety, suicidal ideations, homicidal ideations or alcohol use. Positive findings include abdominal pain, depression, anxiety. Opioid specific risk: Depression,DM,sleep apnea, anxiety. Opioid specific history: concurrent use of opioids since before 2012 with no opioid holiday. Vitals:    07/26/18 1014   BP: 119/76   Pulse: 66   Resp: 18   Temp: 98.4 °F (36.9 °C)   TempSrc: Oral   Weight: 83 kg (183 lb)   Height: 5' 8\" (1.727 m)   PainSc:   4   PainLoc: Back        Physical exam:  AFVSS, no acute distress, overweight body habitus. A&OXs 3. Normocephalic, atraumatic. Conjugate gaze, clear sclerae. Speech is clear and appropriate. Mood is appropriate for situation. Gait and balance are within functional limits. Non-labored breathing. Lungs CTA B/L. No wheezing, rales or rhonchi. Heart RRR with S1 and S2 noted. No murmurs. Cervical, thoracic, lumbar, bilateral SIJ, bilateral piriformis, bilateral GT tender to light palpation  UE:   Strength for right upper extremity is 5/5 for shoulder abduction, elbow flexion, wrist extension, elbow extension, finger abduction, flexor digitorum profundus to digits 2 through 5. Strength for left upper extremity is 5/5 for shoulder abduction, elbow flexion, wrist extension, elbow extension, finger abduction, flexor digitorum profundus to digits 2 through 5. Sensation to light touch is intact for left C4-T1. Sensation to light touch is intact for right C4-T1. Muscle stretch reflexes for right upper extremity are 1+ for C5, C6, C7. Muscle stretch reflexes for left upper extremity are 1+ for C5, C6, C7. LE:   Strength for right lower extremity is 5/5 for hip flexion, knee extension, dorsiflexion, extensor hallucis longus, plantar flexion.   Strength for left lower extremity is 5/5 for hip flexion, knee extension, dorsiflexion, extensor hallucis longus, plantar flexion. Sensation to light touch is intact for right L2-S2. Sensation to light touch is intact for left L2-S2. Muscle Stretch reflexes for right lower extremity are 2+ for L4, absent S1. Muscle Stretch reflexes for left lower extremity are 2+ for L4, absent S1. Full AROM lumbar flexion did not reproduction of primary pain. Full AROM lumbar extension did not reproduction of primary pain. Calculated MEQ -approximately 180  Naloxone rescue -yes  Prophylactic bowel program -no  Date of last OCA 12/18/17  Last UDS 03/01/18, consistent   date checked today, findings consistent    [address] [facility] [id number] [phone]    Today     ORT -4    KAMILLE -42%    PGIC -5 and 3    COMM - 8      PHQ -- . PHQ over the last two weeks 7/26/2018   PHQ Not Done Active Diagnosis of Depression or Bipolar Disorder   Little interest or pleasure in doing things -   Feeling down, depressed, irritable, or hopeless -   Total Score PHQ 2 -   Trouble falling or staying asleep, or sleeping too much -   Feeling tired or having little energy -   Poor appetite, weight loss, or overeating -   Feeling bad about yourself - or that you are a failure or have let yourself or your family down -   Trouble concentrating on things such as school, work, reading, or watching TV -   Moving or speaking so slowly that other people could have noticed; or the opposite being so fidgety that others notice -   Thoughts of being better off dead, or hurting yourself in some way -       DSM V-OUD Screen -deferred to next visit    Assessment/Plan:     ICD-10-CM ICD-9-CM    1. Chronic pain of multiple joints M25.50 719.49     G89.29 338.29    2. Chronic generalized pain disorder R52 780.96 fentaNYL (DURAGESIC) 50 mcg/hr PATCH    G89.29 338.29 oxyCODONE-acetaminophen (PERCOCET 10)  mg per tablet   3. Encounter for long-term (current) use of high-risk medication Z79.899 V58.69    4.  Chronic pain syndrome G89.4 338.4    5. Musculoskeletal pain M79.1 729.1 fentaNYL (DURAGESIC) 50 mcg/hr PATCH      oxyCODONE-acetaminophen (PERCOCET 10)  mg per tablet        Patient to continue compound cream use 2-3 times daily as needed for joint pain    Patient to continue regular follow-ups with neurologist regarding migraines and RLS    Do not recommend long term opioid therapy for this patient. Pt currently taking  oxycodone/acetaminophen 10/325mg up to 4 times per day as needed for pain and fentanyl 50 mcg patch every 48 hrs for pain with MME of approximately 180. Today, we will start the weaning of patients opioid medication with a goal of being opioid free, pending safety and compliance. Pt instructed to call if they experience any signs of withdrawal. Today, pt given prescription for oxycodone/acetaminophen 10/325 mg tablet take 1 tablet up to 4 times a day as needed for pain and fentanyl 50 MCG patch 1 patch every 72 hours for pain. Their new MME is 180. At next office visit, the plan is to decrease fentanyl to fentanyl 37 MCG patch 1 patch every 72 hours for pain and oxycodone/acetaminophen 10/325 mg tablet take 1 tablet up to 4 times a day as needed for pain. Their new MME will be approximately 150. If patient has difficulty with the wean or difficulty with cravings we will consider referral to mental health for ongoing assessment and treatment for opioid use disorder. Patient given information on SCS trial and will do her own investigation and let us know when she is ready to move forward. Consider SCS trial, psychotherapy, pain psychology, CBT    Follow up ongoing assessment and ongoing development of integrative and comprehensive plan of care for chronic pain. Goals: To establish complementary and integrative plan of care to address chronic pain issues while minimizing pharmaceuticals to maximize patient's function improve quality of life.     Education:  Patient again educated on the importance of strict compliance with the opioid care agreement while on opioid therapy. Patient also again educated that they should avoid driving while on chronic opioid therapy. Also advised to avoid alcohol and to avoid benzodiazepines while on opioid therapy. Handouts given regarding opioid safety and sleep health. Patient Homework:  Continue to independently investigate spinal cord stimulator trial..    Follow-up Disposition:  Return in about 12 weeks (around 10/18/2018).

## 2018-07-26 NOTE — PATIENT INSTRUCTIONS
Learning About Sleeping Well What does sleeping well mean? Sleeping well means getting enough sleep. How much sleep is enough varies among people. The number of hours you sleep is not as important as how you feel when you wake up. If you do not feel refreshed, you probably need more sleep. Another sign of not getting enough sleep is feeling tired during the day. The average total nightly sleep time is 7½ to 8 hours. Healthy adults may need a little more or a little less than this. Why is getting enough sleep important? Getting enough quality sleep is a basic part of good health. When your sleep suffers, your mood and your thoughts can suffer too. You may find yourself feeling more grumpy or stressed. Not getting enough sleep also can lead to serious problems, including injury, accidents, anxiety, and depression. What might cause poor sleeping? Many things can cause sleep problems, including: · Stress. Stress can be caused by fear about a single event, such as giving a speech. Or you may have ongoing stress, such as worry about work or school. · Depression, anxiety, and other mental or emotional conditions. · Changes in your sleep habits or surroundings. This includes changes that happen where you sleep, such as noise, light, or sleeping in a different bed. It also includes changes in your sleep pattern, such as having jet lag or working a late shift. · Health problems, such as pain, breathing problems, and restless legs syndrome. · Lack of regular exercise. How can you help yourself? Here are some tips that may help you sleep more soundly and wake up feeling more refreshed. Your sleeping area · Use your bedroom only for sleeping and sex. A bit of light reading may help you fall asleep. But if it doesn't, do your reading elsewhere in the house. Don't watch TV in bed. · Be sure your bed is big enough to stretch out comfortably, especially if you have a sleep partner.  
· Keep your bedroom quiet, dark, and cool. Use curtains, blinds, or a sleep mask to block out light. To block out noise, use earplugs, soothing music, or a \"white noise\" machine. Your evening and bedtime routine · Create a relaxing bedtime routine. You might want to take a warm shower or bath, listen to soothing music, or drink a cup of noncaffeinated tea. · Go to bed at the same time every night. And get up at the same time every morning, even if you feel tired. What to avoid · Limit caffeine (coffee, tea, caffeinated sodas) during the day, and don't have any for at least 4 to 6 hours before bedtime. · Don't drink alcohol before bedtime. Alcohol can cause you to wake up more often during the night. · Don't smoke or use tobacco, especially in the evening. Nicotine can keep you awake. · Don't take naps during the day, especially close to bedtime. · Don't lie in bed awake for too long. If you can't fall asleep, or if you wake up in the middle of the night and can't get back to sleep within 15 minutes or so, get out of bed and go to another room until you feel sleepy. · Don't take medicine right before bed that may keep you awake or make you feel hyper or energized. Your doctor can tell you if your medicine may do this and if you can take it earlier in the day. If you can't sleep · Imagine yourself in a peaceful, pleasant scene. Focus on the details and feelings of being in a place that is relaxing. · Get up and do a quiet or boring activity until you feel sleepy. · Don't drink any liquids after 6 p.m. if you wake up often because you have to go to the bathroom. Where can you learn more? Go to http://carolina-raiza.info/. Enter Q766 in the search box to learn more about \"Learning About Sleeping Well. \" Current as of: December 7, 2017 Content Version: 11.7 © 8975-6786 [employer], [facility].  Care instructions adapted under license by [employer] (which disclaims liability or warranty for this information). If you have questions about a medical condition or this instruction, always ask your healthcare professional. [address] any warranty or liability for your use of this information. Safe Use of Opioid Pain Medicine: Care Instructions Your Care Instructions Pain is your body's way of warning you that something is wrong. Pain feels different for everybody. Only you can describe your pain. A doctor can suggest or prescribe many types of medicines for pain. These range from over-the-counter medicines like acetaminophen (Tylenol) to powerful medicines called opioids. Examples of opioids are fentanyl, hydrocodone, morphine, and oxycodone. Heroin is an illegal opioid Opioids are strong medicines. They can help you manage pain when you use them the right way. But if you misuse them, they can cause serious harm and even death. For these reasons, doctors are very careful about how they prescribe opioids. If you decide to take opioids, here are some things to remember. · Keep your doctor informed. You can get addicted to opioids. The risk is higher if you have a history of substance use. Your doctor will monitor you closely for signs of misuse and addiction and to figure out when you no longer need to take opioids. · Make a treatment plan. The goal of your plan is to be able to function and do the things you need to do, even if you still have some pain. You might be able to manage your pain with other non-opioid options like physical therapy, relaxation, or over-the-counter pain medicines. · Be aware of the side effects. Opioids can cause serious side effects, such as constipation, dry mouth, and nausea. And over time, you may need a higher dose to get pain relief. This is called tolerance. Your body also gets used to opioids. This is called physical dependence. If you suddenly stop taking them, you may have withdrawal symptoms.  
The doctor carefully considered what pain medicine is right for you. You may not have received opioids if your doctor was concerned about drug interactions or your safety, or if he or she had other concerns. It is best to have one doctor or clinic treat your pain. This way you will get the pain medicine that will help you the most. And a doctor will be able to watch for any problems that the medicine might cause. The doctor has checked you carefully, but problems can develop later. If you notice any problems or new symptoms,  get medical treatment right away. Follow-up care is a key part of your treatment and safety. Be sure to make and go to all appointments, and call your doctor if you are having problems. It's also a good idea to know your test results and keep a list of the medicines you take. How can you care for yourself at home? · If you need to take opioids to manage your pain, remember these safety tips. ¨ Follow directions carefully. It's easy to misuse opioids if you take a dose other than what's prescribed by your doctor. This can lead to overdose and even death. Even sharing them with someone they weren't meant for is misuse. ¨ Be cautious. Opioids may affect your judgment and decision making. Do not drive or operate machinery until you can think clearly. Talk with your doctor about when it is safe to drive. ¨ Reduce the risk of drug interactions. Opioids can be dangerous if you take them with alcohol or with certain drugs like sleeping pills and muscle relaxers. Make sure your doctor knows about all the other medicines you take, including over-the-counter medicines. Don't start any new medicines before you talk to your doctor or pharmacist. 
Kendal Ellis Keep others safe. Store opioids in a safe and secure place. Make sure that pets, children, friends, and family can't get to them. When you're done using opioids, make sure to properly dispose of them.  You can either use a community drug take-back program or your drugstore's mail-back program. If one of these programs isn't available, you can flush opioid skin patches and unused opioid pills down the toilet. ¨ Reduce the risk of overdose. Misuse of opioids can be very dangerous. Protect yourself by asking your doctor about a naloxone rescue kit. It can help you-and even save your life-if you take too much of an opioid. · Try other ways to reduce pain. ¨ Relax, and reduce stress. Relaxation techniques such as deep breathing or meditation can help. ¨ Keep moving. Gentle, daily exercise can help reduce pain over the long run. Try low- or no-impact exercises such as walking, swimming, and stationary biking. Do stretches to stay flexible. ¨ Try heat, cold packs, and massage. ¨ Get enough sleep. Pain can make you tired and drain your energy. Talk with your doctor if you have trouble sleeping because of pain. ¨ Think positive. Your thoughts can affect your pain level. Do things that you enjoy to distract yourself when you have pain instead of focusing on the pain. See a movie, read a book, listen to music, or spend time with a friend. · If you are not taking a prescription pain medicine, ask your doctor if you can take an over-the-counter medicine. When should you call for help? Call your doctor now or seek immediate medical care if: 
  · You have a new kind of pain.  
  · You have new symptoms, such as a fever or rash, along with the pain.  
 Watch closely for changes in your health, and be sure to contact your doctor if: 
  · You think you might be using too much pain medicine, and you need help to use less or stop.  
  · Your pain gets worse.  
  · You would like a referral to a doctor or clinic that specializes in pain management. Where can you learn more? Go to http://carolina-raiza.info/. Enter R108 in the search box to learn more about \"Safe Use of Opioid Pain Medicine: Care Instructions. \" Current as of: September 10, 2017 Content Version: 11.7 © 3662-8867 [employer]. Care instructions adapted under license by [facility] (which disclaims liability or warranty for this information). If you have questions about a medical condition or this instruction, always ask your healthcare professional. [address] any warranty or liability for your use of this information.

## 2018-08-17 ENCOUNTER — DOCUMENTATION ONLY (OUTPATIENT)
Dept: PAIN MANAGEMENT | Age: 48
End: 2018-08-17

## 2018-08-17 ENCOUNTER — TELEPHONE (OUTPATIENT)
Dept: PAIN MANAGEMENT | Age: 48
End: 2018-08-17

## 2018-08-17 NOTE — TELEPHONE ENCOUNTER
Patient called the triage line and asked this office for a noted for her to have a therapy horse. I informed the patient that we will not be able to write any letter containing any of that medication. The patient stated that it is ridiculous that we can not write this note. I suggested that she speak with her psych MD concerning this matter. The patient verbalized understanding of the phone call and conversation and did not ask anymore questions.

## 2018-09-13 ENCOUNTER — TELEPHONE (OUTPATIENT)
Dept: PAIN MANAGEMENT | Age: 48
End: 2018-09-13

## 2018-09-13 NOTE — TELEPHONE ENCOUNTER
09/13/2018 at 01:46 pm Per patient call she wanted to know whatever happens to her records request. I looked in [address] [place] [address] the record request came on 08/28/2018 and on 08/29/2018 Yennifer Dueñas had faxed her request over to [employer]. I explained to the patient that it usually takes 4 weeks. She had already been seen by her new pain doctor and got a RX. I explained that this will disqualify her for keeping her scheduled appt in October or to get another refill. .. Naina Salgado

## 2018-09-20 ENCOUNTER — OFFICE VISIT (OUTPATIENT)
Dept: FAMILY MEDICINE CLINIC | Age: 48
End: 2018-09-20

## 2018-09-20 VITALS
HEIGHT: 68 IN | HEART RATE: 91 BPM | SYSTOLIC BLOOD PRESSURE: 100 MMHG | OXYGEN SATURATION: 96 % | WEIGHT: 185 LBS | TEMPERATURE: 98 F | DIASTOLIC BLOOD PRESSURE: 69 MMHG | RESPIRATION RATE: 18 BRPM | BODY MASS INDEX: 28.04 KG/M2

## 2018-09-20 DIAGNOSIS — I10 ESSENTIAL HYPERTENSION: ICD-10-CM

## 2018-09-20 DIAGNOSIS — E11.40 TYPE 2 DIABETES MELLITUS WITH DIABETIC NEUROPATHY, WITHOUT LONG-TERM CURRENT USE OF INSULIN (HCC): ICD-10-CM

## 2018-09-20 DIAGNOSIS — Z12.31 SCREENING MAMMOGRAM, ENCOUNTER FOR: ICD-10-CM

## 2018-09-20 DIAGNOSIS — Z78.0 POST-MENOPAUSAL: ICD-10-CM

## 2018-09-20 DIAGNOSIS — N30.01 ACUTE CYSTITIS WITH HEMATURIA: Primary | ICD-10-CM

## 2018-09-20 LAB
ALBUMIN UR QL STRIP: 150 MG/L
BILIRUB UR QL STRIP: NEGATIVE
CREATININE, URINE POC: 200 MG/DL
GLUCOSE UR-MCNC: NEGATIVE MG/DL
KETONES P FAST UR STRIP-MCNC: NEGATIVE MG/DL
MICROALBUMIN/CREAT RATIO POC: NORMAL MG/G
PH UR STRIP: 5.5 [PH] (ref 4.6–8)
PROT UR QL STRIP: NORMAL
SP GR UR STRIP: 1.03 (ref 1–1.03)
UA UROBILINOGEN AMB POC: NORMAL (ref 0.2–1)
URINALYSIS CLARITY POC: NORMAL
URINALYSIS COLOR POC: YELLOW
URINE BLOOD POC: NORMAL
URINE LEUKOCYTES POC: NORMAL
URINE NITRITES POC: NEGATIVE

## 2018-09-20 RX ORDER — NITROFURANTOIN (MACROCRYSTALS) 100 MG/1
100 CAPSULE ORAL 2 TIMES DAILY
Qty: 14 CAP | Refills: 0 | Status: SHIPPED | OUTPATIENT
Start: 2018-09-20 | End: 2018-09-27

## 2018-09-20 NOTE — MR AVS SNAPSHOT
[address] [address] 
[phone] Patient: Ernesto Jeans MRN: [id number] QHD:4/10/3607 Visit Information Date & Time Provider Department Dept. Phone Encounter #  
 9/20/2018 11:10 AM Alicja Cleveland MD [address] [id number] Upcoming Health Maintenance Date Due  
 EYE EXAM RETINAL OR DILATED Q1 3/10/2016 BREAST CANCER SCRN MAMMOGRAM 6/2/2016 FOOT EXAM Q1 10/4/2017 MICROALBUMIN Q1 10/4/2017 HEMOGLOBIN A1C Q6M 9/22/2018 Influenza Age 5 to Adult 3/31/2019* LIPID PANEL Q1 3/22/2019 DTaP/Tdap/Td series (2 - Td) 9/26/2026 *Topic was postponed. The date shown is not the original due date. Allergies as of 9/20/2018  Review Complete On: 9/20/2018 By: Dannie Bravo LPN Severity Noted Reaction Type Reactions Esomeprazole Magnesium  10/19/2010    Hives Other reaction(s): mild rash/itching Nexium [Esomeprazole Magnesium]  06/11/2012    Unknown (comments) Pcn [Penicillins]  06/11/2012    Unknown (comments) Current Immunizations  Reviewed on 3/22/2018 Name Date Influenza Vaccine 9/26/2016, 10/20/2015, 10/9/2014, 10/1/2013 Pneumococcal Polysaccharide (PPSV-23) 3/20/2018 Tdap 9/26/2016 Not reviewed this visit You Were Diagnosed With   
  
 Codes Comments Acute cystitis with hematuria    -  Primary ICD-10-CM: N30.01 
ICD-9-CM: 595.0 Type 2 diabetes mellitus with diabetic neuropathy, without long-term current use of insulin (HCC)     ICD-10-CM: E11.40 ICD-9-CM: 250.60, 357.2 Post-menopausal     ICD-10-CM: Z78.0 ICD-9-CM: V49.81 Screening mammogram, encounter for     ICD-10-CM: Z12.31 
ICD-9-CM: V76.12 Vitals BP Pulse Temp Resp Height(growth percentile) Weight(growth percentile)  100/69 (BP 1 Location: Right arm, BP Patient Position: Sitting) 91 98 °F (36.7 °C) (Oral) 18 5' 8\" (1.727 m) 185 lb (83.9 kg) SpO2 BMI OB Status Smoking Status 96% 28.13 kg/m2 Hysterectomy Former Smoker Vitals History BMI and BSA Data Body Mass Index Body Surface Area  
 28.13 kg/m 2 2.01 m 2 Preferred Pharmacy Pharmacy Name Phone [address] [facility] [address] [phone] Your Updated Medication List  
  
   
This list is accurate as of 9/20/18 11:33 AM.  Always use your most recent med list.  
  
  
  
  
 acyclovir 400 mg tablet Commonly known as:  ZOVIRAX Take 400 mg by mouth every four (4) hours (while awake). albuterol 90 mcg/actuation inhaler Commonly known as:  PROAIR HFA Take 2 Puffs by inhalation every four (4) hours as needed for Wheezing. Take 2 Puffs inhaled by mouth Every 4 Hours. AMITIZA 24 mcg capsule Generic drug:  lubiPROStone * cyclobenzaprine 10 mg tablet Commonly known as:  FLEXERIL Take  by mouth three (3) times daily as needed for Muscle Spasm(s). * cyclobenzaprine 10 mg tablet Commonly known as:  FLEXERIL Take 1 Tab by mouth two (2) times daily as needed for Muscle Spasm(s) for up to 30 days. As needed  Indications: Muscle Spasm Dexlansoprazole 60 mg Cpdb Commonly known as:  DEXILANT TAKE ONE CAPSULE BY MOUTH EVERY DAY  
  
 EPINEPHrine 0.3 mg/0.3 mL injection Commonly known as:  EPIPEN  
0.3 mL by IntraMUSCular route once as needed for 1 dose. fenofibrate nanocrystallized 145 mg tablet Commonly known as:  TRICOR  
TAKE 1 TABLET DAILY FOR    HYPERTRIGLYCERIDEMIA  
  
 fluocinoNIDE 0.05 % topical cream  
Commonly known as:  LIDEX APPLY TO THE AFFECTED AREA TOPICALLY TWO TIMES A DAY FLUoxetine 20 mg capsule Commonly known as:  PROzac Take 1 Cap by mouth daily. fluticasone 50 mcg/actuation nasal spray Commonly known as:  FLONASE  
USE 1 TO 2 SPRAYS NASALLY  ONCE DAILY fluticasone-salmeterol 250-50 mcg/dose diskus inhaler Commonly known as:  ADVAIR DISKUS Take 1 Puff by inhalation every twelve (12) hours. glucose blood VI test strips strip Commonly known as:  ON CALL EXPRESS TEST STRIP Test blood sugar twice daily DX: E11.9  
  
 guaiFENesin  mg ER tablet Commonly known as:  Dal[employer] Dalton Take 1 Tab by mouth two (2) times a day.  
  
 ketoconazole 2 % shampoo Commonly known as:  NIZORAL  
APPLY TO SCALP AND RINSE   DAILY AS NEEDED Lancets Misc Test Blood Sugar twice daily. DX Code: E11.9  
  
 loratadine 10 mg tablet Commonly known as:  Ne Thong Take 1 Tab by mouth nightly as needed for Rhinitis. LORazepam 1 mg tablet Commonly known as:  ATIVAN  
  
 montelukast 10 mg tablet Commonly known as:  SINGULAIR Take 1 Tab by mouth daily. naloxone 4 mg/actuation nasal spray Commonly known as:  NARCAN  
4 mg by Nasal route as needed for up to 2 doses. Indications: OPIOID TOXICITY  
  
 naproxen 500 mg tablet Commonly known as:  NAPROSYN  
TAKE ONE TABLET BY MOUTH TWICE A DAY WITH FOOD  
  
 nitrofurantoin 100 mg capsule Commonly known as:  MACRODANTIN Take 1 Cap by mouth two (2) times a day for 7 days. polyethylene glycol 17 gram/dose powder Commonly known as:  MIRALAX  
  
 pravastatin 40 mg tablet Commonly known as:  PRAVACHOL Take 2 Tabs by mouth nightly. raNITIdine 150 mg tablet Commonly known as:  ZANTAC TAKE 1 TABLET TWICE A DAY  
  
 rizatriptan 10 mg tablet Commonly known as:  [place] TAKE ONE TABLET BY MOUTH ONCE AS NEEDED MAY REPEAT IN 2 HOURS IF NEEDED  
  
 topiramate 25 mg tablet Commonly known as:  TOPAMAX TAKE ONE TABLET BY MOUTH AT BEDTIME  
  
 triamcinolone acetonide 0.1 % ointment Commonly known as:  KENALOG Apply  to affected area two (2) times a day. use thin layer TRULICITY 1.5 XP/7.2 mL sub-q pen Generic drug:  dulaglutide INJECT 0.5 ML SUB-Q EVERY 7 DAYS * Notice: This list has 2 medication(s) that are the same as other medications prescribed for you. Read the directions carefully, and ask your doctor or other care provider to review them with you. Prescriptions Sent to Pharmacy Refills  
 nitrofurantoin (MACRODANTIN) 100 mg capsule 0 Sig: Take 1 Cap by mouth two (2) times a day for 7 days. Class: Normal  
 Pharmacy: [address] [place] [address] #: [phone] Route: Oral  
  
We Performed the Following AMB POC URINALYSIS DIP STICK AUTO W/O MICRO [00792 CPT(R)] AMB POC URINE, MICROALBUMIN, SEMIQUANT (3 RESULTS) [04264 CPT(R)] CULTURE, URINE [id number] CPT(R)] To-Do List   
 09/20/2018 Imaging:  ESSENCE MAMMO BI SCREENING INCL CAD Patient Instructions Hormone Therapy (HT): Care Instructions Your Care Instructions Hormone therapy (HT) is medicine to treat symptoms of menopause, such as hot flashes, vaginal dryness, and sleep problems. It replaces the hormones that drop at menopause. Most women get relief from these symptoms within weeks of starting HT. HT contains two female hormones, estrogen and progestin. HT may come in the form of a pill, patch, gel, spray, or vaginal ring. A vaginal cream or a vaginal ring that has a much lower dose of estrogen may be used to relieve vaginal dryness only. HT has some risks. Most doctors recommend that women only take HT for as short a time as possible. This is to reduce the chances of heart disease, breast cancer, blood clots, and stroke that may be connected to HT. Be sure to have regular checkups with your doctor when taking HT. Talk with your doctor about whether HT is right for you. If you decide that the benefits of HT outweigh the risks, ask your doctor to prescribe the lowest effective dose for as short a time as possible. Follow-up care is a key part of your treatment and safety.  Be sure to make and go to all appointments, and call your doctor if you are having problems. It's also a good idea to know your test results and keep a list of the medicines you take. Why might you take HT? 
· HT reduces symptoms of menopause. These include hot flashes, mood swings, and sleep problems. · The estrogen in HT helps to prevent thinning bones. And it may lower the chance of colon cancer. · HT helps keep the lining of the vagina moist and thick. This can reduce irritation. · HT helps protect against dental problems, such as tooth loss and gum disease. What are the risks of taking HT? · Some women who take HT may have vaginal bleeding, bloating, nausea, sore breasts, mood swings, and headaches. Talk to your doctor about changing the type of HT you take or lowering the dose. This may help to end these side effects. · Taking HT may slightly increase your risk for heart disease, breast cancer, ovarian cancer, blood clots, and stroke. · You should not take HT if you: ¨ Could be pregnant. ¨ Have a personal history of breast cancer, endometrial cancer, pulmonary embolism, deep vein thrombosis, heart attack, or stroke. ¨ Have vaginal bleeding from an unknown cause. ¨ Have active liver disease. What can you do to reduce the symptoms of menopause? · Eat healthy foods and get regular exercise. This also will help to maintain strong bones and a healthy heart. · Do not smoke. If you smoke, you can reduce hot flashes and long-term health risks by stopping. If you need help quitting, talk to your doctor about stop-smoking programs and medicines. These can increase your chances of quitting for good. · Practice daily breathing exercises (meditation) to reduce hot flashes and mood swings. · Limit the amount of alcohol you drink. This can reduce symptoms of menopause and long-term health risks. · Keep your home and office cool. · Use a vaginal lubricant, such as Astroglide, Wet Gel Lubricant, or K-Y Jelly. · Do pelvic floor (Kegel) exercises, which tighten and strengthen pelvic muscles. To do Kegel exercises: 
¨ Squeeze the same muscles you would use to stop your urine. Your belly and thighs should not move. ¨ Hold the squeeze for 3 seconds, then relax for 3 seconds. ¨ Start with 3 seconds. Then add 1 second each week until you are able to squeeze for 10 seconds. ¨ Repeat the exercise 10 to 15 times a session. Do three or more sessions a day. Where can you learn more? Go to http://carolina-raiza.info/. Enter [id number] in the search box to learn more about \"Hormone Therapy (HT): Care Instructions. \" Current as of: October 6, 2017 Content Version: 11.7 © 7809-9071 [employer]. Care instructions adapted under license by [employer] (which disclaims liability or warranty for this information). If you have questions about a medical condition or this instruction, always ask your healthcare professional. [address] [address] any warranty or liability for your use of this information. Introducing [facility]! Debby Eng introduces MarketLive patient portal. Now you can access parts of your medical record, email your doctor's office, and request medication refills online. 1. In your internet browser, go to [email or web address] 2. Click on the First Time User? Click Here link in the Sign In box. You will see the New Member Sign Up page. 3. Enter your MarketLive Access Code exactly as it appears below. You will not need to use this code after youve completed the sign-up process. If you do not sign up before the expiration date, you must request a new code. · MarketLive Access Code: [facility] Expires: 12/19/2018 10:26 AM 
 
4. Enter the last four digits of your Social Security Number (xxxx) and Date of Birth (mm/dd/yyyy) as indicated and click Submit. You will be taken to the next sign-up page. 5. Create a Exergyn ID. This will be your Exergyn login ID and cannot be changed, so think of one that is secure and easy to remember. 6. Create a Exergyn password. You can change your password at any time. 7. Enter your Password Reset Question and Answer. This can be used at a later time if you forget your password. 8. Enter your e-mail address. You will receive e-mail notification when new information is available in [address] 9. Click Sign Up. You can now view and download portions of your medical record. 10. Click the Download Summary menu link to download a portable copy of your medical information. If you have questions, please visit the Frequently Asked Questions section of the Exergyn website. Remember, Exergyn is NOT to be used for urgent needs. For medical emergencies, dial 911. Now available from your iPhone and Android! Please provide this summary of care documentation to your next provider. Your primary care clinician is listed as Marisol Engel. If you have any questions after today's visit, please call [phone].

## 2018-09-20 NOTE — PATIENT INSTRUCTIONS
Hormone Therapy (HT): Care Instructions Your Care Instructions Hormone therapy (HT) is medicine to treat symptoms of menopause, such as hot flashes, vaginal dryness, and sleep problems. It replaces the hormones that drop at menopause. Most women get relief from these symptoms within weeks of starting HT. HT contains two female hormones, estrogen and progestin. HT may come in the form of a pill, patch, gel, spray, or vaginal ring. A vaginal cream or a vaginal ring that has a much lower dose of estrogen may be used to relieve vaginal dryness only. HT has some risks. Most doctors recommend that women only take HT for as short a time as possible. This is to reduce the chances of heart disease, breast cancer, blood clots, and stroke that may be connected to HT. Be sure to have regular checkups with your doctor when taking HT. Talk with your doctor about whether HT is right for you. If you decide that the benefits of HT outweigh the risks, ask your doctor to prescribe the lowest effective dose for as short a time as possible. Follow-up care is a key part of your treatment and safety. Be sure to make and go to all appointments, and call your doctor if you are having problems. It's also a good idea to know your test results and keep a list of the medicines you take. Why might you take HT? 
· HT reduces symptoms of menopause. These include hot flashes, mood swings, and sleep problems. · The estrogen in HT helps to prevent thinning bones. And it may lower the chance of colon cancer. · HT helps keep the lining of the vagina moist and thick. This can reduce irritation. · HT helps protect against dental problems, such as tooth loss and gum disease. What are the risks of taking HT? · Some women who take HT may have vaginal bleeding, bloating, nausea, sore breasts, mood swings, and headaches. Talk to your doctor about changing the type of HT you take or lowering the dose.  This may help to end these side effects. · Taking HT may slightly increase your risk for heart disease, breast cancer, ovarian cancer, blood clots, and stroke. · You should not take HT if you: ¨ Could be pregnant. ¨ Have a personal history of breast cancer, endometrial cancer, pulmonary embolism, deep vein thrombosis, heart attack, or stroke. ¨ Have vaginal bleeding from an unknown cause. ¨ Have active liver disease. What can you do to reduce the symptoms of menopause? · Eat healthy foods and get regular exercise. This also will help to maintain strong bones and a healthy heart. · Do not smoke. If you smoke, you can reduce hot flashes and long-term health risks by stopping. If you need help quitting, talk to your doctor about stop-smoking programs and medicines. These can increase your chances of quitting for good. · Practice daily breathing exercises (meditation) to reduce hot flashes and mood swings. · Limit the amount of alcohol you drink. This can reduce symptoms of menopause and long-term health risks. · Keep your home and office cool. · Use a vaginal lubricant, such as Astroglide, Wet Gel Lubricant, or K-Y Jelly. · Do pelvic floor (Kegel) exercises, which tighten and strengthen pelvic muscles. To do Kegel exercises: 
¨ Squeeze the same muscles you would use to stop your urine. Your belly and thighs should not move. ¨ Hold the squeeze for 3 seconds, then relax for 3 seconds. ¨ Start with 3 seconds. Then add 1 second each week until you are able to squeeze for 10 seconds. ¨ Repeat the exercise 10 to 15 times a session. Do three or more sessions a day. Where can you learn more? Go to http://carolina-raiza.info/. Enter [id number] in the search box to learn more about \"Hormone Therapy (HT): Care Instructions. \" Current as of: October 6, 2017 Content Version: 11.7 © 2288-1333 [employer].  Care instructions adapted under license by [employer] (which disclaims liability or warranty for this information). If you have questions about a medical condition or this instruction, always ask your healthcare professional. [address] [address] any warranty or liability for your use of this information.

## 2018-09-20 NOTE — PROGRESS NOTES
1. Have you been to the ER, urgent care clinic since your last visit? Hospitalized since your last visit? No 
 
2. Have you seen or consulted any other health care providers outside of the [address] [place] [address] since your last visit? Include any pap smears or colon screening. No 
Reviewed record in preparation for visit and have necessary documentation Pt did not bring medication to office visit for review 
opportunity was given for questions Goals that were addressed and/or need to be completed during or after this appointment include Health Maintenance Due Topic Date Due  
 EYE EXAM RETINAL OR DILATED Q1  03/10/2016  BREAST CANCER SCRN MAMMOGRAM  06/02/2016  
 FOOT EXAM Q1  10/04/2017  MICROALBUMIN Q1  10/04/2017  Influenza Age 5 to Adult  08/01/2018  HEMOGLOBIN A1C Q6M  09/22/2018

## 2018-09-21 LAB
ALBUMIN SERPL-MCNC: 4.2 G/DL (ref 3.5–5.5)
ALBUMIN/GLOB SERPL: 1.4 {RATIO} (ref 1.2–2.2)
ALP SERPL-CCNC: 114 IU/L (ref 39–117)
ALT SERPL-CCNC: 14 IU/L (ref 0–32)
AST SERPL-CCNC: 12 IU/L (ref 0–40)
BILIRUB SERPL-MCNC: 0.5 MG/DL (ref 0–1.2)
BUN SERPL-MCNC: 12 MG/DL (ref 6–24)
BUN/CREAT SERPL: 20 (ref 9–23)
CALCIUM SERPL-MCNC: 9.8 MG/DL (ref 8.7–10.2)
CHLORIDE SERPL-SCNC: 100 MMOL/L (ref 96–106)
CHOLEST SERPL-MCNC: 244 MG/DL (ref 100–199)
CO2 SERPL-SCNC: 25 MMOL/L (ref 20–29)
CREAT SERPL-MCNC: 0.59 MG/DL (ref 0.57–1)
ERYTHROCYTE [DISTWIDTH] IN BLOOD BY AUTOMATED COUNT: 14.4 % (ref 12.3–15.4)
EST. AVERAGE GLUCOSE BLD GHB EST-MCNC: 140 MG/DL
GLOBULIN SER CALC-MCNC: 3.1 G/DL (ref 1.5–4.5)
GLUCOSE SERPL-MCNC: 200 MG/DL (ref 65–99)
HBA1C MFR BLD: 6.5 % (ref 4.8–5.6)
HCT VFR BLD AUTO: 41.1 % (ref 34–46.6)
HDLC SERPL-MCNC: 35 MG/DL
HGB BLD-MCNC: 14.1 G/DL (ref 11.1–15.9)
LDLC SERPL CALC-MCNC: 173 MG/DL (ref 0–99)
MAGNESIUM SERPL-MCNC: 1.9 MG/DL (ref 1.6–2.3)
MCH RBC QN AUTO: 28.4 PG (ref 26.6–33)
MCHC RBC AUTO-ENTMCNC: 34.3 G/DL (ref 31.5–35.7)
MCV RBC AUTO: 83 FL (ref 79–97)
PLATELET # BLD AUTO: 270 X10E3/UL (ref 150–379)
POTASSIUM SERPL-SCNC: 5.1 MMOL/L (ref 3.5–5.2)
PROT SERPL-MCNC: 7.3 G/DL (ref 6–8.5)
RBC # BLD AUTO: 4.97 X10E6/UL (ref 3.77–5.28)
SODIUM SERPL-SCNC: 140 MMOL/L (ref 134–144)
TRIGL SERPL-MCNC: 180 MG/DL (ref 0–149)
TSH SERPL DL<=0.005 MIU/L-ACNC: 1.12 UIU/ML (ref 0.45–4.5)
VLDLC SERPL CALC-MCNC: 36 MG/DL (ref 5–40)
WBC # BLD AUTO: 14.3 X10E3/UL (ref 3.4–10.8)

## 2018-09-22 LAB — BACTERIA UR CULT: NORMAL

## 2018-09-24 PROBLEM — E11.21 TYPE 2 DIABETES WITH NEPHROPATHY (HCC): Status: ACTIVE | Noted: 2018-09-24

## 2018-09-25 NOTE — PROGRESS NOTES
Chief Complaint Patient presents with  Urinary Pain  Urinary Frequency  
 
she is a 56y.o. year old female who presents for follow up of her chronic medical conditions. Patient with multiple co-morbidities which include DM2, HLD, migraine, RLS, fibromyalgia and chronic pain. She complains of dysuria and increased frequency. Patient denies F/C, HA, dizziness, SOB, CP, abdominal/pelvic pain. Also has concern about increased facial hair. She had a hx of  TOMASA-BSO. Diabetes: This patient is being treating under a comprehensive plan of care for diabetes. Overall the patient feels well with good energy level. Insulin dependence: no 
 Pertinent Labs:  
Lab Results Component Value Date/Time Hemoglobin A1c 6.5 (H) 09/20/2018 03:36 PM  
 
 Body mass index is 28.13 kg/(m^2). Lab Results Component Value Date/Time LDL, calculated 173 (H) 09/20/2018 03:36 PM  
 
  
Wt Readings from Last 3 Encounters:  
09/20/18 185 lb (83.9 kg) 07/26/18 183 lb (83 kg) 06/14/18 183 lb (83 kg) History Smoking Status  Former Smoker Smokeless Tobacco  
 Never Used Comment: quit in 2010 Medications, diet and exercise as means of diabetic control with a goal of an A1C of less than 7.0% discussed. Diabetic foot care and annual eye exam discussed as well. Check blood sugars while fasting just before breakfast on most days and occasionally before dinner. Write down readings in a diabetic log book and bring them to the next visit. Call the office for fasting sugars over 200 or below 75 on two or more occasions. Call immediately if having symptoms of high sugar (frequent urination, always thirsty) or low sugar (dizzy, lethargic, sweaty, nauseated, headache). Our overall goal is to reduce or eliminate the long term consequences of poorly controlled diabetes. Patient expresses understanding and agreement with our plan of care. Hypertension: The patient reports:  taking medications as instructed, no medication side effects noted, no TIA's, no chest pain on exertion, no dyspnea on exertion, no swelling of ankles. Lifestyle modification/social history: sedentary BP Readings from Last 3 Encounters:  
09/20/18 100/69  
07/26/18 119/76  
06/14/18 107/71 Lab Results Component Value Date/Time Sodium 140 09/20/2018 03:36 PM  
 Potassium 5.1 09/20/2018 03:36 PM  
 Chloride 100 09/20/2018 03:36 PM  
 CO2 25 09/20/2018 03:36 PM  
 Anion gap 7 01/29/2010 08:59 AM  
 Glucose 200 (H) 09/20/2018 03:36 PM  
 BUN 12 09/20/2018 03:36 PM  
 Creatinine 0.59 09/20/2018 03:36 PM  
 BUN/Creatinine ratio 20 09/20/2018 03:36 PM  
 GFR est  09/20/2018 03:36 PM  
 GFR est non- 09/20/2018 03:36 PM  
 Calcium 9.8 09/20/2018 03:36 PM  
 
Patient advised to log blood pressures at home weekly and bring to next visit. Call office as soon as possible if BP's over 140/90 on multiple occasions or with symptoms of dizziness, chest pain, shortness of breath, headache or ankle swelling. Our goal is to normalize the blood pressure to decrease the risks of strokes and heart attacks. The patient is in agreement with the plan. Patient Active Problem List  
Diagnosis Code  Obesity E66.9  Diabetes mellitus ([facility] [address] E11.9  Hypertension I10  
 Fibromyalgia syndrome M79.7  Restless leg syndrome G25.81  Chronic generalized pain disorder R52, G89.29  
 Encounter for long-term (current) use of high-risk medication Z79.899  
 Headache R51  Persistent disorder of initiating or maintaining sleep G47.00  Migraine without aura G43.009  Musculoskeletal pain M79.1  Multiple environmental allergies Z91.09  
 History of headache Z87.898  Mixed hyperlipidemia E78.2 Past Surgical History:  
Procedure Laterality Date  HX GYN Partial Hysterectomy  HX HEENT    
 HX TUBAL LIGATION Social History Social History  Marital status: SINGLE Spouse name: N/A  
 Number of children: N/A  
 Years of education: N/A Occupational History  Not on file. Social History Main Topics  Smoking status: Former Smoker  Smokeless tobacco: Never Used Comment: quit in 2010  Alcohol use No  
 Drug use: No  
 Sexual activity: Not on file Other Topics Concern  Not on file Social History Narrative Family History Problem Relation Age of Onset  Alcohol abuse Father  Diabetes Mother  Hypertension Mother  Heart Disease Mother  Lupus Mother  Sleep Apnea Mother  Depression Mother  Cancer Maternal Aunt  Arthritis-osteo Other  Asthma Other  Diabetes Other  Elevated Lipids Other  Headache Other  Heart Disease Other  Hypertension Other  Migraines Other  Stroke Other  Bleeding Prob Neg Hx  Lung Disease Neg Hx  Psychiatric Disorder Neg Hx  Mental Retardation Neg Hx Current Outpatient Prescriptions Medication Sig  
 nitrofurantoin (MACRODANTIN) 100 mg capsule Take 1 Cap by mouth two (2) times a day for 7 days.  TRULICITY 1.5 WL/4.5 mL sub-q pen INJECT 0.5 ML SUB-Q EVERY 7 DAYS  cyclobenzaprine (FLEXERIL) 10 mg tablet Take  by mouth three (3) times daily as needed for Muscle Spasm(s).  acyclovir (ZOVIRAX) 400 mg tablet Take 400 mg by mouth every four (4) hours (while awake).  Dexlansoprazole (DEXILANT) 60 mg CpDB TAKE ONE CAPSULE BY MOUTH EVERY DAY  montelukast (SINGULAIR) 10 mg tablet Take 1 Tab by mouth daily.  triamcinolone acetonide (KENALOG) 0.1 % ointment Apply  to affected area two (2) times a day. use thin layer  raNITIdine (ZANTAC) 150 mg tablet TAKE 1 TABLET TWICE A DAY  fluticasone (FLONASE) 50 mcg/actuation nasal spray USE 1 TO 2 SPRAYS NASALLY  ONCE DAILY  fenofibrate nanocrystallized (TRICOR) 145 mg tablet TAKE 1 TABLET DAILY FOR    HYPERTRIGLYCERIDEMIA  fluocinoNIDE (LIDEX) 0.05 % topical cream APPLY TO THE AFFECTED AREA TOPICALLY TWO TIMES A DAY  ketoconazole (NIZORAL) 2 % shampoo APPLY TO SCALP AND RINSE   DAILY AS NEEDED  
 loratadine (CLARITIN) 10 mg tablet Take 1 Tab by mouth nightly as needed for Rhinitis.  fluticasone-salmeterol (ADVAIR DISKUS) 250-50 mcg/dose diskus inhaler Take 1 Puff by inhalation every twelve (12) hours.  AMITIZA 24 mcg capsule  FLUoxetine (PROZAC) 20 mg capsule Take 1 Cap by mouth daily.  naloxone 4 mg/actuation spry 4 mg by Nasal route as needed for up to 2 doses. Indications: OPIOID TOXICITY  topiramate (TOPAMAX) 25 mg tablet TAKE ONE TABLET BY MOUTH AT BEDTIME  albuterol (PROAIR HFA) 90 mcg/actuation inhaler Take 2 Puffs by inhalation every four (4) hours as needed for Wheezing. Take 2 Puffs inhaled by mouth Every 4 Hours.  rizatriptan (MAXALT) 10 mg tablet TAKE ONE TABLET BY MOUTH ONCE AS NEEDED MAY REPEAT IN 2 HOURS IF NEEDED  
 LORazepam (ATIVAN) 1 mg tablet  glucose blood VI test strips (ON CALL EXPRESS TEST STRIP) strip Test blood sugar twice daily DX: E11.9  Lancets misc Test Blood Sugar twice daily. DX Code: E11.9  polyethylene glycol (MIRALAX) 17 gram/dose powder  naproxen (NAPROSYN) 500 mg tablet TAKE ONE TABLET BY MOUTH TWICE A DAY WITH FOOD  
 guaiFENesin ER (MUCINEX) 600 mg ER tablet Take 1 Tab by mouth two (2) times a day.  EPINEPHrine (EPIPEN) 0.3 mg/0.3 mL (1:1,000) injection 0.3 mL by IntraMUSCular route once as needed for 1 dose.  cyclobenzaprine (FLEXERIL) 10 mg tablet Take 1 Tab by mouth two (2) times daily as needed for Muscle Spasm(s) for up to 30 days. As needed  Indications: Muscle Spasm  pravastatin (PRAVACHOL) 40 mg tablet Take 2 Tabs by mouth nightly. No current facility-administered medications for this visit. Allergies Allergen Reactions  Esomeprazole Magnesium Hives Other reaction(s): mild rash/itching  Nexium [Esomeprazole Magnesium] Unknown (comments)  Pcn [Penicillins] Unknown (comments) Review of Systems: 
Constitutional: Negative for fatigue or malaise Endo: Negative for unusual thirst or weight changes HEENT: Negative for acute hearing or vision changes Cardiovascular: Negative for dizziness, chest pain or palpitations Respiratory: Negative for cough, wheezing or SOB Gastreintestinal: Negative for nausea or abdominal pain Genital/urinary: see HPI Muscoloskeletal: see HPI, Negative for myalgias or arthralgias Neurological: Negative for headache, weakness or paresthesia Skin: Negative for rash or lesion Psychological: Negative for depression or anxiety Vitals:  
 09/20/18 1038 BP: 100/69 Pulse: 91  
Resp: 18 Temp: 98 °F (36.7 °C) TempSrc: Oral  
SpO2: 96% Weight: 185 lb (83.9 kg) Height: 5' 8\" (1.727 m) Physical Examination: 
General: Well developed, well nourished, in no acute distress Head: Normocephalic, atraumatic Eyes: Sclera clear, EOMI Neck: Normal range of motion Respiratory: CTAB with symmetrical, unlabored effort Cardiovascular: Normal S1, S2, Regular rate and rhythm Extremities: Full range of motion, normal strength Feet: 1+ pedal pulses, sensation intact, no lesions Neurologic: normal strength, No focal deficits Skin: no rash or lesion appreciated Psych: Active, alert and oriented. Affect appropriate Diagnoses and all orders for this visit: 
 
1. Acute cystitis with hematuria -     AMB POC URINALYSIS DIP STICK AUTO W/O MICRO 
-     AMB POC URINE, MICROALBUMIN, SEMIQUANT (3 RESULTS) -     CULTURE, URINE 
-     nitrofurantoin (MACRODANTIN) 100 mg capsule; Take 1 Cap by mouth two (2) times a day for 7 days. 2. Type 2 diabetes mellitus with diabetic neuropathy, without long-term current use of insulin ([address] -     CULTURE, URINE 3. Essential hypertension 4. Post-menopausal 
 
5. Screening mammogram, encounter for -     [facility] MAMMO BI SCREENING INCL CAD; Future Plan of care: 
Diagnoses were discussed in detail with patient. Medication risks/benefits/side effects discussed with patient. All of the patient's questions were addressed and answered to apparent satisfaction. The patient understands and agrees with our plan of care. The patient knows to call back if they have questions about the plan of care or if symptoms change. The patient received an After-Visit Summary which contains VS, diagnoses, orders, allergy and medication lists. No future appointments. Follow-up Disposition: Not on File

## 2018-11-01 ENCOUNTER — CLINICAL SUPPORT (OUTPATIENT)
Dept: FAMILY MEDICINE CLINIC | Age: 48
End: 2018-11-01

## 2018-11-01 VITALS — TEMPERATURE: 98.1 F

## 2018-11-01 DIAGNOSIS — Z23 ENCOUNTER FOR IMMUNIZATION: Primary | ICD-10-CM

## 2018-11-02 ENCOUNTER — OFFICE VISIT (OUTPATIENT)
Dept: FAMILY MEDICINE CLINIC | Age: 48
End: 2018-11-02

## 2018-11-02 VITALS
TEMPERATURE: 98.2 F | DIASTOLIC BLOOD PRESSURE: 76 MMHG | BODY MASS INDEX: 28.19 KG/M2 | RESPIRATION RATE: 16 BRPM | OXYGEN SATURATION: 96 % | SYSTOLIC BLOOD PRESSURE: 112 MMHG | HEART RATE: 80 BPM | WEIGHT: 186 LBS | HEIGHT: 68 IN

## 2018-11-02 DIAGNOSIS — F33.9 RECURRENT DEPRESSION (HCC): Primary | ICD-10-CM

## 2018-11-02 RX ORDER — BUPROPION HYDROCHLORIDE 150 MG/1
150 TABLET ORAL
Qty: 30 TAB | Refills: 1 | Status: SHIPPED | OUTPATIENT
Start: 2018-11-02 | End: 2018-11-13 | Stop reason: SINTOL

## 2018-11-02 NOTE — PROGRESS NOTES
1. Have you been to the ER, urgent care clinic since your last visit? Hospitalized since your last visit? no 
 
2. Have you seen or consulted any other health care providers outside of the [address] [place] [address] since your last visit? Include any pap smears or colon screening. yes Reviewed record in preparation for visit and have obtained necessary documentation. Patient did not bring medications to visit for review. Information provided on Advanced Directive, Living Will. Body mass index is 28.28 kg/m². Health Maintenance Due Topic Date Due  
 BREAST CANCER SCRN MAMMOGRAM  06/02/2016

## 2018-11-02 NOTE — PATIENT INSTRUCTIONS
November 2, 2018 Ben Ragland [address] [address] Dear Roxanna Lewis: 
 
Thank you for requesting access to [facility]. Please follow the instructions below to view your test results, access and download parts of your medical record, view details of your past and upcoming appointments, and view your medications online. How Do I Sign Up? 1. In your internet browser, go to [email or web address] 2. Click on the First Time User? Click Here link in the Sign In box. You will see the New Member Sign Up page. 3. Enter your [facility] Access Code exactly as it appears below. You will not need to use this code after youve completed the sign-up process. If you do not sign up before the expiration date, you must request a new code. · [facility] Access Code: [email or web address] 
· Expires: 12/19/2018 10:26 AM 
 
4. Enter the last four digits of your Social Security Number (xxxx) and Date of Birth (mm/dd/yyyy) as indicated and click Submit. You will be taken to the next sign-up page. 5. Create a [facility] ID. This will be your [facility] login ID and cannot be changed, so think of one that is secure and easy to remember. 6. Create a [facility] password. You can change your password at any time. 7. Enter your Password Reset Question and Answer. This can be used at a later time if you forget your password. 8. Enter your e-mail address. You will receive e-mail notification when new information is available in [address] 9. Click Sign Up. You can now view and download portions of your medical record. 10. Click the Download Summary menu link to download a portable copy of your medical information. Additional Information: If you require any assistance or have any questions, please contact our [address] at [phone], email at [email or web address] or check online in our Frequently Asked Questions. Remember, MyChart is NOT to be used for urgent needs. For medical emergencies, dial 911. Now available from your iPhone and Android! Sincerely, Royal Goodwin A Healthy Lifestyle: Care Instructions Your Care Instructions A healthy lifestyle can help you feel good, stay at a healthy weight, and have plenty of energy for both work and play. A healthy lifestyle is something you can share with your whole family. A healthy lifestyle also can lower your risk for serious health problems, such as high blood pressure, heart disease, and diabetes. You can follow a few steps listed below to improve your health and the health of your family. Follow-up care is a key part of your treatment and safety. Be sure to make and go to all appointments, and call your doctor if you are having problems. It's also a good idea to know your test results and keep a list of the medicines you take. How can you care for yourself at home? · Do not eat too much sugar, fat, or fast foods. You can still have dessert and treats now and then. The goal is moderation. · Start small to improve your eating habits. Pay attention to portion sizes, drink less juice and soda pop, and eat more fruits and vegetables. ? Eat a healthy amount of food. A 3-ounce serving of meat, for example, is about the size of a deck of cards. Fill the rest of your plate with vegetables and whole grains. ? Limit the amount of soda and sports drinks you have every day. Drink more water when you are thirsty. ? Eat at least 5 servings of fruits and vegetables every day. It may seem like a lot, but it is not hard to reach this goal. A serving or helping is 1 piece of fruit, 1 cup of vegetables, or 2 cups of leafy, raw vegetables. Have an apple or some carrot sticks as an afternoon snack instead of a candy bar. Try to have fruits and/or vegetables at every meal. 
· Make exercise part of your daily routine.  You may want to start with simple activities, such as walking, bicycling, or slow swimming. Try to be active 30 to 60 minutes every day. You do not need to do all 30 to 60 minutes all at once. For example, you can exercise 3 times a day for 10 or 20 minutes. Moderate exercise is safe for most people, but it is always a good idea to talk to your doctor before starting an exercise program. 
· Keep moving. Dottie Miguel the lawn, work in the garden, or [employer]. Take the stairs instead of the elevator at work. · If you smoke, quit. People who smoke have an increased risk for heart attack, stroke, cancer, and other lung illnesses. Quitting is hard, but there are ways to boost your chance of quitting tobacco for good. ? Use nicotine gum, patches, or lozenges. ? Ask your doctor about stop-smoking programs and medicines. ? Keep trying. In addition to reducing your risk of diseases in the future, you will notice some benefits soon after you stop using tobacco. If you have shortness of breath or asthma symptoms, they will likely get better within a few weeks after you quit. · Limit how much alcohol you drink. Moderate amounts of alcohol (up to 2 drinks a day for men, 1 drink a day for women) are okay. But drinking too much can lead to liver problems, high blood pressure, and other health problems. Family health If you have a family, there are many things you can do together to improve your health. · Eat meals together as a family as often as possible. · Eat healthy foods. This includes fruits, vegetables, lean meats and dairy, and whole grains. · Include your family in your fitness plan. Most people think of activities such as jogging or tennis as the way to fitness, but there are many ways you and your family can be more active. Anything that makes you breathe hard and gets your heart pumping is exercise. Here are some tips: 
? Walk to do errands or to take your child to school or the bus. ? Go for a family bike ride after dinner instead of watching TV. Where can you learn more? Go to http://carolina-raiza.info/. Enter P504 in the search box to learn more about \"A Healthy Lifestyle: Care Instructions. \" Current as of: December 7, 2017 Content Version: 11.8 © 9222-3705 [employer]. Care instructions adapted under license by [employer] (which disclaims liability or warranty for this information). If you have questions about a medical condition or this instruction, always ask your healthcare professional. [address] any warranty or liability for your use of this information.

## 2018-11-03 PROBLEM — F33.9 RECURRENT DEPRESSION (HCC): Status: ACTIVE | Noted: 2018-11-03

## 2018-11-03 NOTE — PROGRESS NOTES
Chief Complaint Patient presents with  Depression  
 
she is a 53y.o. year old female who presents with concern about depression. She says she is starting to feel a downward spiral of her mood. She is easily tearful. She has been on medication for depression in the past.. Patient with multiple co-morbidities which include DM2, HLD, migraine, RLS, fibromyalgia and chronic pain. Patient denies HA, dizziness, SOB, CP, abdominal pain, dysuria. She is followed by pain management. She denies wanting to harm self or others. Diabetes: This patient is being treating under a comprehensive plan of care for diabetes. Overall the patient feels well with good energy level. Insulin dependence: no 
 Pertinent Labs:  
Lab Results Component Value Date/Time Hemoglobin A1c 6.5 (H) 09/20/2018 03:36 PM  
 
 Body mass index is 28.28 kg/m². Lab Results Component Value Date/Time LDL, calculated 173 (H) 09/20/2018 03:36 PM  
 
  
Wt Readings from Last 3 Encounters:  
11/02/18 186 lb (84.4 kg) 09/20/18 185 lb (83.9 kg) 07/26/18 183 lb (83 kg) Social History Tobacco Use Smoking Status Former Smoker Smokeless Tobacco Never Used Tobacco Comment  
 quit in 2010 Medications, diet and exercise as means of diabetic control with a goal of an A1C of less than 7.0% discussed. Diabetic foot care and annual eye exam discussed as well. Check blood sugars while fasting just before breakfast on most days and occasionally before dinner. Write down readings in a diabetic log book and bring them to the next visit. Call the office for fasting sugars over 200 or below 75 on two or more occasions. Call immediately if having symptoms of high sugar (frequent urination, always thirsty) or low sugar (dizzy, lethargic, sweaty, nauseated, headache). Our overall goal is to reduce or eliminate the long term consequences of poorly controlled diabetes.  Patient expresses understanding and agreement with our plan of care. Patient Active Problem List  
Diagnosis Code  Obesity E66.9  Diabetes mellitus ([facility] [address] E11.9  Hypertension I10  
 Fibromyalgia syndrome M79.7  Restless leg syndrome G25.81  Chronic generalized pain disorder R52, G89.29  
 Encounter for long-term (current) use of high-risk medication Z79.899  
 Headache R51  Persistent disorder of initiating or maintaining sleep G47.00  Migraine without aura G43.009  Musculoskeletal pain M79.18  
 Multiple environmental allergies Z91.09  
 History of headache Z87.898  Mixed hyperlipidemia E78.2  Type 2 diabetes with nephropathy (HCC) E11.21  
 Recurrent depression ([facility] [address] F33.9 Past Surgical History:  
Procedure Laterality Date  HX GYN Partial Hysterectomy  HX HEENT    
 HX TUBAL LIGATION Social History Socioeconomic History  Marital status: SINGLE Spouse name: Not on file  Number of children: Not on file  Years of education: Not on file  Highest education level: Not on file Social Needs  Financial resource strain: Not on file  Food insecurity - worry: Not on file  Food insecurity - inability: Not on file  Transportation needs - medical: Not on file  Transportation needs - non-medical: Not on file Occupational History  Not on file Tobacco Use  Smoking status: Former Smoker  Smokeless tobacco: Never Used  Tobacco comment: quit in 2010 Substance and Sexual Activity  Alcohol use: No  
 Drug use: No  
 Sexual activity: Not on file Other Topics Concern  Not on file Social History Narrative  Not on file Family History Problem Relation Age of Onset  Alcohol abuse Father  Diabetes Mother  Hypertension Mother  Heart Disease Mother  Lupus Mother  Sleep Apnea Mother  Depression Mother  Cancer Maternal Aunt  Arthritis-osteo Other  Asthma Other  Diabetes Other  Elevated Lipids Other  Headache Other  Heart Disease Other  Hypertension Other  Migraines Other  Stroke Other  Bleeding Prob Neg Hx  Lung Disease Neg Hx  Psychiatric Disorder Neg Hx  Mental Retardation Neg Hx Current Outpatient Medications Medication Sig  
 buPROPion XL (WELLBUTRIN XL) 150 mg tablet Take 1 Tab by mouth every morning.  TRULICITY 1.5 [place]/2.6 mL sub-q pen INJECT 0.5 ML SUB-Q EVERY 7 DAYS  cyclobenzaprine (FLEXERIL) 10 mg tablet Take  by mouth three (3) times daily as needed for Muscle Spasm(s).  Dexlansoprazole (DEXILANT) 60 mg CpDB TAKE ONE CAPSULE BY MOUTH EVERY DAY  montelukast (SINGULAIR) 10 mg tablet Take 1 Tab by mouth daily.  triamcinolone acetonide (KENALOG) 0.1 % ointment Apply  to affected area two (2) times a day. use thin layer  raNITIdine (ZANTAC) 150 mg tablet TAKE 1 TABLET TWICE A DAY  pravastatin (PRAVACHOL) 40 mg tablet Take 2 Tabs by mouth nightly.  fluticasone (FLONASE) 50 mcg/actuation nasal spray USE 1 TO 2 SPRAYS NASALLY  ONCE DAILY  fenofibrate nanocrystallized (TRICOR) 145 mg tablet TAKE 1 TABLET DAILY FOR    HYPERTRIGLYCERIDEMIA  fluocinoNIDE (LIDEX) 0.05 % topical cream APPLY TO THE AFFECTED AREA TOPICALLY TWO TIMES A DAY  ketoconazole (NIZORAL) 2 % shampoo APPLY TO SCALP AND RINSE   DAILY AS NEEDED  
 loratadine (CLARITIN) 10 mg tablet Take 1 Tab by mouth nightly as needed for Rhinitis.  fluticasone-salmeterol (ADVAIR DISKUS) 250-50 mcg/dose diskus inhaler Take 1 Puff by inhalation every twelve (12) hours.  AMITIZA 24 mcg capsule  topiramate (TOPAMAX) 25 mg tablet TAKE ONE TABLET BY MOUTH AT BEDTIME  albuterol (PROAIR HFA) 90 mcg/actuation inhaler Take 2 Puffs by inhalation every four (4) hours as needed for Wheezing. Take 2 Puffs inhaled by mouth Every 4 Hours.   
 rizatriptan (MAXALT) 10 mg tablet TAKE ONE TABLET BY MOUTH ONCE AS NEEDED MAY REPEAT IN 2 HOURS IF NEEDED  
  glucose blood VI test strips (ON CALL EXPRESS TEST STRIP) strip Test blood sugar twice daily DX: E11.9  Lancets misc Test Blood Sugar twice daily. DX Code: E11.9  polyethylene glycol (MIRALAX) 17 gram/dose powder  EPINEPHrine (EPIPEN) 0.3 mg/0.3 mL (1:1,000) injection 0.3 mL by IntraMUSCular route once as needed for 1 dose.  acyclovir (ZOVIRAX) 400 mg tablet Take 400 mg by mouth every four (4) hours (while awake).  cyclobenzaprine (FLEXERIL) 10 mg tablet Take 1 Tab by mouth two (2) times daily as needed for Muscle Spasm(s) for up to 30 days. As needed  Indications: Muscle Spasm  
 naloxone 4 mg/actuation spry 4 mg by Nasal route as needed for up to 2 doses. Indications: OPIOID TOXICITY  LORazepam (ATIVAN) 1 mg tablet  naproxen (NAPROSYN) 500 mg tablet TAKE ONE TABLET BY MOUTH TWICE A DAY WITH FOOD  
 guaiFENesin ER (MUCINEX) 600 mg ER tablet Take 1 Tab by mouth two (2) times a day. No current facility-administered medications for this visit. Allergies Allergen Reactions  Esomeprazole Magnesium Hives Other reaction(s): mild rash/itching  Nexium [Esomeprazole Magnesium] Unknown (comments)  Pcn [Penicillins] Unknown (comments) Review of Systems: 
Constitutional: Negative for fatigue or malaise Endo: Negative for unusual thirst or weight changes HEENT: Negative for acute hearing or vision changes Cardiovascular: Negative for dizziness, chest pain or palpitations Respiratory: Negative for cough, wheezing or SOB Gastreintestinal: Negative for nausea or abdominal pain Genital/urinary: Negative for dysuria or voiding dysfunction Muscoloskeletal: see HPI, Negative for myalgias or arthralgias Neurological: Negative for headache, weakness or paresthesia Skin: Negative for rash or lesion Psychological: see HPI Vitals:  
 11/02/18 1335 BP: 112/76 Pulse: 80 Resp: 16 Temp: 98.2 °F (36.8 °C) TempSrc: Oral  
SpO2: 96% Weight: 186 lb (84.4 kg) Height: 5' 8\" (1.727 m) Physical Examination: 
General: Well developed, well nourished, in no acute distress Head: Normocephalic, atraumatic Eyes: Sclera clear, EOMI Neck: Normal range of motion Respiratory: CTAB with symmetrical, unlabored effort Cardiovascular: Normal S1, S2, Regular rate and rhythm Extremities: Full range of motion, no edema Neurologic: Normal strength, No focal deficits Skin: warm and dry Psych: Active, alert and oriented. Affect appropriate Diagnoses and all orders for this visit: 1. Recurrent depression ([address] -     buPROPion XL (WELLBUTRIN XL) 150 mg tablet; Take 1 Tab by mouth every morning. Plan of care: 
Diagnoses were discussed in detail with patient. Reviewed concept of anxiety and depression as biochemical imbalance of neurotransmitters and rationale for treatment. Instructed patient to contact office or on-call physician promptly should condition worsen or any new symptoms appear and provided on-call telephone numbers. IF THE PATIENT HAS ANY SUICIDAL OR HOMICIDAL IDEATION, CALL THE OFFICE, DISCUSS WITH A SUPPORT MEMBER OR GO TO THE ER IMMEDIATELY. Patient was agreeable with this plan Medication risks/benefits/side effects discussed with patient. All of the patient's questions were addressed and answered to apparent satisfaction. The patient understands and agrees with our plan of care. The patient knows to call back if they have questions about the plan of care or if symptoms change. The patient received an After-Visit Summary which contains VS, diagnoses, orders, allergy and medication lists. Over half of the 25 minutes face to face with Defreddie Hoyos consisted of counseling and discussing treatment plans in reference to her depression. No future appointments. Follow-up Disposition: 
Return in about 2 weeks (around 11/16/2018).

## 2018-11-13 ENCOUNTER — TELEPHONE (OUTPATIENT)
Dept: FAMILY MEDICINE CLINIC | Age: 48
End: 2018-11-13

## 2018-11-13 RX ORDER — ESCITALOPRAM OXALATE 10 MG/1
10 TABLET ORAL DAILY
Qty: 30 TAB | Refills: 1 | Status: SHIPPED | OUTPATIENT
Start: 2018-11-13 | End: 2019-01-16 | Stop reason: SDUPTHER

## 2018-11-13 NOTE — TELEPHONE ENCOUNTER
Patient called and stated that the Wellbutrin is not working. It caused confusion, and her to feel sick to the stomach. She would like something different.   Please call to discuss at [phone]

## 2018-11-30 ENCOUNTER — TELEPHONE (OUTPATIENT)
Dept: FAMILY MEDICINE CLINIC | Age: 48
End: 2018-11-30

## 2018-11-30 NOTE — TELEPHONE ENCOUNTER
Patient was seen in [facility] in the ER and given Bactrim for the UTI. She is now having severe reaction to this medication. She is having severe sweating, headaches, nausea. Upon reading possible side effects she read that Bactrim and the Lexapro can have severe interactions. She read that it will take three days for the medicine to leave her system. In the meantime she doesn't know if she should continue taking the Lexapro which helps with her depression. She has 4 pills of the Bactrim left. She did not take today's dose. Please call to advise at [phone].

## 2019-02-25 DIAGNOSIS — E11.40 TYPE 2 DIABETES MELLITUS WITH DIABETIC NEUROPATHY, WITHOUT LONG-TERM CURRENT USE OF INSULIN (HCC): ICD-10-CM

## 2019-02-26 RX ORDER — DULAGLUTIDE 1.5 MG/.5ML
INJECTION, SOLUTION SUBCUTANEOUS
Qty: 2 ML | Refills: 0 | Status: SHIPPED | OUTPATIENT
Start: 2019-02-26 | End: 2019-04-03 | Stop reason: SDUPTHER

## 2019-04-02 ENCOUNTER — OFFICE VISIT (OUTPATIENT)
Dept: FAMILY MEDICINE CLINIC | Age: 49
End: 2019-04-02

## 2019-04-02 VITALS
SYSTOLIC BLOOD PRESSURE: 120 MMHG | BODY MASS INDEX: 27.95 KG/M2 | HEIGHT: 68 IN | RESPIRATION RATE: 20 BRPM | WEIGHT: 184.4 LBS | HEART RATE: 88 BPM | DIASTOLIC BLOOD PRESSURE: 81 MMHG | OXYGEN SATURATION: 94 % | TEMPERATURE: 98.5 F

## 2019-04-02 DIAGNOSIS — R52 CHRONIC GENERALIZED PAIN DISORDER: ICD-10-CM

## 2019-04-02 DIAGNOSIS — J06.9 ACUTE UPPER RESPIRATORY INFECTION, UNSPECIFIED: Primary | ICD-10-CM

## 2019-04-02 DIAGNOSIS — E78.2 MIXED HYPERLIPIDEMIA: ICD-10-CM

## 2019-04-02 DIAGNOSIS — Z12.31 BREAST CANCER SCREENING BY MAMMOGRAM: ICD-10-CM

## 2019-04-02 DIAGNOSIS — Z91.09 MULTIPLE ENVIRONMENTAL ALLERGIES: ICD-10-CM

## 2019-04-02 DIAGNOSIS — G89.29 CHRONIC GENERALIZED PAIN DISORDER: ICD-10-CM

## 2019-04-02 DIAGNOSIS — E11.40 TYPE 2 DIABETES MELLITUS WITH DIABETIC NEUROPATHY, WITHOUT LONG-TERM CURRENT USE OF INSULIN (HCC): ICD-10-CM

## 2019-04-02 LAB — HBA1C MFR BLD HPLC: 6.9 %

## 2019-04-02 RX ORDER — NAPROXEN 500 MG/1
TABLET ORAL
Qty: 60 TAB | Refills: 2 | Status: SHIPPED | OUTPATIENT
Start: 2019-04-02 | End: 2019-04-03 | Stop reason: SDUPTHER

## 2019-04-02 RX ORDER — KETOCONAZOLE 20 MG/ML
SHAMPOO TOPICAL
Qty: 120 ML | Refills: 2 | COMMUNITY
Start: 2019-04-02 | End: 2019-04-03 | Stop reason: SDUPTHER

## 2019-04-02 RX ORDER — FLUOCINONIDE 0.5 MG/G
CREAM TOPICAL
Qty: 15 G | Refills: 2 | COMMUNITY
Start: 2019-04-02 | End: 2019-04-03 | Stop reason: SDUPTHER

## 2019-04-02 RX ORDER — CEFPROZIL 250 MG/1
250 TABLET, FILM COATED ORAL 2 TIMES DAILY
Qty: 20 TAB | Refills: 0 | Status: SHIPPED | OUTPATIENT
Start: 2019-04-02 | End: 2019-04-11

## 2019-04-02 NOTE — PROGRESS NOTES
Chief Complaint Patient presents with  Cough  
  congestion  Sore Throat Visit Vitals /81 (BP 1 Location: Right arm, BP Patient Position: Sitting) Pulse 88 Temp 98.5 °F (36.9 °C) (Oral) Resp 20 Ht 5' 8\" (1.727 m) Wt 184 lb 6.4 oz (83.6 kg) SpO2 94% BMI 28.04 kg/m² 1. Have you been to the ER, urgent care clinic since your last visit? Hospitalized since your last visit? No 
 
2. Have you seen or consulted any other health care providers outside of the [address] [place] [address] since your last visit? Include any pap smears or colon screening. No 
 
Reviewed record in preparation for visit and have necessary documentation Pt did not bring medication to office visit for review 
opportunity was given for questions Goals that were addressed and/or need to be completed during or after this appointment include Health Maintenance Due Topic Date Due  
 BREAST CANCER SCRN MAMMOGRAM  06/02/2016  
 HEMOGLOBIN A1C Q6M  03/20/2019  MEDICARE YEARLY EXAM  03/23/2019

## 2019-04-02 NOTE — PROGRESS NOTES
Chief Complaint Patient presents with  Cough  
  congestion  Sore Throat  
 
she is a 53y.o. year old female who presents with concern about cough, congestion and sore throat. Patient with multiple co-morbidities which include DM2, HLD, migraine, RLS, fibromyalgia and chronic pain. Patient denies HA, dizziness, SOB, CP, abdominal pain, dysuria. She is followed by pain management. Diabetes: This patient is being treating under a comprehensive plan of care for diabetes. Overall the patient feels well with good energy level. Insulin dependence: no 
 Pertinent Labs:  
Lab Results Component Value Date/Time Hemoglobin A1c (POC) 6.9 04/02/2019 04:27 PM  
  
 Body mass index is 28.04 kg/m². Lab Results Component Value Date/Time LDL, calculated 173 (H) 09/20/2018 03:36 PM  
 
  
Wt Readings from Last 3 Encounters:  
04/11/19 180 lb (81.6 kg) 04/02/19 184 lb 6.4 oz (83.6 kg) 11/02/18 186 lb (84.4 kg) Social History Tobacco Use Smoking Status Former Smoker Smokeless Tobacco Never Used Tobacco Comment  
 quit in 2010 Medications, diet and exercise as means of diabetic control with a goal of an A1C of less than 7.0% discussed. Diabetic foot care and annual eye exam discussed as well. Check blood sugars while fasting just before breakfast on most days and occasionally before dinner. Call the office for fasting sugars over 200 or below 75 on two or more occasions. Call immediately if having symptoms of high sugar (frequent urination, always thirsty) or low sugar (dizzy, lethargic, sweaty, nauseated, headache). Our overall goal is to reduce or eliminate the long term consequences of poorly controlled diabetes. Patient expresses understanding and agreement with our plan of care. Patient Active Problem List  
Diagnosis Code  Obesity E66.9  Diabetes mellitus ([address] E11.9  Hypertension I10  
 Fibromyalgia syndrome M79.7  Restless leg syndrome G25.81  Chronic generalized pain disorder R52, G89.29  
 Encounter for long-term (current) use of high-risk medication Z79.899  
 Headache R51  Persistent disorder of initiating or maintaining sleep G47.00  Migraine without aura G43.009  Musculoskeletal pain M79.18  
 Multiple environmental allergies Z91.09  
 History of headache Z87.898  Mixed hyperlipidemia E78.2  Type 2 diabetes with nephropathy (HCC) E11.21  
 Recurrent depression ([facility] [address] F33.9 Past Surgical History:  
Procedure Laterality Date  HX GYN Partial Hysterectomy  HX HEENT    
 HX TUBAL LIGATION Social History Socioeconomic History  Marital status: SINGLE Spouse name: Not on file  Number of children: Not on file  Years of education: Not on file  Highest education level: Not on file Occupational History  Not on file Social Needs  Financial resource strain: Not on file  Food insecurity:  
  Worry: Not on file Inability: Not on file  Transportation needs:  
  Medical: Not on file Non-medical: Not on file Tobacco Use  Smoking status: Former Smoker  Smokeless tobacco: Never Used  Tobacco comment: quit in 2010 Substance and Sexual Activity  Alcohol use: No  
 Drug use: No  
 Sexual activity: Not on file Lifestyle  Physical activity:  
  Days per week: Not on file Minutes per session: Not on file  Stress: Not on file Relationships  Social connections:  
  Talks on phone: Not on file Gets together: Not on file Attends Rastafarian service: Not on file Active member of club or organization: Not on file Attends meetings of clubs or organizations: Not on file Relationship status: Not on file  Intimate partner violence:  
  Fear of current or ex partner: Not on file Emotionally abused: Not on file Physically abused: Not on file Forced sexual activity: Not on file Other Topics Concern  Not on file Social History Narrative  Not on file Family History Problem Relation Age of Onset  Alcohol abuse Father  Diabetes Mother  Hypertension Mother  Heart Disease Mother  Lupus Mother  Sleep Apnea Mother  Depression Mother  Cancer Maternal Aunt  Arthritis-osteo Other  Asthma Other  Diabetes Other  Elevated Lipids Other  Headache Other  Heart Disease Other  Hypertension Other  Migraines Other  Stroke Other  Bleeding Prob Neg Hx  Lung Disease Neg Hx  Psychiatric Disorder Neg Hx  Mental Retardation Neg Hx Current Outpatient Medications Medication Sig  pravastatin (PRAVACHOL) 40 mg tablet Take 2 Tabs by mouth nightly.  AMITIZA 24 mcg capsule  naloxone 4 mg/actuation spry 4 mg by Nasal route as needed for up to 2 doses. Indications: OPIOID TOXICITY  albuterol (PROAIR HFA) 90 mcg/actuation inhaler Take 2 Puffs by inhalation every four (4) hours as needed for Wheezing. Take 2 Puffs inhaled by mouth Every 4 Hours. (Patient taking differently: Take 2 Puffs by inhalation every four (4) hours as needed for Wheezing.)  polyethylene glycol (MIRALAX) 17 gram/dose powder  fentaNYL (DURAGESIC) 50 mcg/hr PATCH   
 HYDROcodone-acetaminophen (NORCO) 5-325 mg per tablet Take 1 Tab by mouth.  aspirin delayed-release 81 mg tablet Take  by mouth daily.  fluocinoNIDE (LIDEX) 0.05 % topical cream APPLY TO THE AFFECTED AREA TOPICALLY TWO TIMES A DAY  dexlansoprazole (DEXILANT) 60 mg CpDB capsule (delayed release) TAKE ONE CAPSULE BY MOUTH EVERY DAY  escitalopram oxalate (LEXAPRO) 10 mg tablet TAKE ONE TABLET BY MOUTH EVERY DAY  ketoconazole (NIZORAL) 2 % shampoo APPLY TO SCALP AND RINSE   DAILY AS NEEDED  
 raNITIdine (ZANTAC) 150 mg tablet TAKE 1 TABLET TWICE A DAY  topiramate (TOPAMAX) 25 mg tablet TAKE ONE TABLET BY MOUTH AT BEDTIME  
  montelukast (SINGULAIR) 10 mg tablet Take 1 Tab by mouth daily.  fluticasone propionate (FLONASE) 50 mcg/actuation nasal spray USE 1 TO 2 SPRAYS NASALLY  ONCE DAILY  dulaglutide (TRULICITY) 1.5 QS/3.1 mL sub-q pen INJECT 0.5 ML SUB-Q EVERY 7 DAYS  rizatriptan (MAXALT) 10 mg tablet TAKE ONE TABLET BY MOUTH ONCE AS NEEDED MAY REPEAT IN 2 HOURS IF NEEDED  fluticasone propion-salmeterol (ADVAIR DISKUS) 250-50 mcg/dose diskus inhaler Take 1 Puff by inhalation every twelve (12) hours.  naproxen (NAPROSYN) 500 mg tablet TAKE ONE TABLET BY MOUTH TWICE A DAY WITH FOOD AS NEEDED.  fenofibrate nanocrystallized (TRICOR) 145 mg tablet TAKE 1 TABLET DAILY FOR    HYPERTRIGLYCERIDEMIA  cyclobenzaprine (FLEXERIL) 10 mg tablet Take 1 Tab by mouth three (3) times daily as needed for Muscle Spasm(s).  cyclobenzaprine (FLEXERIL) 10 mg tablet Take 1 Tab by mouth two (2) times daily as needed for Muscle Spasm(s) for up to 30 days. As needed  Indications: Muscle Spasm No current facility-administered medications for this visit. Allergies Allergen Reactions  Esomeprazole Magnesium Hives Other reaction(s): mild rash/itching  Nexium [Esomeprazole Magnesium] Unknown (comments)  Pcn [Penicillins] Unknown (comments) Review of Symptoms: 
Constitutional: c/o malaise, denies fever or chills Skin: Negative for rash or lesion Head: Negative for facial swelling or tenderness Eyes: Negative for redness or discharge Ears: Negative for otalgia or decreased hearing Nose: c/o nasal congestion, denies sinus pressure Neck: c/o sore throat, denies lymphadenopathy Cardiovascular: Negative for chest pain or palpitations Respiratory: c/o non-productive cough, denies wheezing or SOB Gastrointestinal: Negative for nausea or abdominal pain Neurologic: Negative for headache or dizziness Vitals:  
 04/02/19 1550 BP: 120/81 Pulse: 88 Resp: 20 Temp: 98.5 °F (36.9 °C) TempSrc: Oral  
 SpO2: 94% Weight: 184 lb 6.4 oz (83.6 kg) Height: 5' 8\" (1.727 m) Physical Examination: 
General: Well developed, well nourished, in no acute distress Skin: Warm and dry sans rash or lesion Head: Normocephalic, atraumatic Eyes: Sclera clear, EOMI Ears: tympanic membranes normal in appearance Nose: mucosal edema with rhinorrhea Oropharynx: posterior erythema, no exudate Neck: Normal range of motion, no lymphadenopathy Cardiovascular: normal S1, S2, regular rate and rhythm Respiratory: Clear to auscultation bilaterally with symmetrical, unlabored effort Extremities: Full range of motion Neurologic: Active, alert and oriented Diagnoses and all orders for this visit: 
 
1. Acute upper respiratory infection, unspecified 
     -      cefPROZIL (CEFZIL) 250 mg tablet 2. Type 2 diabetes mellitus with diabetic neuropathy, without long-term current use of insulin ([address] -     AMB POC HEMOGLOBIN A1C 
-     COLLECTION CAPILLARY BLOOD SPECIMEN 3. Multiple environmental allergies 4. Breast cancer screening by mammogram 
-     [facility] MAMMO BI SCREENING INCL CAD; Future 5. Chronic generalized pain disorder Plan of care: 
Diagnoses were discussed in detail with patient. Patient was agreeable with this plan Medication risks/benefits/side effects discussed with patient. All of the patient's questions were addressed and answered to apparent satisfaction. The patient understands and agrees with our plan of care. The patient knows to call back if they have questions about the plan of care or if symptoms change. The patient received an After-Visit Summary which contains VS, diagnoses, orders, allergy and medication lists. Over half of the 25 minutes face to face with Alem Mcdermott consisted of counseling and discussing treatment plans in reference to her depression. Future Appointments Date Time Provider Dary Buck 4/25/2019  9:20 AM Darwyn Bamberger, MD [facility] ELLIE SCHED

## 2019-04-02 NOTE — PATIENT INSTRUCTIONS
Learning About Asthma Triggers What are asthma triggers? When you have asthma, certain things can make your symptoms worse. These are called triggers. Learn what triggers an asthma attack for you, and avoid the triggers when you can. Common triggers include colds, smoke, air pollution, dust, pollen, pets, stress, and cold air. How do asthma triggers affect you? Triggers can make it harder for your lungs to work as they should. They can lead to sudden breathing problems and other symptoms. When you are around a trigger, an asthma attack is more likely. If your symptoms are severe, you may need emergency treatment or have to go to the hospital for treatment. What can you do to avoid triggers? The first thing is to know your triggers. When you are having symptoms, note the things around you that might be causing them. Then look for patterns that may be triggering your symptoms. Record your triggers on a piece of paper or in an asthma diary. When you have your list of possible triggers, work with your doctor to find ways to avoid them. Avoid colds and flu. Get a pneumococcal vaccine shot. If you have had one before, ask your doctor whether you need a second dose. Get a flu vaccine every year, as soon as it's available. If you must be around people with colds or the flu, wash your hands often. Here are some ways to avoid a few common triggers. · Do not smoke or allow others to smoke around you. If you need help quitting, talk to your doctor about stop-smoking programs and medicines. These can increase your chances of quitting for good. · If there is a lot of pollution, pollen, or dust outside, stay at home and keep your windows closed. Use an air conditioner or air filter in your home. Check your local weather report or newspaper for air quality and pollen reports. What else should you know?  
· Take your controller medicine every day, not just when you have symptoms. It helps prevent problems before they occur. · Your doctor may suggest that you check how well your lungs are working by measuring your peak expiratory flow (PEF) throughout the day. Your PEF may drop when you are near things that trigger symptoms. Where can you learn more? Go to http://carolina-raiza.info/. Enter B075 in the search box to learn more about \"Learning About Asthma Triggers. \" Current as of: September 5, 2018 Content Version: 11.9 © 2015-6672 [employer]. Care instructions adapted under license by [employer] (which disclaims liability or warranty for this information). If you have questions about a medical condition or this instruction, always ask your healthcare professional. [address] any warranty or liability for your use of this information.

## 2019-04-03 DIAGNOSIS — G89.29 CHRONIC GENERALIZED PAIN DISORDER: ICD-10-CM

## 2019-04-03 DIAGNOSIS — M79.7 FIBROMYALGIA SYNDROME: ICD-10-CM

## 2019-04-03 DIAGNOSIS — E11.40 TYPE 2 DIABETES MELLITUS WITH DIABETIC NEUROPATHY, WITHOUT LONG-TERM CURRENT USE OF INSULIN (HCC): ICD-10-CM

## 2019-04-03 DIAGNOSIS — E78.2 MIXED HYPERLIPIDEMIA: ICD-10-CM

## 2019-04-03 DIAGNOSIS — G43.019 INTRACTABLE MIGRAINE WITHOUT AURA AND WITHOUT STATUS MIGRAINOSUS: ICD-10-CM

## 2019-04-03 DIAGNOSIS — Z91.09 MULTIPLE ENVIRONMENTAL ALLERGIES: ICD-10-CM

## 2019-04-03 DIAGNOSIS — Z91.030 BEE ALLERGY STATUS: ICD-10-CM

## 2019-04-03 DIAGNOSIS — R52 CHRONIC GENERALIZED PAIN DISORDER: ICD-10-CM

## 2019-04-03 DIAGNOSIS — J45.30 MILD PERSISTENT ASTHMA WITHOUT COMPLICATION: ICD-10-CM

## 2019-04-03 DIAGNOSIS — K21.9 GASTROESOPHAGEAL REFLUX DISEASE WITHOUT ESOPHAGITIS: ICD-10-CM

## 2019-04-03 NOTE — TELEPHONE ENCOUNTER
Refill request received from [address] [place] [address] for 90 day supply. Last office visit was 4/2/19.

## 2019-04-04 RX ORDER — RIZATRIPTAN BENZOATE 10 MG/1
TABLET ORAL
Qty: 60 TAB | Refills: 0 | Status: SHIPPED | OUTPATIENT
Start: 2019-04-04 | End: 2021-03-17 | Stop reason: SDUPTHER

## 2019-04-04 RX ORDER — DEXLANSOPRAZOLE 60 MG/1
CAPSULE, DELAYED RELEASE ORAL
Qty: 90 CAP | Refills: 1 | Status: SHIPPED | OUTPATIENT
Start: 2019-04-04 | End: 2019-09-10 | Stop reason: SDUPTHER

## 2019-04-04 RX ORDER — ESCITALOPRAM OXALATE 10 MG/1
TABLET ORAL
Qty: 90 TAB | Refills: 1 | Status: SHIPPED | OUTPATIENT
Start: 2019-04-04 | End: 2019-09-10 | Stop reason: SDUPTHER

## 2019-04-04 RX ORDER — ACYCLOVIR 400 MG/1
400 TABLET ORAL 3 TIMES DAILY
Qty: 21 TAB | Refills: 0 | Status: SHIPPED | OUTPATIENT
Start: 2019-04-04 | End: 2019-04-11

## 2019-04-04 RX ORDER — FLUOCINONIDE 0.5 MG/G
CREAM TOPICAL
Qty: 15 G | Refills: 2 | Status: SHIPPED | OUTPATIENT
Start: 2019-04-04 | End: 2021-03-17 | Stop reason: SDUPTHER

## 2019-04-04 RX ORDER — MONTELUKAST SODIUM 10 MG/1
10 TABLET ORAL DAILY
Qty: 90 TAB | Refills: 1 | Status: SHIPPED | OUTPATIENT
Start: 2019-04-04 | End: 2019-09-10 | Stop reason: SDUPTHER

## 2019-04-04 RX ORDER — TOPIRAMATE 25 MG/1
TABLET ORAL
Qty: 90 TAB | Refills: 1 | Status: SHIPPED | OUTPATIENT
Start: 2019-04-04 | End: 2019-09-10 | Stop reason: SDUPTHER

## 2019-04-04 RX ORDER — KETOCONAZOLE 20 MG/ML
SHAMPOO TOPICAL
Qty: 120 ML | Refills: 2 | Status: SHIPPED | OUTPATIENT
Start: 2019-04-04 | End: 2022-01-31

## 2019-04-04 RX ORDER — RANITIDINE 150 MG/1
TABLET, FILM COATED ORAL
Qty: 180 TAB | Refills: 1 | Status: SHIPPED | OUTPATIENT
Start: 2019-04-04 | End: 2019-09-10 | Stop reason: SDUPTHER

## 2019-04-04 RX ORDER — EPINEPHRINE 0.3 MG/.3ML
0.3 INJECTION SUBCUTANEOUS
Qty: 2 SYRINGE | Refills: 0 | Status: SHIPPED | OUTPATIENT
Start: 2019-04-04 | End: 2019-04-04

## 2019-04-04 RX ORDER — NAPROXEN 500 MG/1
TABLET ORAL
Qty: 180 TAB | Refills: 0 | Status: SHIPPED | OUTPATIENT
Start: 2019-04-04 | End: 2019-10-07

## 2019-04-04 RX ORDER — CYCLOBENZAPRINE HCL 10 MG
10 TABLET ORAL
Qty: 90 TAB | Refills: 0 | Status: SHIPPED | OUTPATIENT
Start: 2019-04-04 | End: 2019-10-07 | Stop reason: SDUPTHER

## 2019-04-04 RX ORDER — FENOFIBRATE 145 MG/1
TABLET, COATED ORAL
Qty: 90 TAB | Refills: 1 | Status: SHIPPED | OUTPATIENT
Start: 2019-04-04 | End: 2019-09-10 | Stop reason: SDUPTHER

## 2019-04-04 RX ORDER — FLUTICASONE PROPIONATE 50 MCG
SPRAY, SUSPENSION (ML) NASAL
Qty: 3 BOTTLE | Refills: 1 | Status: SHIPPED | OUTPATIENT
Start: 2019-04-04 | End: 2019-07-11 | Stop reason: SDUPTHER

## 2019-04-04 RX ORDER — FLUTICASONE PROPIONATE AND SALMETEROL 250; 50 UG/1; UG/1
1 POWDER RESPIRATORY (INHALATION) EVERY 12 HOURS
Qty: 3 INHALER | Refills: 1 | Status: SHIPPED | OUTPATIENT
Start: 2019-04-04 | End: 2019-09-09 | Stop reason: SDUPTHER

## 2019-04-11 ENCOUNTER — OFFICE VISIT (OUTPATIENT)
Dept: FAMILY MEDICINE CLINIC | Age: 49
End: 2019-04-11

## 2019-04-11 VITALS
DIASTOLIC BLOOD PRESSURE: 71 MMHG | TEMPERATURE: 98.5 F | BODY MASS INDEX: 27.28 KG/M2 | HEIGHT: 68 IN | WEIGHT: 180 LBS | OXYGEN SATURATION: 98 % | HEART RATE: 92 BPM | RESPIRATION RATE: 16 BRPM | SYSTOLIC BLOOD PRESSURE: 100 MMHG

## 2019-04-11 DIAGNOSIS — R07.9 CHEST PAIN, UNSPECIFIED TYPE: ICD-10-CM

## 2019-04-11 DIAGNOSIS — K44.9 HIATAL HERNIA: ICD-10-CM

## 2019-04-11 DIAGNOSIS — I10 ESSENTIAL HYPERTENSION: ICD-10-CM

## 2019-04-11 DIAGNOSIS — E11.40 TYPE 2 DIABETES MELLITUS WITH DIABETIC NEUROPATHY, WITHOUT LONG-TERM CURRENT USE OF INSULIN (HCC): ICD-10-CM

## 2019-04-11 RX ORDER — FENTANYL 50 UG/1
PATCH TRANSDERMAL
Refills: 0 | COMMUNITY
Start: 2019-03-28 | End: 2021-09-30

## 2019-04-11 RX ORDER — ASPIRIN 81 MG/1
TABLET ORAL DAILY
COMMUNITY
End: 2019-10-07

## 2019-04-11 RX ORDER — HYDROCODONE BITARTRATE AND ACETAMINOPHEN 5; 325 MG/1; MG/1
1 TABLET ORAL
COMMUNITY
Start: 2014-01-25 | End: 2019-10-07

## 2019-04-11 NOTE — PROGRESS NOTES
Progress Note    Patient: Roman Bales MRN: [id number]  SSN: [id number]    YOB: 1970  Age: 53 y.o. Sex: female        Chief Complaint   Patient presents with   [address], chest pain       Subjective:     Problems addressed:  Encounter Diagnoses     ICD-10-CM ICD-9-CM   1. Transition of care performed with sharing of clinical summary Z91.89 V15.89   2. Chest pain, unspecified type R07.9 786.50   3. Hiatal hernia K44.9 553.3   4. Type 2 diabetes mellitus with diabetic neuropathy, without long-term current use of insulin (HCC) E11.40 250.60     357.2   5. Essential hypertension I10 401.9     Hospital: [facility] in [facility]  Dates of admission: 4/3/19-4/4/19  Discharge diagnoses: Chest pain  State of health at discharge: Stable  Surgical or invasive procedures done: None    Amount and/or Complexity of Data Reviewed:   Clinical lab tests: Reviewed  Tests in the radiology section: requested medical records  Discussion of test results with the patient-yes  Obtain previous medical records or obtain history from someone other than the patient: Requesting medical records  Obtain history from someone other than the patient: no  Review and address past medical records: yes  Discuss the patient with another provider: no  Independant visualization of image, tracing, or specimen: no    Risk of Significant Complications, Morbidity, and/or Mortality:   Presenting problems: Moderate  Diagnostic procedures: Moderate   Management options: Moderate     Transition of Care time spent:   Total time providing care and documentation: 40-60 minutes   Progress at discharge:   Stable on Discharge      52 y/o F presents for transition of care visit after hospital admission in [facility] for chest pain from 4/3 to 4/4.  Pt reports sudden chest pain on 4/3, pain was \"so intense, felt like somebody hit me in the chest and knocked me across the room\", sweating, so went to hospital. Pt brings hospital records with her, records reviewed, Cardiac work up in the hospital negative for any heart problems, symptoms resolved and pt sent home on baby aspirin, got stress test on 19 at which time was told \"part of small intestines behind heart\" seen on ultrasound. These records have been reportedly sent to clinic, but have yet to arrive, pt signed release form today and will request records from stress test for review. Pt says she was diagnosed with \"type 4 hiatal hernia and stress test was normal.    Pt with no symptoms at this time, denies any fever, chills, chest pain, SOB, abdominal pain, nausea, or vomiting. Family history, Father's side: Father COPD, DM. Grandfather  with CHF. Grandmother  with throat cancer (smoker)  Social Hx: Pt is a former smoker    Current and past medical information:    Current Medications after this visit[de-identified]     Current Outpatient Medications   Medication Sig    fentaNYL (DURAGESIC) 50 mcg/hr PATCH     HYDROcodone-acetaminophen (NORCO) 5-325 mg per tablet Take 1 Tab by mouth.  aspirin delayed-release 81 mg tablet Take  by mouth daily.  dexlansoprazole (DEXILANT) 60 mg CpDB capsule (delayed release) TAKE ONE CAPSULE BY MOUTH EVERY DAY    ketoconazole (NIZORAL) 2 % shampoo APPLY TO SCALP AND RINSE   DAILY AS NEEDED    raNITIdine (ZANTAC) 150 mg tablet TAKE 1 TABLET TWICE A DAY    topiramate (TOPAMAX) 25 mg tablet TAKE ONE TABLET BY MOUTH AT BEDTIME    montelukast (SINGULAIR) 10 mg tablet Take 1 Tab by mouth daily.  fluticasone propionate (FLONASE) 50 mcg/actuation nasal spray USE 1 TO 2 SPRAYS NASALLY  ONCE DAILY    dulaglutide (TRULICITY) 1.5 KY/8.2 mL sub-q pen INJECT 0.5 ML SUB-Q EVERY 7 DAYS    rizatriptan (MAXALT) 10 mg tablet TAKE ONE TABLET BY MOUTH ONCE AS NEEDED MAY REPEAT IN 2 HOURS IF NEEDED    fluticasone propion-salmeterol (ADVAIR DISKUS) 250-50 mcg/dose diskus inhaler Take 1 Puff by inhalation every twelve (12) hours.     naproxen (NAPROSYN) 500 mg tablet TAKE ONE TABLET BY MOUTH TWICE A DAY WITH FOOD AS NEEDED.  cyclobenzaprine (FLEXERIL) 10 mg tablet Take 1 Tab by mouth three (3) times daily as needed for Muscle Spasm(s).  naloxone 4 mg/actuation spry 4 mg by Nasal route as needed for up to 2 doses. Indications: OPIOID TOXICITY    albuterol (PROAIR HFA) 90 mcg/actuation inhaler Take 2 Puffs by inhalation every four (4) hours as needed for Wheezing. Take 2 Puffs inhaled by mouth Every 4 Hours. (Patient taking differently: Take 2 Puffs by inhalation every four (4) hours as needed for Wheezing.)    polyethylene glycol (MIRALAX) 17 gram/dose powder     pravastatin (PRAVACHOL) 40 mg tablet Take 1 Tab by mouth nightly.  fluocinoNIDE (LIDEX) 0.05 % topical cream APPLY TO THE AFFECTED AREA TOPICALLY TWO TIMES A DAY    escitalopram oxalate (LEXAPRO) 10 mg tablet TAKE ONE TABLET BY MOUTH EVERY DAY    fenofibrate nanocrystallized (TRICOR) 145 mg tablet TAKE 1 TABLET DAILY FOR    HYPERTRIGLYCERIDEMIA    cyclobenzaprine (FLEXERIL) 10 mg tablet Take 1 Tab by mouth two (2) times daily as needed for Muscle Spasm(s) for up to 30 days. As needed  Indications: Muscle Spasm    AMITIZA 24 mcg capsule      No current facility-administered medications for this visit.         Patient Active Problem List    Diagnosis Date Noted    Recurrent depression ([facility] 11/03/2018    Type 2 diabetes with nephropathy ([facility] 09/24/2018    Mixed hyperlipidemia 06/14/2018    History of headache 09/05/2017    Multiple environmental allergies 01/19/2015    Musculoskeletal pain 09/16/2014    Migraine without aura 07/09/2013    Chronic generalized pain disorder 09/08/2012    Encounter for long-term (current) use of high-risk medication 09/08/2012    Headache 09/08/2012    Persistent disorder of initiating or maintaining sleep 09/08/2012    Fibromyalgia syndrome 08/08/2012    Restless leg syndrome 08/08/2012    Obesity 06/11/2012    Diabetes mellitus ([facility] 06/11/2012    Hypertension 06/11/2012       Past Medical History:   Diagnosis Date    Depression     Diabetes ([facility]     Dysthymic disorder     Fibromyalgia     Fibromyalgia syndrome 8/8/2012    Headache(784.0) 9/8/2012    Hypercholesterolemia     Left ear pain     Migraine     Mixed hyperlipidemia     Musculoskeletal pain 9/16/2014       Allergies   Allergen Reactions    Esomeprazole Magnesium Hives     Other reaction(s): mild rash/itching    Nexium [Esomeprazole Magnesium] Unknown (comments)    Pcn [Penicillins] Unknown (comments)       Past Surgical History:   Procedure Laterality Date    HX GYN      Partial Hysterectomy    HX HEENT      HX TUBAL LIGATION         Social History     Socioeconomic History    Marital status: SINGLE     Spouse name: Not on file    Number of children: Not on file    Years of education: Not on file    Highest education level: Not on file   Tobacco Use    Smoking status: Former Smoker    Smokeless tobacco: Never Used    Tobacco comment: quit in 2010   Substance and Sexual Activity    Alcohol use: No    Drug use: No         Review of Systems   Constitutional: Negative for chills and fever. HENT: Negative for congestion and sore throat. Eyes: Negative for blurred vision and double vision. Respiratory: Negative for cough and shortness of breath. Cardiovascular: Negative for chest pain and palpitations. Gastrointestinal: Negative for abdominal pain, constipation, diarrhea, nausea and vomiting. Genitourinary: Negative for dysuria and hematuria. Musculoskeletal: Negative for back pain. Skin: Negative for rash. Neurological: Negative for dizziness and headaches. Psychiatric/Behavioral: The patient is not nervous/anxious.          Objective:     Vitals:    04/11/19 1527   BP: 100/71   Pulse: 92   Resp: 16   Temp: 98.5 °F (36.9 °C)   TempSrc: Oral   SpO2: 98%   Weight: 180 lb (81.6 kg)   Height: 5' 8\" (1.727 m)      Body mass index is 27.37 kg/m². Physical Exam   Constitutional: She is oriented to person, place, and time. She appears well-developed and well-nourished. No distress. HENT:   Head: Normocephalic and atraumatic. Right Ear: External ear normal.   Left Ear: External ear normal.   Mouth/Throat: Oropharynx is clear and moist. No oropharyngeal exudate. Eyes: Pupils are equal, round, and reactive to light. Conjunctivae are normal.   Neck: Normal range of motion. Neck supple. Cardiovascular: Normal rate, regular rhythm and normal heart sounds. Pulmonary/Chest: Effort normal and breath sounds normal. No respiratory distress. She has no wheezes. Abdominal: Soft. Bowel sounds are normal. She exhibits no distension and no mass. There is no tenderness. There is no rebound and no guarding. Musculoskeletal: Normal range of motion. She exhibits no edema or deformity. Neurological: She is alert and oriented to person, place, and time. Skin: Skin is warm and dry. She is not diaphoretic. Psychiatric: She has a normal mood and affect. Her behavior is normal.   Vitals reviewed. Health Maintenance Due   Topic Date Due    BREAST CANCER SCRN MAMMOGRAM  06/02/2016    MEDICARE YEARLY EXAM  03/23/2019       Assessment and orders:     Encounter Diagnoses     ICD-10-CM ICD-9-CM   1. Transition of care performed with sharing of clinical summary Z91.89 V15.89   2. Chest pain, unspecified type R07.9 786.50   3. Hiatal hernia K44.9 553.3   4. Type 2 diabetes mellitus with diabetic neuropathy, without long-term current use of insulin ([facility]) E11.40 250.60     357.2   5. Essential hypertension I10 401.9       54 y/o F recently admitted for chest pain with negative work up for cardiac problems, but \"hiatal hernia\" noted during outpatient stress test 4/8/19, awaiting records from outpatient stress test    1.  Transition of care performed with sharing of clinical summary - Pt chest pain symptoms resolved, reportedly diagnosed with hiatal hernia  -Will continue to monitor for symptoms  [facility] records reviewed, pt started on baby aspirin daily  -Awaiting stress test records, records requested, will review     2. Chest pain, unspecified type - possibly 2/2 to hiatal hernia, negative cardiac workup  -Awaiting stress test records, records requested, will review   -Continue pravastatin 40mg daily    3. Hiatal hernia  -Awaiting stress test records, records requested, will review   -Continue Zantac    4. T2DM - Well controlled. A1C 6.9 3/2/45  -Continue Trulicity . 5ml subQ every 7 days    5. Essential hypertension - well controled, /71 today  -Diet controlled           Plan of care:  Discussed diagnoses in detail with patient. Medication risks/benefits/side effects discussed with patient. All of the patient's questions were addressed. The patient understands and agrees with our plan of care. The patient knows to call back if they are unsure of or forget any changes we discussed today or if the symptoms change. The patient received an After-Visit Summary which contains VS, orders, medication list and allergy list. This can be used as a \"mini-medical record\" should they have to seek medical care while out of town. Follow-up and Dispositions    · Return if symptoms worsen or fail to improve, for Regular yearly visits.          Future Appointments   Date Time Provider Dary Buck   4/25/2019  9:20 AM Stew Rodriguez MD [facility] SCHED       Signed By: Rd Culver MD     April 14, 2019

## 2019-04-11 NOTE — PROGRESS NOTES
1. Have you been to the ER, urgent care clinic since your last visit? Hospitalized since your last visit? Yes, [facility], 04/03/2019, Chest Pain    2. Have you seen or consulted any other health care providers outside of the [address] [place] [address] since your last visit? Include any pap smears or colon screening.  No  Reviewed record in preparation for visit and have necessary documentation  Pt did not bring medication to office visit for review    Goals that were addressed and/or need to be completed during or after this appointment include     Health Maintenance Due   Topic Date Due    BREAST CANCER SCRN MAMMOGRAM  06/02/2016    MEDICARE YEARLY EXAM  03/23/2019

## 2019-04-11 NOTE — PATIENT INSTRUCTIONS
Hiatal Hernia: Care Instructions  Your Care Instructions  A hiatal hernia occurs when part of the stomach bulges into the chest cavity. A hiatal hernia may allow stomach acid and juices to back up into the esophagus (acid reflux). This can cause a feeling of burning, warmth, heat, or pain behind the breastbone. This feeling may often occur after you eat, soon after you lie down, or when you bend forward, and it may come and go. You also may have a sour taste in your mouth. These symptoms are commonly known as heartburn or reflux. But not all hiatal hernias cause symptoms. Follow-up care is a key part of your treatment and safety. Be sure to make and go to all appointments, and call your doctor if you are having problems. It's also a good idea to know your test results and keep a list of the medicines you take. How can you care for yourself at home? · Take your medicines exactly as prescribed. Call your doctor if you think you are having a problem with your medicine. · Do not take aspirin or other nonsteroidal anti-inflammatory drugs (NSAIDs), such as ibuprofen (Advil, Motrin) or naproxen (Aleve), unless your doctor says it is okay. Ask your doctor what you can take for pain. · Your doctor may recommend over-the-counter medicine. For mild or occasional indigestion, antacids such as Tums, Gaviscon, Maalox, or Mylanta may help. Your doctor also may recommend over-the-counter acid reducers, such as famotidine (Pepcid AC), cimetidine (Tagamet HB), ranitidine (Zantac 75 and Zantac 150), or omeprazole (Prilosec). Read and follow all instructions on the label. If you use these medicines often, talk with your doctor. · Change your eating habits. ? It's best to eat several small meals instead of two or three large meals. ? After you eat, wait 2 to 3 hours before you lie down. Late-night snacks aren't a good idea. ? Chocolate, mint, and alcohol can make heartburn worse.  They relax the valve between the esophagus and the stomach. ? Spicy foods, foods that have a lot of acid (like tomatoes and oranges), and coffee can make heartburn symptoms worse in some people. If your symptoms are worse after you eat a certain food, you may want to stop eating that food to see if your symptoms get better. · Do not smoke or chew tobacco.  · If you get heartburn at night, raise the head of your bed 6 to 8 inches by putting the frame on blocks or placing a foam wedge under the head of your mattress. (Adding extra pillows does not work.)  · Do not wear tight clothing around your middle. · Lose weight if you need to. Losing just 5 to 10 pounds can help. When should you call for help? Call your doctor now or seek immediate medical care if:    · You have new or worse belly pain.     · You are vomiting.    Watch closely for changes in your health, and be sure to contact your doctor if:    · You have new or worse symptoms of indigestion.     · You have trouble or pain swallowing.     · You are losing weight.     · You do not get better as expected. Where can you learn more? Go to http://carolina-raiza.info/. Enter T354 in the search box to learn more about \"Hiatal Hernia: Care Instructions. \"  Current as of: March 27, 2018  Content Version: 11.9  © 9120-6509 [employer]. Care instructions adapted under license by [facility] (which disclaims liability or warranty for this information). If you have questions about a medical condition or this instruction, always ask your healthcare professional. [place] [address] any warranty or liability for your use of this information.

## 2019-04-12 RX ORDER — PRAVASTATIN SODIUM 40 MG/1
40 TABLET ORAL
Qty: 90 TAB | Refills: 1 | Status: SHIPPED | OUTPATIENT
Start: 2019-04-12 | End: 2020-02-17 | Stop reason: SDUPTHER

## 2019-04-23 ENCOUNTER — TELEPHONE (OUTPATIENT)
Dept: FAMILY MEDICINE CLINIC | Age: 49
End: 2019-04-23

## 2019-04-23 NOTE — TELEPHONE ENCOUNTER
----- Message from Violeta Godfrey sent at 4/23/2019  1:12 PM EDT -----  Regarding: Dr. Soria Mail from [address] [place] [address] is calling to get clarification on a Pt Rx. Best contact number is [phone] ext [phone].

## 2019-04-23 NOTE — TELEPHONE ENCOUNTER
Spoke with pharmacy and advised per Dr. Trecia Eisenmenger, patient should be taking pravastatin and fenofibrate at same time.

## 2019-04-29 ENCOUNTER — OFFICE VISIT (OUTPATIENT)
Dept: FAMILY MEDICINE CLINIC | Age: 49
End: 2019-04-29

## 2019-04-29 VITALS
BODY MASS INDEX: 27.58 KG/M2 | WEIGHT: 182 LBS | HEART RATE: 67 BPM | DIASTOLIC BLOOD PRESSURE: 72 MMHG | RESPIRATION RATE: 20 BRPM | SYSTOLIC BLOOD PRESSURE: 109 MMHG | TEMPERATURE: 98.1 F | OXYGEN SATURATION: 97 % | HEIGHT: 68 IN

## 2019-04-29 DIAGNOSIS — Z13.31 SCREENING FOR DEPRESSION: ICD-10-CM

## 2019-04-29 DIAGNOSIS — Z12.31 BREAST CANCER SCREENING BY MAMMOGRAM: ICD-10-CM

## 2019-04-29 DIAGNOSIS — Z13.39 SCREENING FOR ALCOHOLISM: ICD-10-CM

## 2019-04-29 DIAGNOSIS — Z00.00 MEDICARE ANNUAL WELLNESS VISIT, SUBSEQUENT: Primary | ICD-10-CM

## 2019-04-29 NOTE — PATIENT INSTRUCTIONS
Medicare Wellness Visit, Female The best way to live healthy is to have a lifestyle where you eat a well-balanced diet, exercise regularly, limit alcohol use, and quit all forms of tobacco/nicotine, if applicable. Regular preventive services are another way to keep healthy. Preventive services (vaccines, screening tests, monitoring & exams) can help personalize your care plan, which helps you manage your own care. Screening tests can find health problems at the earliest stages, when they are easiest to treat. [facility] follows the current, evidence-based guidelines published by the [facility] ([facility]) when recommending preventive services for our patients. Because we follow these guidelines, sometimes recommendations change over time as research supports it. (For example, mammograms used to be recommended annually. Even though Medicare will still pay for an annual mammogram, the newer guidelines recommend a mammogram every two years for women of average risk.) Of course, you and your doctor may decide to screen more often for some diseases, based on your risk and your health status. Preventive services for you include: - Medicare offers their members a free annual wellness visit, which is time for you and your primary care provider to discuss and plan for your preventive service needs. Take advantage of this benefit every year! 
-All adults over the age of 72 should receive the recommended pneumonia vaccines. Current USPSTF guidelines recommend a series of two vaccines for the best pneumonia protection.  
-All adults should have a flu vaccine yearly and a tetanus vaccine every 10 years. All adults age 61 and older should receive a shingles vaccine once in their lifetime.   
-A bone mass density test is recommended when a woman turns 65 to screen for osteoporosis. This test is only recommended one time, as a screening. Some providers will use this same test as a disease monitoring tool if you already have osteoporosis. -All adults age 38-68 who are overweight should have a diabetes screening test once every three years.  
-Other screening tests and preventive services for persons with diabetes include: an eye exam to screen for diabetic retinopathy, a kidney function test, a foot exam, and stricter control over your cholesterol.  
-Cardiovascular screening for adults with routine risk involves an electrocardiogram (ECG) at intervals determined by your doctor.  
-Colorectal cancer screenings should be done for adults age 54-65 with no increased risk factors for colorectal cancer. There are a number of acceptable methods of screening for this type of cancer. Each test has its own benefits and drawbacks. Discuss with your doctor what is most appropriate for you during your annual wellness visit. The different tests include: colonoscopy (considered the best screening method), a fecal occult blood test, a fecal DNA test, and sigmoidoscopy. -Breast cancer screenings are recommended every other year for women of normal risk, age 54-69. 
-Cervical cancer screenings for women over age 72 are only recommended with certain risk factors.  
-All adults born between [facility] should be screened once for Hepatitis C. Here is a list of your current Health Maintenance items (your personalized list of preventive services) with a due date: There are no preventive care reminders to display for this patient.

## 2019-04-29 NOTE — PROGRESS NOTES
This is the Subsequent Medicare Annual Wellness Exam, performed 12 months or more after the Initial AWV or the last Subsequent AWV I have reviewed the patient's medical history in detail and updated the computerized patient record. History Past Medical History:  
Diagnosis Date  Depression  Diabetes ([address]  Dysthymic disorder  Fibromyalgia  Fibromyalgia syndrome 8/8/2012  ZEPXSPRY(059.7) 9/8/2012  Hypercholesterolemia  Left ear pain  Migraine  Mixed hyperlipidemia  Musculoskeletal pain 9/16/2014 Past Surgical History:  
Procedure Laterality Date  HX GYN Partial Hysterectomy  HX HEENT    
 HX TUBAL LIGATION Current Outpatient Medications Medication Sig Dispense Refill  fentaNYL (DURAGESIC) 50 mcg/hr PATCH   0  
 HYDROcodone-acetaminophen (NORCO) 5-325 mg per tablet Take 1 Tab by mouth.  aspirin delayed-release 81 mg tablet Take  by mouth daily.  fluocinoNIDE (LIDEX) 0.05 % topical cream APPLY TO THE AFFECTED AREA TOPICALLY TWO TIMES A DAY 15 g 2  
 dexlansoprazole (DEXILANT) 60 mg CpDB capsule (delayed release) TAKE ONE CAPSULE BY MOUTH EVERY DAY 90 Cap 1  
 escitalopram oxalate (LEXAPRO) 10 mg tablet TAKE ONE TABLET BY MOUTH EVERY DAY 90 Tab 1  ketoconazole (NIZORAL) 2 % shampoo APPLY TO SCALP AND RINSE   DAILY AS NEEDED 120 mL 2  
 raNITIdine (ZANTAC) 150 mg tablet TAKE 1 TABLET TWICE A  Tab 1  
 topiramate (TOPAMAX) 25 mg tablet TAKE ONE TABLET BY MOUTH AT BEDTIME 90 Tab 1  
 montelukast (SINGULAIR) 10 mg tablet Take 1 Tab by mouth daily.  90 Tab 1  
 fluticasone propionate (FLONASE) 50 mcg/actuation nasal spray USE 1 TO 2 SPRAYS NASALLY  ONCE DAILY 3 Bottle 1  
 dulaglutide (TRULICITY) 1.5 TY/8.2 mL sub-q pen INJECT 0.5 ML SUB-Q EVERY 7 DAYS 12 Pen 1  
 rizatriptan (MAXALT) 10 mg tablet TAKE ONE TABLET BY MOUTH ONCE AS NEEDED MAY REPEAT IN 2 HOURS IF NEEDED 60 Tab 0  
  fluticasone propion-salmeterol (ADVAIR DISKUS) 250-50 mcg/dose diskus inhaler Take 1 Puff by inhalation every twelve (12) hours. 3 Inhaler 1  
 naproxen (NAPROSYN) 500 mg tablet TAKE ONE TABLET BY MOUTH TWICE A DAY WITH FOOD AS NEEDED. 180 Tab 0  cyclobenzaprine (FLEXERIL) 10 mg tablet Take 1 Tab by mouth three (3) times daily as needed for Muscle Spasm(s). 90 Tab 0  
 naloxone 4 mg/actuation spry 4 mg by Nasal route as needed for up to 2 doses. Indications: OPIOID TOXICITY 1 Box 1  
 albuterol (PROAIR HFA) 90 mcg/actuation inhaler Take 2 Puffs by inhalation every four (4) hours as needed for Wheezing. Take 2 Puffs inhaled by mouth Every 4 Hours. (Patient taking differently: Take 2 Puffs by inhalation every four (4) hours as needed for Wheezing.) 1 Inhaler 2  polyethylene glycol (MIRALAX) 17 gram/dose powder  pravastatin (PRAVACHOL) 40 mg tablet Take 1 Tab by mouth nightly. 90 Tab 1  
 fenofibrate nanocrystallized (TRICOR) 145 mg tablet TAKE 1 TABLET DAILY FOR    HYPERTRIGLYCERIDEMIA 90 Tab 1  cyclobenzaprine (FLEXERIL) 10 mg tablet Take 1 Tab by mouth two (2) times daily as needed for Muscle Spasm(s) for up to 30 days. As needed  Indications: Muscle Spasm 180 Tab 1  AMITIZA 24 mcg capsule Allergies Allergen Reactions  Esomeprazole Magnesium Hives Other reaction(s): mild rash/itching  Nexium [Esomeprazole Magnesium] Unknown (comments)  Pcn [Penicillins] Unknown (comments) Family History Problem Relation Age of Onset  Alcohol abuse Father  Diabetes Mother  Hypertension Mother  Heart Disease Mother  Lupus Mother  Sleep Apnea Mother  Depression Mother  Cancer Maternal Aunt  Arthritis-osteo Other  Asthma Other  Diabetes Other  Elevated Lipids Other  Headache Other  Heart Disease Other  Hypertension Other  Migraines Other  Stroke Other  Bleeding Prob Neg Hx  Lung Disease Neg Hx  Psychiatric Disorder Neg Hx  Mental Retardation Neg Hx Social History Tobacco Use  Smoking status: Former Smoker  Smokeless tobacco: Never Used  Tobacco comment: quit in 2010 Substance Use Topics  Alcohol use: No  
 
Patient Active Problem List  
Diagnosis Code  Obesity E66.9  Diabetes mellitus ([address] E11.9  Hypertension I10  
 Fibromyalgia syndrome M79.7  Restless leg syndrome G25.81  Chronic generalized pain disorder R52, G89.29  
 Encounter for long-term (current) use of high-risk medication Z79.899  
 Headache R51  Persistent disorder of initiating or maintaining sleep G47.00  Migraine without aura G43.009  Musculoskeletal pain M79.18  
 Multiple environmental allergies Z91.09  
 History of headache Z87.898  Mixed hyperlipidemia E78.2  Type 2 diabetes with nephropathy (HCC) E11.21  
 Recurrent depression ([address] F33.9 Depression Risk Factor Screening:  
 
3 most recent PHQ Screens 4/2/2019 PHQ Not Done - Little interest or pleasure in doing things Not at all Feeling down, depressed, irritable, or hopeless Not at all Total Score PHQ 2 0 Trouble falling or staying asleep, or sleeping too much - Feeling tired or having little energy - Poor appetite, weight loss, or overeating - Feeling bad about yourself - or that you are a failure or have let yourself or your family down - Trouble concentrating on things such as school, work, reading, or watching TV - Moving or speaking so slowly that other people could have noticed; or the opposite being so fidgety that others notice - Thoughts of being better off dead, or hurting yourself in some way -  
PHQ 9 Score - How difficult have these problems made it for you to do your work, take care of your home and get along with others - Alcohol Risk Factor Screening: You do not drink alcohol or very rarely. Functional Ability and Level of Safety:  
Hearing Loss Hearing is good. Activities of Daily Living The home contains: no safety equipment. Patient does total self care Fall Risk Fall Risk Assessment, last 12 mths 7/26/2018 Able to walk? Yes Fall in past 12 months? No  
Fall with injury? -  
Number of falls in past 12 months - Fall Risk Score -  
 
 
Abuse Screen Patient is not abused Cognitive Screening Evaluation of Cognitive Function: 
Has your family/caregiver stated any concerns about your memory: no 
Normal 
 
Patient Care Team  
Patient Care Team: 
Constance Massey MD as PCP - [facility]) Michele Jeff MD (Podiatry) Juan J Medina MD as Physician (Neurology) YADIRA Lawson (Physician Assistant) Christy Kayser, MD (Rheumatology) Marie Grubbs MD as Physician (Sleep Medicine) Dmitri Kim MD (Anesthesiology) Assessment/Plan Education and counseling provided: 
Are appropriate based on today's review and evaluation End-of-Life planning (with patient's consent) Diagnoses and all orders for this visit: 
 
1. Medicare annual wellness visit, subsequent 2. Screening for depression 
-     Luke Serrano 3. Screening for alcoholism -     [place] ANNUAL ALCOHOL SCREEN 15 MIN 4. Breast cancer screening by mammogram 
-     [facility] MAMMO BI SCREENING INCL CAD; Future There are no preventive care reminders to display for this patient.

## 2019-04-29 NOTE — PROGRESS NOTES
1. Have you been to the ER, urgent care clinic since your last visit? Hospitalized since your last visit? No 
 
2. Have you seen or consulted any other health care providers outside of the [address] [place] [address] since your last visit? Include any pap smears or colon screening. No 
Reviewed record in preparation for visit and have necessary documentation Pt did not bring medication to office visit for review Goals that were addressed and/or need to be completed during or after this appointment include Health Maintenance Due Topic Date Due  
 BREAST CANCER SCRN MAMMOGRAM  06/02/2016  MEDICARE YEARLY EXAM  03/23/2019

## 2019-05-14 ENCOUNTER — OFFICE VISIT (OUTPATIENT)
Dept: FAMILY MEDICINE CLINIC | Age: 49
End: 2019-05-14

## 2019-05-14 VITALS
RESPIRATION RATE: 18 BRPM | HEART RATE: 74 BPM | TEMPERATURE: 98 F | WEIGHT: 182.4 LBS | BODY MASS INDEX: 27.65 KG/M2 | HEIGHT: 68 IN | DIASTOLIC BLOOD PRESSURE: 71 MMHG | SYSTOLIC BLOOD PRESSURE: 104 MMHG | OXYGEN SATURATION: 98 %

## 2019-05-14 DIAGNOSIS — E11.40 TYPE 2 DIABETES MELLITUS WITH DIABETIC NEUROPATHY, WITHOUT LONG-TERM CURRENT USE OF INSULIN (HCC): ICD-10-CM

## 2019-05-14 DIAGNOSIS — L25.5 DERMATITIS DUE TO PLANTS, INCLUDING POISON IVY, SUMAC, AND OAK: Primary | ICD-10-CM

## 2019-05-14 RX ORDER — PREDNISONE 10 MG/1
TABLET ORAL
Qty: 21 TAB | Refills: 0 | Status: SHIPPED | OUTPATIENT
Start: 2019-05-14 | End: 2019-10-07 | Stop reason: ALTCHOICE

## 2019-05-14 RX ORDER — TRIAMCINOLONE ACETONIDE 40 MG/ML
40 INJECTION, SUSPENSION INTRA-ARTICULAR; INTRAMUSCULAR ONCE
Qty: 1 ML | Refills: 0
Start: 2019-05-14 | End: 2019-05-14

## 2019-05-14 RX ORDER — LORATADINE 10 MG/1
10 TABLET ORAL
COMMUNITY
Start: 2014-01-25 | End: 2021-11-12

## 2019-05-14 RX ORDER — HYDROXYZINE 25 MG/1
25 TABLET, FILM COATED ORAL
Qty: 30 TAB | Refills: 1 | Status: SHIPPED | OUTPATIENT
Start: 2019-05-14 | End: 2019-05-24

## 2019-05-14 RX ORDER — OMEPRAZOLE 10 MG/1
10 CAPSULE, DELAYED RELEASE ORAL
COMMUNITY
End: 2019-10-09 | Stop reason: ALTCHOICE

## 2019-05-14 RX ORDER — OXYCODONE AND ACETAMINOPHEN 10; 325 MG/1; MG/1
TABLET ORAL
Refills: 0 | COMMUNITY
Start: 2019-04-29 | End: 2022-01-31

## 2019-05-14 NOTE — PATIENT INSTRUCTIONS
Poison [place], [place], and [place]: Care Instructions  Your Care Instructions    Poison ivy, poison oak, and poison sumac are plants that can cause a skin rash upon contact. The red, itchy rash often shows up in lines or streaks and may cause fluid-filled blisters or large, raised hives. The rash is caused by an allergic reaction to an oil in poison ivy, oak, and sumac. The rash may occur when you touch the plant or when you touch clothing, pet fur, sporting gear, gardening tools, or other objects that have come in contact with one of these plants. You cannot catch or spread the rash, even if you touch it or the blister fluid, because the plant oil will already have been absorbed or washed off the skin. The rash may seem to be spreading, but either it is still developing from earlier contact or you have touched something that still has the plant oil on it. Follow-up care is a key part of your treatment and safety. Be sure to make and go to all appointments, and call your doctor if you are having problems. It's also a good idea to know your test results and keep a list of the medicines you take. How can you care for yourself at home? · If your doctor prescribed a cream, use it as directed. If your doctor prescribed medicine, take it exactly as prescribed. Call your doctor if you think you are having a problem with your medicine. · Use cold, wet cloths to reduce itching. · Keep cool, and stay out of the sun. · Leave the rash open to the air. · Wash all clothing or other things that may have come in contact with the plant oil. · Avoid most lotions and ointments until the rash heals. Calamine lotion may help relieve symptoms of a plant rash. Use it 3 or 4 times a day. To prevent poison ivy exposure  If you know that you will be near poison ivy, oak, or sumac, you can try these options:  · Use a product designed to help prevent plant oil from getting on the skin.  These products, such as Ivy X Pre-Contact Skin Solution, come in lotions, sprays, or towelettes. You put the product on your skin right before you go outdoors. · If you did not use a preventive product and you have had contact with plant oil, clean it off your skin as soon as possible. Use a product such as [employer] Original Outdoor Skin Cleanser. These products can also be used to clean plant oil from clothing or tools. When should you call for help? Call your doctor now or seek immediate medical care if:    · Your rash gets worse, and you start to feel bad and have a fever, a stiff neck, nausea, and vomiting.     · You have signs of infection, such as:  ? Increased pain, swelling, warmth, or redness. ? Red streaks leading from the rash. ? Pus draining from the rash. ? A fever.    Watch closely for changes in your health, and be sure to contact your doctor if:    · You have new blisters or bruises, or the rash spreads and looks like a sunburn.     · The rash gets worse, or it comes back after nearly disappearing.     · You think a medicine you are using is making your rash worse.     · Your rash does not clear up after 1 to 2 weeks of home treatment.     · You have joint aches or body aches with your rash. Where can you learn more? Go to http://carolina-raiza.info/. Enter K439 in the search box to learn more about \"Poison [place], [place], and [place]: Care Instructions. \"  Current as of: April 17, 2018  Content Version: 11.9  © 5667-4494 [employer]. Care instructions adapted under license by "[employer] (which disclaims liability or warranty for this information). If you have questions about a medical condition or this instruction, always ask your healthcare professional. [place] [address] any warranty or liability for your use of this information.

## 2019-05-14 NOTE — PROGRESS NOTES
Chief Complaint   Patient presents with    Rash     face, arms     Visit Vitals  /71 (BP 1 Location: Left arm, BP Patient Position: Sitting)   Pulse 74   Temp 98 °F (36.7 °C) (Oral)   Resp 18   Ht 5' 8\" (1.727 m)   Wt 182 lb 6.4 oz (82.7 kg)   SpO2 98%   BMI 27.73 kg/m²     1. Have you been to the ER, urgent care clinic since your last visit? Hospitalized since your last visit? No    2. Have you seen or consulted any other health care providers outside of the [address] [place] [address] since your last visit? Include any pap smears or colon screening. No    Reviewed record in preparation for visit and have necessary documentation  Pt did not bring medication to office visit for review  opportunity was given for questions  Goals that were addressed and/or need to be completed during or after this appointment include   There are no preventive care reminders to display for this patient.

## 2019-05-14 NOTE — PROGRESS NOTES
Chief Complaint   Patient presents with    Rash     face, arms     she is a 53y.o. year old female who presents with complaint of erythematous pruritic dermatitis on trunk and extemities for 2  days. There was a known environmental exposures. [place] and symptoms began after handling dog that runs free. Patient denies HA, dizziness, SOB, CP, abdominal pain, dysuria, myalgias or arthralgias. Patient with hx of DM2 which has been controlled. Patient sans other complaints or concerns at this time. Lab Results   Component Value Date/Time    Hemoglobin A1c 6.5 (H) 09/20/2018 03:36 PM    Hemoglobin A1c (POC) 6.9 04/02/2019 04:27 PM      BP Readings from Last 3 Encounters:   05/14/19 104/71   04/29/19 109/72   04/11/19 100/71     Wt Readings from Last 3 Encounters:   05/14/19 182 lb 6.4 oz (82.7 kg)   04/29/19 182 lb (82.6 kg)   04/11/19 180 lb (81.6 kg)     Body mass index is 27.73 kg/m².       Patient Active Problem List   Diagnosis Code    Obesity E66.9    Diabetes mellitus ([facility] [address] E11.9    Hypertension I10    Fibromyalgia syndrome M79.7    Restless leg syndrome G25.81    Chronic generalized pain disorder R52, G89.29    Encounter for long-term (current) use of high-risk medication Z79.899    Headache R51    Persistent disorder of initiating or maintaining sleep G47.00    Migraine without aura G43.009    Musculoskeletal pain M79.18    Multiple environmental allergies Z91.09    History of headache Z87.898    Mixed hyperlipidemia E78.2    Type 2 diabetes with nephropathy (HCC) E11.21    Recurrent depression (HCC) F33.9     Past Surgical History:   Procedure Laterality Date    HX GYN      Partial Hysterectomy    HX HEENT      HX TUBAL LIGATION       Social History     Socioeconomic History    Marital status: SINGLE     Spouse name: Not on file    Number of children: Not on file    Years of education: Not on file    Highest education level: Not on file   Occupational History    Not on file   Social Needs    Financial resource strain: Not on file    Food insecurity:     Worry: Not on file     Inability: Not on file    Transportation needs:     Medical: Not on file     Non-medical: Not on file   Tobacco Use    Smoking status: Former Smoker    Smokeless tobacco: Never Used    Tobacco comment: quit in 2010   Substance and Sexual Activity    Alcohol use: No    Drug use: No    Sexual activity: Not on file   Lifestyle    Physical activity:     Days per week: Not on file     Minutes per session: Not on file    Stress: Not on file   Relationships    Social connections:     Talks on phone: Not on file     Gets together: Not on file     Attends Muslim service: Not on file     Active member of club or organization: Not on file     Attends meetings of clubs or organizations: Not on file     Relationship status: Not on file    Intimate partner violence:     Fear of current or ex partner: Not on file     Emotionally abused: Not on file     Physically abused: Not on file     Forced sexual activity: Not on file   Other Topics Concern    Not on file   Social History Narrative    Not on file     Family History   Problem Relation Age of Onset    Alcohol abuse Father     Diabetes Mother     Hypertension Mother     Heart Disease Mother     Lupus Mother     Sleep Apnea Mother     Depression Mother     Cancer Maternal Aunt     Arthritis-osteo Other     Asthma Other     Diabetes Other     Elevated Lipids Other     Headache Other     Heart Disease Other     Hypertension Other     Migraines Other     Stroke Other     Bleeding Prob Neg Hx     Lung Disease Neg Hx     Psychiatric Disorder Neg Hx     Mental Retardation Neg Hx      Current Outpatient Medications   Medication Sig    oxyCODONE-acetaminophen (PERCOCET 10)  mg per tablet     omeprazole (PRILOSEC) 10 mg capsule Take 10 mg by mouth.  loratadine (CLARITIN) 10 mg tablet Take 10 mg by mouth.     predniSONE (STERAPRED DS) 10 mg dose pack See administration instruction per 10mg dose pack    hydrOXYzine HCl (ATARAX) 25 mg tablet Take 1 Tab by mouth three (3) times daily as needed for Itching for up to 10 days.  triamcinolone acetonide (KENALOG) 40 mg/mL injection 1 mL by IntraMUSCular route once for 1 dose.  pravastatin (PRAVACHOL) 40 mg tablet Take 1 Tab by mouth nightly.  fentaNYL (DURAGESIC) 50 mcg/hr PATCH     HYDROcodone-acetaminophen (NORCO) 5-325 mg per tablet Take 1 Tab by mouth.  fluocinoNIDE (LIDEX) 0.05 % topical cream APPLY TO THE AFFECTED AREA TOPICALLY TWO TIMES A DAY    dexlansoprazole (DEXILANT) 60 mg CpDB capsule (delayed release) TAKE ONE CAPSULE BY MOUTH EVERY DAY    ketoconazole (NIZORAL) 2 % shampoo APPLY TO SCALP AND RINSE   DAILY AS NEEDED    raNITIdine (ZANTAC) 150 mg tablet TAKE 1 TABLET TWICE A DAY    topiramate (TOPAMAX) 25 mg tablet TAKE ONE TABLET BY MOUTH AT BEDTIME    montelukast (SINGULAIR) 10 mg tablet Take 1 Tab by mouth daily.  fluticasone propionate (FLONASE) 50 mcg/actuation nasal spray USE 1 TO 2 SPRAYS NASALLY  ONCE DAILY    dulaglutide (TRULICITY) 1.5 TQ/6.4 mL sub-q pen INJECT 0.5 ML SUB-Q EVERY 7 DAYS    rizatriptan (MAXALT) 10 mg tablet TAKE ONE TABLET BY MOUTH ONCE AS NEEDED MAY REPEAT IN 2 HOURS IF NEEDED    fluticasone propion-salmeterol (ADVAIR DISKUS) 250-50 mcg/dose diskus inhaler Take 1 Puff by inhalation every twelve (12) hours.  naproxen (NAPROSYN) 500 mg tablet TAKE ONE TABLET BY MOUTH TWICE A DAY WITH FOOD AS NEEDED.  fenofibrate nanocrystallized (TRICOR) 145 mg tablet TAKE 1 TABLET DAILY FOR    HYPERTRIGLYCERIDEMIA    cyclobenzaprine (FLEXERIL) 10 mg tablet Take 1 Tab by mouth three (3) times daily as needed for Muscle Spasm(s).  naloxone 4 mg/actuation spry 4 mg by Nasal route as needed for up to 2 doses.  Indications: OPIOID TOXICITY    albuterol (PROAIR HFA) 90 mcg/actuation inhaler Take 2 Puffs by inhalation every four (4) hours as needed for Wheezing. Take 2 Puffs inhaled by mouth Every 4 Hours. (Patient taking differently: Take 2 Puffs by inhalation every four (4) hours as needed for Wheezing.)    polyethylene glycol (MIRALAX) 17 gram/dose powder     aspirin delayed-release 81 mg tablet Take  by mouth daily.  escitalopram oxalate (LEXAPRO) 10 mg tablet TAKE ONE TABLET BY MOUTH EVERY DAY    cyclobenzaprine (FLEXERIL) 10 mg tablet Take 1 Tab by mouth two (2) times daily as needed for Muscle Spasm(s) for up to 30 days. As needed  Indications: Muscle Spasm    AMITIZA 24 mcg capsule      No current facility-administered medications for this visit.       Allergies   Allergen Reactions    Esomeprazole Magnesium Hives     Other reaction(s): mild rash/itching    Nexium [Esomeprazole Magnesium] Unknown (comments)    Pcn [Penicillins] Unknown (comments)       Review of Systems:  Constitutional: feeling well, denies fatigue, malaise  HEENT: Negative for acute hearing or vision changes  Respiratory: Negative for cough, wheezing or SOB  Cardiovascular: Negative for chest pain, dizziness or palpitations  Gastrointestinal: Negative for nausea or abdominal pain  Genital/urinary: Negative for dysuria or voiding dysfunction  Musculoskeletal: Negative myalgias or arthralgias   Neurological: Negative for HA, weakness or paresthesia  Skin: see HPI  Psychlogical: Negative for depression or anxiety     Vitals:    05/14/19 1455   BP: 104/71   Pulse: 74   Resp: 18   Temp: 98 °F (36.7 °C)   TempSrc: Oral   SpO2: 98%   Weight: 182 lb 6.4 oz (82.7 kg)   Height: 5' 8\" (1.727 m)       Physical Examination:  General: Well developed, well nourished, in no acute distress  Head: Normocephalic, atraumatic  Eyes: Sclera clear, EOMI  Neck: Normal range of motion  Respiratory: Clear to auscultation bilaterally with symmetrical effort  Cardiovascular: Normal S1, S2, Regular rate and rhythm  Extremities: Full range of motion, Normal gait  Neurological: Normal strength and sensation. No focal deficits  Skin: erythematous maculopapular dermatitis on trunk and extemities  Psychological: Active, alert and oriented. Affect appropriate     Diagnoses and all orders for this visit:    1. Dermatitis due to plants, including poison ivy, sumac, and oak  -     predniSONE (STERAPRED DS) 10 mg dose pack; See administration instruction per 10mg dose pack  -     hydrOXYzine HCl (ATARAX) 25 mg tablet; Take 1 Tab by mouth three (3) times daily as needed for Itching for up to 10 days.  -     TRIAMCINOLONE ACETONIDE INJ  -     triamcinolone acetonide (KENALOG) 40 mg/mL injection; 1 mL by IntraMUSCular route once for 1 dose. 2. Type 2 diabetes mellitus with diabetic neuropathy, without long-term current use of insulin ([facility] [address]       Advise patient to be diligent for signd/symptoms of infection as she is at risk due to her diabetes. Also to follow a strict diabetic diet while on steroid. I have discussed the diagnosis with the patient and the intended plan as seen in the above orders. The patient expresses understanding and agreement with our plan of care. The patient has received an after-visit summary. The patient knows to call our office if there are any questions or concerns regarding diagnosis and treatment plans. I have discussed medication side effects and warnings with the patient as well. Follow-up and Dispositions    · Return if symptoms worsen or fail to improve.

## 2019-08-11 DIAGNOSIS — E11.40 TYPE 2 DIABETES MELLITUS WITH DIABETIC NEUROPATHY, WITHOUT LONG-TERM CURRENT USE OF INSULIN (HCC): ICD-10-CM

## 2019-08-11 NOTE — LETTER
8/12/2019 11:07 AM 
 
Ms. Claudean Mike [facility] [place] [id number] Dear Ms. [place] Oneil missed you! Please call our office at [phone] and schedule a follow up appointment for your continued care. Sincerely, Nancy See MD

## 2019-09-09 DIAGNOSIS — J45.30 MILD PERSISTENT ASTHMA WITHOUT COMPLICATION: ICD-10-CM

## 2019-09-09 RX ORDER — FLUTICASONE PROPIONATE AND SALMETEROL 250; 50 UG/1; UG/1
1 POWDER RESPIRATORY (INHALATION) EVERY 12 HOURS
Qty: 1 INHALER | Refills: 0 | Status: SHIPPED | OUTPATIENT
Start: 2019-09-09 | End: 2019-10-10 | Stop reason: SDUPTHER

## 2019-09-09 NOTE — LETTER
9/9/2019 8:02 AM 
 
Ms. Derrick Lug[place] [id number] Dear Ms. Ageeandrés Pedro Brady missed you! Please call our office at [phone] and schedule a follow up appointment for your continued care. Sincerely, Chuck Kilgore MD

## 2019-10-07 ENCOUNTER — OFFICE VISIT (OUTPATIENT)
Dept: FAMILY MEDICINE CLINIC | Age: 49
End: 2019-10-07

## 2019-10-07 VITALS
RESPIRATION RATE: 16 BRPM | WEIGHT: 173 LBS | DIASTOLIC BLOOD PRESSURE: 75 MMHG | HEIGHT: 68 IN | BODY MASS INDEX: 26.22 KG/M2 | SYSTOLIC BLOOD PRESSURE: 107 MMHG | OXYGEN SATURATION: 96 % | HEART RATE: 86 BPM | TEMPERATURE: 98.3 F

## 2019-10-07 DIAGNOSIS — E11.40 TYPE 2 DIABETES MELLITUS WITH DIABETIC NEUROPATHY, WITHOUT LONG-TERM CURRENT USE OF INSULIN (HCC): Primary | ICD-10-CM

## 2019-10-07 DIAGNOSIS — G63 POLYNEUROPATHY ASSOCIATED WITH UNDERLYING DISEASE (HCC): ICD-10-CM

## 2019-10-07 DIAGNOSIS — J45.30 MILD PERSISTENT ASTHMA WITHOUT COMPLICATION: ICD-10-CM

## 2019-10-07 DIAGNOSIS — Z91.09 MULTIPLE ENVIRONMENTAL ALLERGIES: ICD-10-CM

## 2019-10-07 DIAGNOSIS — Z23 ENCOUNTER FOR IMMUNIZATION: ICD-10-CM

## 2019-10-07 DIAGNOSIS — E78.2 MIXED HYPERLIPIDEMIA: ICD-10-CM

## 2019-10-07 DIAGNOSIS — I10 ESSENTIAL HYPERTENSION: ICD-10-CM

## 2019-10-07 DIAGNOSIS — K21.9 GASTROESOPHAGEAL REFLUX DISEASE WITHOUT ESOPHAGITIS: ICD-10-CM

## 2019-10-07 DIAGNOSIS — G43.019 INTRACTABLE MIGRAINE WITHOUT AURA AND WITHOUT STATUS MIGRAINOSUS: ICD-10-CM

## 2019-10-07 DIAGNOSIS — M79.7 FIBROMYALGIA SYNDROME: ICD-10-CM

## 2019-10-07 DIAGNOSIS — F33.9 RECURRENT DEPRESSION (HCC): ICD-10-CM

## 2019-10-07 LAB
ALBUMIN UR QL STRIP: 30 MG/L
BILIRUB UR QL STRIP: NEGATIVE
CREATININE, URINE POC: 300 MG/DL
GLUCOSE UR-MCNC: NORMAL MG/DL
KETONES P FAST UR STRIP-MCNC: NEGATIVE MG/DL
MICROALBUMIN/CREAT RATIO POC: <30 MG/G
PH UR STRIP: 5 [PH] (ref 4.6–8)
PROT UR QL STRIP: NEGATIVE
SP GR UR STRIP: 1.02 (ref 1–1.03)
UA UROBILINOGEN AMB POC: NORMAL (ref 0.2–1)
URINALYSIS CLARITY POC: CLEAR
URINALYSIS COLOR POC: YELLOW
URINE BLOOD POC: NEGATIVE
URINE LEUKOCYTES POC: NEGATIVE
URINE NITRITES POC: NEGATIVE

## 2019-10-07 RX ORDER — CYCLOBENZAPRINE HCL 10 MG
10 TABLET ORAL
Qty: 90 TAB | Refills: 0 | Status: SHIPPED | OUTPATIENT
Start: 2019-10-07 | End: 2020-06-19

## 2019-10-07 NOTE — PROGRESS NOTES
1. Have you been to the ER, urgent care clinic since your last visit? Hospitalized since your last visit? No    2. Have you seen or consulted any other health care providers outside of the [address] [place] [address] since your last visit? Include any pap smears or colon screening.  No  Reviewed record in preparation for visit and have necessary documentation  Pt did not bring medication to office visit for review    Goals that were addressed and/or need to be completed during or after this appointment include   Health Maintenance Due   Topic Date Due    BREAST CANCER SCRN MAMMOGRAM  06/02/2016    Influenza Age 5 to Adult  08/01/2019    MICROALBUMIN Q1  09/20/2019    LIPID PANEL Q1  09/20/2019    FOOT EXAM Q1  09/24/2019    HEMOGLOBIN A1C Q6M  10/02/2019     Pt to schedule a mammogram

## 2019-10-07 NOTE — PATIENT INSTRUCTIONS
October 7, 2019 Magi Albarran [id number] Dear Luba Claire: 
 
Thank you for requesting access to [facility]. Please follow the instructions below to view your test results, access and download parts of your medical record, view details of your past and upcoming appointments, and view your medications online. How Do I Sign Up? 1. In your internet browser, go to [email or web address] 2. Click on the First Time User? Click Here link in the Sign In box. You will see the New Member Sign Up page. 3. Enter your [facility] Access Code exactly as it appears below. You will not need to use this code after youve completed the sign-up process. If you do not sign up before the expiration date, you must request a new code. · [facility] Access Code: [email or web address] · Expires: 11/21/2019  1:06 PM 
 
4. Enter the last four digits of your Social Security Number (xxxx) and Date of Birth (mm/dd/yyyy) as indicated and click Submit. You will be taken to the next sign-up page. 5. Create a [facility] ID. This will be your [facility] login ID and cannot be changed, so think of one that is secure and easy to remember. 6. Create a [facility] password. You can change your password at any time. 7. Enter your Password Reset Question and Answer. This can be used at a later time if you forget your password. 8. Enter your e-mail address. You will receive e-mail notification when new information is available in [address]. 9. Click Sign Up. You can now view and download portions of your medical record. 10. Click the Download Summary menu link to download a portable copy of your medical information. Additional Information: If you require any assistance or have any questions, please contact [address] at [phone], email at [email or web address] or check online in our Frequently Asked Questions. Remember, MyChart is NOT to be used for urgent needs. For medical emergencies, dial 911. Now available from your iPhone and Android! Sincerely, Talbot Ormond

## 2019-10-09 RX ORDER — FENOFIBRATE 145 MG/1
145 TABLET, COATED ORAL DAILY
Qty: 30 TAB | Refills: 2 | Status: SHIPPED | OUTPATIENT
Start: 2019-10-09 | End: 2020-02-17 | Stop reason: SDUPTHER

## 2019-10-09 RX ORDER — RANITIDINE 150 MG/1
150 TABLET, FILM COATED ORAL 2 TIMES DAILY
Qty: 60 TAB | Refills: 2 | Status: SHIPPED | OUTPATIENT
Start: 2019-10-09 | End: 2020-06-19

## 2019-10-09 RX ORDER — MONTELUKAST SODIUM 10 MG/1
10 TABLET ORAL DAILY
Qty: 30 TAB | Refills: 0 | Status: SHIPPED | OUTPATIENT
Start: 2019-10-09 | End: 2020-02-17 | Stop reason: SDUPTHER

## 2019-10-09 RX ORDER — ESCITALOPRAM OXALATE 10 MG/1
10 TABLET ORAL DAILY
Qty: 30 TAB | Refills: 2 | Status: SHIPPED | OUTPATIENT
Start: 2019-10-09 | End: 2020-06-19

## 2019-10-09 RX ORDER — DEXLANSOPRAZOLE 60 MG/1
CAPSULE, DELAYED RELEASE ORAL
Qty: 30 CAP | Refills: 2 | Status: SHIPPED | OUTPATIENT
Start: 2019-10-09 | End: 2020-02-17 | Stop reason: SDUPTHER

## 2019-10-09 RX ORDER — TOPIRAMATE 25 MG/1
25 TABLET ORAL
Qty: 30 TAB | Refills: 2 | Status: SHIPPED | OUTPATIENT
Start: 2019-10-09 | End: 2020-02-17 | Stop reason: SDUPTHER

## 2019-10-11 NOTE — PROGRESS NOTES
No chief complaint on file. she is a 51y.o. year old female who presents for follow up of her multiple chronic health conditions. Patient with multiple co-morbidities which include T2D, HTN, HLD, migraine, RLS, fibromyalgia, depression and chronic pain. Patient denies HA, dizziness, SOB, CP, abdominal pain, dysuria. She is followed by pain management. Diabetes: This patient is being treating under a comprehensive plan of care for diabetes. Overall the patient feels well with good energy level. Insulin dependence: no   Pertinent Labs:   Lab Results   Component Value Date/Time    Hemoglobin A1c 6.5 (H) 09/20/2018 03:36 PM      Body mass index is 26.3 kg/m². Lab Results   Component Value Date/Time    LDL, calculated 173 (H) 09/20/2018 03:36 PM        Wt Readings from Last 3 Encounters:   10/07/19 173 lb (78.5 kg)   05/14/19 182 lb 6.4 oz (82.7 kg)   04/29/19 182 lb (82.6 kg)        Social History     Tobacco Use   Smoking Status Former Smoker   Smokeless Tobacco Never Used   Tobacco Comment    quit in 2010        Medications, diet and exercise as means of diabetic control with a goal of an A1C of less than 7.0% discussed. Diabetic foot care and annual eye exam discussed as well. Check blood sugars while fasting just before breakfast on most days and occasionally before dinner. Write down readings in a diabetic log book and bring them to the next visit. Call the office for fasting sugars over 200 or below 75 on two or more occasions. Call immediately if having symptoms of high sugar (frequent urination, always thirsty) or low sugar (dizzy, lethargic, sweaty, nauseated, headache). Our overall goal is to reduce or eliminate the long term consequences of poorly controlled diabetes. Patient expresses understanding and agreement with our plan of care.     Hypertension:  The patient reports:  taking medications as instructed, no medication side effects noted, no TIA's, no chest pain on exertion, no dyspnea on exertion, no swelling of ankles. BP Readings from Last 3 Encounters:   10/07/19 107/75   05/14/19 104/71   04/29/19 109/72     Lab Results   Component Value Date/Time    Sodium 140 09/20/2018 03:36 PM    Potassium 5.1 09/20/2018 03:36 PM    Chloride 100 09/20/2018 03:36 PM    CO2 25 09/20/2018 03:36 PM    Anion gap 7 01/29/2010 08:59 AM    Glucose 200 (H) 09/20/2018 03:36 PM    BUN 12 09/20/2018 03:36 PM    Creatinine 0.59 09/20/2018 03:36 PM    BUN/Creatinine ratio 20 09/20/2018 03:36 PM    GFR est  09/20/2018 03:36 PM    GFR est non- 09/20/2018 03:36 PM    Calcium 9.8 09/20/2018 03:36 PM     Patient advised to log blood pressures at home weekly and bring to next visit. Call office as soon as possible if BP's over 140/90 on multiple occasions or with symptoms of dizziness, chest pain, shortness of breath, headache or ankle swelling. Our goal is to normalize the blood pressure to decrease the risks of strokes and heart attacks. The patient is in agreement with the plan.       Patient Active Problem List   Diagnosis Code    Obesity E66.9    Diabetes mellitus ([facility] [address] E11.9    Hypertension I10    Fibromyalgia syndrome M79.7    Restless leg syndrome G25.81    Chronic generalized pain disorder R52, G89.29    Encounter for long-term (current) use of high-risk medication Z79.899    Headache R51    Persistent disorder of initiating or maintaining sleep G47.00    Migraine without aura G43.009    Musculoskeletal pain M79.18    Multiple environmental allergies Z91.09    History of headache Z87.898    Mixed hyperlipidemia E78.2    Type 2 diabetes with nephropathy (HCC) E11.21    Recurrent depression (HCC) F33.9     Past Surgical History:   Procedure Laterality Date    HX GYN      Partial Hysterectomy    HX HEENT      HX TUBAL LIGATION       Social History     Socioeconomic History    Marital status: SINGLE     Spouse name: Not on file    Number of children: Not on file    Years of education: Not on file    Highest education level: Not on file   Occupational History    Not on file   Social Needs    Financial resource strain: Not on file    Food insecurity:     Worry: Not on file     Inability: Not on file    Transportation needs:     Medical: Not on file     Non-medical: Not on file   Tobacco Use    Smoking status: Former Smoker    Smokeless tobacco: Never Used    Tobacco comment: quit in 2010   Substance and Sexual Activity    Alcohol use: No    Drug use: No    Sexual activity: Not on file   Lifestyle    Physical activity:     Days per week: Not on file     Minutes per session: Not on file    Stress: Not on file   Relationships    Social connections:     Talks on phone: Not on file     Gets together: Not on file     Attends Yazidism service: Not on file     Active member of club or organization: Not on file     Attends meetings of clubs or organizations: Not on file     Relationship status: Not on file    Intimate partner violence:     Fear of current or ex partner: Not on file     Emotionally abused: Not on file     Physically abused: Not on file     Forced sexual activity: Not on file   Other Topics Concern    Not on file   Social History Narrative    Not on file     Family History   Problem Relation Age of Onset    Alcohol abuse Father     Diabetes Mother     Hypertension Mother     Heart Disease Mother     Lupus Mother     Sleep Apnea Mother     Depression Mother     Cancer Maternal Aunt     Arthritis-osteo Other     Asthma Other     Diabetes Other     Elevated Lipids Other     Headache Other     Heart Disease Other     Hypertension Other     Migraines Other     Stroke Other     Bleeding Prob Neg Hx     Lung Disease Neg Hx     Psychiatric Disorder Neg Hx     Mental Retardation Neg Hx      Current Outpatient Medications   Medication Sig    dulaglutide (TRULICITY) 1.5 WN/5.8 mL sub-q pen 0.5 mL by SubCUTAneous route every seven (7) days.  INJECT 0.5 ML SUB-Q EVERY 7 DAYS    topiramate (TOPAMAX) 25 mg tablet Take 1 Tab by mouth nightly.  escitalopram oxalate (LEXAPRO) 10 mg tablet Take 1 Tab by mouth daily.  montelukast (SINGULAIR) 10 mg tablet Take 1 Tab by mouth daily.  fenofibrate nanocrystallized (TRICOR) 145 mg tablet Take 1 Tab by mouth daily.  dexlansoprazole (DEXILANT) 60 mg CpDB capsule (delayed release) TAKE ONE CAPSULE BY MOUTH EVERY DAY    raNITIdine (ZANTAC) 150 mg tablet Take 1 Tab by mouth two (2) times a day.  cyclobenzaprine (FLEXERIL) 10 mg tablet Take 1 Tab by mouth three (3) times daily as needed for Muscle Spasm(s).  fluticasone propionate (FLONASE) 50 mcg/actuation nasal spray USE 1 TO 2 SPRAYS NASALLY ONCE DAILY    oxyCODONE-acetaminophen (PERCOCET 10)  mg per tablet     loratadine (CLARITIN) 10 mg tablet Take 10 mg by mouth.  pravastatin (PRAVACHOL) 40 mg tablet Take 1 Tab by mouth nightly.  fentaNYL (DURAGESIC) 50 mcg/hr PATCH     fluocinoNIDE (LIDEX) 0.05 % topical cream APPLY TO THE AFFECTED AREA TOPICALLY TWO TIMES A DAY    ketoconazole (NIZORAL) 2 % shampoo APPLY TO SCALP AND RINSE   DAILY AS NEEDED    rizatriptan (MAXALT) 10 mg tablet TAKE ONE TABLET BY MOUTH ONCE AS NEEDED MAY REPEAT IN 2 HOURS IF NEEDED    AMITIZA 24 mcg capsule     naloxone 4 mg/actuation spry 4 mg by Nasal route as needed for up to 2 doses. Indications: OPIOID TOXICITY    albuterol (PROAIR HFA) 90 mcg/actuation inhaler Take 2 Puffs by inhalation every four (4) hours as needed for Wheezing. Take 2 Puffs inhaled by mouth Every 4 Hours. (Patient taking differently: Take 2 Puffs by inhalation every four (4) hours as needed for Wheezing.)    polyethylene glycol (MIRALAX) 17 gram/dose powder     fluticasone propion-salmeterol (ADVAIR/WIXELA) 250-50 mcg/dose diskus inhaler Take 1 Puff by inhalation every twelve (12) hours. No current facility-administered medications for this visit.       Allergies   Allergen Reactions    Esomeprazole Magnesium Hives     Other reaction(s): mild rash/itching    Nexium [Esomeprazole Magnesium] Unknown (comments)    Pcn [Penicillins] Unknown (comments)       Review of Systems:  Constitutional: Negative for fatigue or malaise  Skin: Negative for rash or lesion  Endo: Negative for unusual thirst or weight changes  HEENT: Negative for acute hearing or vision changes  Cardiovascular: Negative for dizziness, chest pain or palpitations  Respiratory: Negative for cough, wheezing or SOB  Gastrointestinal: Negative for nausea or abdominal pain  Genital/urinary: Negative for dysuria or voiding dysfunction  Musculoskeletal: see HPI, Negative for myalgias or arthralgias   Neurological: Negative for headache, weakness or paresthesia  Psychological: see HPI       Vitals:    10/07/19 1312   BP: 107/75   Pulse: 86   Resp: 16   Temp: 98.3 °F (36.8 °C)   TempSrc: Oral   SpO2: 96%   Weight: 173 lb (78.5 kg)   Height: 5' 8\" (1.727 m)       Physical Examination:  General: Well developed, well nourished, in no acute distress  Skin: warm and dry, normal tone and turgo  Head: Normocephalic, atraumatic  Eyes: Sclera clear, EOMI  Neck: Normal range of motion  Respiratory: CTAB with symmetrical, unlabored effort  Cardiovascular: Normal S1, S2, Regular rate and rhythm  Abdomen: soft, normal bowel sounds  Extremities: Full range of motion, normal gait  Neurologic: Normal strength, No focal deficits  Psych: Active, alert and oriented. Affect appropriate       Diagnoses and all orders for this visit:    1.  Type 2 diabetes mellitus with diabetic neuropathy, without long-term current use of insulin ([facility])  -      DIABETES FOOT EXAM  -     AMB POC URINALYSIS DIP STICK AUTO W/O MICRO  -     AMB POC URINE, MICROALBUMIN, SEMIQUANT (3 RESULTS)  -     LIPID PANEL  -     HEMOGLOBIN A1C WITH EAG  -     TSH 3RD GENERATION  -     METABOLIC PANEL, COMPREHENSIVE  -     HGB & HCT  -     dulaglutide (TRULICITY) 1.5 SC/1.4 mL sub-q pen; 0.5 mL by SubCUTAneous route every seven (7) days. INJECT 0.5 ML SUB-Q EVERY 7 DAYS    2. Essential hypertension  -     AMB POC URINE, MICROALBUMIN, SEMIQUANT (3 RESULTS)  -     TSH 3RD GENERATION  -     METABOLIC PANEL, COMPREHENSIVE    3. Mild persistent asthma without complication  -     HGB & HCT    4. Mixed hyperlipidemia  -     LIPID PANEL  -     METABOLIC PANEL, COMPREHENSIVE  -     fenofibrate nanocrystallized (TRICOR) 145 mg tablet; Take 1 Tab by mouth daily. 5. Intractable migraine without aura and without status migrainosus  -     topiramate (TOPAMAX) 25 mg tablet; Take 1 Tab by mouth nightly. 6. Gastroesophageal reflux disease without esophagitis  -     dexlansoprazole (DEXILANT) 60 mg CpDB capsule (delayed release); TAKE ONE CAPSULE BY MOUTH EVERY DAY  -     raNITIdine (ZANTAC) 150 mg tablet; Take 1 Tab by mouth two (2) times a day. 7. Fibromyalgia syndrome  -     cyclobenzaprine (FLEXERIL) 10 mg tablet; Take 1 Tab by mouth three (3) times daily as needed for Muscle Spasm(s). 8. Recurrent depression ([facility] [address]  -     escitalopram oxalate (LEXAPRO) 10 mg tablet; Take 1 Tab by mouth daily. 9. Multiple environmental allergies  -     montelukast (SINGULAIR) 10 mg tablet; Take 1 Tab by mouth daily. 10. [facility]    11. Encounter for immunization  -     INFLUENZA VIRUS VAC QUAD,SPLIT,PRESV FREE SYRINGE IM  -     ADMIN INFLUENZA VIRUS VAC      Plan of care:  Diagnoses were discussed in detail with patient. Medication risks/benefits/side effects discussed with patient. Importance of compliance with all prescribed medications discussed. All of the patient's questions were addressed and answered to apparent satisfaction. The patient understands and agrees with our plan of care. The patient knows to call back if they have questions about the plan of care or if symptoms change.   The patient received an After-Visit Summary which contains VS, diagnoses, orders, allergy and medication lists. Over half of the 40 minutes face to face with Evelio Liu consisted of counseling and discussing treatment plans in reference to her TSD and multiple co-morbidities. No future appointments. Follow-up and Dispositions    · Return in about 4 months (around 2/7/2020), or if symptoms worsen or fail to improve.

## 2019-12-16 ENCOUNTER — HOSPITAL ENCOUNTER (OUTPATIENT)
Dept: LAB | Age: 49
Discharge: HOME | End: 2019-12-16

## 2019-12-18 LAB — Lab: NORMAL

## 2019-12-19 LAB
ALBUMIN SERPL-MCNC: 4.4 G/DL (ref 3.5–5.5)
ALBUMIN/GLOB SERPL: 1.7 {RATIO} (ref 1.2–2.2)
ALP SERPL-CCNC: 106 IU/L (ref 39–117)
ALT SERPL-CCNC: 11 IU/L (ref 0–32)
AST SERPL-CCNC: 12 IU/L (ref 0–40)
BILIRUB SERPL-MCNC: 0.3 MG/DL (ref 0–1.2)
BUN SERPL-MCNC: 13 MG/DL (ref 6–24)
BUN/CREAT SERPL: 18 (ref 9–23)
CALCIUM SERPL-MCNC: 9.6 MG/DL (ref 8.7–10.2)
CHLORIDE SERPL-SCNC: 104 MMOL/L (ref 96–106)
CHOLEST SERPL-MCNC: 223 MG/DL (ref 100–199)
CO2 SERPL-SCNC: 25 MMOL/L (ref 20–29)
CREAT SERPL-MCNC: 0.71 MG/DL (ref 0.57–1)
EST. AVERAGE GLUCOSE BLD GHB EST-MCNC: 148 MG/DL
GLOBULIN SER CALC-MCNC: 2.6 G/DL (ref 1.5–4.5)
GLUCOSE SERPL-MCNC: 151 MG/DL (ref 65–99)
HBA1C MFR BLD: 6.8 % (ref 4.8–5.6)
HCT VFR BLD AUTO: 45.1 % (ref 34–46.6)
HDLC SERPL-MCNC: 30 MG/DL
HGB BLD-MCNC: 14.5 G/DL (ref 11.1–15.9)
LDLC SERPL CALC-MCNC: 153 MG/DL (ref 0–99)
POTASSIUM SERPL-SCNC: 4.5 MMOL/L (ref 3.5–5.2)
PROT SERPL-MCNC: 7 G/DL (ref 6–8.5)
SODIUM SERPL-SCNC: 141 MMOL/L (ref 134–144)
TRIGL SERPL-MCNC: 202 MG/DL (ref 0–149)
TSH SERPL DL<=0.005 MIU/L-ACNC: 1.38 UIU/ML (ref 0.45–4.5)
VLDLC SERPL CALC-MCNC: 40 MG/DL (ref 5–40)

## 2020-01-08 RX ORDER — FLUTICASONE PROPIONATE 50 MCG
SPRAY, SUSPENSION (ML) NASAL
Qty: 1 BOTTLE | Refills: 1 | Status: SHIPPED | OUTPATIENT
Start: 2020-01-08 | End: 2020-03-10

## 2020-01-27 DIAGNOSIS — J45.30 MILD PERSISTENT ASTHMA WITHOUT COMPLICATION: ICD-10-CM

## 2020-01-27 RX ORDER — FLUTICASONE PROPIONATE AND SALMETEROL 250; 50 UG/1; UG/1
1 POWDER RESPIRATORY (INHALATION) EVERY 12 HOURS
Qty: 1 INHALER | Refills: 3 | Status: SHIPPED | OUTPATIENT
Start: 2020-01-27 | End: 2020-07-09

## 2020-01-27 NOTE — TELEPHONE ENCOUNTER
Requested Prescriptions     Signed Prescriptions Disp Refills    fluticasone propion-salmeteroL (ADVAIR/WIXELA) 250-50 mcg/dose diskus inhaler 1 Inhaler 3     Sig: Take 1 Puff by inhalation every twelve (12) hours. Authorizing Provider: Nikole Mayfield     Ordering User: Shea rodriguez per Dr. Jonathan Lee. [facility] called to advise patient needs refill on above med and not flonase. ESRD: + R pleural effusion ---> S/P drainage , + CM ----> Feels better   - UF as tolerates  - Cardio follow up noted   Additional HD done ( Normally gets MWF )   Next HD tomorrow     Anemia: +CRF  - Iron stores OK   - added Epogen with HD       RO: cont CaCO3 and rocaltrol    Recent outpt + Hep C Ab -----> Will repeat and check PCR

## 2020-02-14 DIAGNOSIS — E11.40 TYPE 2 DIABETES MELLITUS WITH DIABETIC NEUROPATHY, WITHOUT LONG-TERM CURRENT USE OF INSULIN (HCC): ICD-10-CM

## 2020-02-17 ENCOUNTER — OFFICE VISIT (OUTPATIENT)
Dept: FAMILY MEDICINE CLINIC | Age: 50
End: 2020-02-17

## 2020-02-17 VITALS
TEMPERATURE: 98.2 F | HEART RATE: 86 BPM | HEIGHT: 68 IN | RESPIRATION RATE: 16 BRPM | OXYGEN SATURATION: 96 % | WEIGHT: 177 LBS | DIASTOLIC BLOOD PRESSURE: 82 MMHG | SYSTOLIC BLOOD PRESSURE: 116 MMHG | BODY MASS INDEX: 26.83 KG/M2

## 2020-02-17 DIAGNOSIS — K21.9 GASTROESOPHAGEAL REFLUX DISEASE WITHOUT ESOPHAGITIS: ICD-10-CM

## 2020-02-17 DIAGNOSIS — E11.40 TYPE 2 DIABETES MELLITUS WITH DIABETIC NEUROPATHY, WITHOUT LONG-TERM CURRENT USE OF INSULIN (HCC): ICD-10-CM

## 2020-02-17 DIAGNOSIS — G63 POLYNEUROPATHY ASSOCIATED WITH UNDERLYING DISEASE (HCC): ICD-10-CM

## 2020-02-17 DIAGNOSIS — F33.9 RECURRENT DEPRESSION (HCC): ICD-10-CM

## 2020-02-17 DIAGNOSIS — E78.2 MIXED HYPERLIPIDEMIA: ICD-10-CM

## 2020-02-17 DIAGNOSIS — G43.019 INTRACTABLE MIGRAINE WITHOUT AURA AND WITHOUT STATUS MIGRAINOSUS: Primary | ICD-10-CM

## 2020-02-17 DIAGNOSIS — Z91.09 MULTIPLE ENVIRONMENTAL ALLERGIES: ICD-10-CM

## 2020-02-17 RX ORDER — MONTELUKAST SODIUM 10 MG/1
10 TABLET ORAL DAILY
Qty: 90 TAB | Refills: 1 | Status: SHIPPED | OUTPATIENT
Start: 2020-02-17 | End: 2020-02-17 | Stop reason: SDUPTHER

## 2020-02-17 RX ORDER — FENOFIBRATE 145 MG/1
145 TABLET, COATED ORAL DAILY
Qty: 90 TAB | Refills: 1 | Status: SHIPPED | OUTPATIENT
Start: 2020-02-17 | End: 2020-02-17 | Stop reason: SDUPTHER

## 2020-02-17 RX ORDER — PRAVASTATIN SODIUM 40 MG/1
40 TABLET ORAL
Qty: 30 TAB | Refills: 0 | Status: SHIPPED | OUTPATIENT
Start: 2020-02-17 | End: 2020-07-31

## 2020-02-17 RX ORDER — MONTELUKAST SODIUM 10 MG/1
10 TABLET ORAL DAILY
Qty: 30 TAB | Refills: 0 | Status: SHIPPED | OUTPATIENT
Start: 2020-02-17 | End: 2020-09-02

## 2020-02-17 RX ORDER — TOPIRAMATE 25 MG/1
25 TABLET ORAL
Qty: 90 TAB | Refills: 1 | Status: SHIPPED | OUTPATIENT
Start: 2020-02-17 | End: 2020-02-17 | Stop reason: SDUPTHER

## 2020-02-17 RX ORDER — DEXLANSOPRAZOLE 60 MG/1
CAPSULE, DELAYED RELEASE ORAL
Qty: 30 CAP | Refills: 0 | Status: SHIPPED | OUTPATIENT
Start: 2020-02-17 | End: 2020-07-31

## 2020-02-17 RX ORDER — LANOLIN ALCOHOL/MO/W.PET/CERES
400 CREAM (GRAM) TOPICAL DAILY
Status: CANCELLED | COMMUNITY
Start: 2020-02-17

## 2020-02-17 RX ORDER — FENOFIBRATE 145 MG/1
145 TABLET, COATED ORAL DAILY
Qty: 30 TAB | Refills: 0 | Status: SHIPPED | OUTPATIENT
Start: 2020-02-17 | End: 2020-07-31

## 2020-02-17 RX ORDER — TOPIRAMATE 25 MG/1
25 TABLET ORAL
Qty: 30 TAB | Refills: 1 | Status: SHIPPED | OUTPATIENT
Start: 2020-02-17 | End: 2020-07-31

## 2020-02-17 RX ORDER — DEXLANSOPRAZOLE 60 MG/1
CAPSULE, DELAYED RELEASE ORAL
Qty: 90 CAP | Refills: 1 | Status: SHIPPED | OUTPATIENT
Start: 2020-02-17 | End: 2020-02-17 | Stop reason: SDUPTHER

## 2020-02-17 RX ORDER — PRAVASTATIN SODIUM 40 MG/1
40 TABLET ORAL
Qty: 90 TAB | Refills: 1 | Status: SHIPPED | OUTPATIENT
Start: 2020-02-17 | End: 2020-02-17 | Stop reason: SDUPTHER

## 2020-02-17 NOTE — PROGRESS NOTES
1. Have you been to the ER, urgent care clinic since your last visit? Hospitalized since your last visit? No    2. Have you seen or consulted any other health care providers outside of the [address] [place] [address] since your last visit? Include any pap smears or colon screening. No  Reviewed record in preparation for visit and have necessary documentation  Pt did not bring medication to office visit for review    Goals that were addressed and/or need to be completed during or after this appointment include   There are no preventive care reminders to display for this patient.

## 2020-02-24 ENCOUNTER — TELEPHONE (OUTPATIENT)
Dept: FAMILY MEDICINE CLINIC | Age: 50
End: 2020-02-24

## 2020-02-24 ENCOUNTER — CLINICAL SUPPORT (OUTPATIENT)
Dept: FAMILY MEDICINE CLINIC | Age: 50
End: 2020-02-24

## 2020-02-24 VITALS
TEMPERATURE: 98.1 F | OXYGEN SATURATION: 98 % | RESPIRATION RATE: 16 BRPM | SYSTOLIC BLOOD PRESSURE: 115 MMHG | DIASTOLIC BLOOD PRESSURE: 82 MMHG | BODY MASS INDEX: 26.52 KG/M2 | HEART RATE: 77 BPM | HEIGHT: 68 IN | WEIGHT: 175 LBS

## 2020-02-24 DIAGNOSIS — Z91.09 MULTIPLE ENVIRONMENTAL ALLERGIES: ICD-10-CM

## 2020-02-24 DIAGNOSIS — J45.30 MILD PERSISTENT ASTHMA WITHOUT COMPLICATION: ICD-10-CM

## 2020-02-24 DIAGNOSIS — R42 DIZZINESS: ICD-10-CM

## 2020-02-24 DIAGNOSIS — R53.82 CHRONIC FATIGUE: Primary | ICD-10-CM

## 2020-02-24 RX ORDER — MECLIZINE HCL 12.5 MG 12.5 MG/1
12.5 TABLET ORAL
Qty: 30 TAB | Refills: 1 | Status: SHIPPED | OUTPATIENT
Start: 2020-02-24 | End: 2020-03-05

## 2020-02-24 NOTE — PATIENT INSTRUCTIONS
Diabetes and Preventing Falls: Care Instructions  Your Care Instructions    If you are an older adult who has diabetes, you may have a higher risk of falling. Complications of diabetes--such as nerve damage, foot problems, and reduced vision--may increase your risk of a fall. Some of your medicines also may add to your risk. By making your home safer, you can lower your risk of falling. Doing things to prevent diabetes complications may also help to lower your risk. You can make your home safer with a few simple measures. Follow-up care is a key part of your treatment and safety. Be sure to make and go to all appointments, and call your doctor if you are having problems. It's also a good idea to know your test results and keep a list of the medicines you take. How can you care for yourself at home? Taking care of yourself  · Keep your blood sugar at a target level (which you set with your doctor). · Exercise regularly to improve your strength, muscle tone, and balance. Walk if you can. Swimming may be a good choice if you cannot walk easily. · Have your vision checked as often as your doctor recommends. It is usually once a year or more often if you have eye problems. · Know the side effects of the medicines you take. Ask your doctor or pharmacist whether the medicines you take can affect your balance. Sleeping pills or sedatives can affect your balance. · Limit the amount of alcohol you drink. Alcohol can impair your balance and other senses. · Have your doctor check your feet during each visit. If you have a foot problem, see your doctor. Preventing falls at home  · Remove raised doorway thresholds, throw rugs, and clutter. Repair loose carpet or raised areas in the floor. · Move furniture and electrical cords to keep them out of walking paths. · Use nonskid floor wax, and wipe up spills right away, especially on ceramic tile floors. · If you use a walker or cane, put rubber tips on it.  If you use crutches, clean the bottoms of them regularly with an abrasive pad, such as steel wool. · Keep your house well lit, especially [place], and outside walkways. Use night-lights in areas such as hallways and bathrooms. Add extra light switches or use remote switches (such as switches that go on or off when you clap your hands) to make it easier to turn lights on if you have to get up during the night. · Install sturdy handrails on stairways. Put grab bars near your shower, bathtub, and toilet. · Store household items on low shelves so that you do not have to climb or reach high. Or use a reaching device that you can get at a medical supply store. If you have to climb for something, use a step stool with handrails, or ask someone to get it for you. · Keep a cordless phone and a flashlight with new batteries by your bed. If possible, put a phone in each of the main rooms of your house, or carry a cell phone in case you fall and cannot reach a phone. Or you can wear a device around your neck or wrist. You push a button that sends a signal for help. · Wear low-heeled shoes that fit well and give your feet good support. Use footwear with nonskid soles. Check the heels and soles of your shoes for wear. Repair or replace worn heels or soles. · Do not wear socks without shoes on wood floors. · Walk on the grass when the sidewalks are slippery. If you live in an area that gets snow and ice in the winter, sprinkle salt on slippery steps and sidewalks. Where can you learn more? Go to http://carolina-raiza.info/. Enter P955 in the search box to learn more about \"Diabetes and Preventing Falls: Care Instructions. \"  Current as of: November 7, 2018  Content Version: 12.2  © 8112-2514 [employer]. Care instructions adapted under license by [employer] (which disclaims liability or warranty for this information).  If you have questions about a medical condition or this instruction, always ask your healthcare professional. [place] [address] any warranty or liability for your use of this information.

## 2020-02-24 NOTE — PROGRESS NOTES
Chief Complaint   Patient presents with    Allergy Injection    Sleep Problem     tired all the time    Dizziness     she is a 51y.o. year old female who presents for allergy injections. Patient with multiple co-morbidities which include T2D, HTN, HLD, migraine, RLS, fibromyalgia, depression and chronic pain. Patient complains of drowsiness and fatigue. Says she is concerned about narcolepsy. Also with continued symptoms consistent with vertigo. Patient denies HA, otalgia, SOB, CP, abdominal pain, dysuria. She is followed by pain management. Diabetes: This patient is being treating under a comprehensive plan of care for diabetes. Overall the patient feels well with good energy level. Insulin dependence: no   Pertinent Labs:   Lab Results   Component Value Date/Time    Hemoglobin A1c 6.8 (H) 12/16/2019 12:00 AM      Body mass index is 26.61 kg/m². Lab Results   Component Value Date/Time    LDL, calculated 153 (H) 12/16/2019 12:00 AM        Wt Readings from Last 3 Encounters:   02/24/20 175 lb (79.4 kg)   02/17/20 177 lb (80.3 kg)   10/07/19 173 lb (78.5 kg)        Social History     Tobacco Use   Smoking Status Former Smoker   Smokeless Tobacco Never Used   Tobacco Comment    quit in 2010        Medications, diet and exercise as means of diabetic control with a goal of an A1C of less than 7.0% discussed. Diabetic foot care and annual eye exam discussed as well. Check blood sugars while fasting just before breakfast on most days and occasionally before dinner. Write down readings in a diabetic log book and bring them to the next visit. Call the office for fasting sugars over 200 or below 75 on two or more occasions. Call immediately if having symptoms of high sugar (frequent urination, always thirsty) or low sugar (dizzy, lethargic, sweaty, nauseated, headache). Our overall goal is to reduce or eliminate the long term consequences of poorly controlled diabetes.  Patient expresses understanding and agreement with our plan of care. Hypertension:  The patient reports:  taking medications as instructed, no medication side effects noted, no TIA's, no chest pain on exertion, no dyspnea on exertion, no swelling of ankles. BP Readings from Last 3 Encounters:   02/24/20 115/82   02/17/20 116/82   10/07/19 107/75     Lab Results   Component Value Date/Time    Sodium 141 12/16/2019 12:00 AM    Potassium 4.5 12/16/2019 12:00 AM    Chloride 104 12/16/2019 12:00 AM    CO2 25 12/16/2019 12:00 AM    Anion gap 7 01/29/2010 08:59 AM    Glucose 151 (H) 12/16/2019 12:00 AM    BUN 13 12/16/2019 12:00 AM    Creatinine 0.71 12/16/2019 12:00 AM    BUN/Creatinine ratio 18 12/16/2019 12:00 AM    GFR est  12/16/2019 12:00 AM    GFR est non- 12/16/2019 12:00 AM    Calcium 9.6 12/16/2019 12:00 AM     Patient advised to log blood pressures at home weekly and bring to next visit. Call office as soon as possible if BP's over 140/90 on multiple occasions or with symptoms of dizziness, chest pain, shortness of breath, headache or ankle swelling. Our goal is to normalize the blood pressure to decrease the risks of strokes and heart attacks. The patient is in agreement with the plan.       Patient Active Problem List   Diagnosis Code    Obesity E66.9    Diabetes mellitus ([facility] E11.9    Hypertension I10    Fibromyalgia syndrome M79.7    Restless leg syndrome G25.81    Chronic generalized pain disorder R52, G89.29    Encounter for long-term (current) use of high-risk medication Z79.899    Headache R51    Persistent disorder of initiating or maintaining sleep G47.00    Migraine without aura G43.009    Musculoskeletal pain M79.18    Multiple environmental allergies Z91.09    History of headache Z87.898    Mixed hyperlipidemia E78.2    Type 2 diabetes with nephropathy ([facility] E11.21    Recurrent depression (HCC) F33.9     Past Surgical History:   Procedure Laterality Date    HX GYN      Partial Hysterectomy    HX HEENT      HX TUBAL LIGATION       Social History     Socioeconomic History    Marital status: SINGLE     Spouse name: Not on file    Number of children: Not on file    Years of education: Not on file    Highest education level: Not on file   Occupational History    Not on file   Social Needs    Financial resource strain: Not on file    Food insecurity:     Worry: Not on file     Inability: Not on file    Transportation needs:     Medical: Not on file     Non-medical: Not on file   Tobacco Use    Smoking status: Former Smoker    Smokeless tobacco: Never Used    Tobacco comment: quit in 2010   Substance and Sexual Activity    Alcohol use: No    Drug use: No    Sexual activity: Not on file   Lifestyle    Physical activity:     Days per week: Not on file     Minutes per session: Not on file    Stress: Not on file   Relationships    Social connections:     Talks on phone: Not on file     Gets together: Not on file     Attends Oriental orthodox service: Not on file     Active member of club or organization: Not on file     Attends meetings of clubs or organizations: Not on file     Relationship status: Not on file    Intimate partner violence:     Fear of current or ex partner: Not on file     Emotionally abused: Not on file     Physically abused: Not on file     Forced sexual activity: Not on file   Other Topics Concern    Not on file   Social History Narrative    Not on file     Family History   Problem Relation Age of Onset    Alcohol abuse Father     Diabetes Mother     Hypertension Mother     Heart Disease Mother     Lupus Mother     Sleep Apnea Mother     Depression Mother     Cancer Maternal Aunt     Arthritis-osteo Other     Asthma Other     Diabetes Other     Elevated Lipids Other     Headache Other     Heart Disease Other     Hypertension Other     Migraines Other     Stroke Other     Bleeding Prob Neg Hx     Lung Disease Neg Hx     Psychiatric Disorder Neg Hx     Mental Retardation Neg Hx      Current Outpatient Medications   Medication Sig    meclizine (ANTIVERT) 12.5 mg tablet Take 1 Tab by mouth three (3) times daily as needed for Dizziness for up to 10 days.  allergy injection 0.2 ml right arm    topiramate (TOPAMAX) 25 mg tablet Take 1 Tab by mouth nightly.  montelukast (SINGULAIR) 10 mg tablet Take 1 Tab by mouth daily.  fenofibrate nanocrystallized (TRICOR) 145 mg tablet Take 1 Tab by mouth daily.  dexlansoprazole (DEXILANT) 60 mg CpDB capsule (delayed release) TAKE ONE CAPSULE BY MOUTH EVERY DAY    pravastatin (PRAVACHOL) 40 mg tablet Take 1 Tab by mouth nightly.  dulaglutide (TRULICITY) 1.5 UP/8.5 mL sub-q pen 0.5 mL by SubCUTAneous route every seven (7) days. INJECT 0.5 ML SUB-Q EVERY 7 DAYS    fluticasone propion-salmeteroL (ADVAIR/WIXELA) 250-50 mcg/dose diskus inhaler Take 1 Puff by inhalation every twelve (12) hours.  fluticasone propionate (FLONASE) 50 mcg/actuation nasal spray Inhale 1 puff by mouth every 12 hours.  escitalopram oxalate (LEXAPRO) 10 mg tablet Take 1 Tab by mouth daily.  raNITIdine (ZANTAC) 150 mg tablet Take 1 Tab by mouth two (2) times a day.  cyclobenzaprine (FLEXERIL) 10 mg tablet Take 1 Tab by mouth three (3) times daily as needed for Muscle Spasm(s).  oxyCODONE-acetaminophen (PERCOCET 10)  mg per tablet     loratadine (CLARITIN) 10 mg tablet Take 10 mg by mouth.  fentaNYL (DURAGESIC) 50 mcg/hr PATCH     fluocinoNIDE (LIDEX) 0.05 % topical cream APPLY TO THE AFFECTED AREA TOPICALLY TWO TIMES A DAY    ketoconazole (NIZORAL) 2 % shampoo APPLY TO SCALP AND RINSE   DAILY AS NEEDED    rizatriptan (MAXALT) 10 mg tablet TAKE ONE TABLET BY MOUTH ONCE AS NEEDED MAY REPEAT IN 2 HOURS IF NEEDED    AMITIZA 24 mcg capsule     naloxone 4 mg/actuation spry 4 mg by Nasal route as needed for up to 2 doses.  Indications: OPIOID TOXICITY    albuterol (PROAIR HFA) 90 mcg/actuation inhaler Take 2 Puffs by inhalation every four (4) hours as needed for Wheezing. Take 2 Puffs inhaled by mouth Every 4 Hours. (Patient taking differently: Take 2 Puffs by inhalation every four (4) hours as needed for Wheezing.)    polyethylene glycol (MIRALAX) 17 gram/dose powder     allergy injection 0.2 ml left arm     No current facility-administered medications for this visit. Allergies   Allergen Reactions    Esomeprazole Magnesium Hives     Other reaction(s): mild rash/itching    Nexium [Esomeprazole Magnesium] Unknown (comments)    Pcn [Penicillins] Unknown (comments)       Review of Systems:  Constitutional: Negative for fatigue or malaise  Skin: Negative for rash or lesion  Endo: Negative for unusual thirst or weight changes  HEENT: Negative for acute hearing or vision changes  Cardiovascular: Negative for dizziness, chest pain or palpitations  Respiratory: Negative for cough, wheezing or SOB  Gastrointestinal: Negative for nausea or abdominal pain  Genital/urinary: Negative for dysuria or voiding dysfunction  Musculoskeletal: see HPI, Negative for myalgias or arthralgias   Neurological: Negative for headache, weakness or paresthesia  Psychological: see HPI       Vitals:    02/24/20 1145   BP: 115/82   Pulse: 77   Resp: 16   Temp: 98.1 °F (36.7 °C)   TempSrc: Oral   SpO2: 98%   Weight: 175 lb (79.4 kg)   Height: 5' 8\" (1.727 m)       Physical Examination:  General: Well developed, well nourished, in no acute distress  Skin: warm and dry, normal tone and turgo  Head: Normocephalic, atraumatic  Eyes: Sclera clear, EOMI  Neck: Normal range of motion  Respiratory: CTAB with symmetrical, unlabored effort  Cardiovascular: Normal S1, S2, Regular rate and rhythm  Abdomen: soft, normal bowel sounds  Extremities: Full range of motion, normal gait  Neurologic: Normal strength, No focal deficits  Psych: Active, alert and oriented. Affect appropriate       Diagnoses and all orders for this visit:    1.  Chronic fatigue  -     SLEEP MEDICINE REFERRAL    2. Dizziness  -     meclizine (ANTIVERT) 12.5 mg tablet; Take 1 Tab by mouth three (3) times daily as needed for Dizziness for up to 10 days. 3. Multiple environmental allergies  -     SC IMMUNOTHERAPY, 2+ INJECTIONS  -     allergy injection; 0.2 ml right arm  -     allergy injection; 0.2 ml left arm    4. Mild persistent asthma without complication      Plan of care:  Diagnoses were discussed in detail with patient. Medication risks/benefits/side effects discussed with patient. Importance of compliance with all prescribed medications discussed. All of the patient's questions were addressed and answered to apparent satisfaction. The patient understands and agrees with our plan of care. The patient knows to call back if they have questions about the plan of care or if symptoms change. The patient received an After-Visit Summary which contains VS, diagnoses, orders, allergy and medication lists. Over half of the 25 minutes face to face with Any Blanco consisted of counseling and discussing treatment plans in reference to her allergy injections, concerns about fatigue, dizziness and multiple co-morbidities.      Future Appointments   Date Time Provider Department Center   2/27/2020 11:20 AM Kamilah Cook MD [facility] SCHED   3/2/2020 11:00 AM Kamilah Cook MD [facility] SCHED   3/5/2020 11:20 AM Kamilah Cook MD [facility]

## 2020-02-24 NOTE — PROGRESS NOTES
Chief Complaint   Patient presents with    Dizziness     she is a 51y.o. year old female who presents for follow up of her multiple chronic health conditions. Patient with multiple co-morbidities which include T2D, HTN, HLD, migraine, RLS, fibromyalgia, depression and chronic pain. She needs medication refills. She is wanting to restart getting allergy injections at this office. Patient denies HA, dizziness, SOB, CP, abdominal pain, dysuria. She is followed by pain management. Diabetes: This patient is being treating under a comprehensive plan of care for diabetes. Overall the patient feels well with good energy level. Insulin dependence: no   Pertinent Labs:   Lab Results   Component Value Date/Time    Hemoglobin A1c 6.8 (H) 12/16/2019 12:00 AM      Body mass index is 26.91 kg/m². Lab Results   Component Value Date/Time    LDL, calculated 153 (H) 12/16/2019 12:00 AM        Wt Readings from Last 3 Encounters:   02/17/20 177 lb (80.3 kg)   10/07/19 173 lb (78.5 kg)   05/14/19 182 lb 6.4 oz (82.7 kg)        Social History     Tobacco Use   Smoking Status Former Smoker   Smokeless Tobacco Never Used   Tobacco Comment    quit in 2010        Medications, diet and exercise as means of diabetic control with a goal of an A1C of less than 7.0% discussed. Diabetic foot care and annual eye exam discussed as well. Check blood sugars while fasting just before breakfast on most days and occasionally before dinner. Write down readings in a diabetic log book and bring them to the next visit. Call the office for fasting sugars over 200 or below 75 on two or more occasions. Call immediately if having symptoms of high sugar (frequent urination, always thirsty) or low sugar (dizzy, lethargic, sweaty, nauseated, headache). Our overall goal is to reduce or eliminate the long term consequences of poorly controlled diabetes. Patient expresses understanding and agreement with our plan of care. Hypertension:   The patient reports:  taking medications as instructed, no medication side effects noted, no TIA's, no chest pain on exertion, no dyspnea on exertion, no swelling of ankles. BP Readings from Last 3 Encounters:   02/17/20 116/82   10/07/19 107/75   05/14/19 104/71     Lab Results   Component Value Date/Time    Sodium 141 12/16/2019 12:00 AM    Potassium 4.5 12/16/2019 12:00 AM    Chloride 104 12/16/2019 12:00 AM    CO2 25 12/16/2019 12:00 AM    Anion gap 7 01/29/2010 08:59 AM    Glucose 151 (H) 12/16/2019 12:00 AM    BUN 13 12/16/2019 12:00 AM    Creatinine 0.71 12/16/2019 12:00 AM    BUN/Creatinine ratio 18 12/16/2019 12:00 AM    GFR est  12/16/2019 12:00 AM    GFR est non- 12/16/2019 12:00 AM    Calcium 9.6 12/16/2019 12:00 AM     Patient advised to log blood pressures at home weekly and bring to next visit. Call office as soon as possible if BP's over 140/90 on multiple occasions or with symptoms of dizziness, chest pain, shortness of breath, headache or ankle swelling. Our goal is to normalize the blood pressure to decrease the risks of strokes and heart attacks. The patient is in agreement with the plan.       Patient Active Problem List   Diagnosis Code    Obesity E66.9    Diabetes mellitus ([facility] [address] E11.9    Hypertension I10    Fibromyalgia syndrome M79.7    Restless leg syndrome G25.81    Chronic generalized pain disorder R52, G89.29    Encounter for long-term (current) use of high-risk medication Z79.899    Headache R51    Persistent disorder of initiating or maintaining sleep G47.00    Migraine without aura G43.009    Musculoskeletal pain M79.18    Multiple environmental allergies Z91.09    History of headache Z87.898    Mixed hyperlipidemia E78.2    Type 2 diabetes with nephropathy (HCC) E11.21    Recurrent depression (HCC) F33.9     Past Surgical History:   Procedure Laterality Date    HX GYN      Partial Hysterectomy    HX HEENT      HX TUBAL LIGATION       Social History Socioeconomic History    Marital status: SINGLE     Spouse name: Not on file    Number of children: Not on file    Years of education: Not on file    Highest education level: Not on file   Occupational History    Not on file   Social Needs    Financial resource strain: Not on file    Food insecurity:     Worry: Not on file     Inability: Not on file    Transportation needs:     Medical: Not on file     Non-medical: Not on file   Tobacco Use    Smoking status: Former Smoker    Smokeless tobacco: Never Used    Tobacco comment: quit in 2010   Substance and Sexual Activity    Alcohol use: No    Drug use: No    Sexual activity: Not on file   Lifestyle    Physical activity:     Days per week: Not on file     Minutes per session: Not on file    Stress: Not on file   Relationships    Social connections:     Talks on phone: Not on file     Gets together: Not on file     Attends Zoroastrian service: Not on file     Active member of club or organization: Not on file     Attends meetings of clubs or organizations: Not on file     Relationship status: Not on file    Intimate partner violence:     Fear of current or ex partner: Not on file     Emotionally abused: Not on file     Physically abused: Not on file     Forced sexual activity: Not on file   Other Topics Concern    Not on file   Social History Narrative    Not on file     Family History   Problem Relation Age of Onset    Alcohol abuse Father     Diabetes Mother     Hypertension Mother     Heart Disease Mother     Lupus Mother     Sleep Apnea Mother     Depression Mother     Cancer Maternal Aunt     Arthritis-osteo Other     Asthma Other     Diabetes Other     Elevated Lipids Other     Headache Other     Heart Disease Other     Hypertension Other     Migraines Other     Stroke Other     Bleeding Prob Neg Hx     Lung Disease Neg Hx     Psychiatric Disorder Neg Hx     Mental Retardation Neg Hx      Current Outpatient Medications Medication Sig    topiramate (TOPAMAX) 25 mg tablet Take 1 Tab by mouth nightly.  montelukast (SINGULAIR) 10 mg tablet Take 1 Tab by mouth daily.  fenofibrate nanocrystallized (TRICOR) 145 mg tablet Take 1 Tab by mouth daily.  dexlansoprazole (DEXILANT) 60 mg CpDB capsule (delayed release) TAKE ONE CAPSULE BY MOUTH EVERY DAY    pravastatin (PRAVACHOL) 40 mg tablet Take 1 Tab by mouth nightly.  dulaglutide (TRULICITY) 1.5 YD/1.7 mL sub-q pen 0.5 mL by SubCUTAneous route every seven (7) days. INJECT 0.5 ML SUB-Q EVERY 7 DAYS    fluticasone propion-salmeteroL (ADVAIR/WIXELA) 250-50 mcg/dose diskus inhaler Take 1 Puff by inhalation every twelve (12) hours.  fluticasone propionate (FLONASE) 50 mcg/actuation nasal spray Inhale 1 puff by mouth every 12 hours.  cyclobenzaprine (FLEXERIL) 10 mg tablet Take 1 Tab by mouth three (3) times daily as needed for Muscle Spasm(s).  oxyCODONE-acetaminophen (PERCOCET 10)  mg per tablet     loratadine (CLARITIN) 10 mg tablet Take 10 mg by mouth.  fentaNYL (DURAGESIC) 50 mcg/hr PATCH     fluocinoNIDE (LIDEX) 0.05 % topical cream APPLY TO THE AFFECTED AREA TOPICALLY TWO TIMES A DAY    ketoconazole (NIZORAL) 2 % shampoo APPLY TO SCALP AND RINSE   DAILY AS NEEDED    rizatriptan (MAXALT) 10 mg tablet TAKE ONE TABLET BY MOUTH ONCE AS NEEDED MAY REPEAT IN 2 HOURS IF NEEDED    AMITIZA 24 mcg capsule     albuterol (PROAIR HFA) 90 mcg/actuation inhaler Take 2 Puffs by inhalation every four (4) hours as needed for Wheezing. Take 2 Puffs inhaled by mouth Every 4 Hours. (Patient taking differently: Take 2 Puffs by inhalation every four (4) hours as needed for Wheezing.)    polyethylene glycol (MIRALAX) 17 gram/dose powder     escitalopram oxalate (LEXAPRO) 10 mg tablet Take 1 Tab by mouth daily.  raNITIdine (ZANTAC) 150 mg tablet Take 1 Tab by mouth two (2) times a day.  naloxone 4 mg/actuation spry 4 mg by Nasal route as needed for up to 2 doses. Indications: OPIOID TOXICITY     No current facility-administered medications for this visit. Allergies   Allergen Reactions    Esomeprazole Magnesium Hives     Other reaction(s): mild rash/itching    Nexium [Esomeprazole Magnesium] Unknown (comments)    Pcn [Penicillins] Unknown (comments)       Review of Systems:  Constitutional: Negative for fatigue or malaise  Skin: Negative for rash or lesion  Endo: Negative for unusual thirst or weight changes  HEENT: Negative for acute hearing or vision changes  Cardiovascular: Negative for dizziness, chest pain or palpitations  Respiratory: Negative for cough, wheezing or SOB  Gastrointestinal: Negative for nausea or abdominal pain  Genital/urinary: Negative for dysuria or voiding dysfunction  Musculoskeletal: see HPI, Negative for myalgias or arthralgias   Neurological: Negative for headache, weakness or paresthesia  Psychological: see HPI       Vitals:    02/17/20 1111   BP: 116/82   Pulse: 86   Resp: 16   Temp: 98.2 °F (36.8 °C)   TempSrc: Oral   SpO2: 96%   Weight: 177 lb (80.3 kg)   Height: 5' 8\" (1.727 m)       Physical Examination:  General: Well developed, well nourished, in no acute distress  Skin: warm and dry, normal tone and turgo  Head: Normocephalic, atraumatic  Eyes: Sclera clear, EOMI  Neck: Normal range of motion  Respiratory: CTAB with symmetrical, unlabored effort  Cardiovascular: Normal S1, S2, Regular rate and rhythm  Abdomen: soft, normal bowel sounds  Extremities: Full range of motion, normal gait  Neurologic: Normal strength, No focal deficits  Psych: Active, alert and oriented. Affect appropriate       Diagnoses and all orders for this visit:    1. Intractable migraine without aura and without status migrainosus  -     topiramate (TOPAMAX) 25 mg tablet; Take 1 Tab by mouth nightly.     2. Type 2 diabetes mellitus with diabetic neuropathy, without long-term current use of insulin (HCC)  -     fenofibrate nanocrystallized (TRICOR) 145 mg tablet; Take 1 Tab by mouth daily. -     pravastatin (PRAVACHOL) 40 mg tablet; Take 1 Tab by mouth nightly. 3. Mixed hyperlipidemia  -     fenofibrate nanocrystallized (TRICOR) 145 mg tablet; Take 1 Tab by mouth daily. -     pravastatin (PRAVACHOL) 40 mg tablet; Take 1 Tab by mouth nightly. 4. Multiple environmental allergies  -     montelukast (SINGULAIR) 10 mg tablet; Take 1 Tab by mouth daily. 5. Gastroesophageal reflux disease without esophagitis  -     dexlansoprazole (DEXILANT) 60 mg CpDB capsule (delayed release); TAKE ONE CAPSULE BY MOUTH EVERY DAY    6. Polyneuropathy associated with underlying disease ([facility] [address]    7. [facility]      Plan of care:  Diagnoses were discussed in detail with patient. Medication risks/benefits/side effects discussed with patient. Importance of compliance with all prescribed medications discussed. All of the patient's questions were addressed and answered to apparent satisfaction. The patient understands and agrees with our plan of care. The patient knows to call back if they have questions about the plan of care or if symptoms change. The patient received an After-Visit Summary which contains VS, diagnoses, orders, allergy and medication lists. Over half of the 25 minutes face to face with Divine Gloria consisted of counseling and discussing treatment plans in reference to her allergy injections and multiple co-morbidities.      Future Appointments   Date Time Provider Department Center   2/24/2020 11:20 AM Nena Agudelo MD [facility] SCHED   2/27/2020 11:20 AM Nena Agudelo MD [facility] SCHED   3/2/2020 11:00 AM Nena Agudelo MD [facility] SCHED   3/5/2020 11:20 AM Nena Agudelo MD [facility]

## 2020-02-24 NOTE — PROGRESS NOTES
1. Have you been to the ER, urgent care clinic since your last visit? Hospitalized since your last visit? No    2. Have you seen or consulted any other health care providers outside of the [address] [place] [address] since your last visit? Include any pap smears or colon screening. No  Reviewed record in preparation for visit and have necessary documentation  Pt did not bring medication to office visit for review    Goals that were addressed and/or need to be completed during or after this appointment include   There are no preventive care reminders to display for this patient.

## 2020-02-24 NOTE — TELEPHONE ENCOUNTER
----- Message from [employer] sent at 2/24/2020  9:03 AM EST -----  Regarding: Dr. Karely Lutz first and last name: Liborio Hannah Gwen      Reason for call: medication issues       Callback required yes/no and why:yes      Best contact number(s):[phone] option [phone]      Details to clarify the request: The pharmacy has questions about the medication [facility]

## 2020-02-27 ENCOUNTER — CLINICAL SUPPORT (OUTPATIENT)
Dept: FAMILY MEDICINE CLINIC | Age: 50
End: 2020-02-27

## 2020-02-27 VITALS
RESPIRATION RATE: 18 BRPM | TEMPERATURE: 97.9 F | WEIGHT: 176 LBS | OXYGEN SATURATION: 96 % | SYSTOLIC BLOOD PRESSURE: 109 MMHG | HEART RATE: 83 BPM | BODY MASS INDEX: 26.67 KG/M2 | DIASTOLIC BLOOD PRESSURE: 76 MMHG | HEIGHT: 68 IN

## 2020-02-27 DIAGNOSIS — Z91.09 MULTIPLE ENVIRONMENTAL ALLERGIES: Primary | ICD-10-CM

## 2020-02-27 NOTE — PROGRESS NOTES
Chief Complaint   Patient presents with    Allergy Injection     she is a 51y.o. year old female who presents for allergy injections. No problems with prior injections. Patient with multiple co-morbidities which include T2D, HTN, HLD, migraine, RLS, fibromyalgia, depression and chronic pain. Patient denies HA, otalgia, SOB, CP, abdominal pain, dysuria. She is followed by pain management. BP Readings from Last 3 Encounters:   02/27/20 109/76   02/24/20 115/82   02/17/20 116/82     Wt Readings from Last 3 Encounters:   02/27/20 176 lb (79.8 kg)   02/24/20 175 lb (79.4 kg)   02/17/20 177 lb (80.3 kg)     Body mass index is 26.76 kg/m².       Patient Active Problem List   Diagnosis Code    Obesity E66.9    Diabetes mellitus ([facility] [address] E11.9    Hypertension I10    Fibromyalgia syndrome M79.7    Restless leg syndrome G25.81    Chronic generalized pain disorder R52, G89.29    Encounter for long-term (current) use of high-risk medication Z79.899    Headache R51    Persistent disorder of initiating or maintaining sleep G47.00    Migraine without aura G43.009    Musculoskeletal pain M79.18    Multiple environmental allergies Z91.09    History of headache Z87.898    Mixed hyperlipidemia E78.2    Type 2 diabetes with nephropathy (HCC) E11.21    Recurrent depression (HCC) F33.9     Past Surgical History:   Procedure Laterality Date    HX GYN      Partial Hysterectomy    HX HEENT      HX TUBAL LIGATION       Social History     Socioeconomic History    Marital status: SINGLE     Spouse name: Not on file    Number of children: Not on file    Years of education: Not on file    Highest education level: Not on file   Occupational History    Not on file   Social Needs    Financial resource strain: Not on file    Food insecurity:     Worry: Not on file     Inability: Not on file    Transportation needs:     Medical: Not on file     Non-medical: Not on file   Tobacco Use    Smoking status: Former Smoker    Smokeless tobacco: Never Used    Tobacco comment: quit in 2010   Substance and Sexual Activity    Alcohol use: No    Drug use: No    Sexual activity: Not on file   Lifestyle    Physical activity:     Days per week: Not on file     Minutes per session: Not on file    Stress: Not on file   Relationships    Social connections:     Talks on phone: Not on file     Gets together: Not on file     Attends Nondenominational service: Not on file     Active member of club or organization: Not on file     Attends meetings of clubs or organizations: Not on file     Relationship status: Not on file    Intimate partner violence:     Fear of current or ex partner: Not on file     Emotionally abused: Not on file     Physically abused: Not on file     Forced sexual activity: Not on file   Other Topics Concern    Not on file   Social History Narrative    Not on file     Family History   Problem Relation Age of Onset    Alcohol abuse Father     Diabetes Mother     Hypertension Mother     Heart Disease Mother     Lupus Mother     Sleep Apnea Mother     Depression Mother     Cancer Maternal Aunt     Arthritis-osteo Other     Asthma Other     Diabetes Other     Elevated Lipids Other     Headache Other     Heart Disease Other     Hypertension Other     Migraines Other     Stroke Other     Bleeding Prob Neg Hx     Lung Disease Neg Hx     Psychiatric Disorder Neg Hx     Mental Retardation Neg Hx      Current Outpatient Medications   Medication Sig    meclizine (ANTIVERT) 12.5 mg tablet Take 1 Tab by mouth three (3) times daily as needed for Dizziness for up to 10 days.  topiramate (TOPAMAX) 25 mg tablet Take 1 Tab by mouth nightly.  montelukast (SINGULAIR) 10 mg tablet Take 1 Tab by mouth daily.  fenofibrate nanocrystallized (TRICOR) 145 mg tablet Take 1 Tab by mouth daily.     dexlansoprazole (DEXILANT) 60 mg CpDB capsule (delayed release) TAKE ONE CAPSULE BY MOUTH EVERY DAY    pravastatin (PRAVACHOL) 40 mg tablet Take 1 Tab by mouth nightly.  dulaglutide (TRULICITY) 1.5 EG/7.3 mL sub-q pen 0.5 mL by SubCUTAneous route every seven (7) days. INJECT 0.5 ML SUB-Q EVERY 7 DAYS    fluticasone propion-salmeteroL (ADVAIR/WIXELA) 250-50 mcg/dose diskus inhaler Take 1 Puff by inhalation every twelve (12) hours.  fluticasone propionate (FLONASE) 50 mcg/actuation nasal spray Inhale 1 puff by mouth every 12 hours.  escitalopram oxalate (LEXAPRO) 10 mg tablet Take 1 Tab by mouth daily.  cyclobenzaprine (FLEXERIL) 10 mg tablet Take 1 Tab by mouth three (3) times daily as needed for Muscle Spasm(s).  oxyCODONE-acetaminophen (PERCOCET 10)  mg per tablet     loratadine (CLARITIN) 10 mg tablet Take 10 mg by mouth.  fentaNYL (DURAGESIC) 50 mcg/hr PATCH     fluocinoNIDE (LIDEX) 0.05 % topical cream APPLY TO THE AFFECTED AREA TOPICALLY TWO TIMES A DAY    ketoconazole (NIZORAL) 2 % shampoo APPLY TO SCALP AND RINSE   DAILY AS NEEDED    rizatriptan (MAXALT) 10 mg tablet TAKE ONE TABLET BY MOUTH ONCE AS NEEDED MAY REPEAT IN 2 HOURS IF NEEDED    AMITIZA 24 mcg capsule     naloxone 4 mg/actuation spry 4 mg by Nasal route as needed for up to 2 doses. Indications: OPIOID TOXICITY    albuterol (PROAIR HFA) 90 mcg/actuation inhaler Take 2 Puffs by inhalation every four (4) hours as needed for Wheezing. Take 2 Puffs inhaled by mouth Every 4 Hours. (Patient taking differently: Take 2 Puffs by inhalation every four (4) hours as needed for Wheezing.)    polyethylene glycol (MIRALAX) 17 gram/dose powder     allergy injection 0.2 ml right arm    allergy injection 0.2 ml left arm    raNITIdine (ZANTAC) 150 mg tablet Take 1 Tab by mouth two (2) times a day. No current facility-administered medications for this visit.       Allergies   Allergen Reactions    Esomeprazole Magnesium Hives     Other reaction(s): mild rash/itching    Nexium [Esomeprazole Magnesium] Unknown (comments)    Pcn [Penicillins] Unknown (comments)       Review of Systems:  Constitutional: Negative for fatigue or malaise  Skin: Negative for rash or lesion  Endo: Negative for unusual thirst or weight changes  HEENT: Negative for acute hearing or vision changes  Cardiovascular: Negative for dizziness, chest pain or palpitations  Respiratory: Negative for cough, wheezing or SOB  Gastrointestinal: Negative for nausea or abdominal pain  Genital/urinary: Negative for dysuria or voiding dysfunction  Musculoskeletal: see HPI, Negative for myalgias or arthralgias   Neurological: Negative for headache, weakness or paresthesia  Psychological: see HPI       Vitals:    02/27/20 1126   BP: 109/76   Pulse: 83   Resp: 18   Temp: 97.9 °F (36.6 °C)   TempSrc: Oral   SpO2: 96%   Weight: 176 lb (79.8 kg)   Height: 5' 8\" (1.727 m)       Physical Examination:  General: Well developed, well nourished, in no acute distress  Head: Normocephalic, atraumatic  Eyes: Sclera clear, EOMI  Neck: Normal range of motion  Respiratory: symmetrical, unlabored effort  Cardiovascular: Regular rate and rhythm  Extremities: Full range of motion, normal gait  Neurologic: Normal strength, No focal deficits  Psych: Active, alert and oriented. Affect appropriate       Diagnoses and all orders for this visit:    1. Multiple environmental allergies      Plan of care:  Diagnoses were discussed in detail with patient. Medication risks/benefits/side effects discussed with patient. Importance of compliance with all prescribed medications discussed. All of the patient's questions were addressed and answered to apparent satisfaction. The patient understands and agrees with our plan of care. The patient knows to call back if they have questions about the plan of care or if symptoms change. The patient received an After-Visit Summary which contains VS, diagnoses, orders, allergy and medication lists.    Over half of the 25 minutes face to face with Camilla Mcgowan consisted of counseling and discussing treatment plans in reference to her allergy injections, concerns about fatigue, dizziness and multiple co-morbidities.      Future Appointments   Date Time Provider Dary Buck   3/2/2020 11:00 AM Darwyn Bamberger, MD [facility] ELLIE BRIDGES   3/5/2020 11:20 AM Darwyn Bamberger, MD [facility]

## 2020-02-27 NOTE — PROGRESS NOTES
1. Have you been to the ER, urgent care clinic since your last visit? Hospitalized since your last visit? No    2. Have you seen or consulted any other health care providers outside of the [address] [place] [address] since your last visit? Include any pap smears or colon screening. No  Reviewed record in preparation for visit and have necessary documentation    opportunity was given for questions  Goals that were addressed and/or need to be completed during or after this appointment include There are no preventive care reminders to display for this patient.

## 2020-03-02 ENCOUNTER — CLINICAL SUPPORT (OUTPATIENT)
Dept: FAMILY MEDICINE CLINIC | Age: 50
End: 2020-03-02

## 2020-03-02 VITALS
SYSTOLIC BLOOD PRESSURE: 116 MMHG | OXYGEN SATURATION: 96 % | WEIGHT: 176 LBS | HEIGHT: 68 IN | DIASTOLIC BLOOD PRESSURE: 74 MMHG | TEMPERATURE: 98.1 F | HEART RATE: 68 BPM | BODY MASS INDEX: 26.67 KG/M2 | RESPIRATION RATE: 18 BRPM

## 2020-03-02 DIAGNOSIS — J45.30 MILD PERSISTENT ASTHMA WITHOUT COMPLICATION: ICD-10-CM

## 2020-03-02 DIAGNOSIS — R10.11 RIGHT UPPER QUADRANT ABDOMINAL PAIN: Primary | ICD-10-CM

## 2020-03-02 DIAGNOSIS — E11.40 TYPE 2 DIABETES MELLITUS WITH DIABETIC NEUROPATHY, WITHOUT LONG-TERM CURRENT USE OF INSULIN (HCC): ICD-10-CM

## 2020-03-02 DIAGNOSIS — Z91.09 MULTIPLE ENVIRONMENTAL ALLERGIES: ICD-10-CM

## 2020-03-02 DIAGNOSIS — M79.7 FIBROMYALGIA SYNDROME: ICD-10-CM

## 2020-03-02 DIAGNOSIS — K21.9 GASTROESOPHAGEAL REFLUX DISEASE WITHOUT ESOPHAGITIS: ICD-10-CM

## 2020-03-02 DIAGNOSIS — Q44.1 GALLBLADDER ANOMALY: ICD-10-CM

## 2020-03-02 NOTE — PROGRESS NOTES
No chief complaint on file. she is a 51y.o. year old female who presents for allergy injections. Patient was in ED for abdominal pain with distended gallbladder with thickened walls seen on CT. Patient now believes all her prior GI issues secondary to the gall bladder and want a referral to surgeon to discuss cholecystectomy. Patient with multiple co-morbidities which include T2D, HTN, HLD, migraine, RLS, fibromyalgia, depression and chronic pain. . Patient denies HA, otalgia, SOB, CP, or  dysuria. She is followed by pain management and is opioids. Diabetes: This patient is being treating under a comprehensive plan of care for diabetes. Overall the patient feels well with good energy level. Insulin dependence: no   Pertinent Labs:   Lab Results   Component Value Date/Time    Hemoglobin A1c 6.8 (H) 12/16/2019 12:00 AM      Body mass index is 26.76 kg/m². Lab Results   Component Value Date/Time    LDL, calculated 153 (H) 12/16/2019 12:00 AM        Wt Readings from Last 3 Encounters:   03/02/20 176 lb (79.8 kg)   02/27/20 176 lb (79.8 kg)   02/24/20 175 lb (79.4 kg)        Social History     Tobacco Use   Smoking Status Former Smoker   Smokeless Tobacco Never Used   Tobacco Comment    quit in 2010        Medications, diet and exercise as means of diabetic control with a goal of an A1C of less than 7.0% discussed. Diabetic foot care and annual eye exam discussed as well. Check blood sugars while fasting just before breakfast on most days and occasionally before dinner. Write down readings in a diabetic log book and bring them to the next visit. Call the office for fasting sugars over 200 or below 75 on two or more occasions. Call immediately if having symptoms of high sugar (frequent urination, always thirsty) or low sugar (dizzy, lethargic, sweaty, nauseated, headache). Our overall goal is to reduce or eliminate the long term consequences of poorly controlled diabetes.  Patient expresses understanding and agreement with our plan of care. Hypertension:  The patient reports:  taking medications as instructed, no medication side effects noted, no TIA's, no chest pain on exertion, no dyspnea on exertion, no swelling of ankles. BP Readings from Last 3 Encounters:   03/02/20 116/74   02/27/20 109/76   02/24/20 115/82     Lab Results   Component Value Date/Time    Sodium 141 12/16/2019 12:00 AM    Potassium 4.5 12/16/2019 12:00 AM    Chloride 104 12/16/2019 12:00 AM    CO2 25 12/16/2019 12:00 AM    Anion gap 7 01/29/2010 08:59 AM    Glucose 151 (H) 12/16/2019 12:00 AM    BUN 13 12/16/2019 12:00 AM    Creatinine 0.71 12/16/2019 12:00 AM    BUN/Creatinine ratio 18 12/16/2019 12:00 AM    GFR est  12/16/2019 12:00 AM    GFR est non- 12/16/2019 12:00 AM    Calcium 9.6 12/16/2019 12:00 AM     Patient advised to log blood pressures at home weekly and bring to next visit. Call office as soon as possible if BP's over 140/90 on multiple occasions or with symptoms of dizziness, chest pain, shortness of breath, headache or ankle swelling. Our goal is to normalize the blood pressure to decrease the risks of strokes and heart attacks. The patient is in agreement with the plan.       Patient Active Problem List   Diagnosis Code    Obesity E66.9    Diabetes mellitus ([facility] [address] E11.9    Hypertension I10    Fibromyalgia syndrome M79.7    Restless leg syndrome G25.81    Chronic generalized pain disorder R52, G89.29    Encounter for long-term (current) use of high-risk medication Z79.899    Headache R51    Persistent disorder of initiating or maintaining sleep G47.00    Migraine without aura G43.009    Musculoskeletal pain M79.18    Multiple environmental allergies Z91.09    History of headache Z87.898    Mixed hyperlipidemia E78.2    Type 2 diabetes with nephropathy ([facility] [address] E11.21    Recurrent depression (HCC) F33.9     Past Surgical History:   Procedure Laterality Date    HX GYN      Partial Hysterectomy    HX HEENT      HX TUBAL LIGATION       Social History     Socioeconomic History    Marital status: SINGLE     Spouse name: Not on file    Number of children: Not on file    Years of education: Not on file    Highest education level: Not on file   Occupational History    Not on file   Social Needs    Financial resource strain: Not on file    Food insecurity:     Worry: Not on file     Inability: Not on file    Transportation needs:     Medical: Not on file     Non-medical: Not on file   Tobacco Use    Smoking status: Former Smoker    Smokeless tobacco: Never Used    Tobacco comment: quit in 2010   Substance and Sexual Activity    Alcohol use: No    Drug use: No    Sexual activity: Not on file   Lifestyle    Physical activity:     Days per week: Not on file     Minutes per session: Not on file    Stress: Not on file   Relationships    Social connections:     Talks on phone: Not on file     Gets together: Not on file     Attends Taoist service: Not on file     Active member of club or organization: Not on file     Attends meetings of clubs or organizations: Not on file     Relationship status: Not on file    Intimate partner violence:     Fear of current or ex partner: Not on file     Emotionally abused: Not on file     Physically abused: Not on file     Forced sexual activity: Not on file   Other Topics Concern    Not on file   Social History Narrative    Not on file     Family History   Problem Relation Age of Onset    Alcohol abuse Father     Diabetes Mother     Hypertension Mother     Heart Disease Mother     Lupus Mother     Sleep Apnea Mother     Depression Mother     Cancer Maternal Aunt     Arthritis-osteo Other     Asthma Other     Diabetes Other     Elevated Lipids Other     Headache Other     Heart Disease Other     Hypertension Other     Migraines Other     Stroke Other     Bleeding Prob Neg Hx     Lung Disease Neg Hx     Psychiatric Disorder Neg Hx     Mental Retardation Neg Hx      Current Outpatient Medications   Medication Sig    allergy injection 0.6 ml right arm    allergy injection 0.6 left arm    meclizine (ANTIVERT) 12.5 mg tablet Take 1 Tab by mouth three (3) times daily as needed for Dizziness for up to 10 days.  allergy injection 0.2 ml right arm    allergy injection 0.2 ml left arm    topiramate (TOPAMAX) 25 mg tablet Take 1 Tab by mouth nightly.  montelukast (SINGULAIR) 10 mg tablet Take 1 Tab by mouth daily.  fenofibrate nanocrystallized (TRICOR) 145 mg tablet Take 1 Tab by mouth daily.  dexlansoprazole (DEXILANT) 60 mg CpDB capsule (delayed release) TAKE ONE CAPSULE BY MOUTH EVERY DAY    pravastatin (PRAVACHOL) 40 mg tablet Take 1 Tab by mouth nightly.  dulaglutide (TRULICITY) 1.5 DZ/2.7 mL sub-q pen 0.5 mL by SubCUTAneous route every seven (7) days. INJECT 0.5 ML SUB-Q EVERY 7 DAYS    fluticasone propion-salmeteroL (ADVAIR/WIXELA) 250-50 mcg/dose diskus inhaler Take 1 Puff by inhalation every twelve (12) hours.  fluticasone propionate (FLONASE) 50 mcg/actuation nasal spray Inhale 1 puff by mouth every 12 hours.  escitalopram oxalate (LEXAPRO) 10 mg tablet Take 1 Tab by mouth daily.  raNITIdine (ZANTAC) 150 mg tablet Take 1 Tab by mouth two (2) times a day.  cyclobenzaprine (FLEXERIL) 10 mg tablet Take 1 Tab by mouth three (3) times daily as needed for Muscle Spasm(s).  oxyCODONE-acetaminophen (PERCOCET 10)  mg per tablet     loratadine (CLARITIN) 10 mg tablet Take 10 mg by mouth.     fentaNYL (DURAGESIC) 50 mcg/hr PATCH     fluocinoNIDE (LIDEX) 0.05 % topical cream APPLY TO THE AFFECTED AREA TOPICALLY TWO TIMES A DAY    ketoconazole (NIZORAL) 2 % shampoo APPLY TO SCALP AND RINSE   DAILY AS NEEDED    rizatriptan (MAXALT) 10 mg tablet TAKE ONE TABLET BY MOUTH ONCE AS NEEDED MAY REPEAT IN 2 HOURS IF NEEDED    AMITIZA 24 mcg capsule     naloxone 4 mg/actuation spry 4 mg by Nasal route as needed for up to 2 doses. Indications: OPIOID TOXICITY    albuterol (PROAIR HFA) 90 mcg/actuation inhaler Take 2 Puffs by inhalation every four (4) hours as needed for Wheezing. Take 2 Puffs inhaled by mouth Every 4 Hours. (Patient taking differently: Take 2 Puffs by inhalation every four (4) hours as needed for Wheezing.)    polyethylene glycol (MIRALAX) 17 gram/dose powder      No current facility-administered medications for this visit. Allergies   Allergen Reactions    Esomeprazole Magnesium Hives     Other reaction(s): mild rash/itching    Nexium [Esomeprazole Magnesium] Unknown (comments)    Pcn [Penicillins] Unknown (comments)       Review of Systems:  Constitutional: Negative for fatigue or malaise  Skin: Negative for rash or lesion  Endo: Negative for unusual thirst or weight changes  HEENT: Negative for acute hearing or vision changes  Cardiovascular: Negative for dizziness, chest pain or palpitations  Respiratory: Negative for cough, wheezing or SOB  Gastrointestinal: see HPI  Genital/urinary: Negative for dysuria or voiding dysfunction  Musculoskeletal: see HPI, Negative for myalgias or arthralgias   Neurological: Negative for headache, weakness or paresthesia  Psychological: see HPI       Vitals:    03/02/20 1102   BP: 116/74   Pulse: 68   Resp: 18   Temp: 98.1 °F (36.7 °C)   TempSrc: Oral   SpO2: 96%   Weight: 176 lb (79.8 kg)   Height: 5' 8\" (1.727 m)       Physical Examination:  General: Well developed, well nourished, in no acute distress  Skin: warm and dry, normal tone and turgo  Head: Normocephalic, atraumatic  Eyes: Sclera clear, EOMI  Neck: Normal range of motion  Respiratory: CTAB with symmetrical, unlabored effort  Cardiovascular: Normal S1, S2, Regular rate and rhythm  Abdomen: soft, normal bowel sounds  Extremities: Full range of motion, normal gait  Neurologic: Normal strength, No focal deficits  Psych: Active, alert and oriented.  Affect appropriate       Diagnoses and all orders for this visit:    1. Right upper quadrant abdominal pain  -     REFERRAL TO GENERAL SURGERY    2. Gallbladder anomaly  -     REFERRAL TO GENERAL SURGERY    3. Gastroesophageal reflux disease without esophagitis    4. Fibromyalgia syndrome    5. Multiple environmental allergies  -     AK IMMUNOTHERAPY, 2+ INJECTIONS  -     allergy injection; 0.6 ml right arm  -     allergy injection; 0.6 left arm    6. Type 2 diabetes mellitus with diabetic neuropathy, without long-term current use of insulin ([facility] [address]    7. Mild persistent asthma without complication      Plan of care:  Diagnoses were discussed in detail with patient. Medication risks/benefits/side effects discussed with patient. All of the patient's questions were addressed and answered to apparent satisfaction. The patient understands and agrees with our plan of care. The patient knows to call back if they have questions about the plan of care or if symptoms change. The patient received an After-Visit Summary which contains VS, diagnoses, orders, allergy and medication lists. Over half of the 25 minutes face to face with Carley Morrison consisted of counseling and discussing treatment plans in reference to her abdominal pain and CT findings.      Future Appointments   Date Time Provider Dary Buck   3/5/2020 11:20 AM Elisa Velazquez MD [facility]

## 2020-03-10 ENCOUNTER — TELEPHONE (OUTPATIENT)
Dept: FAMILY MEDICINE CLINIC | Age: 50
End: 2020-03-10

## 2020-03-10 ENCOUNTER — OFFICE VISIT (OUTPATIENT)
Dept: FAMILY MEDICINE CLINIC | Age: 50
End: 2020-03-10

## 2020-03-10 VITALS
WEIGHT: 176 LBS | TEMPERATURE: 98 F | RESPIRATION RATE: 18 BRPM | DIASTOLIC BLOOD PRESSURE: 83 MMHG | HEIGHT: 68 IN | OXYGEN SATURATION: 98 % | HEART RATE: 74 BPM | SYSTOLIC BLOOD PRESSURE: 123 MMHG | BODY MASS INDEX: 26.67 KG/M2

## 2020-03-10 DIAGNOSIS — J45.30 MILD PERSISTENT ASTHMA WITHOUT COMPLICATION: ICD-10-CM

## 2020-03-10 DIAGNOSIS — Z91.09 MULTIPLE ENVIRONMENTAL ALLERGIES: Primary | ICD-10-CM

## 2020-03-10 NOTE — PROGRESS NOTES
1. Have you been to the ER, urgent care clinic since your last visit? Hospitalized since your last visit? No    2. Have you seen or consulted any other health care providers outside of the [address] [place] [address] since your last visit? Include any pap smears or colon screening. No  Reviewed record in preparation for visit and have necessary documentation  Pt did not bring medication to office visit for review  opportunity was given for questions  Goals that were addressed and/or need to be completed during or after this appointment include There are no preventive care reminders to display for this patient.

## 2020-03-10 NOTE — TELEPHONE ENCOUNTER
Pt states she had not heard anything from the [facility] Dr. Rob Macias about her  gallbladder surgery. They state that they have not received anything from us. The phone number is [phone] and the fax number is [phone]. Please call Pt. Thanks.

## 2020-03-16 ENCOUNTER — OFFICE VISIT (OUTPATIENT)
Dept: FAMILY MEDICINE CLINIC | Age: 50
End: 2020-03-16

## 2020-03-16 VITALS
TEMPERATURE: 97.6 F | RESPIRATION RATE: 18 BRPM | SYSTOLIC BLOOD PRESSURE: 114 MMHG | OXYGEN SATURATION: 97 % | HEIGHT: 68 IN | WEIGHT: 175.4 LBS | DIASTOLIC BLOOD PRESSURE: 77 MMHG | BODY MASS INDEX: 26.58 KG/M2 | HEART RATE: 84 BPM

## 2020-03-16 DIAGNOSIS — Z91.09 MULTIPLE ENVIRONMENTAL ALLERGIES: Primary | ICD-10-CM

## 2020-03-16 NOTE — PROGRESS NOTES
Patient not evaluated directly. Patient tolerated injection well.     MD SHIMON Fajardo & EFREM BARNARD [facility]  03/16/20

## 2020-03-16 NOTE — PROGRESS NOTES
Chief Complaint   Patient presents with    Allergy Injection     Visit Vitals  /77 (BP 1 Location: Right arm, BP Patient Position: Sitting)   Pulse 84   Temp 97.6 °F (36.4 °C) (Oral)   Resp 18   Ht 5' 8\" (1.727 m)   Wt 175 lb 6.4 oz (79.6 kg)   SpO2 97%   BMI 26.67 kg/m²     1. Have you been to the ER, urgent care clinic since your last visit? Hospitalized since your last visit? No    2. Have you seen or consulted any other health care providers outside of the [address] [place] [address] since your last visit? Include any pap smears or colon screening. No    Reviewed record in preparation for visit and have necessary documentation  Pt did not bring medication to office visit for review  opportunity was given for questions  Goals that were addressed and/or need to be completed during or after this appointment include   There are no preventive care reminders to display for this patient.

## 2020-03-30 ENCOUNTER — OFFICE VISIT (OUTPATIENT)
Dept: FAMILY MEDICINE CLINIC | Age: 50
End: 2020-03-30

## 2020-03-30 VITALS
DIASTOLIC BLOOD PRESSURE: 78 MMHG | OXYGEN SATURATION: 96 % | BODY MASS INDEX: 25.76 KG/M2 | HEART RATE: 122 BPM | RESPIRATION RATE: 16 BRPM | SYSTOLIC BLOOD PRESSURE: 115 MMHG | HEIGHT: 68 IN | TEMPERATURE: 98.9 F | WEIGHT: 170 LBS

## 2020-03-30 DIAGNOSIS — Z91.09 MULTIPLE ENVIRONMENTAL ALLERGIES: Primary | ICD-10-CM

## 2020-03-30 NOTE — PROGRESS NOTES
1. Have you been to the ER, urgent care clinic since your last visit? Hospitalized since your last visit? yes    2. Have you seen or consulted any other health care providers outside of the [address] [place] [address] since your last visit? Include any pap smears or colon screening. yes  Reviewed record in preparation for visit and have obtained necessary documentation. Patient did not bring medications to visit for review. Information provided on Advanced Directive, Living Will. Body mass index is 25.85 kg/m².    Health Maintenance Due   Topic Date Due    Medicare Yearly Exam  04/29/2020

## 2020-04-13 ENCOUNTER — OFFICE VISIT (OUTPATIENT)
Dept: FAMILY MEDICINE CLINIC | Age: 50
End: 2020-04-13

## 2020-04-13 VITALS
OXYGEN SATURATION: 97 % | BODY MASS INDEX: 26.52 KG/M2 | RESPIRATION RATE: 18 BRPM | DIASTOLIC BLOOD PRESSURE: 84 MMHG | SYSTOLIC BLOOD PRESSURE: 124 MMHG | WEIGHT: 175 LBS | HEART RATE: 74 BPM | HEIGHT: 68 IN | TEMPERATURE: 98.9 F

## 2020-04-13 DIAGNOSIS — J45.30 MILD PERSISTENT ASTHMA WITHOUT COMPLICATION: ICD-10-CM

## 2020-04-13 DIAGNOSIS — Z91.09 MULTIPLE ENVIRONMENTAL ALLERGIES: Primary | ICD-10-CM

## 2020-04-13 NOTE — PROGRESS NOTES
1. Have you been to the ER, urgent care clinic since your last visit? Hospitalized since your last visit? No    2. Have you seen or consulted any other health care providers outside of the [address] [place] [address] since your last visit? Include any pap smears or colon screening.  No  Reviewed record in preparation for visit and have necessary documentation  Pt did not bring medication to office visit for review  opportunity was given for questions  Goals that were addressed and/or need to be completed during or after this appointment include    Health Maintenance Due   Topic Date Due    Medicare Yearly Exam  04/29/2020

## 2020-04-20 ENCOUNTER — OFFICE VISIT (OUTPATIENT)
Dept: FAMILY MEDICINE CLINIC | Age: 50
End: 2020-04-20

## 2020-04-20 VITALS
OXYGEN SATURATION: 96 % | SYSTOLIC BLOOD PRESSURE: 121 MMHG | BODY MASS INDEX: 26.52 KG/M2 | WEIGHT: 175 LBS | RESPIRATION RATE: 18 BRPM | HEIGHT: 68 IN | TEMPERATURE: 98 F | DIASTOLIC BLOOD PRESSURE: 81 MMHG | HEART RATE: 83 BPM

## 2020-04-20 DIAGNOSIS — Z91.09 MULTIPLE ENVIRONMENTAL ALLERGIES: Primary | ICD-10-CM

## 2020-04-20 DIAGNOSIS — J45.30 MILD PERSISTENT ASTHMA WITHOUT COMPLICATION: ICD-10-CM

## 2020-04-20 DIAGNOSIS — Z51.6 ALLERGY DESENSITIZATION THERAPY: ICD-10-CM

## 2020-04-20 NOTE — PROGRESS NOTES
1. Have you been to the ER, urgent care clinic since your last visit? Hospitalized since your last visit? No    2. Have you seen or consulted any other health care providers outside of the [address] [place] [address] since your last visit? Include any pap smears or colon screening.  No  Reviewed record in preparation for visit and have necessary documentation  Pt did not bring medication to office visit for review  opportunity was given for questions  Goals that were addressed and/or need to be completed during or after this appointment include   Health Maintenance Due   Topic Date Due    Medicare Yearly Exam  04/29/2020

## 2020-04-27 ENCOUNTER — OFFICE VISIT (OUTPATIENT)
Dept: FAMILY MEDICINE CLINIC | Age: 50
End: 2020-04-27

## 2020-04-27 VITALS
TEMPERATURE: 96.3 F | SYSTOLIC BLOOD PRESSURE: 128 MMHG | HEART RATE: 64 BPM | OXYGEN SATURATION: 97 % | DIASTOLIC BLOOD PRESSURE: 93 MMHG | BODY MASS INDEX: 26.37 KG/M2 | RESPIRATION RATE: 18 BRPM | WEIGHT: 174 LBS | HEIGHT: 68 IN

## 2020-04-27 DIAGNOSIS — Z91.09 MULTIPLE ENVIRONMENTAL ALLERGIES: Primary | ICD-10-CM

## 2020-04-27 NOTE — PROGRESS NOTES
Chief Complaint   Patient presents with    Allergy Injection     Visit Vitals  BP (!) 128/93 (BP 1 Location: Right arm, BP Patient Position: Sitting)   Pulse 64   Temp 96.3 °F (35.7 °C) (Oral)   Resp 18   Ht 5' 8\" (1.727 m)   Wt 174 lb (78.9 kg)   SpO2 97%   BMI 26.46 kg/m²     1. Have you been to the ER, urgent care clinic since your last visit? Hospitalized since your last visit? No    2. Have you seen or consulted any other health care providers outside of the [address] [place] [address] since your last visit? Include any pap smears or colon screening.  No    Reviewed record in preparation for visit and have necessary documentation  Pt did not bring medication to office visit for review  opportunity was given for questions  Goals that were addressed and/or need to be completed during or after this appointment include   Health Maintenance Due   Topic Date Due    Medicare Yearly Exam  04/29/2020

## 2020-04-27 NOTE — PROGRESS NOTES
Did not evaluate patient directly. Eristent tolerated Allergy injection we per nursing.      MD SHIMON Choudhury & EFREM BARNARD [facility]  04/27/20

## 2020-05-04 ENCOUNTER — OFFICE VISIT (OUTPATIENT)
Dept: FAMILY MEDICINE CLINIC | Age: 50
End: 2020-05-04

## 2020-05-04 VITALS
RESPIRATION RATE: 18 BRPM | TEMPERATURE: 97.5 F | WEIGHT: 176.4 LBS | HEART RATE: 67 BPM | DIASTOLIC BLOOD PRESSURE: 70 MMHG | SYSTOLIC BLOOD PRESSURE: 120 MMHG | BODY MASS INDEX: 26.73 KG/M2 | OXYGEN SATURATION: 97 % | HEIGHT: 68 IN

## 2020-05-04 DIAGNOSIS — Z91.09 ENVIRONMENTAL ALLERGIES: Primary | ICD-10-CM

## 2020-05-04 NOTE — PROGRESS NOTES
[facility]    History of Present Illness:   Jam Perez is a 51 y.o. female with history of Environmental Allergies, Diabetes, Depression, Fibromyalgia, Migraine  CC: Allergy Shot  History provided by patient and Records    HPI:  Encounter for Injection: Recently had change in allergy injection formula after retesting. Noting allergies under control to some but noting new allergies as well. - Indication: Allergies  - Effectiveness: effective  - Side effects from previous injection: No   - Current illness: No   - Specialist follow up: Allergist  - Next injection due in 1 weeks  - Other concerns today: No     No other chronic conditions concerns. Health Maintenance  Health Maintenance Due   Topic Date Due    Medicare Yearly Exam  04/29/2020       Past Medical, Family, and Social History:     Current Outpatient Medications on File Prior to Visit   Medication Sig Dispense Refill    allergy injection 0.4 ml right arm 0.4 mL 0    allergy injection 0.4 ml left arm 0.4 mL 0    fluticasone propionate (FLONASE) 50 mcg/actuation nasal spray Instill 1 to 2 sprays in each nostril daily. 1 Bottle 2    topiramate (TOPAMAX) 25 mg tablet Take 1 Tab by mouth nightly. 30 Tab 1    montelukast (SINGULAIR) 10 mg tablet Take 1 Tab by mouth daily. 30 Tab 0    fenofibrate nanocrystallized (TRICOR) 145 mg tablet Take 1 Tab by mouth daily. 30 Tab 0    dexlansoprazole (DEXILANT) 60 mg CpDB capsule (delayed release) TAKE ONE CAPSULE BY MOUTH EVERY DAY 30 Cap 0    pravastatin (PRAVACHOL) 40 mg tablet Take 1 Tab by mouth nightly. 30 Tab 0    dulaglutide (TRULICITY) 1.5 OH/0.3 mL sub-q pen 0.5 mL by SubCUTAneous route every seven (7) days. INJECT 0.5 ML SUB-Q EVERY 7 DAYS 4 Pen 2    fluticasone propion-salmeteroL (ADVAIR/WIXELA) 250-50 mcg/dose diskus inhaler Take 1 Puff by inhalation every twelve (12) hours. 1 Inhaler 3    escitalopram oxalate (LEXAPRO) 10 mg tablet Take 1 Tab by mouth daily.  30 Tab 2    raNITIdine (ZANTAC) 150 mg tablet Take 1 Tab by mouth two (2) times a day. 60 Tab 2    cyclobenzaprine (FLEXERIL) 10 mg tablet Take 1 Tab by mouth three (3) times daily as needed for Muscle Spasm(s). 90 Tab 0    oxyCODONE-acetaminophen (PERCOCET 10)  mg per tablet   0    loratadine (CLARITIN) 10 mg tablet Take 10 mg by mouth.  fentaNYL (DURAGESIC) 50 mcg/hr PATCH   0    fluocinoNIDE (LIDEX) 0.05 % topical cream APPLY TO THE AFFECTED AREA TOPICALLY TWO TIMES A DAY 15 g 2    ketoconazole (NIZORAL) 2 % shampoo APPLY TO SCALP AND RINSE   DAILY AS NEEDED 120 mL 2    rizatriptan (MAXALT) 10 mg tablet TAKE ONE TABLET BY MOUTH ONCE AS NEEDED MAY REPEAT IN 2 HOURS IF NEEDED 60 Tab 0    AMITIZA 24 mcg capsule       naloxone 4 mg/actuation spry 4 mg by Nasal route as needed for up to 2 doses. Indications: OPIOID TOXICITY 1 Box 1    albuterol (PROAIR HFA) 90 mcg/actuation inhaler Take 2 Puffs by inhalation every four (4) hours as needed for Wheezing. Take 2 Puffs inhaled by mouth Every 4 Hours. (Patient taking differently: Take 2 Puffs by inhalation every four (4) hours as needed for Wheezing.) 1 Inhaler 2    polyethylene glycol (MIRALAX) 17 gram/dose powder       allergy injection Set A 0.6ml right arm 0.6 mL 0    allergy injection Set B 0.6ml left arm 0.6 mL 0     No current facility-administered medications on file prior to visit.         Patient Active Problem List   Diagnosis Code    Obesity E66.9    Diabetes mellitus ([facility] [address] E11.9    Hypertension I10    Fibromyalgia syndrome M79.7    Restless leg syndrome G25.81    Chronic generalized pain disorder R52, G89.29    Encounter for long-term (current) use of high-risk medication Z79.899    Headache R51    Persistent disorder of initiating or maintaining sleep G47.00    Migraine without aura G43.009    Musculoskeletal pain M79.18    Multiple environmental allergies Z91.09    History of headache Z87.898    Mixed hyperlipidemia E78.2    Type 2 diabetes with nephropathy (HCC) E11.21    Recurrent depression ([facility] [address] F33.9       Social History     Socioeconomic History    Marital status: SINGLE     Spouse name: Not on file    Number of children: Not on file    Years of education: Not on file    Highest education level: Not on file   Occupational History    Not on file   Social Needs    Financial resource strain: Not on file    Food insecurity     Worry: Not on file     Inability: Not on file    Transportation needs     Medical: Not on file     Non-medical: Not on file   Tobacco Use    Smoking status: Former Smoker    Smokeless tobacco: Never Used    Tobacco comment: quit in 2010   Substance and Sexual Activity    Alcohol use: No    Drug use: No    Sexual activity: Not on file   Lifestyle    Physical activity     Days per week: Not on file     Minutes per session: Not on file    Stress: Not on file   Relationships    Social connections     Talks on phone: Not on file     Gets together: Not on file     Attends Faith service: Not on file     Active member of club or organization: Not on file     Attends meetings of clubs or organizations: Not on file     Relationship status: Not on file    Intimate partner violence     Fear of current or ex partner: Not on file     Emotionally abused: Not on file     Physically abused: Not on file     Forced sexual activity: Not on file   Other Topics Concern    Not on file   Social History Narrative    Not on file       Review of Systems   Review of Systems   Constitutional: Negative for chills and fever. Respiratory: Negative for cough and shortness of breath. Gastrointestinal: Negative for nausea and vomiting. Endo/Heme/Allergies: Positive for environmental allergies.        Objective:     Visit Vitals  /70 (BP 1 Location: Right arm, BP Patient Position: Sitting)   Pulse 67   Temp 97.5 °F (36.4 °C) (Oral)   Resp 18   Ht 5' 8\" (1.727 m)   Wt 176 lb 6.4 oz (80 kg)   SpO2 97%   BMI 26.82 kg/m² Physical Exam  Vitals signs and nursing note reviewed. Constitutional:       Appearance: Normal appearance. HENT:      Head: Normocephalic and atraumatic. Nose: No congestion. Neck:      Musculoskeletal: Normal range of motion and neck supple. Cardiovascular:      Rate and Rhythm: Normal rate and regular rhythm. Pulses: Normal pulses. Heart sounds: Normal heart sounds. Pulmonary:      Effort: Pulmonary effort is normal.      Breath sounds: Normal breath sounds. Skin:     General: Skin is warm and dry. Neurological:      Mental Status: She is alert. Pertinent Labs/Studies:      Assessment and orders:       ICD-10-CM ICD-9-CM    1. Environmental allergies Z91.09 V15.09 THER/PROPH/DIAG INJECTION, SUBCUT/IM      CA IMMUNOTHERAPY, 2+ INJECTIONS      allergy injection      allergy injection     Diagnoses and all orders for this visit:    1. Environmental allergies: Follow up on Thursday. Currnetly Bi-weekly injections then spreading based on alergist instructions. -     THER/PROPH/DIAG INJECTION, SUBCUT/IM      Follow-up and Dispositions    · Return in about 1 month (around 6/4/2020) for Follow up with Dr. Arya Way for Labs and Chronic Conditions. .            I have discussed the diagnosis with the patient and the intended plan as seen in the above orders. Social history, medical history, and labs were reviewed. The patient has received an after-visit summary and questions were answered concerning future plans. I have discussed medication side effects and warnings with the patient as well.     MD SHIMON Garcia & EFRME BARNARD [facility]  05/04/20

## 2020-05-04 NOTE — PROGRESS NOTES
Chief Complaint   Patient presents with    Allergy Injection     Visit Vitals  /70 (BP 1 Location: Right arm, BP Patient Position: Sitting)   Pulse 67   Temp 97.5 °F (36.4 °C) (Oral)   Resp 18   Ht 5' 8\" (1.727 m)   Wt 176 lb 6.4 oz (80 kg)   SpO2 97%   BMI 26.82 kg/m²     1. Have you been to the ER, urgent care clinic since your last visit? Hospitalized since your last visit? No    2. Have you seen or consulted any other health care providers outside of the [address] [place] [address] since your last visit? Include any pap smears or colon screening.  No    Reviewed record in preparation for visit and have necessary documentation  Pt did not bring medication to office visit for review  opportunity was given for questions  Goals that were addressed and/or need to be completed during or after this appointment include   Health Maintenance Due   Topic Date Due    Medicare Yearly Exam  04/29/2020

## 2020-05-11 ENCOUNTER — OFFICE VISIT (OUTPATIENT)
Dept: FAMILY MEDICINE CLINIC | Age: 50
End: 2020-05-11

## 2020-05-11 VITALS
WEIGHT: 178 LBS | BODY MASS INDEX: 26.98 KG/M2 | OXYGEN SATURATION: 98 % | TEMPERATURE: 97.6 F | HEART RATE: 80 BPM | DIASTOLIC BLOOD PRESSURE: 80 MMHG | SYSTOLIC BLOOD PRESSURE: 118 MMHG | HEIGHT: 68 IN | RESPIRATION RATE: 20 BRPM

## 2020-05-11 DIAGNOSIS — Z51.6 ALLERGY DESENSITIZATION THERAPY: Primary | ICD-10-CM

## 2020-05-11 NOTE — PROGRESS NOTES
1. Have you been to the ER, urgent care clinic since your last visit? Hospitalized since your last visit? No    2. Have you seen or consulted any other health care providers outside of the [address] [place] [address] since your last visit? Include any pap smears or colon screening. No    Reviewed record in preparation for visit and have necessary documentation  Goals that were addressed and/or need to be completed during or after this appointment include     Health Maintenance Due   Topic Date Due    Medicare Yearly Exam  04/29/2020       Patient is accompanied by self I have received verbal consent from Theresa Perry to discuss any/all medical information while they are present in the room.

## 2020-05-28 ENCOUNTER — TELEPHONE (OUTPATIENT)
Dept: FAMILY MEDICINE CLINIC | Age: 50
End: 2020-05-28

## 2020-05-28 NOTE — TELEPHONE ENCOUNTER
Call made to pt, no answer, mess left. Pt's allergy injection medications came to the office. In refrigerator now, pickup needed.

## 2020-06-11 ENCOUNTER — OFFICE VISIT (OUTPATIENT)
Dept: FAMILY MEDICINE CLINIC | Age: 50
End: 2020-06-11

## 2020-06-11 VITALS
RESPIRATION RATE: 18 BRPM | HEART RATE: 86 BPM | SYSTOLIC BLOOD PRESSURE: 108 MMHG | DIASTOLIC BLOOD PRESSURE: 84 MMHG | WEIGHT: 179 LBS | BODY MASS INDEX: 27.13 KG/M2 | TEMPERATURE: 98 F | OXYGEN SATURATION: 97 % | HEIGHT: 68 IN

## 2020-06-11 DIAGNOSIS — Z00.00 MEDICARE ANNUAL WELLNESS VISIT, SUBSEQUENT: ICD-10-CM

## 2020-06-11 DIAGNOSIS — Z51.6 ALLERGY DESENSITIZATION THERAPY: ICD-10-CM

## 2020-06-11 DIAGNOSIS — Z13.39 SCREENING FOR ALCOHOLISM: ICD-10-CM

## 2020-06-11 DIAGNOSIS — Z13.31 SCREENING FOR DEPRESSION: ICD-10-CM

## 2020-06-11 DIAGNOSIS — I10 ESSENTIAL HYPERTENSION: ICD-10-CM

## 2020-06-11 DIAGNOSIS — E11.40 TYPE 2 DIABETES MELLITUS WITH DIABETIC NEUROPATHY, WITHOUT LONG-TERM CURRENT USE OF INSULIN (HCC): Primary | ICD-10-CM

## 2020-06-11 DIAGNOSIS — E78.2 MIXED HYPERLIPIDEMIA: ICD-10-CM

## 2020-06-11 NOTE — PROGRESS NOTES
1. Have you been to the ER, urgent care clinic since your last visit? Hospitalized since your last visit? No    2. Have you seen or consulted any other health care providers outside of the [address] [place] [address] since your last visit? Include any pap smears or colon screening. No    Reviewed record in preparation for visit and have necessary documentation  Goals that were addressed and/or need to be completed during or after this appointment include     Health Maintenance Due   Topic Date Due    Medicare Yearly Exam  04/29/2020       Patient is accompanied by self I have received verbal consent from Theresa Perry to discuss any/all medical information while they are present in the room.

## 2020-06-17 ENCOUNTER — OFFICE VISIT (OUTPATIENT)
Dept: FAMILY MEDICINE CLINIC | Age: 50
End: 2020-06-17

## 2020-06-17 VITALS
BODY MASS INDEX: 27.1 KG/M2 | OXYGEN SATURATION: 100 % | RESPIRATION RATE: 18 BRPM | DIASTOLIC BLOOD PRESSURE: 79 MMHG | TEMPERATURE: 96.9 F | WEIGHT: 178.8 LBS | SYSTOLIC BLOOD PRESSURE: 116 MMHG | HEIGHT: 68 IN | HEART RATE: 89 BPM

## 2020-06-17 DIAGNOSIS — M79.7 FIBROMYALGIA SYNDROME: Primary | ICD-10-CM

## 2020-06-17 DIAGNOSIS — Z51.6 ALLERGY DESENSITIZATION THERAPY: ICD-10-CM

## 2020-06-17 DIAGNOSIS — Z91.09 MULTIPLE ENVIRONMENTAL ALLERGIES: ICD-10-CM

## 2020-06-17 NOTE — PROGRESS NOTES
Chief Complaint   Patient presents with    Allergy Injection     she is a 51y.o. year old female who presents for follow up of her multiple chronic health conditions. Patient with multiple co-morbidities which include T2D, HTN, HLD, migraine, RLS, fibromyalgia, depression and chronic pain. Patient denies HA, dizziness, SOB, CP, abdominal pain, dysuria. She is followed by pain management. Diabetes: This patient is being treating under a comprehensive plan of care for diabetes. Overall the patient feels well with good energy level. Insulin dependence: no   Pertinent Labs:   Lab Results   Component Value Date/Time    Hemoglobin A1c 6.8 (H) 12/16/2019 12:00 AM      Body mass index is 27.22 kg/m². Lab Results   Component Value Date/Time    LDL, calculated 153 (H) 12/16/2019 12:00 AM        Wt Readings from Last 3 Encounters:   06/11/20 179 lb (81.2 kg)   05/11/20 178 lb (80.7 kg)   05/04/20 176 lb 6.4 oz (80 kg)        Social History     Tobacco Use   Smoking Status Former Smoker   Smokeless Tobacco Never Used   Tobacco Comment    quit in 2010        Medications, diet and exercise as means of diabetic control with a goal of an A1C of less than 7.0% discussed. Diabetic foot care and annual eye exam discussed as well. Check blood sugars while fasting just before breakfast on most days and occasionally before dinner. Write down readings in a diabetic log book and bring them to the next visit. Call the office for fasting sugars over 200 or below 75 on two or more occasions. Call immediately if having symptoms of high sugar (frequent urination, always thirsty) or low sugar (dizzy, lethargic, sweaty, nauseated, headache). Our overall goal is to reduce or eliminate the long term consequences of poorly controlled diabetes. Patient expresses understanding and agreement with our plan of care.     Hypertension:  The patient reports:  taking medications as instructed, no medication side effects noted, no TIA's, no chest pain on exertion, no dyspnea on exertion, no swelling of ankles. BP Readings from Last 3 Encounters:   06/11/20 108/84   05/11/20 118/80   05/04/20 120/70     Lab Results   Component Value Date/Time    Sodium 141 12/16/2019 12:00 AM    Potassium 4.5 12/16/2019 12:00 AM    Chloride 104 12/16/2019 12:00 AM    CO2 25 12/16/2019 12:00 AM    Anion gap 7 01/29/2010 08:59 AM    Glucose 151 (H) 12/16/2019 12:00 AM    BUN 13 12/16/2019 12:00 AM    Creatinine 0.71 12/16/2019 12:00 AM    BUN/Creatinine ratio 18 12/16/2019 12:00 AM    GFR est  12/16/2019 12:00 AM    GFR est non- 12/16/2019 12:00 AM    Calcium 9.6 12/16/2019 12:00 AM     Patient advised to log blood pressures at home weekly and bring to next visit. Call office as soon as possible if BP's over 140/90 on multiple occasions or with symptoms of dizziness, chest pain, shortness of breath, headache or ankle swelling. Our goal is to normalize the blood pressure to decrease the risks of strokes and heart attacks. The patient is in agreement with the plan.       Patient Active Problem List   Diagnosis Code    Obesity E66.9    Diabetes mellitus ([facility] [address] E11.9    Hypertension I10    Fibromyalgia syndrome M79.7    Restless leg syndrome G25.81    Chronic generalized pain disorder R52, G89.29    Encounter for long-term (current) use of high-risk medication Z79.899    Headache R51    Persistent disorder of initiating or maintaining sleep G47.00    Migraine without aura G43.009    Musculoskeletal pain M79.18    Multiple environmental allergies Z91.09    History of headache Z87.898    Mixed hyperlipidemia E78.2    Type 2 diabetes with nephropathy (HCC) E11.21    Recurrent depression (HCC) F33.9     Past Surgical History:   Procedure Laterality Date    HX GYN      Partial Hysterectomy    HX HEENT      HX TUBAL LIGATION       Social History     Socioeconomic History    Marital status: SINGLE     Spouse name: Not on file    Number of children: Not on file    Years of education: Not on file    Highest education level: Not on file   Occupational History    Not on file   Social Needs    Financial resource strain: Not on file    Food insecurity     Worry: Not on file     Inability: Not on file    Transportation needs     Medical: Not on file     Non-medical: Not on file   Tobacco Use    Smoking status: Former Smoker    Smokeless tobacco: Never Used    Tobacco comment: quit in 2010   Substance and Sexual Activity    Alcohol use: No    Drug use: No    Sexual activity: Not on file   Lifestyle    Physical activity     Days per week: Not on file     Minutes per session: Not on file    Stress: Not on file   Relationships    Social connections     Talks on phone: Not on file     Gets together: Not on file     Attends Bahai service: Not on file     Active member of club or organization: Not on file     Attends meetings of clubs or organizations: Not on file     Relationship status: Not on file    Intimate partner violence     Fear of current or ex partner: Not on file     Emotionally abused: Not on file     Physically abused: Not on file     Forced sexual activity: Not on file   Other Topics Concern    Not on file   Social History Narrative    Not on file     Family History   Problem Relation Age of Onset    Alcohol abuse Father     Diabetes Mother     Hypertension Mother     Heart Disease Mother     Lupus Mother     Sleep Apnea Mother     Depression Mother     Cancer Maternal Aunt     Arthritis-osteo Other     Asthma Other     Diabetes Other     Elevated Lipids Other     Headache Other     Heart Disease Other     Hypertension Other     Migraines Other     Stroke Other     Bleeding Prob Neg Hx     Lung Disease Neg Hx     Psychiatric Disorder Neg Hx     Mental Retardation Neg Hx      Current Outpatient Medications   Medication Sig    allergy injection Set A #1 - 0.1ml - left arm    allergy injection Set B #1 - 0.1ml - right arm    fluticasone propionate (FLONASE) 50 mcg/actuation nasal spray Instill 1 to 2 sprays in each nostril daily.  topiramate (TOPAMAX) 25 mg tablet Take 1 Tab by mouth nightly.  montelukast (SINGULAIR) 10 mg tablet Take 1 Tab by mouth daily.  fenofibrate nanocrystallized (TRICOR) 145 mg tablet Take 1 Tab by mouth daily.  dexlansoprazole (DEXILANT) 60 mg CpDB capsule (delayed release) TAKE ONE CAPSULE BY MOUTH EVERY DAY    pravastatin (PRAVACHOL) 40 mg tablet Take 1 Tab by mouth nightly.  dulaglutide (TRULICITY) 1.5 CX/6.1 mL sub-q pen 0.5 mL by SubCUTAneous route every seven (7) days. INJECT 0.5 ML SUB-Q EVERY 7 DAYS    fluticasone propion-salmeteroL (ADVAIR/WIXELA) 250-50 mcg/dose diskus inhaler Take 1 Puff by inhalation every twelve (12) hours.  escitalopram oxalate (LEXAPRO) 10 mg tablet Take 1 Tab by mouth daily.  cyclobenzaprine (FLEXERIL) 10 mg tablet Take 1 Tab by mouth three (3) times daily as needed for Muscle Spasm(s).  fluocinoNIDE (LIDEX) 0.05 % topical cream APPLY TO THE AFFECTED AREA TOPICALLY TWO TIMES A DAY    ketoconazole (NIZORAL) 2 % shampoo APPLY TO SCALP AND RINSE   DAILY AS NEEDED    rizatriptan (MAXALT) 10 mg tablet TAKE ONE TABLET BY MOUTH ONCE AS NEEDED MAY REPEAT IN 2 HOURS IF NEEDED    naloxone 4 mg/actuation spry 4 mg by Nasal route as needed for up to 2 doses. Indications: OPIOID TOXICITY    albuterol (PROAIR HFA) 90 mcg/actuation inhaler Take 2 Puffs by inhalation every four (4) hours as needed for Wheezing. Take 2 Puffs inhaled by mouth Every 4 Hours.  (Patient taking differently: Take 2 Puffs by inhalation every four (4) hours as needed for Wheezing.)    allergy injection Set A vial #2 - give 1.0ml SC right arm    allergy injection Set B vial #2 give 1.0ml SC left arm    allergy injection Set A 0.8 ml right arm    allergy injection Set B 0.8 ml left arm    allergy injection Set A 0.6ml right arm    allergy injection Set B 0.6ml left arm    allergy injection 0.4 ml right arm    allergy injection 0.4 ml left arm    raNITIdine (ZANTAC) 150 mg tablet Take 1 Tab by mouth two (2) times a day.  oxyCODONE-acetaminophen (PERCOCET 10)  mg per tablet     loratadine (CLARITIN) 10 mg tablet Take 10 mg by mouth.  fentaNYL (DURAGESIC) 50 mcg/hr PATCH     AMITIZA 24 mcg capsule     polyethylene glycol (MIRALAX) 17 gram/dose powder      No current facility-administered medications for this visit. Allergies   Allergen Reactions    Esomeprazole Magnesium Hives     Other reaction(s): mild rash/itching    Nexium [Esomeprazole Magnesium] Unknown (comments)    Pcn [Penicillins] Unknown (comments)       Review of Systems:  Constitutional: Negative for fatigue or malaise  Skin: Negative for rash or lesion  Endo: Negative for unusual thirst or weight changes  HEENT: Negative for acute hearing or vision changes  Cardiovascular: Negative for dizziness, chest pain or palpitations  Respiratory: Negative for cough, wheezing or SOB  Gastrointestinal: Negative for nausea or abdominal pain  Genital/urinary: Negative for dysuria or voiding dysfunction  Musculoskeletal: see HPI, Negative for myalgias or arthralgias   Neurological: Negative for headache, weakness or paresthesia  Psychological: see HPI       Vitals:    06/11/20 1040   BP: 108/84   Pulse: 86   Resp: 18   Temp: 98 °F (36.7 °C)   TempSrc: Oral   SpO2: 97%   Weight: 179 lb (81.2 kg)   Height: 5' 8\" (1.727 m)       Physical Examination:  General: Well developed, well nourished, in no acute distress  Skin: warm and dry, normal tone and turgo  Head: Normocephalic, atraumatic  Eyes: Sclera clear, EOMI  Neck: Normal range of motion  Respiratory: CTAB with symmetrical, unlabored effort  Cardiovascular: Normal S1, S2, Regular rate and rhythm  Abdomen: soft, normal bowel sounds  Extremities: Full range of motion, normal gait  Neurologic: Normal strength, No focal deficits  Psych:  Active, alert and oriented. Affect appropriate       Diagnoses and all orders for this visit:    1. Type 2 diabetes mellitus with diabetic neuropathy, without long-term current use of insulin (HCC)  -     TSH 3RD GENERATION; Future  -     HEMOGLOBIN A1C WITH EAG; Future  -     LIPID PANEL; Future  -     METABOLIC PANEL, COMPREHENSIVE; Future  -     CBC W/O DIFF; Future    2. Mixed hyperlipidemia  -     LIPID PANEL; Future  -     METABOLIC PANEL, COMPREHENSIVE; Future    3. Essential hypertension  -     TSH 3RD GENERATION; Future  -     METABOLIC PANEL, COMPREHENSIVE; Future    4. Allergy desensitization therapy  -     [place] IMMUNOTHERAPY, 2+ INJECTIONS  -     allergy injection; Set A #1 - 0.1ml - left arm  -     allergy injection; Set B #1 - 0.1ml - right arm      Plan of care:  Diagnoses were discussed in detail with patient. Medication risks/benefits/side effects discussed with patient. Importance of compliance with all prescribed medications discussed. All of the patient's questions were addressed and answered to apparent satisfaction. The patient understands and agrees with our plan of care. The patient knows to call back if they have questions about the plan of care or if symptoms change. The patient received an After-Visit Summary which contains VS, diagnoses, orders, allergy and medication lists. Over half of the 25 minutes face to face with Blas Silver consisted of counseling and discussing treatment plans in reference to her TSD and multiple co-morbidities.      Future Appointments   Date Time Provider Dary Buck   6/17/2020 10:40 AM Dayan Khan MD [facility] ELLIE BRIDGES   6/24/2020 10:40 AM Dayan Khan MD [facility] ELLIE SCHED

## 2020-06-17 NOTE — PROGRESS NOTES
1. Have you been to the ER, urgent care clinic since your last visit? Hospitalized since your last visit? No    2. Have you seen or consulted any other health care providers outside of the [address] [place] [address] since your last visit? Include any pap smears or colon screening. No    Reviewed record in preparation for visit and have necessary documentation  Goals that were addressed and/or need to be completed during or after this appointment include     There are no preventive care reminders to display for this patient. Patient is accompanied by self I have received verbal consent from Sharyn Sargent to discuss any/all medical information while they are present in the room.

## 2020-06-17 NOTE — PATIENT INSTRUCTIONS
Medicare Wellness Visit, Female The best way to live healthy is to have a lifestyle where you eat a well-balanced diet, exercise regularly, limit alcohol use, and quit all forms of tobacco/nicotine, if applicable. Regular preventive services are another way to keep healthy. Preventive services (vaccines, screening tests, monitoring & exams) can help personalize your care plan, which helps you manage your own care. Screening tests can find health problems at the earliest stages, when they are easiest to treat. [facility] follows the current, evidence-based guidelines published by the [facility] ([facility]) when recommending preventive services for our patients. Because we follow these guidelines, sometimes recommendations change over time as research supports it. (For example, mammograms used to be recommended annually. Even though Medicare will still pay for an annual mammogram, the newer guidelines recommend a mammogram every two years for women of average risk). Of course, you and your doctor may decide to screen more often for some diseases, based on your risk and your co-morbidities (chronic disease you are already diagnosed with). Preventive services for you include: - Medicare offers their members a free annual wellness visit, which is time for you and your primary care provider to discuss and plan for your preventive service needs. Take advantage of this benefit every year! 
-All adults over the age of 72 should receive the recommended pneumonia vaccines. Current USPSTF guidelines recommend a series of two vaccines for the best pneumonia protection.  
-All adults should have a flu vaccine yearly and a tetanus vaccine every 10 years.  
-All adults age 48 and older should receive the shingles vaccines (series of two vaccines). -All adults age 38-68 who are overweight should have a diabetes screening test once every three years. -All adults born between [birth year] and 1964 should be screened once for Hepatitis C. 
-Other screening tests and preventive services for persons with diabetes include: an eye exam to screen for diabetic retinopathy, a kidney function test, a foot exam, and stricter control over your cholesterol.  
-Cardiovascular screening for adults with routine risk involves an electrocardiogram (ECG) at intervals determined by your doctor.  
-Colorectal cancer screenings should be done for adults age 54-65 with no increased risk factors for colorectal cancer. There are a number of acceptable methods of screening for this type of cancer. Each test has its own benefits and drawbacks. Discuss with your doctor what is most appropriate for you during your annual wellness visit. The different tests include: colonoscopy (considered the best screening method), a fecal occult blood test, a fecal DNA test, and sigmoidoscopy. 
 
-A bone mass density test is recommended when a woman turns 65 to screen for osteoporosis. This test is only recommended one time, as a screening. Some providers will use this same test as a disease monitoring tool if you already have osteoporosis. -Breast cancer screenings are recommended every other year for women of normal risk, age 54-69. 
-Cervical cancer screenings for women over age 72 are only recommended with certain risk factors. Here is a list of your current Health Maintenance items (your personalized list of preventive services) with a due date: 
Health Maintenance Due Topic Date Due  
 Annual Well Visit  04/29/2020

## 2020-06-17 NOTE — PROGRESS NOTES
This is the Subsequent Medicare Annual Wellness Exam, performed 12 months or more after the Initial AWV or the last Subsequent AWV    I have reviewed the patient's medical history in detail and updated the computerized patient record. History     Patient Active Problem List   Diagnosis Code    Obesity E66.9    Diabetes mellitus ([facility] [address] E11.9    Hypertension I10    Fibromyalgia syndrome M79.7    Restless leg syndrome G25.81    Chronic generalized pain disorder R52, G89.29    Encounter for long-term (current) use of high-risk medication Z79.899    Headache R51    Persistent disorder of initiating or maintaining sleep G47.00    Migraine without aura G43.009    Musculoskeletal pain M79.18    Multiple environmental allergies Z91.09    History of headache Z87.898    Mixed hyperlipidemia E78.2    Type 2 diabetes with nephropathy (HCC) E11.21    Recurrent depression ([facility] [address] F33.9     Past Medical History:   Diagnosis Date    Depression     Diabetes ([facility] [address]     Dysthymic disorder     Fibromyalgia     Fibromyalgia syndrome 8/8/2012    Headache(784.0) 9/8/2012    Hypercholesterolemia     Left ear pain     Migraine     Mixed hyperlipidemia     Musculoskeletal pain 9/16/2014      Past Surgical History:   Procedure Laterality Date    HX GYN      Partial Hysterectomy    HX HEENT      HX TUBAL LIGATION       Current Outpatient Medications   Medication Sig Dispense Refill    allergy injection Set A #1 - 0.1ml - left arm 0.1 mL 0    allergy injection Set B #1 - 0.1ml - right arm 0.1 mL 0    fluticasone propionate (FLONASE) 50 mcg/actuation nasal spray Instill 1 to 2 sprays in each nostril daily. 1 Bottle 1    topiramate (TOPAMAX) 25 mg tablet Take 1 Tab by mouth nightly. 30 Tab 1    montelukast (SINGULAIR) 10 mg tablet Take 1 Tab by mouth daily. 30 Tab 0    fenofibrate nanocrystallized (TRICOR) 145 mg tablet Take 1 Tab by mouth daily.  30 Tab 0    dexlansoprazole (DEXILANT) 60 mg CpDB capsule (delayed release) TAKE ONE CAPSULE BY MOUTH EVERY DAY 30 Cap 0    pravastatin (PRAVACHOL) 40 mg tablet Take 1 Tab by mouth nightly. 30 Tab 0    dulaglutide (TRULICITY) 1.5 IX/3.7 mL sub-q pen 0.5 mL by SubCUTAneous route every seven (7) days. INJECT 0.5 ML SUB-Q EVERY 7 DAYS 4 Pen 2    fluticasone propion-salmeteroL (ADVAIR/WIXELA) 250-50 mcg/dose diskus inhaler Take 1 Puff by inhalation every twelve (12) hours. 1 Inhaler 3    escitalopram oxalate (LEXAPRO) 10 mg tablet Take 1 Tab by mouth daily. 30 Tab 2    cyclobenzaprine (FLEXERIL) 10 mg tablet Take 1 Tab by mouth three (3) times daily as needed for Muscle Spasm(s). 90 Tab 0    fluocinoNIDE (LIDEX) 0.05 % topical cream APPLY TO THE AFFECTED AREA TOPICALLY TWO TIMES A DAY 15 g 2    ketoconazole (NIZORAL) 2 % shampoo APPLY TO SCALP AND RINSE   DAILY AS NEEDED 120 mL 2    rizatriptan (MAXALT) 10 mg tablet TAKE ONE TABLET BY MOUTH ONCE AS NEEDED MAY REPEAT IN 2 HOURS IF NEEDED 60 Tab 0    naloxone 4 mg/actuation spry 4 mg by Nasal route as needed for up to 2 doses. Indications: OPIOID TOXICITY 1 Box 1    albuterol (PROAIR HFA) 90 mcg/actuation inhaler Take 2 Puffs by inhalation every four (4) hours as needed for Wheezing. Take 2 Puffs inhaled by mouth Every 4 Hours. (Patient taking differently: Take 2 Puffs by inhalation every four (4) hours as needed for Wheezing.) 1 Inhaler 2    allergy injection Set A vial #2 - give 1.0ml SC right arm 1 mL 0    allergy injection Set B vial #2 give 1.0ml SC left arm 1 mL 0    allergy injection Set A 0.8 ml right arm 0.8 mL 0    allergy injection Set B 0.8 ml left arm 0.8 mL 0    allergy injection Set A 0.6ml right arm 0.6 mL 0    allergy injection Set B 0.6ml left arm 0.6 mL 0    allergy injection 0.4 ml right arm 0.4 mL 0    allergy injection 0.4 ml left arm 0.4 mL 0    raNITIdine (ZANTAC) 150 mg tablet Take 1 Tab by mouth two (2) times a day.  60 Tab 2    oxyCODONE-acetaminophen (PERCOCET 10)  mg per tablet   0    loratadine (CLARITIN) 10 mg tablet Take 10 mg by mouth.       fentaNYL (DURAGESIC) 50 mcg/hr PATCH   0    AMITIZA 24 mcg capsule       polyethylene glycol (MIRALAX) 17 gram/dose powder        Allergies   Allergen Reactions    Esomeprazole Magnesium Hives     Other reaction(s): mild rash/itching    Nexium [Esomeprazole Magnesium] Unknown (comments)    Pcn [Penicillins] Unknown (comments)       Family History   Problem Relation Age of Onset    Alcohol abuse Father     Diabetes Mother     Hypertension Mother     Heart Disease Mother     Lupus Mother     Sleep Apnea Mother     Depression Mother     Cancer Maternal Aunt     Arthritis-osteo Other     Asthma Other     Diabetes Other     Elevated Lipids Other     Headache Other     Heart Disease Other     Hypertension Other     Migraines Other     Stroke Other     Bleeding Prob Neg Hx     Lung Disease Neg Hx     Psychiatric Disorder Neg Hx     Mental Retardation Neg Hx      Social History     Tobacco Use    Smoking status: Former Smoker    Smokeless tobacco: Never Used    Tobacco comment: quit in 2010   Substance Use Topics    Alcohol use: No       Depression Risk Factor Screening:     3 most recent PHQ Screens 6/11/2020   PHQ Not Done -   Little interest or pleasure in doing things Not at all   Feeling down, depressed, irritable, or hopeless Not at all   Total Score PHQ 2 0   Trouble falling or staying asleep, or sleeping too much -   Feeling tired or having little energy -   Poor appetite, weight loss, or overeating -   Feeling bad about yourself - or that you are a failure or have let yourself or your family down -   Trouble concentrating on things such as school, work, reading, or watching TV -   Moving or speaking so slowly that other people could have noticed; or the opposite being so fidgety that others notice -   Thoughts of being better off dead, or hurting yourself in some way -   PHQ 9 Score -   How difficult have these problems made it for you to do your work, take care of your home and get along with others -       Alcohol Risk Factor Screening:   Do you average 1 drink per night or more than 7 drinks a week:  No    On any one occasion in the past three months have you have had more than 3 drinks containing alcohol:  No      Functional Ability and Level of Safety:   Hearing: Hearing is good. Activities of Daily Living: The home contains: no safety equipment. Patient does total self care     Ambulation: with no difficulty     Fall Risk:  Fall Risk Assessment, last 12 mths 6/11/2020   Able to walk? Yes   Fall in past 12 months? No   Fall with injury? -   Number of falls in past 12 months -   Fall Risk Score -     Abuse Screen:  Patient is not abused       Cognitive Screening   Has your family/caregiver stated any concerns about your memory: no       Patient Care Team   Patient Care Team:  Mahi Mata MD as PCP - General (Family Practice)  Mahi Mata MD as PCP - [address] [place] [address] Provider  Zuleyma Reilly MD (Podiatry)  Yana Meyers MD as Physician (Neurology)  YADIRA Sanches (Physician Assistant)  Colton Lockhart MD (Rheumatology)  Jasbir Rutledge MD as Physician (Sleep Medicine)  Narayan Sun MD (Anesthesiology)    Assessment/Plan   Education and counseling provided:  Are appropriate based on today's review and evaluation    Diagnoses and all orders for this visit:    1. Medicare annual wellness visit, subsequent    2. Screening for depression  -     DEPRESSION SCREEN ANNUAL    3.  Screening for alcoholism  -     MS ANNUAL ALCOHOL SCREEN 15 MIN

## 2020-06-21 NOTE — PROGRESS NOTES
Chief Complaint   Patient presents with    Allergy Injection     she is a 51y.o. year old female who presents for allergy desensitization injection. Patient with multiple co-morbidities which include T2D, HTN, HLD, migraine, RLS, fibromyalgia, depression and chronic pain. She complains of lethargy and asks for Adderal. She says she saw that this was helpful for fibromyalgia on an Internet site. Patient denies HA, dizziness, SOB, CP, abdominal pain, dysuria. She is followed by pain management and is on opioid medications     Diabetes: This patient is being treating under a comprehensive plan of care for diabetes. Overall the patient feels well with good energy level. Insulin dependence: no   Pertinent Labs:   Lab Results   Component Value Date/Time    Hemoglobin A1c 6.8 (H) 12/16/2019 12:00 AM      Body mass index is 27.19 kg/m². Lab Results   Component Value Date/Time    LDL, calculated 153 (H) 12/16/2019 12:00 AM        Wt Readings from Last 3 Encounters:   06/17/20 178 lb 12.8 oz (81.1 kg)   06/11/20 179 lb (81.2 kg)   05/11/20 178 lb (80.7 kg)        Social History     Tobacco Use   Smoking Status Former Smoker   Smokeless Tobacco Never Used   Tobacco Comment    quit in 2010        Medications, diet and exercise as means of diabetic control with a goal of an A1C of less than 7.0% discussed. Diabetic foot care and annual eye exam discussed as well. Check blood sugars while fasting just before breakfast on most days and occasionally before dinner. Write down readings in a diabetic log book and bring them to the next visit. Call the office for fasting sugars over 200 or below 75 on two or more occasions. Call immediately if having symptoms of high sugar (frequent urination, always thirsty) or low sugar (dizzy, lethargic, sweaty, nauseated, headache). Our overall goal is to reduce or eliminate the long term consequences of poorly controlled diabetes.  Patient expresses understanding and agreement with our plan of care. Hypertension:  The patient reports:  taking medications as instructed, no medication side effects noted, no TIA's, no chest pain on exertion, no dyspnea on exertion, no swelling of ankles. BP Readings from Last 3 Encounters:   06/17/20 116/79   06/11/20 108/84   05/11/20 118/80     Lab Results   Component Value Date/Time    Sodium 141 12/16/2019 12:00 AM    Potassium 4.5 12/16/2019 12:00 AM    Chloride 104 12/16/2019 12:00 AM    CO2 25 12/16/2019 12:00 AM    Anion gap 7 01/29/2010 08:59 AM    Glucose 151 (H) 12/16/2019 12:00 AM    BUN 13 12/16/2019 12:00 AM    Creatinine 0.71 12/16/2019 12:00 AM    BUN/Creatinine ratio 18 12/16/2019 12:00 AM    GFR est  12/16/2019 12:00 AM    GFR est non- 12/16/2019 12:00 AM    Calcium 9.6 12/16/2019 12:00 AM     Patient advised to log blood pressures at home weekly and bring to next visit. Call office as soon as possible if BP's over 140/90 on multiple occasions or with symptoms of dizziness, chest pain, shortness of breath, headache or ankle swelling. Our goal is to normalize the blood pressure to decrease the risks of strokes and heart attacks. The patient is in agreement with the plan.       Patient Active Problem List   Diagnosis Code    Obesity E66.9    Diabetes mellitus ([facility] [address] E11.9    Hypertension I10    Fibromyalgia syndrome M79.7    Restless leg syndrome G25.81    Chronic generalized pain disorder R52, G89.29    Encounter for long-term (current) use of high-risk medication Z79.899    Headache R51    Persistent disorder of initiating or maintaining sleep G47.00    Migraine without aura G43.009    Musculoskeletal pain M79.18    Multiple environmental allergies Z91.09    History of headache Z87.898    Mixed hyperlipidemia E78.2    Type 2 diabetes with nephropathy ([address] E11.21    Recurrent depression (HCC) F33.9     Past Surgical History:   Procedure Laterality Date    HX GYN      Partial Hysterectomy    HX HEENT  HX TUBAL LIGATION       Social History     Socioeconomic History    Marital status: SINGLE     Spouse name: Not on file    Number of children: Not on file    Years of education: Not on file    Highest education level: Not on file   Occupational History    Not on file   Social Needs    Financial resource strain: Not on file    Food insecurity     Worry: Not on file     Inability: Not on file    Transportation needs     Medical: Not on file     Non-medical: Not on file   Tobacco Use    Smoking status: Former Smoker    Smokeless tobacco: Never Used    Tobacco comment: quit in 2010   Substance and Sexual Activity    Alcohol use: No    Drug use: No    Sexual activity: Not on file   Lifestyle    Physical activity     Days per week: Not on file     Minutes per session: Not on file    Stress: Not on file   Relationships    Social connections     Talks on phone: Not on file     Gets together: Not on file     Attends Je[employer]ah's witness service: Not on file     Active member of club or organization: Not on file     Attends meetings of clubs or organizations: Not on file     Relationship status: Not on file    Intimate partner violence     Fear of current or ex partner: Not on file     Emotionally abused: Not on file     Physically abused: Not on file     Forced sexual activity: Not on file   Other Topics Concern    Not on file   Social History Narrative    Not on file     Family History   Problem Relation Age of Onset    Alcohol abuse Father     Diabetes Mother     Hypertension Mother     Heart Disease Mother     Lupus Mother     Sleep Apnea Mother     Depression Mother     Cancer Maternal Aunt     Arthritis-osteo Other     Asthma Other     Diabetes Other     Elevated Lipids Other     Headache Other     Heart Disease Other     Hypertension Other     Migraines Other     Stroke Other     Bleeding Prob Neg Hx     Lung Disease Neg Hx     Psychiatric Disorder Neg Hx     Mental Retardation Neg Hx Current Outpatient Medications   Medication Sig    allergy injection Set a - 0.2ml - left arm SQ    allergy injection Set B - 0.2ml - right arm SQ    fluticasone propionate (FLONASE) 50 mcg/actuation nasal spray Instill 1 to 2 sprays in each nostril daily.  topiramate (TOPAMAX) 25 mg tablet Take 1 Tab by mouth nightly.  montelukast (SINGULAIR) 10 mg tablet Take 1 Tab by mouth daily.  fenofibrate nanocrystallized (TRICOR) 145 mg tablet Take 1 Tab by mouth daily.  dexlansoprazole (DEXILANT) 60 mg CpDB capsule (delayed release) TAKE ONE CAPSULE BY MOUTH EVERY DAY    pravastatin (PRAVACHOL) 40 mg tablet Take 1 Tab by mouth nightly.  dulaglutide (TRULICITY) 1.5 RU/0.5 mL sub-q pen 0.5 mL by SubCUTAneous route every seven (7) days. INJECT 0.5 ML SUB-Q EVERY 7 DAYS    fluticasone propion-salmeteroL (ADVAIR/WIXELA) 250-50 mcg/dose diskus inhaler Take 1 Puff by inhalation every twelve (12) hours.  oxyCODONE-acetaminophen (PERCOCET 10)  mg per tablet     loratadine (CLARITIN) 10 mg tablet Take 10 mg by mouth.  fentaNYL (DURAGESIC) 50 mcg/hr PATCH     fluocinoNIDE (LIDEX) 0.05 % topical cream APPLY TO THE AFFECTED AREA TOPICALLY TWO TIMES A DAY    ketoconazole (NIZORAL) 2 % shampoo APPLY TO SCALP AND RINSE   DAILY AS NEEDED    AMITIZA 24 mcg capsule     naloxone 4 mg/actuation spry 4 mg by Nasal route as needed for up to 2 doses. Indications: OPIOID TOXICITY    albuterol (PROAIR HFA) 90 mcg/actuation inhaler Take 2 Puffs by inhalation every four (4) hours as needed for Wheezing. Take 2 Puffs inhaled by mouth Every 4 Hours.  (Patient taking differently: Take 2 Puffs by inhalation every four (4) hours as needed for Wheezing.)    polyethylene glycol (MIRALAX) 17 gram/dose powder     allergy injection Set A #1 - 0.1ml - left arm    allergy injection Set B #1 - 0.1ml - right arm    allergy injection Set A vial #2 - give 1.0ml SC right arm    allergy injection Set B vial #2 give 1.0ml SC left arm    allergy injection Set A 0.8 ml right arm    allergy injection Set B 0.8 ml left arm    allergy injection Set A 0.6ml right arm    allergy injection Set B 0.6ml left arm    allergy injection 0.4 ml right arm    allergy injection 0.4 ml left arm    rizatriptan (MAXALT) 10 mg tablet TAKE ONE TABLET BY MOUTH ONCE AS NEEDED MAY REPEAT IN 2 HOURS IF NEEDED     No current facility-administered medications for this visit. Allergies   Allergen Reactions    Esomeprazole Magnesium Hives     Other reaction(s): mild rash/itching    Nexium [Esomeprazole Magnesium] Unknown (comments)    Pcn [Penicillins] Unknown (comments)       Review of Systems:  Constitutional: Positive for fatigue and lethargy  Skin: Negative for rash or lesion  Endo: Negative for unusual thirst or weight changes  HEENT: Negative for acute hearing or vision changes  Cardiovascular: Negative for dizziness, chest pain or palpitations  Respiratory: Negative for cough, wheezing or SOB  Gastrointestinal: Negative for nausea or abdominal pain  Genital/urinary: Negative for dysuria or voiding dysfunction  Musculoskeletal: see HPI, Negative for myalgias or arthralgias   Neurological: Negative for headache, weakness or paresthesia  Psychological: see HPI       Vitals:    06/17/20 1051   BP: 116/79   Pulse: 89   Resp: 18   Temp: 96.9 °F (36.1 °C)   TempSrc: Oral   SpO2: 100%   Weight: 178 lb 12.8 oz (81.1 kg)   Height: 5' 8\" (1.727 m)       Physical Examination:  General: Well developed, well nourished, in no acute distress  Skin: warm and dry, normal tone and turgo  Head: Normocephalic, atraumatic  Eyes: Sclera clear, EOMI  Neck: Normal range of motion  Respiratory: CTAB with symmetrical, unlabored effort  Cardiovascular: Normal S1, S2, Regular rate and rhythm  Extremities: Full range of motion, normal gait  Neurologic: Normal strength, No focal deficits  Psych: Active, alert and oriented.  Affect appropriate       Diagnoses and all orders for this visit:    1. Fibromyalgia syndrome    2. Multiple environmental allergies    3. Allergy desensitization therapy  -     GA IMMUNOTHERAPY, 2+ INJECTIONS  -     allergy injection; Set a - 0.2ml - left arm SQ  -     allergy injection; Set B - 0.2ml - right arm SQ      Plan of care:  Diagnoses were discussed in detail with patient. Medication risks/benefits/side effects discussed with patient. Importance of compliance with all prescribed medications discussed. All of the patient's questions were addressed and answered to apparent satisfaction. The patient understands and agrees with our plan of care. The patient knows to call back if they have questions about the plan of care or if symptoms change. The patient received an After-Visit Summary which contains VS, diagnoses, orders, allergy and medication lists. Over half of the 25 minutes face to face with Sheryle Ropes consisted of counseling and discussing treatment plans in reference to her TSD and multiple co-morbidities.      Future Appointments   Date Time Provider Dary Buck   6/24/2020 10:40 AM Fernanda Aleman MD [facility]

## 2020-06-30 ENCOUNTER — OFFICE VISIT (OUTPATIENT)
Dept: FAMILY MEDICINE CLINIC | Age: 50
End: 2020-06-30

## 2020-06-30 VITALS
HEIGHT: 68 IN | OXYGEN SATURATION: 97 % | DIASTOLIC BLOOD PRESSURE: 80 MMHG | SYSTOLIC BLOOD PRESSURE: 113 MMHG | WEIGHT: 179 LBS | HEART RATE: 83 BPM | RESPIRATION RATE: 18 BRPM | BODY MASS INDEX: 27.13 KG/M2 | TEMPERATURE: 97.2 F

## 2020-06-30 DIAGNOSIS — Z51.6 ALLERGY DESENSITIZATION THERAPY: Primary | ICD-10-CM

## 2020-06-30 NOTE — PROGRESS NOTES
Chief Complaint   Patient presents with    Allergy Injection     Visit Vitals  /80 (BP 1 Location: Right arm, BP Patient Position: Sitting)   Pulse 83   Temp 97.2 °F (36.2 °C) (Temporal)   Resp 18   Ht 5' 8\" (1.727 m)   Wt 179 lb (81.2 kg)   SpO2 97%   BMI 27.22 kg/m²     1. Have you been to the ER, urgent care clinic since your last visit? Hospitalized since your last visit? No    2. Have you seen or consulted any other health care providers outside of the [address] [place] [address] since your last visit? Include any pap smears or colon screening. No    Reviewed record in preparation for visit and have necessary documentation  Pt did not bring medication to office visit for review  opportunity was given for questions  Goals that were addressed and/or need to be completed during or after this appointment include   There are no preventive care reminders to display for this patient.

## 2020-06-30 NOTE — PROGRESS NOTES
Patient tolerated injection well. Did not see patient directly.     MD SHIMON Alvarez & EFREM BARNARD [facility]  06/30/20

## 2020-07-02 ENCOUNTER — TELEPHONE (OUTPATIENT)
Dept: FAMILY MEDICINE CLINIC | Age: 50
End: 2020-07-02

## 2020-07-08 ENCOUNTER — OFFICE VISIT (OUTPATIENT)
Dept: FAMILY MEDICINE CLINIC | Age: 50
End: 2020-07-08

## 2020-07-08 VITALS
OXYGEN SATURATION: 97 % | DIASTOLIC BLOOD PRESSURE: 87 MMHG | WEIGHT: 176 LBS | TEMPERATURE: 97.8 F | RESPIRATION RATE: 18 BRPM | BODY MASS INDEX: 26.67 KG/M2 | SYSTOLIC BLOOD PRESSURE: 122 MMHG | HEART RATE: 85 BPM | HEIGHT: 68 IN

## 2020-07-08 DIAGNOSIS — Z51.6 ALLERGY DESENSITIZATION THERAPY: Primary | ICD-10-CM

## 2020-07-08 DIAGNOSIS — Z91.09 MULTIPLE ENVIRONMENTAL ALLERGIES: ICD-10-CM

## 2020-07-08 NOTE — PROGRESS NOTES
1. Have you been to the ER, urgent care clinic since your last visit? Hospitalized since your last visit? No    2. Have you seen or consulted any other health care providers outside of the [address] [place] [address] since your last visit? Include any pap smears or colon screening. No  Reviewed record in preparation for visit and have necessary documentation  Pt did not bring medication to office visit for review  opportunity was given for questions  Goals that were addressed and/or need to be completed during or after this appointment include  There are no preventive care reminders to display for this patient.

## 2020-07-15 ENCOUNTER — OFFICE VISIT (OUTPATIENT)
Dept: FAMILY MEDICINE CLINIC | Age: 50
End: 2020-07-15

## 2020-07-15 VITALS
DIASTOLIC BLOOD PRESSURE: 76 MMHG | TEMPERATURE: 97 F | RESPIRATION RATE: 18 BRPM | SYSTOLIC BLOOD PRESSURE: 117 MMHG | WEIGHT: 181 LBS | OXYGEN SATURATION: 98 % | BODY MASS INDEX: 27.43 KG/M2 | HEART RATE: 70 BPM | HEIGHT: 68 IN

## 2020-07-15 DIAGNOSIS — Z91.09 MULTIPLE ENVIRONMENTAL ALLERGIES: ICD-10-CM

## 2020-07-15 DIAGNOSIS — Z51.6 ALLERGY DESENSITIZATION THERAPY: Primary | ICD-10-CM

## 2020-07-15 NOTE — PROGRESS NOTES
1. Have you been to the ER, urgent care clinic since your last visit? Hospitalized since your last visit? No    2. Have you seen or consulted any other health care providers outside of the [address] [place] [address] since your last visit? Include any pap smears or colon screening. No  Reviewed record in preparation for visit and have necessary documentation  opportunity was given for questions  Goals that were addressed and/or need to be completed during or after this appointment include  There are no preventive care reminders to display for this patient.

## 2020-07-29 ENCOUNTER — OFFICE VISIT (OUTPATIENT)
Dept: FAMILY MEDICINE CLINIC | Age: 50
End: 2020-07-29

## 2020-07-29 VITALS
HEART RATE: 72 BPM | WEIGHT: 176 LBS | OXYGEN SATURATION: 98 % | TEMPERATURE: 96.5 F | SYSTOLIC BLOOD PRESSURE: 128 MMHG | RESPIRATION RATE: 18 BRPM | BODY MASS INDEX: 26.67 KG/M2 | DIASTOLIC BLOOD PRESSURE: 75 MMHG | HEIGHT: 68 IN

## 2020-07-29 DIAGNOSIS — Z51.6 ALLERGY DESENSITIZATION THERAPY: ICD-10-CM

## 2020-07-29 DIAGNOSIS — Z91.09 MULTIPLE ENVIRONMENTAL ALLERGIES: ICD-10-CM

## 2020-07-29 DIAGNOSIS — E11.40 TYPE 2 DIABETES MELLITUS WITH DIABETIC NEUROPATHY, WITHOUT LONG-TERM CURRENT USE OF INSULIN (HCC): Primary | ICD-10-CM

## 2020-07-29 NOTE — PROGRESS NOTES
1. Have you been to the ER, urgent care clinic since your last visit? Hospitalized since your last visit? No    2. Have you seen or consulted any other health care providers outside of the [address] [place] [address] since your last visit? Include any pap smears or colon screening. No  Information was given on Shingles Vaccine  opportunity was given for questions  Goals that were addressed and/or need to be completed during or after this appointment include  There are no preventive care reminders to display for this patient.

## 2020-08-07 ENCOUNTER — OFFICE VISIT (OUTPATIENT)
Dept: FAMILY MEDICINE CLINIC | Age: 50
End: 2020-08-07
Payer: COMMERCIAL

## 2020-08-07 VITALS
SYSTOLIC BLOOD PRESSURE: 114 MMHG | HEIGHT: 68 IN | WEIGHT: 181 LBS | HEART RATE: 70 BPM | OXYGEN SATURATION: 96 % | BODY MASS INDEX: 27.43 KG/M2 | DIASTOLIC BLOOD PRESSURE: 80 MMHG | TEMPERATURE: 97.2 F | RESPIRATION RATE: 18 BRPM

## 2020-08-07 DIAGNOSIS — Z51.6 ALLERGY DESENSITIZATION THERAPY: Primary | ICD-10-CM

## 2020-08-07 PROCEDURE — 95117 IMMUNOTHERAPY INJECTIONS: CPT | Performed by: FAMILY MEDICINE

## 2020-08-07 NOTE — PROGRESS NOTES
1. Have you been to the ER, urgent care clinic since your last visit? Hospitalized since your last visit? No    2. Have you seen or consulted any other health care providers outside of the [address] [place] [address] since your last visit? Include any pap smears or colon screening.  No  Reviewed record in preparation for visit and have necessary documentation  opportunity was given for questions  Goals that were addressed and/or need to be completed during or after this appointment include    Health Maintenance Due   Topic Date Due    Influenza Age 5 to Adult  08/01/2020

## 2020-08-18 ENCOUNTER — OFFICE VISIT (OUTPATIENT)
Dept: FAMILY MEDICINE CLINIC | Age: 50
End: 2020-08-18
Payer: COMMERCIAL

## 2020-08-18 VITALS
DIASTOLIC BLOOD PRESSURE: 79 MMHG | SYSTOLIC BLOOD PRESSURE: 115 MMHG | HEART RATE: 89 BPM | RESPIRATION RATE: 20 BRPM | WEIGHT: 175 LBS | OXYGEN SATURATION: 97 % | TEMPERATURE: 98.3 F | HEIGHT: 68 IN | BODY MASS INDEX: 26.52 KG/M2

## 2020-08-18 DIAGNOSIS — E11.40 TYPE 2 DIABETES MELLITUS WITH DIABETIC NEUROPATHY, WITHOUT LONG-TERM CURRENT USE OF INSULIN (HCC): ICD-10-CM

## 2020-08-18 DIAGNOSIS — E78.2 MIXED HYPERLIPIDEMIA: ICD-10-CM

## 2020-08-18 DIAGNOSIS — Z78.0 POST-MENOPAUSAL: ICD-10-CM

## 2020-08-18 DIAGNOSIS — Z51.6 ALLERGY DESENSITIZATION THERAPY: Primary | ICD-10-CM

## 2020-08-18 DIAGNOSIS — I10 ESSENTIAL HYPERTENSION: ICD-10-CM

## 2020-08-18 LAB
ALBUMIN SERPL-MCNC: 4.2 G/DL (ref 3.5–5)
ALBUMIN/GLOB SERPL: 1.1 {RATIO} (ref 1.1–2.2)
ALP SERPL-CCNC: 134 U/L (ref 45–117)
ALT SERPL-CCNC: 29 U/L (ref 12–78)
ANION GAP SERPL CALC-SCNC: 6 MMOL/L (ref 5–15)
AST SERPL-CCNC: 15 U/L (ref 15–37)
BILIRUB SERPL-MCNC: 0.7 MG/DL (ref 0.2–1)
BUN SERPL-MCNC: 11 MG/DL (ref 6–20)
BUN/CREAT SERPL: 15 (ref 12–20)
CALCIUM SERPL-MCNC: 10 MG/DL (ref 8.5–10.1)
CHLORIDE SERPL-SCNC: 104 MMOL/L (ref 97–108)
CHOLEST SERPL-MCNC: 263 MG/DL
CO2 SERPL-SCNC: 28 MMOL/L (ref 21–32)
CREAT SERPL-MCNC: 0.73 MG/DL (ref 0.55–1.02)
GLOBULIN SER CALC-MCNC: 4 G/DL (ref 2–4)
GLUCOSE SERPL-MCNC: 156 MG/DL (ref 65–100)
HDLC SERPL-MCNC: 37 MG/DL
HDLC SERPL: 7.1 {RATIO} (ref 0–5)
LDLC SERPL CALC-MCNC: 165.8 MG/DL (ref 0–100)
LIPID PROFILE,FLP: ABNORMAL
POTASSIUM SERPL-SCNC: 4.7 MMOL/L (ref 3.5–5.1)
PROT SERPL-MCNC: 8.2 G/DL (ref 6.4–8.2)
SODIUM SERPL-SCNC: 138 MMOL/L (ref 136–145)
TRIGL SERPL-MCNC: 301 MG/DL (ref ?–150)
TSH SERPL DL<=0.05 MIU/L-ACNC: 0.65 UIU/ML (ref 0.36–3.74)
VLDLC SERPL CALC-MCNC: 60.2 MG/DL

## 2020-08-18 PROCEDURE — 95117 IMMUNOTHERAPY INJECTIONS: CPT | Performed by: FAMILY MEDICINE

## 2020-08-18 RX ORDER — ESTRADIOL 1 MG/1
1 TABLET ORAL DAILY
Qty: 30 TAB | Refills: 2 | Status: SHIPPED | OUTPATIENT
Start: 2020-08-18 | End: 2021-01-06

## 2020-08-18 NOTE — PROGRESS NOTES
1. Have you been to the ER, urgent care clinic since your last visit? Hospitalized since your last visit? No    2. Have you seen or consulted any other health care providers outside of the [address] [place] [address] since your last visit? Include any pap smears or colon screening. No    Reviewed record in preparation for visit and have necessary documentation  Goals that were addressed and/or need to be completed during or after this appointment include     Health Maintenance Due   Topic Date Due    Influenza Age 5 to Adult  08/01/2020       Patient is accompanied by self I have received verbal consent from Alireza Tracy to discuss any/all medical information while they are present in the room.

## 2020-08-19 LAB
EST. AVERAGE GLUCOSE BLD GHB EST-MCNC: 148 MG/DL
HBA1C MFR BLD: 6.8 % (ref 4–5.6)

## 2020-08-25 ENCOUNTER — OFFICE VISIT (OUTPATIENT)
Dept: FAMILY MEDICINE CLINIC | Age: 50
End: 2020-08-25
Payer: COMMERCIAL

## 2020-08-25 VITALS
WEIGHT: 178 LBS | BODY MASS INDEX: 26.98 KG/M2 | HEIGHT: 68 IN | OXYGEN SATURATION: 96 % | DIASTOLIC BLOOD PRESSURE: 81 MMHG | SYSTOLIC BLOOD PRESSURE: 116 MMHG | RESPIRATION RATE: 18 BRPM | TEMPERATURE: 97.2 F | HEART RATE: 71 BPM

## 2020-08-25 DIAGNOSIS — Z51.6 ALLERGY DESENSITIZATION THERAPY: Primary | ICD-10-CM

## 2020-08-25 PROCEDURE — 95117 IMMUNOTHERAPY INJECTIONS: CPT | Performed by: FAMILY MEDICINE

## 2020-08-25 NOTE — PROGRESS NOTES
1. Have you been to the ER, urgent care clinic since your last visit? Hospitalized since your last visit? No    2. Have you seen or consulted any other health care providers outside of the [address] [place] [address] since your last visit? Include any pap smears or colon screening. No    Reviewed record in preparation for visit and have necessary documentation  Goals that were addressed and/or need to be completed during or after this appointment include     There are no preventive care reminders to display for this patient. Patient is accompanied by self I have received verbal consent from Evgeny Ware to discuss any/all medical information while they are present in the room.

## 2020-09-08 ENCOUNTER — OFFICE VISIT (OUTPATIENT)
Dept: FAMILY MEDICINE CLINIC | Age: 50
End: 2020-09-08
Payer: COMMERCIAL

## 2020-09-08 VITALS
HEART RATE: 92 BPM | DIASTOLIC BLOOD PRESSURE: 83 MMHG | RESPIRATION RATE: 20 BRPM | SYSTOLIC BLOOD PRESSURE: 115 MMHG | TEMPERATURE: 97.1 F | OXYGEN SATURATION: 98 %

## 2020-09-08 DIAGNOSIS — Z91.09 MULTIPLE ENVIRONMENTAL ALLERGIES: Primary | ICD-10-CM

## 2020-09-08 DIAGNOSIS — Z51.6 ALLERGY DESENSITIZATION THERAPY: ICD-10-CM

## 2020-09-08 PROCEDURE — 95117 IMMUNOTHERAPY INJECTIONS: CPT | Performed by: FAMILY MEDICINE

## 2020-09-08 NOTE — PROGRESS NOTES
1. Have you been to the ER, urgent care clinic since your last visit? Hospitalized since your last visit? No    2. Have you seen or consulted any other health care providers outside of the [address] [place] [address] since your last visit? Include any pap smears or colon screening. No    Reviewed record in preparation for visit and have necessary documentation  Goals that were addressed and/or need to be completed during or after this appointment include     Health Maintenance Due   Topic Date Due    Flu Vaccine (1) 09/01/2020    Foot Exam Q1  10/07/2020    MICROALBUMIN Q1  10/07/2020       Patient is accompanied by self I have received verbal consent from Ruby Kelley to discuss any/all medical information while they are present in the room.

## 2020-09-22 ENCOUNTER — OFFICE VISIT (OUTPATIENT)
Dept: FAMILY MEDICINE CLINIC | Age: 50
End: 2020-09-22
Payer: COMMERCIAL

## 2020-09-22 VITALS
SYSTOLIC BLOOD PRESSURE: 101 MMHG | HEIGHT: 68 IN | HEART RATE: 72 BPM | WEIGHT: 177.8 LBS | DIASTOLIC BLOOD PRESSURE: 70 MMHG | BODY MASS INDEX: 26.95 KG/M2 | TEMPERATURE: 97 F | OXYGEN SATURATION: 96 % | RESPIRATION RATE: 16 BRPM

## 2020-09-22 DIAGNOSIS — Z91.09 MULTIPLE ENVIRONMENTAL ALLERGIES: ICD-10-CM

## 2020-09-22 DIAGNOSIS — Z51.6 ALLERGY DESENSITIZATION THERAPY: ICD-10-CM

## 2020-09-22 DIAGNOSIS — L25.5 DERMATITIS DUE TO PLANTS, INCLUDING POISON IVY, SUMAC, AND OAK: Primary | ICD-10-CM

## 2020-09-22 PROCEDURE — 95117 IMMUNOTHERAPY INJECTIONS: CPT | Performed by: FAMILY MEDICINE

## 2020-09-22 PROCEDURE — 96372 THER/PROPH/DIAG INJ SC/IM: CPT | Performed by: FAMILY MEDICINE

## 2020-09-22 PROCEDURE — 99213 OFFICE O/P EST LOW 20 MIN: CPT | Performed by: FAMILY MEDICINE

## 2020-09-22 RX ORDER — HYDROXYZINE 25 MG/1
25 TABLET, FILM COATED ORAL
Qty: 30 TAB | Refills: 1 | Status: SHIPPED | OUTPATIENT
Start: 2020-09-22 | End: 2021-02-18

## 2020-09-22 RX ORDER — PREDNISONE 20 MG/1
20 TABLET ORAL DAILY
Qty: 10 TAB | Refills: 0 | Status: SHIPPED | OUTPATIENT
Start: 2020-09-22 | End: 2021-02-18 | Stop reason: ALTCHOICE

## 2020-09-22 RX ORDER — TRIAMCINOLONE ACETONIDE 40 MG/ML
40 INJECTION, SUSPENSION INTRA-ARTICULAR; INTRAMUSCULAR ONCE
Qty: 1 ML | Refills: 0
Start: 2020-09-22 | End: 2020-09-22

## 2020-09-22 NOTE — PROGRESS NOTES
1. Have you been to the ER, urgent care clinic since your last visit? Hospitalized since your last visit? No    2. Have you seen or consulted any other health care providers outside of the [address] [place] [address] since your last visit? Include any pap smears or colon screening.  No  Reviewed record in preparation for visit and have necessary documentation  Pt did not bring medication to office visit for review    Goals that were addressed and/or need to be completed during or after this appointment include     Health Maintenance Due   Topic Date Due    Flu Vaccine (1) 09/01/2020    Foot Exam Q1  10/07/2020    MICROALBUMIN Q1  10/07/2020

## 2020-09-29 NOTE — PROGRESS NOTES
Chief Complaint   Patient presents with    Injection     allergy injection    Poison Ivy/Poison Oak/Poison Sumac Exposure     she is a 53y.o. year old female who presents with complaint of erythematous pruritic dermatitis on trunk and extemities for 2  days. Patient exposed to poison ivy. Patient denies HA, dizziness, SOB, CP, abdominal pain, dysuria, acute myalgias or arthralgias. Lab Results   Component Value Date/Time    Hemoglobin A1c 6.8 (H) 08/18/2020 02:42 PM    Hemoglobin A1c (POC) 6.9 04/02/2019 04:27 PM      BP Readings from Last 3 Encounters:   09/22/20 101/70   09/08/20 115/83   08/25/20 116/81     Wt Readings from Last 3 Encounters:   09/22/20 177 lb 12.8 oz (80.6 kg)   08/25/20 178 lb (80.7 kg)   08/18/20 175 lb (79.4 kg)     Body mass index is 27.03 kg/m².       Patient Active Problem List   Diagnosis Code    Obesity E66.9    Diabetes mellitus ([facility] [address] E11.9    Hypertension I10    Fibromyalgia syndrome M79.7    Restless leg syndrome G25.81    Chronic generalized pain disorder R52, G89.29    Encounter for long-term (current) use of high-risk medication Z79.899    Headache R51    Persistent disorder of initiating or maintaining sleep G47.00    Migraine without aura G43.009    Musculoskeletal pain M79.18    Multiple environmental allergies Z91.09    History of headache Z87.898    Mixed hyperlipidemia E78.2    Type 2 diabetes with nephropathy (HCC) E11.21    Recurrent depression (HCC) F33.9     Past Surgical History:   Procedure Laterality Date    HX GYN      Partial Hysterectomy    HX HEENT      HX TUBAL LIGATION       Social History     Socioeconomic History    Marital status: SINGLE     Spouse name: Not on file    Number of children: Not on file    Years of education: Not on file    Highest education level: Not on file   Occupational History    Not on file   Social Needs    Financial resource strain: Not on file    Food insecurity     Worry: Not on file     Inability: Not on file   Cody needs     Medical: Not on file     Non-medical: Not on file   Tobacco Use    Smoking status: Former Smoker    Smokeless tobacco: Never Used    Tobacco comment: quit in 2010   Substance and Sexual Activity    Alcohol use: No    Drug use: No    Sexual activity: Not on file   Lifestyle    Physical activity     Days per week: Not on file     Minutes per session: Not on file    Stress: Not on file   Relationships    Social connections     Talks on phone: Not on file     Gets together: Not on file     Attends Mu-ism service: Not on file     Active member of club or organization: Not on file     Attends meetings of clubs or organizations: Not on file     Relationship status: Not on file    Intimate partner violence     Fear of current or ex partner: Not on file     Emotionally abused: Not on file     Physically abused: Not on file     Forced sexual activity: Not on file   Other Topics Concern    Not on file   Social History Narrative    Not on file     Family History   Problem Relation Age of Onset    Alcohol abuse Father     Diabetes Mother     Hypertension Mother     Heart Disease Mother     Lupus Mother     Sleep Apnea Mother     Depression Mother     Cancer Maternal Aunt     Arthritis-osteo Other     Asthma Other     Diabetes Other     Elevated Lipids Other     Headache Other     Heart Disease Other     Hypertension Other     Migraines Other     Stroke Other     Bleeding Prob Neg Hx     Lung Disease Neg Hx     Psychiatric Disorder Neg Hx     Mental Retardation Neg Hx      Current Outpatient Medications   Medication Sig    predniSONE (DELTASONE) 20 mg tablet Take 20 mg by mouth daily.  hydrOXYzine HCL (ATARAX) 25 mg tablet Take 1 Tab by mouth every eight (8) hours as needed for Itching.  allergy injection Vial A    allergy injection Vial B    montelukast (SINGULAIR) 10 mg tablet Take 1 tablet by mouth daily.     estradioL (ESTRACE) 1 mg tablet Take 1 Tab by mouth daily.  pravastatin (PRAVACHOL) 40 mg tablet Take 1 tablet by mouth nightly.  topiramate (TOPAMAX) 25 mg tablet Take 1 tablet by mouth nightly.  fenofibrate nanocrystallized (TRICOR) 145 mg tablet Take 1 tablet by mouth daily.  fluticasone propionate (FLONASE) 50 mcg/actuation nasal spray Instill 1 to 2 sprays in each nostril daily.  dexlansoprazole (Dexilant) 60 mg CpDB capsule (delayed release) Take 1 capsule by mouth daily.  Trulicity 1.5 VN/2.4 mL sub-q pen Inject 1.5mg subcutaneously every 7 days.  fluticasone propion-salmeteroL (ADVAIR/WIXELA) 250-50 mcg/dose diskus inhaler Take 1 Puff by inhalation every twelve (12) hours.  oxyCODONE-acetaminophen (PERCOCET 10)  mg per tablet     loratadine (CLARITIN) 10 mg tablet Take 10 mg by mouth.  fentaNYL (DURAGESIC) 50 mcg/hr PATCH     ketoconazole (NIZORAL) 2 % shampoo APPLY TO SCALP AND RINSE   DAILY AS NEEDED    rizatriptan (MAXALT) 10 mg tablet TAKE ONE TABLET BY MOUTH ONCE AS NEEDED MAY REPEAT IN 2 HOURS IF NEEDED    naloxone 4 mg/actuation spry 4 mg by Nasal route as needed for up to 2 doses. Indications: OPIOID TOXICITY    albuterol (PROAIR HFA) 90 mcg/actuation inhaler Take 2 Puffs by inhalation every four (4) hours as needed for Wheezing. Take 2 Puffs inhaled by mouth Every 4 Hours.  (Patient taking differently: Take 2 Puffs by inhalation every four (4) hours as needed for Wheezing.)    polyethylene glycol (MIRALAX) 17 gram/dose powder     allergy injection Set A - 1.0ml SC left arm    allergy injection Set B - 1.0 ml SC Right arm    allergy injection Set A - left arm - SC    allergy injection Set B - 1.0ml - right arm SC    allergy injection Set A - left arm - 1.0 ml    allergy injection Set B - right arm - 1.0ml    allergy injection 1 ml right arm    allergy injection 1 ml left arm    allergy injection 1 ml right arm    allergy injection 1 ml left arm    allergy injection 0.8 ml right arm    allergy injection 0.8 ml left arm    allergy injection 0.6 right arm    allergy injection 0.6 left arm    allergy injection Set B, 0.4ml right arm    allergy injection Set A, 0.4ml left arm    allergy injection Set a - 0.2ml - left arm SQ    allergy injection Set B - 0.2ml - right arm SQ    allergy injection Set A #1 - 0.1ml - left arm    allergy injection Set B #1 - 0.1ml - right arm    allergy injection Set A vial #2 - give 1.0ml SC right arm    allergy injection Set B vial #2 give 1.0ml SC left arm    allergy injection Set A 0.8 ml right arm    allergy injection Set B 0.8 ml left arm    allergy injection Set A 0.6ml right arm    allergy injection Set B 0.6ml left arm    allergy injection 0.4 ml right arm    allergy injection 0.4 ml left arm    fluocinoNIDE (LIDEX) 0.05 % topical cream APPLY TO THE AFFECTED AREA TOPICALLY TWO TIMES A DAY    AMITIZA 24 mcg capsule      No current facility-administered medications for this visit.       Allergies   Allergen Reactions    Esomeprazole Magnesium Hives     Other reaction(s): mild rash/itching    Nexium [Esomeprazole Magnesium] Unknown (comments)    Pcn [Penicillins] Unknown (comments)       Review of Systems:  Constitutional: feeling well, denies fatigue, malaise  HEENT: Negative for acute hearing or vision changes  Respiratory: Negative for cough, wheezing or SOB  Cardiovascular: Negative for chest pain, dizziness or palpitations  Gastrointestinal: Negative for nausea or abdominal pain  Genital/urinary: Negative for dysuria or voiding dysfunction  Musculoskeletal: Negative myalgias or arthralgias   Neurological: Negative for HA, weakness or paresthesia  Skin: see HPI  Psychlogical: Negative for depression or anxiety     Vitals:    09/22/20 1531   BP: 101/70   Pulse: 72   Resp: 16   Temp: 97 °F (36.1 °C)   TempSrc: Skin   SpO2: 96%   Weight: 177 lb 12.8 oz (80.6 kg)   Height: 5' 8\" (1.727 m)       Physical Examination:  General: Well developed, well nourished, in no acute distress  Head: Normocephalic, atraumatic  Eyes: Sclera clear, EOMI  Neck: Normal range of motion  Respiratory: Clear to auscultation bilaterally with symmetrical effort  Cardiovascular: Normal S1, S2, Regular rate and rhythm  Extremities: Full range of motion, Normal gait  Neurological: Normal strength and sensation. No focal deficits  Skin: erythematous maculopapular dermatitis on trunk and extemities  Psychological: Active, alert and oriented. Affect appropriate     Diagnoses and all orders for this visit:    1. Dermatitis due to plants, including poison ivy, sumac, and oak  -     TRIAMCINOLONE ACETONIDE INJ  -     triamcinolone acetonide (Kenalog) 40 mg/mL injection; 1 mL by IntraMUSCular route once for 1 dose. -     predniSONE (DELTASONE) 20 mg tablet; Take 20 mg by mouth daily. -     hydrOXYzine HCL (ATARAX) 25 mg tablet; Take 1 Tab by mouth every eight (8) hours as needed for Itching. 2. Allergy desensitization therapy  -     NV IMMUNOTHERAPY, 2+ INJECTIONS  -     allergy injection; Vial A  -     allergy injection; Vial B    3. Multiple environmental allergies  -     NV IMMUNOTHERAPY, 2+ INJECTIONS  -     allergy injection; Vial A  -     allergy injection; Vial B       Advise patient to be diligent for signd/symptoms of infection as she is at risk due to her diabetes. Also to follow a strict diabetic diet while on steroid. I have discussed the diagnosis with the patient and the intended plan as seen in the above orders. The patient expresses understanding and agreement with our plan of care. The patient has received an after-visit summary. The patient knows to call our office if there are any questions or concerns regarding diagnosis and treatment plans. I have discussed medication side effects and warnings with the patient as well.

## 2020-10-11 ENCOUNTER — HOSPITAL ENCOUNTER (EMERGENCY)
Age: 50
Discharge: HOME | End: 2020-10-11
Attending: EMERGENCY MEDICINE
Payer: MEDICARE

## 2020-10-11 VITALS
RESPIRATION RATE: 16 BRPM | BODY MASS INDEX: 26.22 KG/M2 | WEIGHT: 173 LBS | SYSTOLIC BLOOD PRESSURE: 123 MMHG | HEIGHT: 68 IN | HEART RATE: 100 BPM | DIASTOLIC BLOOD PRESSURE: 71 MMHG | TEMPERATURE: 98 F | OXYGEN SATURATION: 98 %

## 2020-10-11 DIAGNOSIS — J01.00 ACUTE MAXILLARY SINUSITIS, RECURRENCE NOT SPECIFIED: Primary | ICD-10-CM

## 2020-10-11 DIAGNOSIS — G44.89 OTHER HEADACHE SYNDROME: ICD-10-CM

## 2020-10-11 PROCEDURE — 74011250636 HC RX REV CODE- 250/636: Performed by: EMERGENCY MEDICINE

## 2020-10-11 PROCEDURE — 99283 EMERGENCY DEPT VISIT LOW MDM: CPT

## 2020-10-11 PROCEDURE — 96372 THER/PROPH/DIAG INJ SC/IM: CPT

## 2020-10-11 PROCEDURE — 74011250637 HC RX REV CODE- 250/637: Performed by: EMERGENCY MEDICINE

## 2020-10-11 RX ORDER — AZITHROMYCIN 250 MG/1
TABLET, FILM COATED ORAL
Qty: 6 TAB | Refills: 0 | Status: SHIPPED | OUTPATIENT
Start: 2020-10-11 | End: 2020-10-11 | Stop reason: SDUPTHER

## 2020-10-11 RX ORDER — AZITHROMYCIN 250 MG/1
500 TABLET, FILM COATED ORAL
Status: COMPLETED | OUTPATIENT
Start: 2020-10-11 | End: 2020-10-11

## 2020-10-11 RX ORDER — CETIRIZINE HYDROCHLORIDE 10 MG/1
10 CAPSULE, LIQUID FILLED ORAL DAILY
Qty: 12 CAP | Refills: 0 | Status: SHIPPED | OUTPATIENT
Start: 2020-10-11

## 2020-10-11 RX ORDER — KETOROLAC TROMETHAMINE 30 MG/ML
60 INJECTION, SOLUTION INTRAMUSCULAR; INTRAVENOUS
Status: COMPLETED | OUTPATIENT
Start: 2020-10-11 | End: 2020-10-11

## 2020-10-11 RX ORDER — AZITHROMYCIN 250 MG/1
TABLET, FILM COATED ORAL
Qty: 6 TAB | Refills: 0 | Status: SHIPPED | OUTPATIENT
Start: 2020-10-11 | End: 2020-10-16

## 2020-10-11 RX ORDER — CETIRIZINE HYDROCHLORIDE 10 MG/1
10 CAPSULE, LIQUID FILLED ORAL DAILY
Qty: 12 CAP | Refills: 0 | Status: SHIPPED | OUTPATIENT
Start: 2020-10-11 | End: 2020-10-11 | Stop reason: SDUPTHER

## 2020-10-11 RX ADMIN — AZITHROMYCIN MONOHYDRATE 500 MG: 250 TABLET ORAL at 20:24

## 2020-10-11 RX ADMIN — KETOROLAC TROMETHAMINE 60 MG: 30 INJECTION, SOLUTION INTRAMUSCULAR at 20:24

## 2020-10-12 NOTE — ED PROVIDER NOTES
Patient presents with complaint of a headache and sinus pressure since yesterday. No fever, nausea or vomiting.    Duration:  1 day     Intensity: severe    Modified by: movement or pressure               Past Medical History:   Diagnosis Date    Depression     Diabetes ([address]     Dysthymic disorder     Fibromyalgia     Fibromyalgia syndrome 8/8/2012    Headache(784.0) 9/8/2012    Hypercholesterolemia     Left ear pain     Migraine     Mixed hyperlipidemia     Musculoskeletal pain 9/16/2014       Past Surgical History:   Procedure Laterality Date    HX GYN      Partial Hysterectomy    HX HEENT      HX TUBAL LIGATION           Family History:   Problem Relation Age of Onset    Alcohol abuse Father     Diabetes Mother     Hypertension Mother     Heart Disease Mother     Lupus Mother     Sleep Apnea Mother     Depression Mother     Cancer Maternal Aunt     Arthritis-osteo Other     Asthma Other     Diabetes Other     Elevated Lipids Other     Headache Other     Heart Disease Other     Hypertension Other     Migraines Other     Stroke Other     Bleeding Prob Neg Hx     Lung Disease Neg Hx     Psychiatric Disorder Neg Hx     Mental Retardation Neg Hx        Social History     Socioeconomic History    Marital status: SINGLE     Spouse name: Not on file    Number of children: Not on file    Years of education: Not on file    Highest education level: Not on file   Occupational History    Not on file   Social Needs    Financial resource strain: Not on file    Food insecurity     Worry: Not on file     Inability: Not on file    Transportation needs     Medical: Not on file     Non-medical: Not on file   Tobacco Use    Smoking status: Former Smoker    Smokeless tobacco: Never Used    Tobacco comment: quit in 2010   Substance and Sexual Activity    Alcohol use: No    Drug use: No    Sexual activity: Yes     Birth control/protection: Condom   Lifestyle    Physical activity     Days per week: Not on file     Minutes per session: Not on file    Stress: Not on file   Relationships    Social connections     Talks on phone: Not on file     Gets together: Not on file     Attends Zoroastrianism service: Not on file     Active member of club or organization: Not on file     Attends meetings of clubs or organizations: Not on file     Relationship status: Not on file    Intimate partner violence     Fear of current or ex partner: Not on file     Emotionally abused: Not on file     Physically abused: Not on file     Forced sexual activity: Not on file   Other Topics Concern    Not on file   Social History Narrative    Not on file         ALLERGIES: Esomeprazole magnesium; Nexium [esomeprazole magnesium]; and Pcn [penicillins]    Review of Systems   Constitutional: Negative. HENT: Positive for sinus pressure and sinus pain. Eyes: Negative. Respiratory: Negative. Cardiovascular: Negative. Gastrointestinal: Negative. Endocrine: Negative. Genitourinary: Negative. Skin: Negative. Allergic/Immunologic: Negative. Neurological: Positive for headaches. Hematological: Negative. Psychiatric/Behavioral: Negative. All other systems reviewed and are negative. Vitals:    10/11/20 1952   BP: (!) 148/94   Pulse: (!) 105   Resp: 18   Temp: 98 °F (36.7 °C)   SpO2: 97%   Weight: 78.5 kg (173 lb)   Height: 5' 8\" (1.727 m)            Physical Exam  Vitals signs and nursing note reviewed. Constitutional:       Appearance: She is well-developed. HENT:      Head: Normocephalic and atraumatic. Nose: Congestion present. Mouth/Throat:      Mouth: Mucous membranes are dry. Pharynx: Oropharynx is clear. Eyes:      Conjunctiva/sclera: Conjunctivae normal.      Pupils: Pupils are equal, round, and reactive to light. Neck:      Musculoskeletal: Normal range of motion and neck supple. Cardiovascular:      Rate and Rhythm: Normal rate and regular rhythm.       Heart sounds: Normal heart sounds. Pulmonary:      Breath sounds: Normal breath sounds. Abdominal:      General: Bowel sounds are normal.      Palpations: Abdomen is soft. Musculoskeletal: Normal range of motion. Skin:     General: Skin is warm and dry. Neurological:      General: No focal deficit present. Mental Status: She is alert.    Psychiatric:         Mood and Affect: Mood normal.         Behavior: Behavior normal.          MDM       Procedures

## 2020-10-12 NOTE — ED NOTES
Discharge medications reviewed with patient and appropriate educational materials and side effects teaching were provided. I have reviewed discharge instructions with the patient and spouse. The patient and spouse verbalized understanding.

## 2020-10-27 ENCOUNTER — OFFICE VISIT (OUTPATIENT)
Dept: FAMILY MEDICINE CLINIC | Age: 50
End: 2020-10-27
Payer: COMMERCIAL

## 2020-10-27 VITALS
RESPIRATION RATE: 20 BRPM | WEIGHT: 176 LBS | HEART RATE: 65 BPM | HEIGHT: 68 IN | TEMPERATURE: 97 F | OXYGEN SATURATION: 97 % | BODY MASS INDEX: 26.67 KG/M2 | SYSTOLIC BLOOD PRESSURE: 118 MMHG | DIASTOLIC BLOOD PRESSURE: 83 MMHG

## 2020-10-27 DIAGNOSIS — Z51.6 ALLERGY DESENSITIZATION THERAPY: Primary | ICD-10-CM

## 2020-10-27 PROCEDURE — 95117 IMMUNOTHERAPY INJECTIONS: CPT | Performed by: FAMILY MEDICINE

## 2020-10-27 NOTE — PROGRESS NOTES
Chief Complaint   Patient presents with    Allergy Injection     1. Have you been to the ER, urgent care clinic since your last visit? Hospitalized since your last visit? No    2. Have you seen or consulted any other health care providers outside of the [address] [place] [address] since your last visit? Include any pap smears or colon screening. No  Reviewed record in preparation for visit and have necessary documentation  Pt did not bring medication to office visit for review  Information was given to pt on Advanced Directives, Living Will  Information was given on Shingles Vaccine  Opportunity was given for questions  Goals that were addressed and/or need to be completed after this appointment include:       Health Maintenance Due   Topic Date Due    Flu Vaccine (1) 09/01/2020    Foot Exam Q1  10/07/2020    MICROALBUMIN Q1  10/07/2020     Patient was over a month late getting her injections. Call made to [facility]. Spoke with David Doty who advised that patient should receive 0.8ml.

## 2020-11-03 ENCOUNTER — OFFICE VISIT (OUTPATIENT)
Dept: FAMILY MEDICINE CLINIC | Age: 50
End: 2020-11-03
Payer: COMMERCIAL

## 2020-11-03 VITALS
HEART RATE: 69 BPM | SYSTOLIC BLOOD PRESSURE: 104 MMHG | HEIGHT: 68 IN | BODY MASS INDEX: 28.34 KG/M2 | DIASTOLIC BLOOD PRESSURE: 72 MMHG | OXYGEN SATURATION: 100 % | RESPIRATION RATE: 20 BRPM | WEIGHT: 187 LBS | TEMPERATURE: 97.1 F

## 2020-11-03 DIAGNOSIS — Z51.6 ALLERGY DESENSITIZATION THERAPY: Primary | ICD-10-CM

## 2020-11-03 PROCEDURE — 95117 IMMUNOTHERAPY INJECTIONS: CPT | Performed by: FAMILY MEDICINE

## 2020-11-03 NOTE — PROGRESS NOTES
Chief Complaint   Patient presents with    Allergy Injection     1. Have you been to the ER, urgent care clinic since your last visit? Hospitalized since your last visit? No    2. Have you seen or consulted any other health care providers outside of the [address] [place] [address] since your last visit? Include any pap smears or colon screening.  No    Reviewed record in preparation for visit and have necessary documentation  Pt did not bring medication to office visit for review  Information was given to pt on Advanced Directives, Living Will  Information was given on Shingles Vaccine  Opportunity was given for questions  Goals that were addressed and/or need to be completed after this appointment include:   Health Maintenance Due   Topic Date Due    Flu Vaccine (1) 09/01/2020    Foot Exam Q1  10/07/2020    MICROALBUMIN Q1  10/07/2020

## 2020-11-16 ENCOUNTER — VIRTUAL VISIT (OUTPATIENT)
Dept: BEHAVIORAL/MENTAL HEALTH CLINIC | Age: 50
End: 2020-11-16
Payer: COMMERCIAL

## 2020-11-16 DIAGNOSIS — F33.1 MODERATE EPISODE OF RECURRENT MAJOR DEPRESSIVE DISORDER (HCC): Primary | ICD-10-CM

## 2020-11-16 PROCEDURE — 99214 OFFICE O/P EST MOD 30 MIN: CPT | Performed by: NURSE PRACTITIONER

## 2020-11-16 RX ORDER — BUPROPION HYDROCHLORIDE 150 MG/1
150 TABLET ORAL DAILY
Qty: 30 TAB | Refills: 1 | Status: SHIPPED | OUTPATIENT
Start: 2020-11-16 | End: 2020-12-16
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
## 2020-11-18 ENCOUNTER — OFFICE VISIT (OUTPATIENT)
Dept: FAMILY MEDICINE CLINIC | Age: 50
End: 2020-11-18
Payer: COMMERCIAL

## 2020-11-18 VITALS
HEIGHT: 68 IN | TEMPERATURE: 96.3 F | RESPIRATION RATE: 16 BRPM | OXYGEN SATURATION: 98 % | BODY MASS INDEX: 26.83 KG/M2 | SYSTOLIC BLOOD PRESSURE: 114 MMHG | WEIGHT: 177 LBS | HEART RATE: 81 BPM | DIASTOLIC BLOOD PRESSURE: 79 MMHG

## 2020-11-18 DIAGNOSIS — Z51.6 ALLERGY DESENSITIZATION THERAPY: Primary | ICD-10-CM

## 2020-11-18 PROBLEM — E11.9 TYPE 2 DIABETES MELLITUS (HCC): Status: ACTIVE | Noted: 2018-09-24

## 2020-11-18 PROBLEM — E78.5 HYPERLIPIDEMIA: Status: ACTIVE | Noted: 2018-06-14

## 2020-11-18 PROCEDURE — 95117 IMMUNOTHERAPY INJECTIONS: CPT | Performed by: FAMILY MEDICINE

## 2020-11-24 ENCOUNTER — OFFICE VISIT (OUTPATIENT)
Dept: FAMILY MEDICINE CLINIC | Age: 50
End: 2020-11-24
Payer: COMMERCIAL

## 2020-11-24 VITALS
TEMPERATURE: 97.4 F | OXYGEN SATURATION: 97 % | SYSTOLIC BLOOD PRESSURE: 123 MMHG | BODY MASS INDEX: 26.76 KG/M2 | WEIGHT: 176.6 LBS | HEIGHT: 68 IN | DIASTOLIC BLOOD PRESSURE: 76 MMHG | HEART RATE: 87 BPM | RESPIRATION RATE: 18 BRPM

## 2020-11-24 DIAGNOSIS — Z51.6 ALLERGY DESENSITIZATION THERAPY: Primary | ICD-10-CM

## 2020-11-24 PROCEDURE — 95117 IMMUNOTHERAPY INJECTIONS: CPT | Performed by: FAMILY MEDICINE

## 2020-11-24 NOTE — PROGRESS NOTES
Chief Complaint   Patient presents with    Allergy Injection     Visit Vitals  /76 (BP 1 Location: Left arm, BP Patient Position: Sitting)   Pulse 87   Temp 97.4 °F (36.3 °C) (Temporal)   Resp 18   Ht 5' 8\" (1.727 m)   Wt 176 lb 9.6 oz (80.1 kg)   SpO2 97%   BMI 26.85 kg/m²     1. Have you been to the ER, urgent care clinic since your last visit? Hospitalized since your last visit? No    2. Have you seen or consulted any other health care providers outside of the [address] [place] [address] since your last visit? Include any pap smears or colon screening.  No    Reviewed record in preparation for visit and have necessary documentation  Pt did not bring medication to office visit for review  opportunity was given for questions  Goals that were addressed and/or need to be completed during or after this appointment include   Health Maintenance Due   Topic Date Due    Foot Exam Q1  10/07/2020    MICROALBUMIN Q1  10/07/2020

## 2020-11-24 NOTE — PROGRESS NOTES
Did not evaluate patient directly. Patient tolerated injection well per nursing.     Man Crow MD  11/24/20

## 2020-12-08 ENCOUNTER — OFFICE VISIT (OUTPATIENT)
Dept: FAMILY MEDICINE CLINIC | Age: 50
End: 2020-12-08
Payer: MEDICARE

## 2020-12-08 VITALS
OXYGEN SATURATION: 98 % | HEIGHT: 68 IN | RESPIRATION RATE: 15 BRPM | HEART RATE: 73 BPM | DIASTOLIC BLOOD PRESSURE: 74 MMHG | BODY MASS INDEX: 27.13 KG/M2 | SYSTOLIC BLOOD PRESSURE: 113 MMHG | TEMPERATURE: 96.6 F | WEIGHT: 179 LBS

## 2020-12-08 DIAGNOSIS — Z51.6 ALLERGY DESENSITIZATION THERAPY: Primary | ICD-10-CM

## 2020-12-08 PROCEDURE — 95117 IMMUNOTHERAPY INJECTIONS: CPT | Performed by: FAMILY MEDICINE

## 2020-12-08 NOTE — PROGRESS NOTES
Allergy injection ordered by Dr. Ana Vance given 12/8/2020 by Grace Post LPN as follows:    Dose amount:  1.0 ml  Injection site:  upper arm ( bilateral )  Route:  SQ      Patient tolerated injection well.

## 2020-12-29 RX ORDER — FLUTICASONE PROPIONATE 50 MCG
SPRAY, SUSPENSION (ML) NASAL
Qty: 1 BOTTLE | Refills: 1 | Status: SHIPPED | OUTPATIENT
Start: 2020-12-29 | End: 2021-02-26

## 2021-01-04 ENCOUNTER — OFFICE VISIT (OUTPATIENT)
Dept: FAMILY MEDICINE CLINIC | Age: 51
End: 2021-01-04
Payer: MEDICARE

## 2021-01-04 VITALS
TEMPERATURE: 97 F | WEIGHT: 175.6 LBS | HEART RATE: 68 BPM | HEIGHT: 68 IN | SYSTOLIC BLOOD PRESSURE: 116 MMHG | BODY MASS INDEX: 26.61 KG/M2 | OXYGEN SATURATION: 98 % | RESPIRATION RATE: 18 BRPM | DIASTOLIC BLOOD PRESSURE: 76 MMHG

## 2021-01-04 DIAGNOSIS — Z51.6 ALLERGY DESENSITIZATION THERAPY: Primary | ICD-10-CM

## 2021-01-04 PROCEDURE — 95117 IMMUNOTHERAPY INJECTIONS: CPT | Performed by: FAMILY MEDICINE

## 2021-01-04 NOTE — PROGRESS NOTES
Chief Complaint   Patient presents with    Allergy Injection     Visit Vitals  /76 (BP 1 Location: Right arm, BP Patient Position: Sitting)   Pulse 68   Temp 97 °F (36.1 °C) (Temporal)   Resp 18   Ht 5' 8\" (1.727 m)   Wt 175 lb 9.6 oz (79.7 kg)   SpO2 98%   BMI 26.70 kg/m²     1. Have you been to the ER, urgent care clinic since your last visit? Hospitalized since your last visit? No    2. Have you seen or consulted any other health care providers outside of the [address] [place] [address] since your last visit? Include any pap smears or colon screening.  No    Reviewed record in preparation for visit and have necessary documentation  Pt did not bring medication to office visit for review  opportunity was given for questions  Goals that were addressed and/or need to be completed during or after this appointment include   Health Maintenance Due   Topic Date Due    Foot Exam Q1  10/07/2020    MICROALBUMIN Q1  10/07/2020

## 2021-01-04 NOTE — PROGRESS NOTES
Did not evaluate patient directly. Patient tolerated injection well per nursing.     Anisha Ding MD  01/04/21

## 2021-01-13 ENCOUNTER — OFFICE VISIT (OUTPATIENT)
Dept: FAMILY MEDICINE CLINIC | Age: 51
End: 2021-01-13
Payer: COMMERCIAL

## 2021-01-13 VITALS
SYSTOLIC BLOOD PRESSURE: 114 MMHG | HEART RATE: 78 BPM | HEIGHT: 68 IN | RESPIRATION RATE: 18 BRPM | DIASTOLIC BLOOD PRESSURE: 80 MMHG | BODY MASS INDEX: 26.92 KG/M2 | OXYGEN SATURATION: 98 % | TEMPERATURE: 98.2 F | WEIGHT: 177.6 LBS

## 2021-01-13 DIAGNOSIS — Z51.6 ALLERGY DESENSITIZATION THERAPY: Primary | ICD-10-CM

## 2021-01-13 DIAGNOSIS — Z23 ENCOUNTER FOR IMMUNIZATION: ICD-10-CM

## 2021-01-13 PROCEDURE — 90686 IIV4 VACC NO PRSV 0.5 ML IM: CPT | Performed by: FAMILY MEDICINE

## 2021-01-13 PROCEDURE — G0008 ADMIN INFLUENZA VIRUS VAC: HCPCS | Performed by: FAMILY MEDICINE

## 2021-01-13 PROCEDURE — 95117 IMMUNOTHERAPY INJECTIONS: CPT | Performed by: FAMILY MEDICINE

## 2021-01-13 RX ORDER — BUPROPION HYDROCHLORIDE 150 MG/1
TABLET ORAL
COMMUNITY
Start: 2021-01-12 | End: 2021-11-12

## 2021-01-13 NOTE — PROGRESS NOTES
1. Have you been to the ER, urgent care clinic since your last visit? Hospitalized since your last visit? No    2. Have you seen or consulted any other health care providers outside of the [address] [place] [address] since your last visit? Include any pap smears or colon screening. No    Reviewed record in preparation for visit and have necessary documentation  Goals that were addressed and/or need to be completed during or after this appointment include     Health Maintenance Due   Topic Date Due    Foot Exam Q1  10/07/2020    MICROALBUMIN Q1  10/07/2020       Patient is accompanied by self I have received verbal consent from Barbara Vivar to discuss any/all medical information while they are present in the room.

## 2021-01-28 ENCOUNTER — OFFICE VISIT (OUTPATIENT)
Dept: FAMILY MEDICINE CLINIC | Age: 51
End: 2021-01-28
Payer: COMMERCIAL

## 2021-01-28 VITALS
HEIGHT: 68 IN | WEIGHT: 183 LBS | HEART RATE: 84 BPM | OXYGEN SATURATION: 95 % | DIASTOLIC BLOOD PRESSURE: 77 MMHG | TEMPERATURE: 96.3 F | BODY MASS INDEX: 27.74 KG/M2 | SYSTOLIC BLOOD PRESSURE: 116 MMHG | RESPIRATION RATE: 16 BRPM

## 2021-01-28 DIAGNOSIS — Z51.6 ALLERGY DESENSITIZATION THERAPY: Primary | ICD-10-CM

## 2021-01-28 PROCEDURE — 95117 IMMUNOTHERAPY INJECTIONS: CPT | Performed by: FAMILY MEDICINE

## 2021-01-28 NOTE — PROGRESS NOTES
Did not evaluate patient directly. Patient tolerated injection well per nursing.     Juliana Norris MD  01/28/21

## 2021-01-28 NOTE — PROGRESS NOTES
Chief Complaint   Patient presents with    Allergy Injection     Visit Vitals  /77 (BP 1 Location: Right upper arm, BP Patient Position: Sitting)   Pulse 84   Temp (!) 96.3 °F (35.7 °C) (Temporal)   Resp 16   Ht 5' 8\" (1.727 m)   Wt 183 lb (83 kg)   SpO2 95%   BMI 27.83 kg/m²     1. Have you been to the ER, urgent care clinic since your last visit? Hospitalized since your last visit? No    2. Have you seen or consulted any other health care providers outside of the [address] [place] [address] since your last visit? Include any pap smears or colon screening.  No    Reviewed record in preparation for visit and have necessary documentation  Pt did not bring medication to office visit for review  opportunity was given for questions  Goals that were addressed and/or need to be completed during or after this appointment include   Health Maintenance Due   Topic Date Due    COVID-19 Vaccine (1 of 2) 09/26/1987    Foot Exam Q1  10/07/2020    MICROALBUMIN Q1  10/07/2020    Eye Exam Retinal or Dilated  02/01/2021

## 2021-02-18 ENCOUNTER — VIRTUAL VISIT (OUTPATIENT)
Dept: FAMILY MEDICINE CLINIC | Age: 51
End: 2021-02-18
Payer: COMMERCIAL

## 2021-02-18 DIAGNOSIS — E11.40 TYPE 2 DIABETES MELLITUS WITH DIABETIC NEUROPATHY, WITHOUT LONG-TERM CURRENT USE OF INSULIN (HCC): ICD-10-CM

## 2021-02-18 DIAGNOSIS — M25.552 LEFT HIP PAIN: Primary | ICD-10-CM

## 2021-02-18 DIAGNOSIS — M79.7 FIBROMYALGIA SYNDROME: ICD-10-CM

## 2021-02-18 PROCEDURE — 99213 OFFICE O/P EST LOW 20 MIN: CPT | Performed by: FAMILY MEDICINE

## 2021-02-18 NOTE — PROGRESS NOTES
Romaine Owusu is a 53 y.o. female evaluated via telephone on 21. Patient Identity confirmed by . Telephone encounter done in lieu of office visit due to extraordinary circumstances. A state of national and state emergency has been declared by the President and the [place] due to the [employer] pandemic. Pursuant to the emergency declaration under the [facility] [id number] [facility] authority and the [employer] Act, this Virtual  Visit was conducted, with patient's consent, to reduce the patient's risk of exposure to COVID-19 and provide continuity of care for an established patient. Dimitris Trujillo LPN coordinated virtual visit    Consent:  Patient and/or health care decision maker is aware that he/she may receive a bill for this telephone encounter, depending on his insurance coverage, and has provided verbal consent to proceed: Yes    Physician Location: Office  Patient Location: Home    CC: hip pain  Information gathered from patient and/or health care decision maker. HPI: Romaine Owusu is a 53 y.o. female who was evaluated by synchronous (real-time) audio technology from his/her home. Patient with hx of T2D, HTN, HLD, migraine, RLS, fibromyalgia, depression and chronic pain. She complains of intermittent painful left hip \"popping. \" Also with right 3rd distal finger DIP stiffness and swelling. Patient denies HA, dizziness, SOB, CP, abdominal pain, dysuria, acute weakness or paresthesia. She is followed by pain management and is on opioid pain medication. Encounter Diagnoses   Name Primary?  Left hip pain Yes    Type 2 diabetes mellitus with diabetic neuropathy, without long-term current use of insulin (HCC)     Fibromyalgia syndrome          Current Outpatient Medications:     fenofibrate nanocrystallized (TRICOR) 145 mg tablet, Take 1 tablet by mouth daily.  , Disp: 90 Tab, Rfl: 0    Dexilant 60 mg CpDB capsule (delayed release), Take 1 capsule by mouth daily. , Disp: 90 Cap, Rfl: 0    fluticasone propion-salmeteroL (ADVAIR/WIXELA) 250-50 mcg/dose diskus inhaler, Inhale 1 puff by mouth every 12 hours. , Disp: 3 Each, Rfl: 0    topiramate (TOPAMAX) 25 mg tablet, Take 1 tablet by mouth nightly., Disp: 90 Tab, Rfl: 0    pravastatin (PRAVACHOL) 40 mg tablet, Take 1 tablet by mouth nightly., Disp: 90 Tab, Rfl: 0    allergy injection, Set A right arm 1.0 ml subq, Disp: 1 mL, Rfl: 0    allergy injection, Set B left arm 1.0 ml subq, Disp: 1 mL, Rfl: 0    buPROPion XL (WELLBUTRIN XL) 150 mg tablet, , Disp: , Rfl:     allergy injection, Set B-left arm- 1.0ml SC, Disp: 1 mL, Rfl: 0    allergy injection, Set A-right arm 1.0ml SC, Disp: 1 mL, Rfl: 0    estradioL (ESTRACE) 1 mg tablet, TAKE ONE TABLET BY MOUTH EVERY DAY, Disp: 30 Tab, Rfl: 3    fluticasone propionate (FLONASE) 50 mcg/actuation nasal spray, Instill 1 to 2 sprays in each nostril daily. , Disp: 1 Bottle, Rfl: 1    Trulicity 1.5 /2.7 mL sub-q pen, Inject 1.5mg subcutaneously every 7 days. , Disp: 4 mL, Rfl: 1    Cetirizine (ZyrTEC) 10 mg cap, Take 10 mg by mouth daily. , Disp: 12 Cap, Rfl: 0    montelukast (SINGULAIR) 10 mg tablet, Take 1 tablet by mouth daily. , Disp: 90 Tab, Rfl: 1    oxyCODONE-acetaminophen (PERCOCET 10)  mg per tablet, , Disp: , Rfl: 0    loratadine (CLARITIN) 10 mg tablet, Take 10 mg by mouth., Disp: , Rfl:     fentaNYL (DURAGESIC) 50 mcg/hr PATCH, , Disp: , Rfl: 0    fluocinoNIDE (LIDEX) 0.05 % topical cream, APPLY TO THE AFFECTED AREA TOPICALLY TWO TIMES A DAY, Disp: 15 g, Rfl: 2    ketoconazole (NIZORAL) 2 % shampoo, APPLY TO SCALP AND RINSE   DAILY AS NEEDED, Disp: 120 mL, Rfl: 2    rizatriptan (MAXALT) 10 mg tablet, TAKE ONE TABLET BY MOUTH ONCE AS NEEDED MAY REPEAT IN 2 HOURS IF NEEDED, Disp: 60 Tab, Rfl: 0    AMITIZA 24 mcg capsule, , Disp: , Rfl:     naloxone 4 mg/actuation spry, 4 mg by Nasal route as needed for up to 2 doses. Indications: OPIOID TOXICITY, Disp: 1 Box, Rfl: 1    albuterol (PROAIR HFA) 90 mcg/actuation inhaler, Take 2 Puffs by inhalation every four (4) hours as needed for Wheezing. Take 2 Puffs inhaled by mouth Every 4 Hours. (Patient taking differently: Take 2 Puffs by inhalation every four (4) hours as needed for Wheezing.), Disp: 1 Inhaler, Rfl: 2    polyethylene glycol (MIRALAX) 17 gram/dose powder, , Disp: , Rfl:      Allergies   Allergen Reactions    Esomeprazole Magnesium Hives     Other reaction(s): mild rash/itching    Nexium [Esomeprazole Magnesium] Unknown (comments)    Pcn [Penicillins] Unknown (comments)        Patient Active Problem List    Diagnosis Date Noted    Recurrent depression ([facility] 11/03/2018    Type 2 diabetes mellitus ([facility] 09/24/2018    Hyperlipidemia 06/14/2018    History of headache 09/05/2017    Multiple environmental allergies 01/19/2015    Musculoskeletal pain 09/16/2014    Migraine headache 07/09/2013    Primary fibromyalgia syndrome 09/08/2012    Encounter for long-term (current) use of high-risk medication 09/08/2012    Headache 09/08/2012    Persistent disorder of initiating or maintaining sleep 09/08/2012    Fibromyalgia syndrome 08/08/2012    Restless leg syndrome 08/08/2012    Obesity 06/11/2012    Diabetes mellitus ([facility] 06/11/2012    Hypertension 06/11/2012        Review of Systems:  Constitutional: Negative for fatigue, malaise  Resp: Negative for cough, wheezing or SOB  CV: Negative for chest pain, dizziness or palpitations  GI: Negative for nausea or abdominal pain  MS: see HPI  Neuro: Negative for HA, weakness or paresthesia      Assessment/Plan:  Details of this discussion including any medical advice provided: Patient advised as a precaution to stay at home, practice regular hand washing with soap and warm water and to wear a mask and utilize social distancing when necessary to be out in public places. ICD-10-CM ICD-9-CM    1. Left hip pain  M25.552 719.45    2. Type 2 diabetes mellitus with diabetic neuropathy, without long-term current use of insulin (HCC)  E11.40 250.60      357.2    3. Fibromyalgia syndrome  M79.7 729.1        Will schedule OV for physical examination and xray if indicated. Total Time: minutes: 21-30 minutes was spent addressing above problems and plan. Patient medical history, prior OV notes, vitals flow sheet, lab results, medications, and allergies were reviewed during this encounter. All of the patient's questions were addressed and answered to apparent satisfaction. The patient understands and agrees with our plan of care. The patient knows to call back if they have questions about the plan of care or if symptoms change. For phone encounters:  I affirm this is a patient initiated episode with an established Patient who has not had a related appointment within my department in the past 7 days or scheduled within the next 24 hours.     Note: not billable if this call serves to triage the patient into an appointment for the relevant concern        MD SHIMON Gómez & EFREM BARNARD [facility]  02/18/21

## 2021-02-18 NOTE — PROGRESS NOTES
1. Have you been to the ER, urgent care clinic since your last visit? Hospitalized since your last visit? No    2. Have you seen or consulted any other health care providers outside of the [address] [place] [address] since your last visit? Include any pap smears or colon screening.  No        Health Maintenance Due   Topic Date Due    COVID-19 Vaccine (1 of 2) 09/26/1987    Foot Exam Q1  10/07/2020    MICROALBUMIN Q1  10/07/2020    Eye Exam Retinal or Dilated  02/01/2021

## 2021-03-17 ENCOUNTER — TELEPHONE (OUTPATIENT)
Dept: FAMILY MEDICINE CLINIC | Age: 51
End: 2021-03-17

## 2021-03-17 ENCOUNTER — OFFICE VISIT (OUTPATIENT)
Dept: FAMILY MEDICINE CLINIC | Age: 51
End: 2021-03-17

## 2021-03-17 VITALS
RESPIRATION RATE: 20 BRPM | DIASTOLIC BLOOD PRESSURE: 77 MMHG | HEART RATE: 80 BPM | TEMPERATURE: 96.5 F | OXYGEN SATURATION: 98 % | SYSTOLIC BLOOD PRESSURE: 110 MMHG | BODY MASS INDEX: 26.37 KG/M2 | WEIGHT: 174 LBS | HEIGHT: 68 IN

## 2021-03-17 DIAGNOSIS — M79.7 FIBROMYALGIA SYNDROME: ICD-10-CM

## 2021-03-17 DIAGNOSIS — F33.1 MODERATE EPISODE OF RECURRENT MAJOR DEPRESSIVE DISORDER (HCC): ICD-10-CM

## 2021-03-17 DIAGNOSIS — M25.552 LEFT HIP PAIN: Primary | ICD-10-CM

## 2021-03-17 DIAGNOSIS — G43.019 INTRACTABLE MIGRAINE WITHOUT AURA AND WITHOUT STATUS MIGRAINOSUS: ICD-10-CM

## 2021-03-17 DIAGNOSIS — E78.2 MIXED HYPERLIPIDEMIA: ICD-10-CM

## 2021-03-17 DIAGNOSIS — G63 POLYNEUROPATHY ASSOCIATED WITH UNDERLYING DISEASE (HCC): ICD-10-CM

## 2021-03-17 DIAGNOSIS — M79.645 PAIN OF LEFT MIDDLE FINGER: ICD-10-CM

## 2021-03-17 DIAGNOSIS — E11.40 TYPE 2 DIABETES MELLITUS WITH DIABETIC NEUROPATHY, WITHOUT LONG-TERM CURRENT USE OF INSULIN (HCC): ICD-10-CM

## 2021-03-17 PROCEDURE — 99214 OFFICE O/P EST MOD 30 MIN: CPT | Performed by: FAMILY MEDICINE

## 2021-03-17 RX ORDER — RIZATRIPTAN BENZOATE 10 MG/1
TABLET ORAL
Qty: 60 TAB | Refills: 0 | Status: SHIPPED | OUTPATIENT
Start: 2021-03-17

## 2021-03-17 RX ORDER — CYCLOBENZAPRINE HCL 10 MG
10 TABLET ORAL
Qty: 90 TAB | Refills: 0 | Status: SHIPPED | OUTPATIENT
Start: 2021-03-17

## 2021-03-17 RX ORDER — FLUOCINONIDE 0.5 MG/G
CREAM TOPICAL
Qty: 15 G | Refills: 2 | Status: SHIPPED | OUTPATIENT
Start: 2021-03-17 | End: 2022-01-31

## 2021-03-17 RX ORDER — RIZATRIPTAN BENZOATE 10 MG/1
TABLET ORAL
Qty: 60 TAB | Refills: 0 | Status: SHIPPED | OUTPATIENT
Start: 2021-03-17 | End: 2021-03-17 | Stop reason: SDUPTHER

## 2021-03-17 RX ORDER — ONDANSETRON 8 MG/1
8 TABLET, ORALLY DISINTEGRATING ORAL
Qty: 30 TAB | Refills: 0 | Status: SHIPPED | OUTPATIENT
Start: 2021-03-17 | End: 2021-06-04 | Stop reason: SDUPTHER

## 2021-03-17 RX ORDER — FLUOCINONIDE 0.5 MG/G
CREAM TOPICAL
Qty: 15 G | Refills: 2 | Status: SHIPPED | OUTPATIENT
Start: 2021-03-17 | End: 2021-03-17 | Stop reason: SDUPTHER

## 2021-03-17 RX ORDER — CYCLOBENZAPRINE HCL 10 MG
10 TABLET ORAL
Qty: 90 TAB | Refills: 0 | Status: SHIPPED | OUTPATIENT
Start: 2021-03-17 | End: 2021-03-17 | Stop reason: SDUPTHER

## 2021-03-17 NOTE — TELEPHONE ENCOUNTER
Attempted to call. No answer. Message left. Placed call to discuss missed allergy injections. Needs to speak with nurse.

## 2021-03-17 NOTE — TELEPHONE ENCOUNTER
Attempted to call. No answer. Message left. Placed second call to discuss missed allergy injections. Needs to speak with nurse.

## 2021-03-18 NOTE — TELEPHONE ENCOUNTER
Received call back from [place]. Per [place], Patient is to restart allergy injections at 0.2ml and increase by 0.2ml every 7-14 days until reaching maintenance dose of 1.0ml. Once reaching 1.0ml, continue every 7-14 days. (0.2ml, 0.4ml, 0.6ml, 0.8ml, 1.0ml)        Attempted to call. No answer. Message left. Need to schedule appt for patient to get allergy injections.

## 2021-03-22 NOTE — PROGRESS NOTES
Chief Complaint   Patient presents with    Allergy Injection    Hip Pain     she is a 53y.o. year old female who presents for follow up of her multiple chronic health conditions. Patient with multiple co-morbidities which include T2D, HTN, HLD, migraine, RLS, fibromyalgia, depression and chronic pain. Patient denies HA, dizziness, SOB, CP, abdominal pain, dysuria. She is followed by pain management. She complains of left hip and finger pain. Diabetes: This patient is being treating under a comprehensive plan of care for diabetes. Overall the patient feels well with good energy level. Insulin dependence: no   Pertinent Labs:   Lab Results   Component Value Date/Time    Hemoglobin A1c 6.8 (H) 08/18/2020 02:42 PM      Body mass index is 26.46 kg/m². Lab Results   Component Value Date/Time    LDL, calculated 165.8 (H) 08/18/2020 02:42 PM        Wt Readings from Last 3 Encounters:   03/17/21 174 lb (78.9 kg)   01/28/21 183 lb (83 kg)   01/13/21 177 lb 9.6 oz (80.6 kg)        Social History     Tobacco Use   Smoking Status Former Smoker   Smokeless Tobacco Never Used   Tobacco Comment    quit in 2010        Medications, diet and exercise as means of diabetic control with a goal of an A1C of less than 7.0% discussed. Diabetic foot care and annual eye exam discussed as well. Check blood sugars while fasting just before breakfast on most days and occasionally before dinner. Write down readings in a diabetic log book and bring them to the next visit. Call the office for fasting sugars over 200 or below 75 on two or more occasions. Call immediately if having symptoms of high sugar (frequent urination, always thirsty) or low sugar (dizzy, lethargic, sweaty, nauseated, headache). Our overall goal is to reduce or eliminate the long term consequences of poorly controlled diabetes. Patient expresses understanding and agreement with our plan of care.     Hypertension:  The patient reports:  taking medications as instructed, no medication side effects noted, no TIA's, no chest pain on exertion, no dyspnea on exertion, no swelling of ankles. BP Readings from Last 3 Encounters:   03/17/21 110/77   01/28/21 116/77   01/13/21 114/80     Lab Results   Component Value Date/Time    Sodium 138 08/18/2020 02:42 PM    Potassium 4.7 08/18/2020 02:42 PM    Chloride 104 08/18/2020 02:42 PM    CO2 28 08/18/2020 02:42 PM    Anion gap 6 08/18/2020 02:42 PM    Glucose 156 (H) 08/18/2020 02:42 PM    BUN 11 08/18/2020 02:42 PM    Creatinine 0.73 08/18/2020 02:42 PM    BUN/Creatinine ratio 15 08/18/2020 02:42 PM    GFR est AA >60 08/18/2020 02:42 PM    GFR est non-AA >60 08/18/2020 02:42 PM    Calcium 10.0 08/18/2020 02:42 PM     Patient advised to log blood pressures at home weekly and bring to next visit. Call office as soon as possible if BP's over 140/90 on multiple occasions or with symptoms of dizziness, chest pain, shortness of breath, headache or ankle swelling. Our goal is to normalize the blood pressure to decrease the risks of strokes and heart attacks. The patient is in agreement with the plan.       Patient Active Problem List   Diagnosis Code    Obesity E66.9    Diabetes mellitus ([facility] E11.9    Hypertension I10    Fibromyalgia syndrome M79.7    Restless leg syndrome G25.81    Primary fibromyalgia syndrome M79.7    Encounter for long-term (current) use of high-risk medication Z79.899    Headache R51.9    Persistent disorder of initiating or maintaining sleep G47.00    Migraine headache G43.909    Musculoskeletal pain M79.18    Multiple environmental allergies Z91.09    History of headache Z87.898    Hyperlipidemia E78.5    Type 2 diabetes mellitus (HCC) E11.9    Recurrent depression (HCC) F33.9     Past Surgical History:   Procedure Laterality Date    HX GYN      Partial Hysterectomy    HX HEENT      HX TUBAL LIGATION       Social History     Socioeconomic History    Marital status:  Spouse name: Not on file    Number of children: 2    Years of education: Not on file    Highest education level: 12th grade   Occupational History    Not on file   Social Needs    Financial resource strain: Not hard at all   [employer] insecurity     Worry: Never true     Inability: Never true   [employer] needs     Medical: No     Non-medical: No   Tobacco Use    Smoking status: Former Smoker    Smokeless tobacco: Never Used    Tobacco comment: quit in 2010   Substance and Sexual Activity    Alcohol use: No    Drug use: No    Sexual activity: Yes     Birth control/protection: Condom   Lifestyle    Physical activity     Days per week: Not on file     Minutes per session: Not on file    Stress: Not on file   Relationships    Social connections     Talks on phone: Not on file     Gets together: Not on file     Attends Sikhism service: Not on file     Active member of club or organization: Not on file     Attends meetings of clubs or organizations: Not on file     Relationship status: Not on file    Intimate partner violence     Fear of current or ex partner: Not on file     Emotionally abused: Not on file     Physically abused: Not on file     Forced sexual activity: Not on file   Other Topics Concern    Not on file   Social History Narrative    Not on file     Family History   Problem Relation Age of Onset    Alcohol abuse Father     Diabetes Mother     Hypertension Mother     Heart Disease Mother     Lupus Mother     Sleep Apnea Mother     Depression Mother     Cancer Maternal Aunt     Arthritis-osteo Other     Asthma Other     Diabetes Other     Elevated Lipids Other     Headache Other     Heart Disease Other     Hypertension Other     Migraines Other     Stroke Other     Bleeding Prob Neg Hx     Lung Disease Neg Hx     Psychiatric Disorder Neg Hx     Mental Retardation Neg Hx      Current Outpatient Medications   Medication Sig    rizatriptan (MAXALT) 10 mg tablet TAKE ONE TABLET BY MOUTH ONCE AS NEEDED MAY REPEAT IN 2 HOURS IF NEEDED    cyclobenzaprine (FLEXERIL) 10 mg tablet Take 1 Tab by mouth three (3) times daily as needed for Muscle Spasm(s).  fluocinoNIDE (LIDEX) 0.05 % topical cream APPLY TO THE AFFECTED AREA TOPICALLY TWO TIMES A DAY    ondansetron (ZOFRAN ODT) 8 mg disintegrating tablet Take 1 Tab by mouth every eight (8) hours as needed for Nausea or Vomiting.  fluticasone propionate (FLONASE) 50 mcg/actuation nasal spray Instill 1 to 2 sprays in each nostril daily.  montelukast (SINGULAIR) 10 mg tablet Take 1 tablet by mouth daily.  fenofibrate nanocrystallized (TRICOR) 145 mg tablet Take 1 tablet by mouth daily.  Dexilant 60 mg CpDB capsule (delayed release) Take 1 capsule by mouth daily.  fluticasone propion-salmeteroL (ADVAIR/WIXELA) 250-50 mcg/dose diskus inhaler Inhale 1 puff by mouth every 12 hours.  topiramate (TOPAMAX) 25 mg tablet Take 1 tablet by mouth nightly.  pravastatin (PRAVACHOL) 40 mg tablet Take 1 tablet by mouth nightly.  buPROPion XL (WELLBUTRIN XL) 150 mg tablet     estradioL (ESTRACE) 1 mg tablet TAKE ONE TABLET BY MOUTH EVERY DAY    Trulicity 1.5 KH/3.7 mL sub-q pen Inject 1.5mg subcutaneously every 7 days.  Cetirizine (ZyrTEC) 10 mg cap Take 10 mg by mouth daily.  oxyCODONE-acetaminophen (PERCOCET 10)  mg per tablet     loratadine (CLARITIN) 10 mg tablet Take 10 mg by mouth.  fentaNYL (DURAGESIC) 50 mcg/hr PATCH     ketoconazole (NIZORAL) 2 % shampoo APPLY TO SCALP AND RINSE   DAILY AS NEEDED    AMITIZA 24 mcg capsule     naloxone 4 mg/actuation spry 4 mg by Nasal route as needed for up to 2 doses. Indications: OPIOID TOXICITY    albuterol (PROAIR HFA) 90 mcg/actuation inhaler Take 2 Puffs by inhalation every four (4) hours as needed for Wheezing. Take 2 Puffs inhaled by mouth Every 4 Hours.  (Patient taking differently: Take 2 Puffs by inhalation every four (4) hours as needed for Wheezing.)    polyethylene glycol (MIRALAX) 17 gram/dose powder      No current facility-administered medications for this visit. Allergies   Allergen Reactions    Esomeprazole Magnesium Hives     Other reaction(s): mild rash/itching    Nexium [Esomeprazole Magnesium] Unknown (comments)    Pcn [Penicillins] Unknown (comments)       Review of Systems:  Constitutional: Negative for fatigue or malaise  Skin: Negative for rash or lesion  Endo: Negative for unusual thirst or weight changes  HEENT: Negative for acute hearing or vision changes  Cardiovascular: Negative for dizziness, chest pain or palpitations  Respiratory: Negative for cough, wheezing or SOB  Gastrointestinal: Negative for nausea or abdominal pain  Genital/urinary: Negative for dysuria or voiding dysfunction  Musculoskeletal: see HPI, Negative for myalgias or arthralgias   Neurological: Negative for headache, weakness or paresthesia  Psychological: see HPI       Vitals:    03/17/21 1417   BP: 110/77   Pulse: 80   Resp: 20   Temp: (!) 96.5 °F (35.8 °C)   TempSrc: Temporal   SpO2: 98%   Weight: 174 lb (78.9 kg)   Height: 5' 8\" (1.727 m)       Physical Examination:  General: Well developed, well nourished, in no acute distress  Skin: warm and dry, normal tone and turgo  Head: Normocephalic, atraumatic  Eyes: Sclera clear, EOMI  Neck: Normal range of motion  Respiratory:  symmetrical, unlabored effort  Cardiovascular: Regular rate and rhythm  Abdomen: soft, normal bowel sounds  Extremities: Full range of motion, normal gait  Neurologic: Normal strength, No focal deficits  Psych: Active, alert and oriented. Affect appropriate     Xrays viewed independently by myself. Radiology: Possible nondisplaced chronic avulsion fracture of the left greater trochanter. Diagnoses and all orders for this visit:    1. Left hip pain  -     XR HIP LT W OR WO PELV 2-3 VWS; Future  -     REFERRAL TO ORTHOPEDICS    2.  Pain of left middle finger  -     XR 3RD FINGER LT MIN 2 V; Future    3. Type 2 diabetes mellitus with diabetic neuropathy, without long-term current use of insulin ([facility]    4. Moderate episode of recurrent major depressive disorder ([facility]    5. Mixed hyperlipidemia    6. Intractable migraine without aura and without status migrainosus  -     rizatriptan (MAXALT) 10 mg tablet; TAKE ONE TABLET BY MOUTH ONCE AS NEEDED MAY REPEAT IN 2 HOURS IF NEEDED    7. Polyneuropathy associated with underlying disease ([facility]    8. Fibromyalgia syndrome  -     cyclobenzaprine (FLEXERIL) 10 mg tablet; Take 1 Tab by mouth three (3) times daily as needed for Muscle Spasm(s). Other orders  -     fluocinoNIDE (LIDEX) 0.05 % topical cream; APPLY TO THE AFFECTED AREA TOPICALLY TWO TIMES A DAY  -     ondansetron (ZOFRAN ODT) 8 mg disintegrating tablet; Take 1 Tab by mouth every eight (8) hours as needed for Nausea or Vomiting. Plan of care:  Diagnoses were discussed in detail with patient. Medication risks/benefits/side effects discussed with patient. Importance of compliance with all prescribed medications discussed. All of the patient's questions were addressed and answered to apparent satisfaction. The patient understands and agrees with our plan of care. The patient knows to call back if they have questions about the plan of care or if symptoms change. The patient received an After-Visit Summary which contains VS, diagnoses, orders, allergy and medication lists. No future appointments.

## 2021-03-23 ENCOUNTER — OFFICE VISIT (OUTPATIENT)
Dept: FAMILY MEDICINE CLINIC | Age: 51
End: 2021-03-23
Payer: COMMERCIAL

## 2021-03-23 VITALS
DIASTOLIC BLOOD PRESSURE: 73 MMHG | OXYGEN SATURATION: 96 % | HEART RATE: 77 BPM | BODY MASS INDEX: 26.95 KG/M2 | WEIGHT: 177.8 LBS | HEIGHT: 68 IN | SYSTOLIC BLOOD PRESSURE: 106 MMHG | TEMPERATURE: 97 F | RESPIRATION RATE: 16 BRPM

## 2021-03-23 DIAGNOSIS — Z51.6 ALLERGY DESENSITIZATION THERAPY: ICD-10-CM

## 2021-03-23 DIAGNOSIS — E11.40 TYPE 2 DIABETES MELLITUS WITH DIABETIC NEUROPATHY, WITHOUT LONG-TERM CURRENT USE OF INSULIN (HCC): Primary | ICD-10-CM

## 2021-03-23 PROCEDURE — 95117 IMMUNOTHERAPY INJECTIONS: CPT | Performed by: FAMILY MEDICINE

## 2021-03-23 PROCEDURE — 99213 OFFICE O/P EST LOW 20 MIN: CPT | Performed by: FAMILY MEDICINE

## 2021-03-23 NOTE — PROGRESS NOTES
1. Have you been to the ER, urgent care clinic since your last visit? Hospitalized since your last visit? No    2. Have you seen or consulted any other health care providers outside of the [address] [place] [address] since your last visit? Include any pap smears or colon screening. No    Reviewed record in preparation for visit and have necessary documentation  Goals that were addressed and/or need to be completed during or after this appointment include     Health Maintenance Due   Topic Date Due    Hepatitis C Screening  Never done    COVID-19 Vaccine (1) Never done    Eye Exam Retinal or Dilated  02/01/2020    Foot Exam Q1  10/07/2020    MICROALBUMIN Q1  10/07/2020       Patient is accompanied by self I have received verbal consent from Sharyn Sargent to discuss any/all medical information while they are present in the room.

## 2021-03-28 NOTE — PROGRESS NOTES
Chief Complaint   Patient presents with   Fredercik Cordoba     she is a 53y.o. year old female who presents for allergy injection. Patient with multiple co-morbidities which include T2D, HTN, HLD, migraine, RLS, fibromyalgia, depression and chronic pain. She is due for lab work. Patient denies HA, dizziness, SOB, CP, abdominal pain, dysuria. She is followed by pain management. Diabetes: This patient is being treating under a comprehensive plan of care for diabetes. Overall the patient feels well with good energy level. Insulin dependence: no      Call immediately if having symptoms of high sugar (frequent urination, always thirsty) or low sugar (dizzy, lethargic, sweaty, nauseated, headache). Our overall goal is to reduce or eliminate the long term consequences of poorly controlled diabetes. Patient expresses understanding and agreement with our plan of care. Hypertension:  The patient reports:  taking medications as instructed, no medication side effects noted, no TIA's, no chest pain on exertion, no dyspnea on exertion, no swelling of ankles. BP Readings from Last 3 Encounters:   03/23/21 106/73   03/17/21 110/77   01/28/21 116/77     Patient advised to log blood pressures at home weekly and bring to next visit. Call office as soon as possible if BP's over 140/90 on multiple occasions or with symptoms of dizziness, chest pain, shortness of breath, headache or ankle swelling. Our goal is to normalize the blood pressure to decrease the risks of strokes and heart attacks. The patient is in agreement with the plan.       Patient Active Problem List   Diagnosis Code    Obesity E66.9    Diabetes mellitus ([facility] E11.9    Hypertension I10    Fibromyalgia syndrome M79.7    Restless leg syndrome G25.81    Primary fibromyalgia syndrome M79.7    Encounter for long-term (current) use of high-risk medication Z79.899    Headache R51.9    Persistent disorder of initiating or maintaining sleep G47.00    Migraine headache G43.909    Musculoskeletal pain M79.18    Multiple environmental allergies Z91.09    History of headache Z87.898    Hyperlipidemia E78.5    Type 2 diabetes mellitus (HCC) E11.9    Recurrent depression (HCC) F33.9     Past Surgical History:   Procedure Laterality Date    HX GYN      Partial Hysterectomy    HX HEENT      HX TUBAL LIGATION       Social History     Socioeconomic History    Marital status:      Spouse name: Not on file    Number of children: 2    Years of education: Not on file    Highest education level: 12th grade   Occupational History    Not on file   Social Needs    Financial resource strain: Not hard at all   [place] insecurity     Worry: Never true     Inability: Never true   [employer] needs     Medical: No     Non-medical: No   Tobacco Use    Smoking status: Former Smoker    Smokeless tobacco: Never Used    Tobacco comment: quit in 2010   Substance and Sexual Activity    Alcohol use: No    Drug use: No    Sexual activity: Yes     Birth control/protection: Condom   Lifestyle    Physical activity     Days per week: Not on file     Minutes per session: Not on file    Stress: Not on file   Relationships    Social connections     Talks on phone: Not on file     Gets together: Not on file     Attends Catholic service: Not on file     Active member of club or organization: Not on file     Attends meetings of clubs or organizations: Not on file     Relationship status: Not on file    Intimate partner violence     Fear of current or ex partner: Not on file     Emotionally abused: Not on file     Physically abused: Not on file     Forced sexual activity: Not on file   Other Topics Concern    Not on file   Social History Narrative    Not on file     Family History   Problem Relation Age of Onset    Alcohol abuse Father     Diabetes Mother     Hypertension Mother     Heart Disease Mother     Lupus Mother     Sleep Apnea Mother  Depression Mother     Cancer Maternal Aunt     Arthritis-osteo Other     Asthma Other     Diabetes Other     Elevated Lipids Other     Headache Other     Heart Disease Other     Hypertension Other     Migraines Other     Stroke Other     Bleeding Prob Neg Hx     Lung Disease Neg Hx     Psychiatric Disorder Neg Hx     Mental Retardation Neg Hx      Current Outpatient Medications   Medication Sig    allergy injection Set A-0.2 ml right arm-sc    allergy injection Set B-0.2 ml left arm-sc    rizatriptan (MAXALT) 10 mg tablet TAKE ONE TABLET BY MOUTH ONCE AS NEEDED MAY REPEAT IN 2 HOURS IF NEEDED    cyclobenzaprine (FLEXERIL) 10 mg tablet Take 1 Tab by mouth three (3) times daily as needed for Muscle Spasm(s).  fluocinoNIDE (LIDEX) 0.05 % topical cream APPLY TO THE AFFECTED AREA TOPICALLY TWO TIMES A DAY    ondansetron (ZOFRAN ODT) 8 mg disintegrating tablet Take 1 Tab by mouth every eight (8) hours as needed for Nausea or Vomiting.  fluticasone propionate (FLONASE) 50 mcg/actuation nasal spray Instill 1 to 2 sprays in each nostril daily.  montelukast (SINGULAIR) 10 mg tablet Take 1 tablet by mouth daily.  fenofibrate nanocrystallized (TRICOR) 145 mg tablet Take 1 tablet by mouth daily.  Dexilant 60 mg CpDB capsule (delayed release) Take 1 capsule by mouth daily.  topiramate (TOPAMAX) 25 mg tablet Take 1 tablet by mouth nightly.  pravastatin (PRAVACHOL) 40 mg tablet Take 1 tablet by mouth nightly.  buPROPion XL (WELLBUTRIN XL) 150 mg tablet     estradioL (ESTRACE) 1 mg tablet TAKE ONE TABLET BY MOUTH EVERY DAY    Trulicity 1.5 UL/8.3 mL sub-q pen Inject 1.5mg subcutaneously every 7 days.  Cetirizine (ZyrTEC) 10 mg cap Take 10 mg by mouth daily.  oxyCODONE-acetaminophen (PERCOCET 10)  mg per tablet     loratadine (CLARITIN) 10 mg tablet Take 10 mg by mouth.     fentaNYL (DURAGESIC) 50 mcg/hr PATCH     ketoconazole (NIZORAL) 2 % shampoo APPLY TO SCALP AND RINSE   DAILY AS NEEDED    AMITIZA 24 mcg capsule     naloxone 4 mg/actuation spry 4 mg by Nasal route as needed for up to 2 doses. Indications: OPIOID TOXICITY    albuterol (PROAIR HFA) 90 mcg/actuation inhaler Take 2 Puffs by inhalation every four (4) hours as needed for Wheezing. Take 2 Puffs inhaled by mouth Every 4 Hours. (Patient taking differently: Take 2 Puffs by inhalation every four (4) hours as needed for Wheezing.)    polyethylene glycol (MIRALAX) 17 gram/dose powder     fluticasone propion-salmeteroL (ADVAIR/WIXELA) 250-50 mcg/dose diskus inhaler Inhale 1 puff by mouth every 12 hours. No current facility-administered medications for this visit. Allergies   Allergen Reactions    Esomeprazole Magnesium Hives     Other reaction(s): mild rash/itching    Nexium [Esomeprazole Magnesium] Unknown (comments)    Pcn [Penicillins] Unknown (comments)       Review of Systems:  Constitutional: Negative for fever or malaise  Cardiovascular: Negative for dizziness, chest pain or palpitations  Respiratory: Negative for cough, wheezing or SOB  Psychological: see HPI       Vitals:    03/23/21 0943   BP: 106/73   Pulse: 77   Resp: 16   Temp: 97 °F (36.1 °C)   TempSrc: Temporal   SpO2: 96%   Weight: 177 lb 12.8 oz (80.6 kg)   Height: 5' 8\" (1.727 m)       Physical Examination:  General: Well developed, well nourished, in no acute distress  Respiratory:  symmetrical, unlabored effort  Cardiovascular: regular rate and rhythm  Extremities: Full range of motion, antalgic gait  Psych: Active, alert and oriented. Affect appropriate       Diagnoses and all orders for this visit:    1. Type 2 diabetes mellitus with diabetic neuropathy, without long-term current use of insulin (HCC)  -     LIPID PANEL; Future  -     METABOLIC PANEL, COMPREHENSIVE; Future  -     CBC W/O DIFF; Future  -     TSH 3RD GENERATION; Future  -     HEMOGLOBIN A1C WITH EAG; Future  -     HEPATITIS C AB;  Future  -     MICROALBUMIN, UR, RAND W/ MICROALB/CREAT RATIO; Future  -     URINALYSIS W/ RFLX MICROSCOPIC; Future    2. Allergy desensitization therapy  -     NV IMMUNOTHERAPY, 2+ INJECTIONS  -     allergy injection; Set A-0.2 ml right arm-sc  -     allergy injection; Set B-0.2 ml left arm-sc      Plan of care: The patient knows to call back if they have questions about the plan of care or if symptoms change. The patient received an After-Visit Summary which contains VS, diagnoses, orders, allergy and medication lists. No future appointments.

## 2021-04-02 LAB
ALBUMIN SERPL-MCNC: 4 G/DL (ref 3.5–5)
ALBUMIN/GLOB SERPL: 1.2 {RATIO} (ref 1.1–2.2)
ALP SERPL-CCNC: 129 U/L (ref 45–117)
ALT SERPL-CCNC: 25 U/L (ref 12–78)
ANION GAP SERPL CALC-SCNC: 4 MMOL/L (ref 5–15)
APPEARANCE UR: CLEAR
AST SERPL-CCNC: 15 U/L (ref 15–37)
BILIRUB SERPL-MCNC: 0.5 MG/DL (ref 0.2–1)
BILIRUB UR QL: NEGATIVE
BUN SERPL-MCNC: 13 MG/DL (ref 6–20)
BUN/CREAT SERPL: 20 (ref 12–20)
CALCIUM SERPL-MCNC: 9.1 MG/DL (ref 8.5–10.1)
CHLORIDE SERPL-SCNC: 103 MMOL/L (ref 97–108)
CHOLEST SERPL-MCNC: 205 MG/DL
CO2 SERPL-SCNC: 30 MMOL/L (ref 21–32)
COLOR UR: NORMAL
CREAT SERPL-MCNC: 0.64 MG/DL (ref 0.55–1.02)
CREAT UR-MCNC: 190 MG/DL
ERYTHROCYTE [DISTWIDTH] IN BLOOD BY AUTOMATED COUNT: 14 % (ref 11.5–14.5)
EST. AVERAGE GLUCOSE BLD GHB EST-MCNC: 140 MG/DL
GLOBULIN SER CALC-MCNC: 3.4 G/DL (ref 2–4)
GLUCOSE SERPL-MCNC: 160 MG/DL (ref 65–100)
GLUCOSE UR STRIP.AUTO-MCNC: NEGATIVE MG/DL
HBA1C MFR BLD: 6.5 % (ref 4–5.6)
HCT VFR BLD AUTO: 47.3 % (ref 35–47)
HCV AB SERPL QL IA: NONREACTIVE
HCV COMMENT,HCGAC: NORMAL
HDLC SERPL-MCNC: 37 MG/DL
HDLC SERPL: 5.5 {RATIO} (ref 0–5)
HGB BLD-MCNC: 14.8 G/DL (ref 11.5–16)
HGB UR QL STRIP: NEGATIVE
KETONES UR QL STRIP.AUTO: NEGATIVE MG/DL
LDLC SERPL CALC-MCNC: 124.2 MG/DL (ref 0–100)
LEUKOCYTE ESTERASE UR QL STRIP.AUTO: NEGATIVE
LIPID PROFILE,FLP: ABNORMAL
MCH RBC QN AUTO: 28.1 PG (ref 26–34)
MCHC RBC AUTO-ENTMCNC: 31.3 G/DL (ref 30–36.5)
MCV RBC AUTO: 89.8 FL (ref 80–99)
MICROALBUMIN UR-MCNC: 1.4 MG/DL
MICROALBUMIN/CREAT UR-RTO: 7 MG/G (ref 0–30)
NITRITE UR QL STRIP.AUTO: NEGATIVE
NRBC # BLD: 0 K/UL (ref 0–0.01)
NRBC BLD-RTO: 0 PER 100 WBC
PH UR STRIP: 5 [PH] (ref 5–8)
PLATELET # BLD AUTO: 302 K/UL (ref 150–400)
PMV BLD AUTO: 10.5 FL (ref 8.9–12.9)
POTASSIUM SERPL-SCNC: 4.6 MMOL/L (ref 3.5–5.1)
PROT SERPL-MCNC: 7.4 G/DL (ref 6.4–8.2)
PROT UR STRIP-MCNC: NEGATIVE MG/DL
RBC # BLD AUTO: 5.27 M/UL (ref 3.8–5.2)
SODIUM SERPL-SCNC: 137 MMOL/L (ref 136–145)
SP GR UR REFRACTOMETRY: 1.03 (ref 1–1.03)
TRIGL SERPL-MCNC: 219 MG/DL (ref ?–150)
TSH SERPL DL<=0.05 MIU/L-ACNC: 1.55 UIU/ML (ref 0.36–3.74)
UROBILINOGEN UR QL STRIP.AUTO: 0.2 EU/DL (ref 0.2–1)
VLDLC SERPL CALC-MCNC: 43.8 MG/DL
WBC # BLD AUTO: 9.2 K/UL (ref 3.6–11)

## 2021-04-05 ENCOUNTER — OFFICE VISIT (OUTPATIENT)
Dept: FAMILY MEDICINE CLINIC | Age: 51
End: 2021-04-05
Payer: COMMERCIAL

## 2021-04-05 VITALS
BODY MASS INDEX: 26.98 KG/M2 | OXYGEN SATURATION: 97 % | RESPIRATION RATE: 18 BRPM | SYSTOLIC BLOOD PRESSURE: 106 MMHG | WEIGHT: 178 LBS | DIASTOLIC BLOOD PRESSURE: 72 MMHG | HEIGHT: 68 IN | HEART RATE: 71 BPM | TEMPERATURE: 97.2 F

## 2021-04-05 DIAGNOSIS — Z51.6 ALLERGY DESENSITIZATION THERAPY: Primary | ICD-10-CM

## 2021-04-05 PROCEDURE — 95117 IMMUNOTHERAPY INJECTIONS: CPT | Performed by: FAMILY MEDICINE

## 2021-04-05 NOTE — PROGRESS NOTES
1. Have you been to the ER, urgent care clinic since your last visit? Hospitalized since your last visit? no    2. Have you seen or consulted any other health care providers outside of the [address] [place] [address] since your last visit? Including any pap smears or colon screening. no    Reviewed record in preparation for visit and have necessary documentation    Pt did not bring medication to office visit for review    Opportunity was given for questions    Goals that were addressed and/or need to be completed during or after this appointment include   Health Maintenance Due   Topic Date Due    COVID-19 Vaccine (1) Never done    Eye Exam Retinal or Dilated  02/01/2020    Foot Exam Q1  10/07/2020     Body mass index is 27.06 kg/m².

## 2021-04-05 NOTE — PROGRESS NOTES
Did not evaluate patient directly. Patient tolerated injection well per nursing.     Girish Hull MD  04/05/21

## 2021-04-14 ENCOUNTER — OFFICE VISIT (OUTPATIENT)
Dept: FAMILY MEDICINE CLINIC | Age: 51
End: 2021-04-14
Payer: MEDICAID

## 2021-04-14 VITALS
DIASTOLIC BLOOD PRESSURE: 68 MMHG | OXYGEN SATURATION: 97 % | SYSTOLIC BLOOD PRESSURE: 106 MMHG | RESPIRATION RATE: 20 BRPM | TEMPERATURE: 98.7 F | BODY MASS INDEX: 27.13 KG/M2 | HEIGHT: 68 IN | HEART RATE: 73 BPM | WEIGHT: 179 LBS

## 2021-04-14 DIAGNOSIS — Z91.09 MULTIPLE ENVIRONMENTAL ALLERGIES: Primary | ICD-10-CM

## 2021-04-14 DIAGNOSIS — Z51.6 ALLERGY DESENSITIZATION THERAPY: ICD-10-CM

## 2021-04-14 PROCEDURE — 95117 IMMUNOTHERAPY INJECTIONS: CPT | Performed by: FAMILY MEDICINE

## 2021-04-14 PROCEDURE — 99213 OFFICE O/P EST LOW 20 MIN: CPT | Performed by: FAMILY MEDICINE

## 2021-04-14 NOTE — PROGRESS NOTES
1. Have you been to the ER, urgent care clinic since your last visit? Hospitalized since your last visit? No    2. Have you seen or consulted any other health care providers outside of the [address] [place] [address] since your last visit? Include any pap smears or colon screening. No    Reviewed record in preparation for visit and have necessary documentation  Goals that were addressed and/or need to be completed during or after this appointment include     Health Maintenance Due   Topic Date Due    COVID-19 Vaccine (1) Never done    Eye Exam Retinal or Dilated  02/01/2020    Foot Exam Q1  10/07/2020       Patient is accompanied by self I have received verbal consent from Snehal Beltran to discuss any/all medical information while they are present in the room.

## 2021-04-19 NOTE — PROGRESS NOTES
Chief Complaint   Patient presents with    Allergy Injection     she is a 53y.o. year old female who presents for allergy injection. Patient with multiple co-morbidities which include T2D, HTN, HLD, migraine, RLS, fibromyalgia, depression and chronic pain. She is due for lab work. Patient denies HA, dizziness, SOB, CP, abdominal pain, dysuria. She is followed by pain management. BP Readings from Last 3 Encounters:   04/14/21 106/68   04/05/21 106/72   03/23/21 106/73   . Wt Readings from Last 3 Encounters:   04/14/21 179 lb (81.2 kg)   04/05/21 178 lb (80.7 kg)   03/23/21 177 lb 12.8 oz (80.6 kg)     Body mass index is 27.22 kg/m². Lab Results   Component Value Date/Time    WBC 9.2 03/23/2021 10:05 AM    HGB 14.8 03/23/2021 10:05 AM    HCT 47.3 (H) 03/23/2021 10:05 AM    PLATELET 509 58/16/4560 10:05 AM    MCV 89.8 03/23/2021 10:05 AM     Lab Results   Component Value Date/Time    Cholesterol, total 205 (H) 03/23/2021 10:05 AM    HDL Cholesterol 37 03/23/2021 10:05 AM    LDL, calculated 124.2 (H) 03/23/2021 10:05 AM    Triglyceride 219 (H) 03/23/2021 10:05 AM    CHOL/HDL Ratio 5.5 (H) 03/23/2021 10:05 AM     Lab Results   Component Value Date/Time    TSH 1.55 03/23/2021 10:05 AM    T4, Free 1.21 08/18/2014 09:27 AM      Lab Results   Component Value Date/Time    Sodium 137 03/23/2021 10:05 AM    Potassium 4.6 03/23/2021 10:05 AM    Chloride 103 03/23/2021 10:05 AM    CO2 30 03/23/2021 10:05 AM    Anion gap 4 (L) 03/23/2021 10:05 AM    Glucose 160 (H) 03/23/2021 10:05 AM    BUN 13 03/23/2021 10:05 AM    Creatinine 0.64 03/23/2021 10:05 AM    BUN/Creatinine ratio 20 03/23/2021 10:05 AM    GFR est AA >60 03/23/2021 10:05 AM    GFR est non-AA >60 03/23/2021 10:05 AM    Calcium 9.1 03/23/2021 10:05 AM    Bilirubin, total 0.5 03/23/2021 10:05 AM    ALT (SGPT) 25 03/23/2021 10:05 AM    Alk.  phosphatase 129 (H) 03/23/2021 10:05 AM    Protein, total 7.4 03/23/2021 10:05 AM    Albumin 4.0 03/23/2021 10:05 AM Globulin 3.4 03/23/2021 10:05 AM    A-G Ratio 1.2 03/23/2021 10:05 AM      Lab Results   Component Value Date/Time    Hemoglobin A1c 6.5 (H) 03/23/2021 10:05 AM    Hemoglobin A1c (POC) 6.9 04/02/2019 04:27 PM          Patient Active Problem List   Diagnosis Code    Obesity E66.9    Diabetes mellitus ([facility] [address] E11.9    Hypertension I10    Fibromyalgia syndrome M79.7    Restless leg syndrome G25.81    Primary fibromyalgia syndrome M79.7    Encounter for long-term (current) use of high-risk medication Z79.899    Headache R51.9    Persistent disorder of initiating or maintaining sleep G47.00    Migraine headache G43.909    Musculoskeletal pain M79.18    Multiple environmental allergies Z91.09    History of headache Z87.898    Hyperlipidemia E78.5    Type 2 diabetes mellitus (HCC) E11.9    Recurrent depression (HCC) F33.9     Past Surgical History:   Procedure Laterality Date    HX GYN      Partial Hysterectomy    HX HEENT      HX TUBAL LIGATION       Social History     Socioeconomic History    Marital status:      Spouse name: Not on file    Number of children: 2    Years of education: Not on file    Highest education level: 12th grade   Occupational History    Not on file   Social Needs    Financial resource strain: Not hard at all   [place] insecurity     Worry: Never true     Inability: Never true   [employer] needs     Medical: No     Non-medical: No   Tobacco Use    Smoking status: Former Smoker    Smokeless tobacco: Never Used    Tobacco comment: quit in 2010   Substance and Sexual Activity    Alcohol use: No    Drug use: No    Sexual activity: Yes     Birth control/protection: Condom   Lifestyle    Physical activity     Days per week: Not on file     Minutes per session: Not on file    Stress: Not on file   Relationships    Social connections     Talks on phone: Not on file     Gets together: Not on file     Attends Mormon service: Not on file     Active member of club or organization: Not on file     Attends meetings of clubs or organizations: Not on file     Relationship status: Not on file    Intimate partner violence     Fear of current or ex partner: Not on file     Emotionally abused: Not on file     Physically abused: Not on file     Forced sexual activity: Not on file   Other Topics Concern    Not on file   Social History Narrative    Not on file     Family History   Problem Relation Age of Onset    Alcohol abuse Father     Diabetes Mother     Hypertension Mother     Heart Disease Mother     Lupus Mother     Sleep Apnea Mother     Depression Mother     Cancer Maternal Aunt     Arthritis-osteo Other     Asthma Other     Diabetes Other     Elevated Lipids Other     Headache Other     Heart Disease Other     Hypertension Other     Migraines Other     Stroke Other     Bleeding Prob Neg Hx     Lung Disease Neg Hx     Psychiatric Disorder Neg Hx     Mental Retardation Neg Hx      Current Outpatient Medications   Medication Sig    Trulicity 1.5 QI/7.9 mL sub-q pen Inject 1.5mg subcutaneously every 7 days.  rizatriptan (MAXALT) 10 mg tablet TAKE ONE TABLET BY MOUTH ONCE AS NEEDED MAY REPEAT IN 2 HOURS IF NEEDED    cyclobenzaprine (FLEXERIL) 10 mg tablet Take 1 Tab by mouth three (3) times daily as needed for Muscle Spasm(s).  fluocinoNIDE (LIDEX) 0.05 % topical cream APPLY TO THE AFFECTED AREA TOPICALLY TWO TIMES A DAY    ondansetron (ZOFRAN ODT) 8 mg disintegrating tablet Take 1 Tab by mouth every eight (8) hours as needed for Nausea or Vomiting.  fluticasone propionate (FLONASE) 50 mcg/actuation nasal spray Instill 1 to 2 sprays in each nostril daily.  montelukast (SINGULAIR) 10 mg tablet Take 1 tablet by mouth daily.  fenofibrate nanocrystallized (TRICOR) 145 mg tablet Take 1 tablet by mouth daily.  Dexilant 60 mg CpDB capsule (delayed release) Take 1 capsule by mouth daily.     fluticasone propion-salmeteroL (ADVAIR/WIXELA) 250-50 mcg/dose diskus inhaler Inhale 1 puff by mouth every 12 hours.  topiramate (TOPAMAX) 25 mg tablet Take 1 tablet by mouth nightly.  pravastatin (PRAVACHOL) 40 mg tablet Take 1 tablet by mouth nightly.  buPROPion XL (WELLBUTRIN XL) 150 mg tablet     estradioL (ESTRACE) 1 mg tablet TAKE ONE TABLET BY MOUTH EVERY DAY    Cetirizine (ZyrTEC) 10 mg cap Take 10 mg by mouth daily.  oxyCODONE-acetaminophen (PERCOCET 10)  mg per tablet     loratadine (CLARITIN) 10 mg tablet Take 10 mg by mouth.  fentaNYL (DURAGESIC) 50 mcg/hr PATCH     ketoconazole (NIZORAL) 2 % shampoo APPLY TO SCALP AND RINSE   DAILY AS NEEDED    AMITIZA 24 mcg capsule     naloxone 4 mg/actuation spry 4 mg by Nasal route as needed for up to 2 doses. Indications: OPIOID TOXICITY    albuterol (PROAIR HFA) 90 mcg/actuation inhaler Take 2 Puffs by inhalation every four (4) hours as needed for Wheezing. Take 2 Puffs inhaled by mouth Every 4 Hours. (Patient taking differently: Take 2 Puffs by inhalation every four (4) hours as needed for Wheezing.)    polyethylene glycol (MIRALAX) 17 gram/dose powder     allergy injection 0.4 ml of set A in left arm    allergy injection 0.4 ml of set B in right arm    allergy injection Set A-0.2 ml right arm-sc    allergy injection Set B-0.2 ml left arm-sc     No current facility-administered medications for this visit.       Allergies   Allergen Reactions    Esomeprazole Magnesium Hives     Other reaction(s): mild rash/itching    Nexium [Esomeprazole Magnesium] Unknown (comments)    Pcn [Penicillins] Unknown (comments)       Review of Systems:  Constitutional: Negative for fever or malaise  Cardiovascular: Negative for dizziness, chest pain or palpitations  Respiratory: Negative for cough, wheezing or SOB  Psychological: see HPI       Vitals:    04/14/21 1638   BP: 106/68   Pulse: 73   Resp: 20   Temp: 98.7 °F (37.1 °C)   TempSrc: Oral   SpO2: 97%   Weight: 179 lb (81.2 kg)   Height: 5' 8\" (1.727 m)       Physical Examination:  General: Well developed, well nourished, in no acute distress  Respiratory:  symmetrical, unlabored effort  Cardiovascular: regular rate and rhythm  Extremities: Full range of motion, antalgic gait  Psych: Active, alert and oriented. Affect appropriate       Diagnoses and all orders for this visit:    1. Multiple environmental allergies    2. Allergy desensitization therapy  -     CO IMMUNOTHERAPY, 2+ INJECTIONS  -     allergy injection; Set A - right arm 0.6ml SC  -     allergy injection; Set B 0.6ml left arm SC      Plan of care: The patient knows to call back if they have questions about the plan of care or if symptoms change. The patient received an After-Visit Summary which contains VS, diagnoses, orders, allergy and medication lists. No future appointments.

## 2021-04-21 ENCOUNTER — TELEPHONE (OUTPATIENT)
Dept: FAMILY MEDICINE CLINIC | Age: 51
End: 2021-04-21

## 2021-06-04 ENCOUNTER — OFFICE VISIT (OUTPATIENT)
Dept: FAMILY MEDICINE CLINIC | Age: 51
End: 2021-06-04
Payer: MEDICARE

## 2021-06-04 VITALS
HEART RATE: 72 BPM | TEMPERATURE: 98.5 F | DIASTOLIC BLOOD PRESSURE: 75 MMHG | SYSTOLIC BLOOD PRESSURE: 114 MMHG | RESPIRATION RATE: 20 BRPM | WEIGHT: 179.6 LBS | OXYGEN SATURATION: 95 % | HEIGHT: 68 IN | BODY MASS INDEX: 27.22 KG/M2

## 2021-06-04 DIAGNOSIS — Z51.6 ALLERGY DESENSITIZATION THERAPY: ICD-10-CM

## 2021-06-04 DIAGNOSIS — Z91.09 MULTIPLE ENVIRONMENTAL ALLERGIES: Primary | ICD-10-CM

## 2021-06-04 DIAGNOSIS — M79.7 FIBROMYALGIA SYNDROME: ICD-10-CM

## 2021-06-04 DIAGNOSIS — E11.40 TYPE 2 DIABETES MELLITUS WITH DIABETIC NEUROPATHY, WITHOUT LONG-TERM CURRENT USE OF INSULIN (HCC): ICD-10-CM

## 2021-06-04 DIAGNOSIS — E55.9 VITAMIN D DEFICIENCY: ICD-10-CM

## 2021-06-04 PROCEDURE — G8419 CALC BMI OUT NRM PARAM NOF/U: HCPCS | Performed by: FAMILY MEDICINE

## 2021-06-04 PROCEDURE — G8752 SYS BP LESS 140: HCPCS | Performed by: FAMILY MEDICINE

## 2021-06-04 PROCEDURE — 2022F DILAT RTA XM EVC RTNOPTHY: CPT | Performed by: FAMILY MEDICINE

## 2021-06-04 PROCEDURE — G9717 DOC PT DX DEP/BP F/U NT REQ: HCPCS | Performed by: FAMILY MEDICINE

## 2021-06-04 PROCEDURE — G8427 DOCREV CUR MEDS BY ELIG CLIN: HCPCS | Performed by: FAMILY MEDICINE

## 2021-06-04 PROCEDURE — G8754 DIAS BP LESS 90: HCPCS | Performed by: FAMILY MEDICINE

## 2021-06-04 PROCEDURE — 95117 IMMUNOTHERAPY INJECTIONS: CPT | Performed by: FAMILY MEDICINE

## 2021-06-04 PROCEDURE — 3044F HG A1C LEVEL LT 7.0%: CPT | Performed by: FAMILY MEDICINE

## 2021-06-04 PROCEDURE — 99214 OFFICE O/P EST MOD 30 MIN: CPT | Performed by: FAMILY MEDICINE

## 2021-06-04 RX ORDER — ONDANSETRON 8 MG/1
8 TABLET, ORALLY DISINTEGRATING ORAL
Qty: 30 TABLET | Refills: 2 | Status: SHIPPED | OUTPATIENT
Start: 2021-06-04

## 2021-06-04 RX ORDER — ERGOCALCIFEROL 1.25 MG/1
50000 CAPSULE ORAL
Qty: 4 CAPSULE | Refills: 2 | Status: SHIPPED | OUTPATIENT
Start: 2021-06-04 | End: 2021-07-27

## 2021-06-04 NOTE — PROGRESS NOTES
1. Have you been to the ER, urgent care clinic since your last visit? Hospitalized since your last visit? No    2. Have you seen or consulted any other health care providers outside of the [address] [place] [address] since your last visit? Include any pap smears or colon screening.  No  Reviewed record in preparation for visit and have necessary documentation    Goals that were addressed and/or need to be completed during or after this appointment include     Health Maintenance Due   Topic Date Due    COVID-19 Vaccine (1) Never done    Eye Exam Retinal or Dilated  02/01/2020    Foot Exam Q1  10/07/2020    Medicare Yearly Exam  06/12/2021

## 2021-06-06 NOTE — PROGRESS NOTES
Chief Complaint   Patient presents with    Allergy Injection     she is a 53y.o. year old female who presents for allergy injection. Patient with multiple co-morbidities which include T2D, HTN, HLD, migraine, RLS, fibromyalgia, depression and chronic pain. She asks for medication refills. Patient denies HA, dizziness, SOB, CP, abdominal pain, dysuria. She is followed by pain management. As [place] will make recreational marijuana legal on July 1, 2021. She wants to discuss if this would be an appropriate adjunct for her chronic pain. BP Readings from Last 3 Encounters:   06/04/21 114/75   04/14/21 106/68   04/05/21 106/72   . Wt Readings from Last 3 Encounters:   06/04/21 179 lb 9.6 oz (81.5 kg)   04/14/21 179 lb (81.2 kg)   04/05/21 178 lb (80.7 kg)     Body mass index is 27.31 kg/m².     Lab Results   Component Value Date/Time    WBC 9.2 03/23/2021 10:05 AM    HGB 14.8 03/23/2021 10:05 AM    HCT 47.3 (H) 03/23/2021 10:05 AM    PLATELET 731 12/46/6744 10:05 AM    MCV 89.8 03/23/2021 10:05 AM     Lab Results   Component Value Date/Time    Cholesterol, total 205 (H) 03/23/2021 10:05 AM    HDL Cholesterol 37 03/23/2021 10:05 AM    LDL, calculated 124.2 (H) 03/23/2021 10:05 AM    Triglyceride 219 (H) 03/23/2021 10:05 AM    CHOL/HDL Ratio 5.5 (H) 03/23/2021 10:05 AM     Lab Results   Component Value Date/Time    TSH 1.55 03/23/2021 10:05 AM    T4, Free 1.21 08/18/2014 09:27 AM      Lab Results   Component Value Date/Time    Sodium 137 03/23/2021 10:05 AM    Potassium 4.6 03/23/2021 10:05 AM    Chloride 103 03/23/2021 10:05 AM    CO2 30 03/23/2021 10:05 AM    Anion gap 4 (L) 03/23/2021 10:05 AM    Glucose 160 (H) 03/23/2021 10:05 AM    BUN 13 03/23/2021 10:05 AM    Creatinine 0.64 03/23/2021 10:05 AM    BUN/Creatinine ratio 20 03/23/2021 10:05 AM    GFR est AA >60 03/23/2021 10:05 AM    GFR est non-AA >60 03/23/2021 10:05 AM    Calcium 9.1 03/23/2021 10:05 AM    Bilirubin, total 0.5 03/23/2021 10:05 AM    ALT (SGPT) 25 03/23/2021 10:05 AM    Alk.  phosphatase 129 (H) 03/23/2021 10:05 AM    Protein, total 7.4 03/23/2021 10:05 AM    Albumin 4.0 03/23/2021 10:05 AM    Globulin 3.4 03/23/2021 10:05 AM    A-G Ratio 1.2 03/23/2021 10:05 AM      Lab Results   Component Value Date/Time    Hemoglobin A1c 6.5 (H) 03/23/2021 10:05 AM    Hemoglobin A1c (POC) 6.9 04/02/2019 04:27 PM          Patient Active Problem List   Diagnosis Code    Obesity E66.9    Diabetes mellitus ([facility] [address] E11.9    Hypertension I10    Fibromyalgia syndrome M79.7    Restless leg syndrome G25.81    Primary fibromyalgia syndrome M79.7    Encounter for long-term (current) use of high-risk medication Z79.899    Headache R51.9    Persistent disorder of initiating or maintaining sleep G47.00    Migraine headache G43.909    Musculoskeletal pain M79.18    Multiple environmental allergies Z91.09    History of headache Z87.898    Hyperlipidemia E78.5    Type 2 diabetes mellitus (HCC) E11.9    Recurrent depression (HCC) F33.9     Past Surgical History:   Procedure Laterality Date    HX GYN      Partial Hysterectomy    HX HEENT      HX TUBAL LIGATION       Social History     Socioeconomic History    Marital status:      Spouse name: Not on file    Number of children: 2    Years of education: Not on file    Highest education level: 12th grade   Occupational History    Not on file   Tobacco Use    Smoking status: Former Smoker    Smokeless tobacco: Never Used    Tobacco comment: quit in 2010   Substance and Sexual Activity    Alcohol use: No    Drug use: No    Sexual activity: Yes     Birth control/protection: Condom   Other Topics Concern    Not on file   Social History Narrative    Not on file     Social Determinants of Health     Financial Resource Strain: Low Risk     Difficulty of Paying Living Expenses: Not hard at all   Food Insecurity: No Food Insecurity    Worried About Running Out of Food in the Last Year: Never true    Ran Out of Food in the Last Year: Never true   Transportation Needs: No Transportation Needs    Lack of Transportation (Medical): No    Lack of Transportation (Non-Medical): No   Physical Activity:     Days of Exercise per Week:     Minutes of Exercise per Session:    Stress:     Feeling of Stress :    Social Connections:     Frequency of Communication with Friends and Family:     Frequency of Social Gatherings with Friends and Family:     Attends Ep[employer] Services:     Active Member of Clubs or Organizations:     Attends Club or Organization Meetings:     Marital Status:    Intimate Partner Violence:     Fear of Current or Ex-Partner:     Emotionally Abused:     Physically Abused:     Sexually Abused:      Family History   Problem Relation Age of Onset    Alcohol abuse Father     Diabetes Mother     Hypertension Mother     Heart Disease Mother     Lupus Mother     Sleep Apnea Mother     Depression Mother     Cancer Maternal Aunt     Arthritis-osteo Other     Asthma Other     Diabetes Other     Elevated Lipids Other     Headache Other     Heart Disease Other     Hypertension Other     Migraines Other     Stroke Other     Bleeding Prob Neg Hx     Lung Disease Neg Hx     Psychiatric Disorder Neg Hx     Mental Retardation Neg Hx      Current Outpatient Medications   Medication Sig    ondansetron (ZOFRAN ODT) 8 mg disintegrating tablet Take 1 Tablet by mouth every eight (8) hours as needed for Nausea or Vomiting.  ergocalciferol (ERGOCALCIFEROL) 1,250 mcg (50,000 unit) capsule Take 1 Capsule by mouth every seven (7) days.  fluticasone propionate (FLONASE) 50 mcg/actuation nasal spray Instill 1 to 2 sprays in each nostril daily.  montelukast (SINGULAIR) 10 mg tablet Take 1 tablet by mouth daily.  Trulicity 1.5 YB/4.3 mL sub-q pen Inject 1.5mg subcutaneously every 7 days.  Dexilant 60 mg CpDB capsule (delayed release) Take 1 capsule by mouth daily.     fenofibrate nanocrystallized (TRICOR) 145 mg tablet Take 1 tablet by mouth daily.  pravastatin (PRAVACHOL) 40 mg tablet Take 1 tablet by mouth nightly.  topiramate (TOPAMAX) 25 mg tablet Take 1 tablet by mouth nightly.  rizatriptan (MAXALT) 10 mg tablet TAKE ONE TABLET BY MOUTH ONCE AS NEEDED MAY REPEAT IN 2 HOURS IF NEEDED    cyclobenzaprine (FLEXERIL) 10 mg tablet Take 1 Tab by mouth three (3) times daily as needed for Muscle Spasm(s).  fluocinoNIDE (LIDEX) 0.05 % topical cream APPLY TO THE AFFECTED AREA TOPICALLY TWO TIMES A DAY    buPROPion XL (WELLBUTRIN XL) 150 mg tablet     estradioL (ESTRACE) 1 mg tablet TAKE ONE TABLET BY MOUTH EVERY DAY    Cetirizine (ZyrTEC) 10 mg cap Take 10 mg by mouth daily.  oxyCODONE-acetaminophen (PERCOCET 10)  mg per tablet     loratadine (CLARITIN) 10 mg tablet Take 10 mg by mouth.  fentaNYL (DURAGESIC) 50 mcg/hr PATCH     ketoconazole (NIZORAL) 2 % shampoo APPLY TO SCALP AND RINSE   DAILY AS NEEDED    AMITIZA 24 mcg capsule     naloxone 4 mg/actuation spry 4 mg by Nasal route as needed for up to 2 doses. Indications: OPIOID TOXICITY    albuterol (PROAIR HFA) 90 mcg/actuation inhaler Take 2 Puffs by inhalation every four (4) hours as needed for Wheezing. Take 2 Puffs inhaled by mouth Every 4 Hours. (Patient taking differently: Take 2 Puffs by inhalation every four (4) hours as needed for Wheezing.)    polyethylene glycol (MIRALAX) 17 gram/dose powder     fluticasone propion-salmeteroL (ADVAIR/WIXELA) 250-50 mcg/dose diskus inhaler Inhale 1 puff by mouth every 12 hours. (Patient not taking: Reported on 6/4/2021)    allergy injection 0.4 ml of set A in left arm    allergy injection 0.4 ml of set B in right arm    allergy injection Set A-0.2 ml right arm-sc    allergy injection Set B-0.2 ml left arm-sc     No current facility-administered medications for this visit.      Allergies   Allergen Reactions    Esomeprazole Magnesium Hives     Other reaction(s): mild rash/itching    Nexium [Esomeprazole Magnesium] Unknown (comments)    Pcn [Penicillins] Unknown (comments)       Review of Systems:  Constitutional: Negative for fever or malaise  Cardiovascular: Negative for dizziness, chest pain or palpitations  Respiratory: Negative for cough, wheezing or SOB  Psychological: see HPI       Vitals:    06/04/21 1011   BP: 114/75   Pulse: 72   Resp: 20   Temp: 98.5 °F (36.9 °C)   TempSrc: Oral   SpO2: 95%   Weight: 179 lb 9.6 oz (81.5 kg)   Height: 5' 8\" (1.727 m)       Physical Examination:  General: Well developed, well nourished, in no acute distress  Respiratory:  symmetrical, unlabored effort  Cardiovascular: regular rate and rhythm  Extremities: Full range of motion, antalgic gait  Psych: Active, alert and oriented. Affect appropriate       Diagnoses and all orders for this visit:    1. Multiple environmental allergies    2. Allergy desensitization therapy  -     IMMUNOTHERAPY, 2+ INJECTIONS  -     allergy injection; Mix B Left arm 1.0ml  -     allergy injection; Mix A Right arm 1.0 ml    3. Fibromyalgia syndrome  -     ondansetron (ZOFRAN ODT) 8 mg disintegrating tablet; Take 1 Tablet by mouth every eight (8) hours as needed for Nausea or Vomiting. 4. Vitamin D deficiency  -     ergocalciferol (ERGOCALCIFEROL) 1,250 mcg (50,000 unit) capsule; Take 1 Capsule by mouth every seven (7) days. 5. Type 2 diabetes mellitus with diabetic neuropathy, without long-term current use of insulin ([facility]  -      DIABETES FOOT EXAM      Plan of care:  Diagnoses were discussed in detail with patient. Medication risks/benefits/side effects discussed with patient. All of the patient's questions were addressed and answered to apparent satisfaction. The patient understands and agrees with our plan of care. The patient knows to call back if they have questions about the plan of care or if symptoms change.   The patient received an After-Visit Summary which contains VS, diagnoses, orders, allergy and medication lists.       Future Appointments   Date Time Provider [facility]   6/25/2021  8:40 AM Rosy Morales MD [facility] AMB            Future Appointments   Date Time Provider [facility]   6/25/2021  8:40 AM Rosy Morales MD [facility] AMB

## 2021-09-30 ENCOUNTER — HOSPITAL ENCOUNTER (EMERGENCY)
Age: 51
Discharge: HOME | End: 2021-09-30
Attending: EMERGENCY MEDICINE
Payer: MEDICARE

## 2021-09-30 VITALS
WEIGHT: 175 LBS | OXYGEN SATURATION: 99 % | BODY MASS INDEX: 26.61 KG/M2 | TEMPERATURE: 98.6 F | RESPIRATION RATE: 16 BRPM | SYSTOLIC BLOOD PRESSURE: 163 MMHG | DIASTOLIC BLOOD PRESSURE: 97 MMHG | HEART RATE: 78 BPM

## 2021-09-30 DIAGNOSIS — J02.9 PHARYNGITIS, UNSPECIFIED ETIOLOGY: Primary | ICD-10-CM

## 2021-09-30 LAB — DEPRECATED S PYO AG THROAT QL EIA: NEGATIVE

## 2021-09-30 PROCEDURE — 74011250637 HC RX REV CODE- 250/637: Performed by: EMERGENCY MEDICINE

## 2021-09-30 PROCEDURE — 87880 STREP A ASSAY W/OPTIC: CPT

## 2021-09-30 PROCEDURE — 99283 EMERGENCY DEPT VISIT LOW MDM: CPT

## 2021-09-30 PROCEDURE — 87070 CULTURE OTHR SPECIMN AEROBIC: CPT

## 2021-09-30 RX ORDER — CEPHALEXIN 250 MG/1
500 CAPSULE ORAL
Status: COMPLETED | OUTPATIENT
Start: 2021-09-30 | End: 2021-09-30

## 2021-09-30 RX ORDER — CEPHALEXIN 500 MG/1
500 CAPSULE ORAL 4 TIMES DAILY
Qty: 40 CAPSULE | Refills: 0 | Status: SHIPPED | OUTPATIENT
Start: 2021-09-30 | End: 2021-10-10

## 2021-09-30 RX ADMIN — CEPHALEXIN 500 MG: 250 CAPSULE ORAL at 02:09

## 2021-09-30 NOTE — ED TRIAGE NOTES
Pt states that she has been having sore throat for over a day now. She states that its not really too painful but when she lays back its hard to swallow and very uncomfortable trying to breathe past them.

## 2021-09-30 NOTE — DISCHARGE INSTRUCTIONS

## 2021-09-30 NOTE — ED PROVIDER NOTES
Patient presents with complaint  Of a sore throat and dysphagia for 1 day. No fever or chills. Also with swollen neck glands.             Past Medical History:   Diagnosis Date    Depression     Diabetes ([address]     Dysthymic disorder     Fibromyalgia     Fibromyalgia syndrome 8/8/2012    Headache(784.0) 9/8/2012    Hypercholesterolemia     Left ear pain     Migraine     Mixed hyperlipidemia     Musculoskeletal pain 9/16/2014       Past Surgical History:   Procedure Laterality Date    HX GYN      Partial Hysterectomy    HX HEENT      HX TUBAL LIGATION           Family History:   Problem Relation Age of Onset    Alcohol abuse Father     Diabetes Mother     Hypertension Mother     Heart Disease Mother     Lupus Mother     Sleep Apnea Mother     Depression Mother     Cancer Maternal Aunt     Arthritis-osteo Other     Asthma Other     Diabetes Other     Elevated Lipids Other     Headache Other     Heart Disease Other     Hypertension Other     Migraines Other     Stroke Other     Bleeding Prob Neg Hx     Lung Disease Neg Hx     Psychiatric Disorder Neg Hx     Mental Retardation Neg Hx        Social History     Socioeconomic History    Marital status:      Spouse name: Not on file    Number of children: 2    Years of education: Not on file    Highest education level: 12th grade   Occupational History    Not on file   Tobacco Use    Smoking status: Former Smoker    Smokeless tobacco: Never Used    Tobacco comment: quit in 2010   Substance and Sexual Activity    Alcohol use: No    Drug use: No    Sexual activity: Yes     Birth control/protection: Condom   Other Topics Concern    Not on file   Social History Narrative    Not on file     Social Determinants of Health     Financial Resource Strain: Low Risk     Difficulty of Paying Living Expenses: Not hard at all   Food Insecurity: No Food Insecurity    Worried About Running Out of Food in the Last Year: Never true   Timoteo Roque Ran Out of Food in the Last Year: Never true   Transportation Needs: No Transportation Needs    Lack of Transportation (Medical): No    Lack of Transportation (Non-Medical): No   Physical Activity:     Days of Exercise per Week:     Minutes of Exercise per Session:    Stress:     Feeling of Stress :    Social Connections:     Frequency of Communication with Friends and Family:     Frequency of Social Gatherings with Friends and Family:     Attends Buddhist Services:     Active Member of Clubs or Organizations:     Attends Club or Organization Meetings:     Marital Status:    Intimate Partner Violence:     Fear of Current or Ex-Partner:     Emotionally Abused:     Physically Abused:     Sexually Abused: ALLERGIES: Esomeprazole magnesium, Nexium [esomeprazole magnesium], and Pcn [penicillins]    Review of Systems   Constitutional: Negative. HENT: Positive for sore throat. Eyes: Negative. Respiratory: Negative. Cardiovascular: Negative. Gastrointestinal: Negative. Endocrine: Negative. Genitourinary: Negative. Musculoskeletal: Positive for neck pain. Skin: Negative. Allergic/Immunologic: Negative. Neurological: Negative. Hematological: Negative. Psychiatric/Behavioral: Negative. All other systems reviewed and are negative. Vitals:    09/30/21 0137   BP: (!) 163/97   Pulse: 78   Resp: 16   Temp: 98.6 °F (37 °C)   SpO2: 99%   Weight: 79.4 kg (175 lb)            Physical Exam  Vitals and nursing note reviewed. Constitutional:       Appearance: She is well-developed. HENT:      Head: Normocephalic and atraumatic. Mouth/Throat:      Pharynx: Posterior oropharyngeal erythema present. Comments: + post nasal drip. Neck:      Comments: Moderate anterior cervical adenopathy. Cardiovascular:      Rate and Rhythm: Normal rate and regular rhythm. Heart sounds: Normal heart sounds. Pulmonary:      Breath sounds: Normal breath sounds. Abdominal:      General: Bowel sounds are normal.      Palpations: Abdomen is soft. Musculoskeletal:         General: Normal range of motion. Cervical back: Normal range of motion and neck supple. Neurological:      General: No focal deficit present. Mental Status: She is alert.    Psychiatric:         Mood and Affect: Mood normal.         Behavior: Behavior normal.          MDM       Procedures

## 2021-10-01 ENCOUNTER — HOSPITAL ENCOUNTER (EMERGENCY)
Age: 51
Discharge: HOME | End: 2021-10-01
Attending: EMERGENCY MEDICINE
Payer: MEDICARE

## 2021-10-01 ENCOUNTER — APPOINTMENT (OUTPATIENT)
Dept: CT IMAGING | Age: 51
End: 2021-10-01
Attending: EMERGENCY MEDICINE
Payer: MEDICARE

## 2021-10-01 VITALS
HEART RATE: 107 BPM | WEIGHT: 170 LBS | OXYGEN SATURATION: 95 % | DIASTOLIC BLOOD PRESSURE: 121 MMHG | SYSTOLIC BLOOD PRESSURE: 171 MMHG | BODY MASS INDEX: 25.76 KG/M2 | RESPIRATION RATE: 18 BRPM | HEIGHT: 68 IN | TEMPERATURE: 98.5 F

## 2021-10-01 DIAGNOSIS — J02.9 ACUTE PHARYNGITIS, UNSPECIFIED ETIOLOGY: Primary | ICD-10-CM

## 2021-10-01 LAB
ALBUMIN SERPL-MCNC: 3.7 G/DL (ref 3.5–5)
ALBUMIN/GLOB SERPL: 0.9 {RATIO} (ref 1.1–2.2)
ALP SERPL-CCNC: 140 U/L (ref 45–117)
ALT SERPL-CCNC: 33 U/L (ref 12–78)
ANION GAP SERPL CALC-SCNC: 8 MMOL/L (ref 5–15)
AST SERPL W P-5'-P-CCNC: 16 U/L (ref 15–37)
BACTERIA SPEC CULT: NORMAL
BASOPHILS # BLD: 0.1 K/UL (ref 0–0.2)
BASOPHILS NFR BLD: 1 % (ref 0–2.5)
BILIRUB SERPL-MCNC: 0.6 MG/DL (ref 0.2–1)
BUN SERPL-MCNC: 6 MG/DL (ref 6–20)
BUN/CREAT SERPL: 7 (ref 12–20)
CA-I BLD-MCNC: 9.2 MG/DL (ref 8.5–10.1)
CHLORIDE SERPL-SCNC: 105 MMOL/L (ref 97–108)
CO2 SERPL-SCNC: 32 MMOL/L (ref 21–32)
CREAT SERPL-MCNC: 0.88 MG/DL (ref 0.55–1.02)
EOSINOPHIL # BLD: 0.1 K/UL (ref 0–0.7)
EOSINOPHIL NFR BLD: 1 % (ref 0.9–2.9)
ERYTHROCYTE [DISTWIDTH] IN BLOOD BY AUTOMATED COUNT: 15.1 % (ref 11.5–14.5)
GLOBULIN SER CALC-MCNC: 3.9 G/DL (ref 2–4)
GLUCOSE SERPL-MCNC: 224 MG/DL (ref 65–100)
HCT VFR BLD AUTO: 46.4 % (ref 36–46)
HGB BLD-MCNC: 15.8 G/DL (ref 13.5–17.5)
LYMPHOCYTES # BLD: 2.1 K/UL (ref 1–4.8)
LYMPHOCYTES NFR BLD: 20 % (ref 20.5–51.1)
MCH RBC QN AUTO: 29.8 PG (ref 31–34)
MCHC RBC AUTO-ENTMCNC: 34 G/DL (ref 31–36)
MCV RBC AUTO: 87.5 FL (ref 80–100)
MONOCYTES # BLD: 0.4 K/UL (ref 0.2–2.4)
MONOCYTES NFR BLD: 4 % (ref 1.7–9.3)
NEUTS SEG # BLD: 8 K/UL (ref 1.8–7.7)
NEUTS SEG NFR BLD: 74 % (ref 42–75)
NRBC # BLD: 0.03 K/UL
NRBC BLD-RTO: 0.3 PER 100 WBC
PLATELET # BLD AUTO: 317 K/UL (ref 150–400)
PMV BLD AUTO: 7.9 FL (ref 6.5–11.5)
POTASSIUM SERPL-SCNC: 4.3 MMOL/L (ref 3.5–5.1)
PROT SERPL-MCNC: 7.6 G/DL (ref 6.4–8.2)
RBC # BLD AUTO: 5.31 M/UL (ref 4.5–5.9)
SODIUM SERPL-SCNC: 145 MMOL/L (ref 136–145)
SPECIAL REQUESTS,SREQ: NORMAL
WBC # BLD AUTO: 10.7 K/UL (ref 4.4–11.3)

## 2021-10-01 PROCEDURE — 80053 COMPREHEN METABOLIC PANEL: CPT

## 2021-10-01 PROCEDURE — 74011250636 HC RX REV CODE- 250/636: Performed by: EMERGENCY MEDICINE

## 2021-10-01 PROCEDURE — 85025 COMPLETE CBC W/AUTO DIFF WBC: CPT

## 2021-10-01 PROCEDURE — 74011000636 HC RX REV CODE- 636: Performed by: EMERGENCY MEDICINE

## 2021-10-01 PROCEDURE — 96374 THER/PROPH/DIAG INJ IV PUSH: CPT

## 2021-10-01 PROCEDURE — 70450 CT HEAD/BRAIN W/O DYE: CPT

## 2021-10-01 PROCEDURE — 70491 CT SOFT TISSUE NECK W/DYE: CPT

## 2021-10-01 PROCEDURE — 96375 TX/PRO/DX INJ NEW DRUG ADDON: CPT

## 2021-10-01 PROCEDURE — 36415 COLL VENOUS BLD VENIPUNCTURE: CPT

## 2021-10-01 PROCEDURE — 99284 EMERGENCY DEPT VISIT MOD MDM: CPT

## 2021-10-01 RX ORDER — KETOROLAC TROMETHAMINE 30 MG/ML
30 INJECTION, SOLUTION INTRAMUSCULAR; INTRAVENOUS ONCE
Status: COMPLETED | OUTPATIENT
Start: 2021-10-01 | End: 2021-10-01

## 2021-10-01 RX ORDER — ONDANSETRON 2 MG/ML
4 INJECTION INTRAMUSCULAR; INTRAVENOUS
Status: COMPLETED | OUTPATIENT
Start: 2021-10-01 | End: 2021-10-01

## 2021-10-01 RX ORDER — CLINDAMYCIN HYDROCHLORIDE 300 MG/1
300 CAPSULE ORAL 4 TIMES DAILY
Qty: 40 CAPSULE | Refills: 0 | Status: SHIPPED | OUTPATIENT
Start: 2021-10-01 | End: 2021-10-11

## 2021-10-01 RX ADMIN — ONDANSETRON 4 MG: 2 INJECTION INTRAMUSCULAR; INTRAVENOUS at 17:38

## 2021-10-01 RX ADMIN — METHYLPREDNISOLONE SODIUM SUCCINATE 125 MG: 125 INJECTION, POWDER, FOR SOLUTION INTRAMUSCULAR; INTRAVENOUS at 17:38

## 2021-10-01 RX ADMIN — SODIUM CHLORIDE 1000 ML: 9 INJECTION, SOLUTION INTRAVENOUS at 17:37

## 2021-10-01 RX ADMIN — IOPAMIDOL 100 ML: 755 INJECTION, SOLUTION INTRAVENOUS at 19:20

## 2021-10-01 RX ADMIN — KETOROLAC TROMETHAMINE 30 MG: 30 INJECTION, SOLUTION INTRAMUSCULAR at 17:38

## 2021-10-01 NOTE — ED PROVIDER NOTES
EMERGENCY DEPARTMENT HISTORY AND PHYSICAL EXAM      Date: 10/1/2021  Patient Name: Mary Anne Christianson    History of Presenting Illness     Chief Complaint   Patient presents with    Headache     pt presents with complaint trouble swallowing seen thursday for sore throat and same trouble swallowing, pt states that she felt like she was having an allergic reaction to antibiotics. Pt A&Ox4, airway intacat, 96% on room airway, pt speaking in complete sentences w/o difficulty. Pt states she had a throat swab done and told she was not positive for strep. History Provided By: Patient    HPI: Mary Anne Christianson, 49 y.o. female with a past medical history significant diabetes and hyperlipidemia presents to the ED with cc of sore throat for 2 days patient states she was seen 2 days ago and our ED was negative strep screen was started on Keflex continues to have soreness in her throat and pain with swallowing; patient believes the headache is associated with the Keflex because Keflex is related to the penicillin group of meds and she has an allergy to penicillin which presents as severe headaches    There are no other complaints, changes, or physical findings at this time. PCP: Dottie Cramer MD    No current facility-administered medications on file prior to encounter. Current Outpatient Medications on File Prior to Encounter   Medication Sig Dispense Refill    cephALEXin (Keflex) 500 mg capsule Take 1 Capsule by mouth four (4) times daily for 10 days. 40 Capsule 0    ergocalciferol (ERGOCALCIFEROL) 1,250 mcg (50,000 unit) capsule Take 1 capsule by mouth every 7 days. 4 Capsule 2    fluticasone propionate (FLONASE) 50 mcg/actuation nasal spray Instill 1 to 2 sprays in each nostril daily. 1 Bottle 1    montelukast (SINGULAIR) 10 mg tablet Take 1 tablet by mouth daily. 90 Tablet 0    Dexilant 60 mg CpDB capsule (delayed release) Take 1 capsule by mouth daily.  90 Capsule 0    topiramate (TOPAMAX) 25 mg tablet Take 1 tablet by mouth nightly. 90 Tablet 0    fenofibrate nanocrystallized (TRICOR) 145 mg tablet Take 1 tablet by mouth daily. 90 Tablet 0    pravastatin (PRAVACHOL) 40 mg tablet Take 1 tablet by mouth nightly. 90 Tablet 0    fluticasone propion-salmeteroL (ADVAIR/WIXELA) 250-50 mcg/dose diskus inhaler Inhale 1 puff by mouth every 12 hours. 3 Each 0    ondansetron (ZOFRAN ODT) 8 mg disintegrating tablet Take 1 Tablet by mouth every eight (8) hours as needed for Nausea or Vomiting. 30 Tablet 2    Trulicity 1.5 UX/8.8 mL sub-q pen Inject 1.5mg subcutaneously every 7 days. 4 mL 2    allergy injection 0.4 ml of set A in left arm 0.4 mL 0    allergy injection 0.4 ml of set B in right arm 0.4 mL 0    allergy injection Set A-0.2 ml right arm-sc 0.2 mL 0    allergy injection Set B-0.2 ml left arm-sc 0.2 mL 0    rizatriptan (MAXALT) 10 mg tablet TAKE ONE TABLET BY MOUTH ONCE AS NEEDED MAY REPEAT IN 2 HOURS IF NEEDED 60 Tab 0    cyclobenzaprine (FLEXERIL) 10 mg tablet Take 1 Tab by mouth three (3) times daily as needed for Muscle Spasm(s). 90 Tab 0    fluocinoNIDE (LIDEX) 0.05 % topical cream APPLY TO THE AFFECTED AREA TOPICALLY TWO TIMES A DAY 15 g 2    buPROPion XL (WELLBUTRIN XL) 150 mg tablet       estradioL (ESTRACE) 1 mg tablet TAKE ONE TABLET BY MOUTH EVERY DAY 30 Tab 3    Cetirizine (ZyrTEC) 10 mg cap Take 10 mg by mouth daily. 12 Cap 0    oxyCODONE-acetaminophen (PERCOCET 10)  mg per tablet   0    loratadine (CLARITIN) 10 mg tablet Take 10 mg by mouth.  ketoconazole (NIZORAL) 2 % shampoo APPLY TO SCALP AND RINSE   DAILY AS NEEDED 120 mL 2    AMITIZA 24 mcg capsule       naloxone 4 mg/actuation spry 4 mg by Nasal route as needed for up to 2 doses. Indications: OPIOID TOXICITY 1 Box 1    albuterol (PROAIR HFA) 90 mcg/actuation inhaler Take 2 Puffs by inhalation every four (4) hours as needed for Wheezing. Take 2 Puffs inhaled by mouth Every 4 Hours.  (Patient taking differently: Take 2 Puffs by inhalation every four (4) hours as needed for Wheezing.) 1 Inhaler 2    polyethylene glycol (MIRALAX) 17 gram/dose powder          Past History     Past Medical History:  Past Medical History:   Diagnosis Date    Depression     Diabetes ([address]     Dysthymic disorder     Fibromyalgia     Fibromyalgia syndrome 8/8/2012    Headache(784.0) 9/8/2012    Hypercholesterolemia     Left ear pain     Migraine     Mixed hyperlipidemia     Musculoskeletal pain 9/16/2014       Past Surgical History:  Past Surgical History:   Procedure Laterality Date    HX GYN      Partial Hysterectomy    HX HEENT      HX TUBAL LIGATION         Family History:  Family History   Problem Relation Age of Onset    Alcohol abuse Father     Diabetes Mother     Hypertension Mother     Heart Disease Mother     Lupus Mother     Sleep Apnea Mother     Depression Mother     Cancer Maternal Aunt     Arthritis-osteo Other     Asthma Other     Diabetes Other     Elevated Lipids Other     Headache Other     Heart Disease Other     Hypertension Other     Migraines Other     Stroke Other     Bleeding Prob Neg Hx     Lung Disease Neg Hx     Psychiatric Disorder Neg Hx     Mental Retardation Neg Hx        Social History:  Social History     Tobacco Use    Smoking status: Former Smoker    Smokeless tobacco: Never Used    Tobacco comment: quit in 2010   Substance Use Topics    Alcohol use: No    Drug use: No       Allergies: Allergies   Allergen Reactions    Esomeprazole Magnesium Hives     Other reaction(s): mild rash/itching    Nexium [Esomeprazole Magnesium] Unknown (comments)    Pcn [Penicillins] Unknown (comments)         Review of Systems     Review of Systems   Constitutional: Negative for chills and fever. HENT: Positive for sore throat and trouble swallowing. Negative for congestion. Eyes: Negative for pain and visual disturbance. Respiratory: Negative for cough, choking and shortness of breath. Cardiovascular: Negative for chest pain and leg swelling. Gastrointestinal: Negative for abdominal pain and vomiting. Endocrine: Negative for polydipsia and polyuria. Genitourinary: Negative for dysuria and urgency. Musculoskeletal: Negative for back pain and neck pain. Skin: Negative for color change and pallor. Neurological: Negative for weakness and numbness. Psychiatric/Behavioral: Negative. Physical Exam     Physical Exam  Vitals and nursing note reviewed. Constitutional:       General: She is not in acute distress. Appearance: Normal appearance. She is ill-appearing. She is not toxic-appearing or diaphoretic. HENT:      Head: Normocephalic and atraumatic. Nose: Nose normal.      Mouth/Throat:      Mouth: Mucous membranes are moist.      Dentition: No gingival swelling. Palate: No lesions. Pharynx: Uvula midline. Pharyngeal swelling and posterior oropharyngeal erythema present. No oropharyngeal exudate or uvula swelling. Tonsils: No tonsillar exudate or tonsillar abscesses. Comments: Right tonsillar swelling no exudate  Eyes:      Extraocular Movements: Extraocular movements intact. Conjunctiva/sclera: Conjunctivae normal.      Pupils: Pupils are equal, round, and reactive to light. Cardiovascular:      Rate and Rhythm: Normal rate and regular rhythm. Pulses: Normal pulses. Heart sounds: Normal heart sounds. Pulmonary:      Effort: Pulmonary effort is normal.      Breath sounds: Normal breath sounds. Abdominal:      General: Bowel sounds are normal.      Palpations: Abdomen is soft. Musculoskeletal:         General: Normal range of motion. Cervical back: Normal range of motion. Tenderness present. Skin:     General: Skin is warm and dry. Capillary Refill: Capillary refill takes less than 2 seconds. Neurological:      General: No focal deficit present.       Mental Status: She is alert and oriented to person, place, and time.   Psychiatric:         Mood and Affect: Mood normal.         Behavior: Behavior normal.         Lab and Diagnostic Study Results     Labs -     Recent Results (from the past 12 hour(s))   METABOLIC PANEL, COMPREHENSIVE    Collection Time: 10/01/21  5:30 PM   Result Value Ref Range    Sodium 145 136 - 145 mmol/L    Potassium 4.3 3.5 - 5.1 mmol/L    Chloride 105 97 - 108 mmol/L    CO2 32 21 - 32 mmol/L    Anion gap 8 5 - 15 mmol/L    Glucose 224 (H) 65 - 100 mg/dL    BUN 6 6 - 20 mg/dL    Creatinine 0.88 0.55 - 1.02 mg/dL    BUN/Creatinine ratio 7 (L) 12 - 20      GFR est AA >60 >60 ml/min/1.73m2    GFR est non-AA >60 >60 ml/min/1.73m2    Calcium 9.2 8.5 - 10.1 mg/dL    Bilirubin, total 0.6 0.2 - 1.0 mg/dL    AST (SGOT) 16 15 - 37 U/L    ALT (SGPT) 33 12 - 78 U/L    Alk. phosphatase 140 (H) 45 - 117 U/L    Protein, total 7.6 6.4 - 8.2 g/dL    Albumin 3.7 3.5 - 5.0 g/dL    Globulin 3.9 2.0 - 4.0 g/dL    A-G Ratio 0.9 (L) 1.1 - 2.2     CBC WITH AUTOMATED DIFF    Collection Time: 10/01/21  5:30 PM   Result Value Ref Range    WBC 10.7 4.4 - 11.3 K/uL    RBC 5.31 4.50 - 5.90 M/uL    HGB 15.8 13.5 - 17.5 g/dL    HCT 46.4 (H) 36 - 46 %    MCV 87.5 80 - 100 FL    MCH 29.8 (L) 31 - 34 PG    MCHC 34.0 31.0 - 36.0 g/dL    RDW 15.1 (H) 11.5 - 14.5 %    PLATELET 940 871 - 587 K/uL    MPV 7.9 6.5 - 11.5 FL    NRBC 0.3  WBC    ABSOLUTE NRBC 0.03 K/uL    NEUTROPHILS 74 42 - 75 %    LYMPHOCYTES 20 (L) 20.5 - 51.1 %    MONOCYTES 4 1.7 - 9.3 %    EOSINOPHILS 1 0.9 - 2.9 %    BASOPHILS 1 0.0 - 2.5 %    ABS. NEUTROPHILS 8.0 (H) 1.8 - 7.7 K/UL    ABS. LYMPHOCYTES 2.1 1.0 - 4.8 K/UL    ABS. MONOCYTES 0.4 0.2 - 2.4 K/UL    ABS. EOSINOPHILS 0.1 0.0 - 0.7 K/UL    ABS. BASOPHILS 0.1 0.0 - 0.2 K/UL       Radiologic Studies -   @[email or web address]@  CT Results  (Last 48 hours)    None        CXR Results  (Last 48 hours)    None            Medical Decision Making   - I am the first provider for this patient.     - I reviewed the vital signs, available nursing notes, past medical history, past surgical history, family history and social history. - Initial assessment performed. The patients presenting problems have been discussed, and they are in agreement with the care plan formulated and outlined with them. I have encouraged them to ask questions as they arise throughout their visit. Vital Signs-Reviewed the patient's vital signs. Patient Vitals for the past 12 hrs:   Temp Pulse Resp BP SpO2   10/01/21 1830  97 18 133/89 100 %   10/01/21 1630 98.5 °F (36.9 °C) (!) 111 20 (!) 155/104 96 %       Records Reviewed: Nursing Notes    The patient presents with sore throat with a differential diagnosis of strep pharyngitis, tonsillar abscess, URI      ED Course:          Provider Notes (Medical Decision Making): MDM       Procedures   Medical Decision Makingedical Decision Making  Performed by: Jamie Mcburney, MD  PROCEDURES:  Procedures       Disposition   Disposition: Condition stable and improved  DC- Adult Discharges: All of the diagnostic tests were reviewed and questions answered. Diagnosis, care plan and treatment options were discussed. The patient understands the instructions and will follow up as directed. The patients results have been reviewed with them. They have been counseled regarding their diagnosis. The patient verbally convey understanding and agreement of the signs, symptoms, diagnosis, treatment and prognosis and additionally agrees to follow up as recommended with their PCP in 24 - 48 hours. They also agree with the care-plan and convey that all of their questions have been answered. I have also put together some discharge instructions for them that include: 1) educational information regarding their diagnosis, 2) how to care for their diagnosis at home, as well a 3) list of reasons why they would want to return to the ED prior to their follow-up appointment, should their condition change.         DISCHARGE PLAN:  1. Current Discharge Medication List      CONTINUE these medications which have NOT CHANGED    Details   cephALEXin (Keflex) 500 mg capsule Take 1 Capsule by mouth four (4) times daily for 10 days. Qty: 40 Capsule, Refills: 0      ergocalciferol (ERGOCALCIFEROL) 1,250 mcg (50,000 unit) capsule Take 1 capsule by mouth every 7 days. Qty: 4 Capsule, Refills: 2    Associated Diagnoses: Vitamin D deficiency      fluticasone propionate (FLONASE) 50 mcg/actuation nasal spray Instill 1 to 2 sprays in each nostril daily. Qty: 1 Bottle, Refills: 1      montelukast (SINGULAIR) 10 mg tablet Take 1 tablet by mouth daily. Qty: 90 Tablet, Refills: 0    Associated Diagnoses: Multiple environmental allergies      Dexilant 60 mg CpDB capsule (delayed release) Take 1 capsule by mouth daily. Qty: 90 Capsule, Refills: 0    Associated Diagnoses: Gastroesophageal reflux disease without esophagitis      topiramate (TOPAMAX) 25 mg tablet Take 1 tablet by mouth nightly. Qty: 90 Tablet, Refills: 0    Associated Diagnoses: Intractable migraine without aura and without status migrainosus      fenofibrate nanocrystallized (TRICOR) 145 mg tablet Take 1 tablet by mouth daily. Qty: 90 Tablet, Refills: 0    Associated Diagnoses: Mixed hyperlipidemia; Type 2 diabetes mellitus with diabetic neuropathy, without long-term current use of insulin ([facility])      pravastatin (PRAVACHOL) 40 mg tablet Take 1 tablet by mouth nightly. Qty: 90 Tablet, Refills: 0    Associated Diagnoses: Mixed hyperlipidemia; Type 2 diabetes mellitus with diabetic neuropathy, without long-term current use of insulin ([facility])      fluticasone propion-salmeteroL (ADVAIR/WIXELA) 250-50 mcg/dose diskus inhaler Inhale 1 puff by mouth every 12 hours.   Qty: 3 Each, Refills: 0    Associated Diagnoses: Mild persistent asthma without complication      ondansetron (ZOFRAN ODT) 8 mg disintegrating tablet Take 1 Tablet by mouth every eight (8) hours as needed for Nausea or Vomiting. Qty: 30 Tablet, Refills: 2    Associated Diagnoses: Fibromyalgia syndrome      Trulicity 1.5 CW/8.8 mL sub-q pen Inject 1.5mg subcutaneously every 7 days. Qty: 4 mL, Refills: 2    Associated Diagnoses: Type 2 diabetes mellitus with diabetic neuropathy, without long-term current use of insulin ([facility]      ! ! allergy injection 0.4 ml of set A in left arm  Qty: 0.4 mL, Refills: 0    Associated Diagnoses: Allergy desensitization therapy      !! allergy injection 0.4 ml of set B in right arm  Qty: 0.4 mL, Refills: 0    Associated Diagnoses: Allergy desensitization therapy      !! allergy injection Set A-0.2 ml right arm-sc  Qty: 0.2 mL, Refills: 0    Associated Diagnoses: Allergy desensitization therapy      !! allergy injection Set B-0.2 ml left arm-sc  Qty: 0.2 mL, Refills: 0    Associated Diagnoses: Allergy desensitization therapy      rizatriptan (MAXALT) 10 mg tablet TAKE ONE TABLET BY MOUTH ONCE AS NEEDED MAY REPEAT IN 2 HOURS IF NEEDED  Qty: 60 Tab, Refills: 0    Associated Diagnoses: Intractable migraine without aura and without status migrainosus      cyclobenzaprine (FLEXERIL) 10 mg tablet Take 1 Tab by mouth three (3) times daily as needed for Muscle Spasm(s). Qty: 90 Tab, Refills: 0    Associated Diagnoses: Fibromyalgia syndrome      fluocinoNIDE (LIDEX) 0.05 % topical cream APPLY TO THE AFFECTED AREA TOPICALLY TWO TIMES A DAY  Qty: 15 g, Refills: 2      buPROPion XL (WELLBUTRIN XL) 150 mg tablet       estradioL (ESTRACE) 1 mg tablet TAKE ONE TABLET BY MOUTH EVERY DAY  Qty: 30 Tab, Refills: 3    Associated Diagnoses: Post-menopausal      Cetirizine (ZyrTEC) 10 mg cap Take 10 mg by mouth daily.   Qty: 12 Cap, Refills: 0      oxyCODONE-acetaminophen (PERCOCET 10)  mg per tablet Refills: 0      loratadine (CLARITIN) 10 mg tablet Take 10 mg by mouth.      ketoconazole (NIZORAL) 2 % shampoo APPLY TO SCALP AND RINSE   DAILY AS NEEDED  Qty: 120 mL, Refills: 2      AMITIZA 24 mcg capsule naloxone 4 mg/actuation spry 4 mg by Nasal route as needed for up to 2 doses. Indications: OPIOID TOXICITY  Qty: 1 Box, Refills: 1      albuterol (PROAIR HFA) 90 mcg/actuation inhaler Take 2 Puffs by inhalation every four (4) hours as needed for Wheezing. Take 2 Puffs inhaled by mouth Every 4 Hours. Qty: 1 Inhaler, Refills: 2    Associated Diagnoses: Mild persistent asthma without complication      polyethylene glycol (MIRALAX) 17 gram/dose powder        !! - Potential duplicate medications found. Please discuss with provider. 2.   Follow-up Information    None       3. Return to ED if worse   4. Current Discharge Medication List            Diagnosis     Clinical Impression: No diagnosis found. Attestations:    Asha Craig MD    Please note that this dictation was completed with [employer], the [employer] voice recognition software. Quite often unanticipated grammatical, syntax, homophones, and other interpretive errors are inadvertently transcribed by the computer software. Please disregard these errors. Please excuse any errors that have escaped final proofreading. Thank you.

## 2021-10-01 NOTE — ED TRIAGE NOTES
.  Chief Complaint   Patient presents with    Headache     pt presents with complaint trouble swallowing seen thursday for sore throat and same trouble swallowing, pt states that she felt like she was having an allergic reaction to antibiotics. Pt A&Ox4, airway intacat, 96% on room airway, pt speaking in complete sentences w/o difficulty. Pt states she had a throat swab done and told she was not positive for strep.       Pt denies changes in vision, pt has pmh of cataracts

## 2021-10-19 NOTE — PROGRESS NOTES
Nursing Notes    Patient presents to the office today in follow-up. Patient rates her pain at 5/10 on the numerical pain scale. Reviewed medications with counts as follows:    Rx Date filled Qty Dispensed Pill Count Last Dose Short   Percocet  mg 6/23/18 115 24 This a.m no   Fentanyl 50 mcg 6/23/18 15 0+1 today no                                POC UDS was not performed in office today    Any new labs or imaging since last appointment? NO    Have you been to an emergency room (ER) or urgent care clinic since your last visit? NO            Have you been hospitalized since your last visit? NO     If yes, where, when, and reason for visit? Have you seen or consulted any other health care providers outside of the [address] [place] [address]  since your last visit? NO     If yes, where, when, and reason for visit? Ms. Danette Chamberlain has a reminder for a \"due or due soon\" health maintenance. I have asked that she contact her primary care provider for follow-up on this health maintenance. Patient stated that she does have Narcan.   PHQ over the last two weeks 7/26/2018   PHQ Not Done Active Diagnosis of Depression or Bipolar Disorder   Little interest or pleasure in doing things -   Feeling down, depressed, irritable, or hopeless -   Total Score PHQ 2 -   Trouble falling or staying asleep, or sleeping too much -   Feeling tired or having little energy -   Poor appetite, weight loss, or overeating -   Feeling bad about yourself - or that you are a failure or have let yourself or your family down -   Trouble concentrating on things such as school, work, reading, or watching TV -   Moving or speaking so slowly that other people could have noticed; or the opposite being so fidgety that others notice -   Thoughts of being better off dead, or hurting yourself in some way - No

## 2021-10-20 ENCOUNTER — TELEPHONE (OUTPATIENT)
Dept: FAMILY MEDICINE CLINIC | Age: 51
End: 2021-10-20

## 2021-10-20 NOTE — TELEPHONE ENCOUNTER
Need a Referral for a Neurologist at [facility] for TIA. Went to the ER in [address] [place] [address]. Please call Pt.

## 2021-11-12 ENCOUNTER — OFFICE VISIT (OUTPATIENT)
Dept: FAMILY MEDICINE CLINIC | Age: 51
End: 2021-11-12
Payer: MEDICARE

## 2021-11-12 VITALS
WEIGHT: 182.4 LBS | SYSTOLIC BLOOD PRESSURE: 117 MMHG | BODY MASS INDEX: 27.65 KG/M2 | OXYGEN SATURATION: 95 % | DIASTOLIC BLOOD PRESSURE: 80 MMHG | HEIGHT: 68 IN | RESPIRATION RATE: 18 BRPM | HEART RATE: 87 BPM | TEMPERATURE: 97.5 F

## 2021-11-12 DIAGNOSIS — I10 ESSENTIAL HYPERTENSION: ICD-10-CM

## 2021-11-12 DIAGNOSIS — E11.40 TYPE 2 DIABETES MELLITUS WITH DIABETIC NEUROPATHY, WITHOUT LONG-TERM CURRENT USE OF INSULIN (HCC): Primary | ICD-10-CM

## 2021-11-12 DIAGNOSIS — E78.2 MIXED HYPERLIPIDEMIA: ICD-10-CM

## 2021-11-12 DIAGNOSIS — Z23 ENCOUNTER FOR IMMUNIZATION: ICD-10-CM

## 2021-11-12 DIAGNOSIS — R13.10 SWALLOWING PROBLEM: ICD-10-CM

## 2021-11-12 DIAGNOSIS — E55.9 VITAMIN D DEFICIENCY: ICD-10-CM

## 2021-11-12 PROCEDURE — G8752 SYS BP LESS 140: HCPCS | Performed by: FAMILY MEDICINE

## 2021-11-12 PROCEDURE — 99214 OFFICE O/P EST MOD 30 MIN: CPT | Performed by: FAMILY MEDICINE

## 2021-11-12 PROCEDURE — 3017F COLORECTAL CA SCREEN DOC REV: CPT | Performed by: FAMILY MEDICINE

## 2021-11-12 PROCEDURE — G8419 CALC BMI OUT NRM PARAM NOF/U: HCPCS | Performed by: FAMILY MEDICINE

## 2021-11-12 PROCEDURE — G8754 DIAS BP LESS 90: HCPCS | Performed by: FAMILY MEDICINE

## 2021-11-12 PROCEDURE — 2022F DILAT RTA XM EVC RTNOPTHY: CPT | Performed by: FAMILY MEDICINE

## 2021-11-12 PROCEDURE — G9717 DOC PT DX DEP/BP F/U NT REQ: HCPCS | Performed by: FAMILY MEDICINE

## 2021-11-12 PROCEDURE — 90686 IIV4 VACC NO PRSV 0.5 ML IM: CPT | Performed by: FAMILY MEDICINE

## 2021-11-12 PROCEDURE — G8427 DOCREV CUR MEDS BY ELIG CLIN: HCPCS | Performed by: FAMILY MEDICINE

## 2021-11-12 PROCEDURE — G0008 ADMIN INFLUENZA VIRUS VAC: HCPCS | Performed by: FAMILY MEDICINE

## 2021-11-12 PROCEDURE — 3051F HG A1C>EQUAL 7.0%<8.0%: CPT | Performed by: FAMILY MEDICINE

## 2021-11-12 RX ORDER — LANOLIN ALCOHOL/MO/W.PET/CERES
400 CREAM (GRAM) TOPICAL DAILY
COMMUNITY

## 2021-11-12 NOTE — PROGRESS NOTES
Chief Complaint   Patient presents with    Follow Up Chronic Condition    Referral Request     neurology     she is a 49y.o. year old female who presents for follow up of chronic health conditions. Patient with multiple co-morbidities which include T2D, HTN, HLD, migraine, RLS, fibromyalgia, depression and chronic pain. She is due for lab work. Patient denies HA, dizziness, SOB, CP, abdominal pain, dysuria. She is followed by pain management. She requests referral to neurology. She reports difficulty swallowing. Has had ENT evaluation, CT and EGD done in [facility]. Diabetes: This patient is being treating under a comprehensive plan of care for diabetes. Overall the patient feels well with good energy level. Insulin dependence: no      Call immediately if having symptoms of high sugar (frequent urination, always thirsty) or low sugar (dizzy, lethargic, sweaty, nauseated, headache). Our overall goal is to reduce or eliminate the long term consequences of poorly controlled diabetes. Patient expresses understanding and agreement with our plan of care. Hypertension:  The patient reports:  taking medications as instructed, no medication side effects noted, no TIA's, no chest pain on exertion, no dyspnea on exertion, no swelling of ankles. BP Readings from Last 3 Encounters:   11/12/21 117/80   10/01/21 (!) 171/121   09/30/21 (!) 163/97     Patient advised to log blood pressures at home weekly and bring to next visit. Call office as soon as possible if BP's over 140/90 on multiple occasions or with symptoms of dizziness, chest pain, shortness of breath, headache or ankle swelling. Our goal is to normalize the blood pressure to decrease the risks of strokes and heart attacks. The patient is in agreement with the plan.       Patient Active Problem List   Diagnosis Code    Obesity E66.9    Diabetes mellitus ([facility] [address] E11.9    Hypertension I10    Fibromyalgia syndrome M79.7    Restless leg syndrome G25.81    Primary fibromyalgia syndrome M79.7    Encounter for long-term (current) use of high-risk medication Z79.899    Headache R51.9    Persistent disorder of initiating or maintaining sleep G47.00    Migraine headache G43.909    Musculoskeletal pain M79.18    Multiple environmental allergies Z91.09    History of headache Z87.898    Hyperlipidemia E78.5    Type 2 diabetes mellitus (HCC) E11.9    Recurrent depression (HCC) F33.9     Past Surgical History:   Procedure Laterality Date    HX GYN      Partial Hysterectomy    HX HEENT      HX TUBAL LIGATION       Social History     Socioeconomic History    Marital status:      Spouse name: Not on file    Number of children: 2    Years of education: Not on file    Highest education level: 12th grade   Occupational History    Not on file   Tobacco Use    Smoking status: Former Smoker    Smokeless tobacco: Never Used    Tobacco comment: quit in 2010   Substance and Sexual Activity    Alcohol use: No    Drug use: No    Sexual activity: Yes     Birth control/protection: Condom   Other Topics Concern    Not on file   Social History Narrative    Not on file     Social Determinants of Health     Financial Resource Strain: Low Risk     Difficulty of Paying Living Expenses: Not hard at all   Food Insecurity: No Food Insecurity    Worried About Running Out of Food in the Last Year: Never true    Mary of Food in the Last Year: Never true   Transportation Needs: No Transportation Needs    Lack of Transportation (Medical): No    Lack of Transportation (Non-Medical):  No   Physical Activity:     Days of Exercise per Week: Not on file    Minutes of Exercise per Session: Not on file   Stress:     Feeling of Stress : Not on file   Social Connections:     Frequency of Communication with Friends and Family: Not on file    Frequency of Social Gatherings with Friends and Family: Not on file    Attends Orthodox Services: Not on file   [facility] Active Member of Clubs or Organizations: Not on file    Attends Club or Organization Meetings: Not on file    Marital Status: Not on file   Intimate Partner Violence:     Fear of Current or Ex-Partner: Not on file    Emotionally Abused: Not on file    Physically Abused: Not on file    Sexually Abused: Not on file   Housing Stability:     Unable to Pay for Housing in the Last Year: Not on file    Number of Places Lived in the Last Year: Not on file    Unstable Housing in the Last Year: Not on file     Family History   Problem Relation Age of Onset    Alcohol abuse Father     Diabetes Mother     Hypertension Mother     Heart Disease Mother     Lupus Mother     Sleep Apnea Mother     Depression Mother     Cancer Maternal Aunt     Arthritis-osteo Other     Asthma Other     Diabetes Other     Elevated Lipids Other     Headache Other     Heart Disease Other     Hypertension Other     Migraines Other     Stroke Other     Bleeding Prob Neg Hx     Lung Disease Neg Hx     Psychiatric Disorder Neg Hx     Mental Retardation Neg Hx      Current Outpatient Medications   Medication Sig    magnesium oxide (MAG-OX) 400 mg tablet Take 400 mg by mouth daily.  topiramate (TOPAMAX) 25 mg tablet Take 1 tablet by mouth nightly.  Dexilant 60 mg CpDB capsule (delayed release) Take 1 capsule by mouth daily.  pravastatin (PRAVACHOL) 40 mg tablet Take 1 tablet by mouth nightly.  fenofibrate nanocrystallized (TRICOR) 145 mg tablet Take 1 tablet by mouth daily.  Trulicity 1.5 BV/3.7 mL sub-q pen Inject 1.5mg subcutaneously every 7 days.  fluticasone propion-salmeteroL (ADVAIR/WIXELA) 250-50 mcg/dose diskus inhaler Inhale 1 puff by mouth every 12 hours.  ergocalciferol (ERGOCALCIFEROL) 1,250 mcg (50,000 unit) capsule Take 1 capsule by mouth every 7 days.  montelukast (SINGULAIR) 10 mg tablet Take 1 tablet by mouth daily.     ondansetron (ZOFRAN ODT) 8 mg disintegrating tablet Take 1 Tablet by mouth every eight (8) hours as needed for Nausea or Vomiting.  rizatriptan (MAXALT) 10 mg tablet TAKE ONE TABLET BY MOUTH ONCE AS NEEDED MAY REPEAT IN 2 HOURS IF NEEDED    cyclobenzaprine (FLEXERIL) 10 mg tablet Take 1 Tab by mouth three (3) times daily as needed for Muscle Spasm(s).  fluocinoNIDE (LIDEX) 0.05 % topical cream APPLY TO THE AFFECTED AREA TOPICALLY TWO TIMES A DAY    Cetirizine (ZyrTEC) 10 mg cap Take 10 mg by mouth daily.  oxyCODONE-acetaminophen (PERCOCET 10)  mg per tablet     ketoconazole (NIZORAL) 2 % shampoo APPLY TO SCALP AND RINSE   DAILY AS NEEDED    albuterol (PROAIR HFA) 90 mcg/actuation inhaler Take 2 Puffs by inhalation every four (4) hours as needed for Wheezing. Take 2 Puffs inhaled by mouth Every 4 Hours. (Patient taking differently: Take 2 Puffs by inhalation every four (4) hours as needed for Wheezing.)    estradioL (ESTRACE) 1 mg tablet TAKE ONE TABLET BY MOUTH EVERY DAY    naloxone 4 mg/actuation spry 4 mg by Nasal route as needed for up to 2 doses. Indications: OPIOID TOXICITY (Patient not taking: Reported on 11/12/2021)     No current facility-administered medications for this visit.      Allergies   Allergen Reactions    Esomeprazole Magnesium Hives     Other reaction(s): mild rash/itching    Nexium [Esomeprazole Magnesium] Unknown (comments)    Pcn [Penicillins] Unknown (comments)       Review of Systems:  Constitutional: Negative for fever or malaise  Cardiovascular: Negative for dizziness, chest pain or palpitations  Respiratory: Negative for cough, wheezing or SOB  Neurology:   Psychological: see HPI       Vitals:    11/12/21 0817   BP: 117/80   Pulse: 87   Resp: 18   Temp: 97.5 °F (36.4 °C)   TempSrc: Temporal   SpO2: 95%   Weight: 182 lb 6.4 oz (82.7 kg)   Height: 5' 8\" (1.727 m)       Physical Examination:  General: Well developed, well nourished, in no acute distress  Respiratory:  Symmetrical, unlabored effort  Cardiovascular: regular rate and rhythm  Extremities: Full range of motion, antalgic gait  Psych: Active, alert and oriented. Affect appropriate       Diagnoses and all orders for this visit:    1. Type 2 diabetes mellitus with diabetic neuropathy, without long-term current use of insulin (HCC)  -     LIPID PANEL; Future  -     METABOLIC PANEL, COMPREHENSIVE; Future  -     CBC W/O DIFF; Future  -     TSH 3RD GENERATION; Future  -     HEMOGLOBIN A1C WITH EAG; Future    2. Mixed hyperlipidemia  -     LIPID PANEL; Future  -     METABOLIC PANEL, COMPREHENSIVE; Future    3. Essential hypertension  -     LIPID PANEL; Future  -     METABOLIC PANEL, COMPREHENSIVE; Future  -     TSH 3RD GENERATION; Future    4. Vitamin D deficiency  -     VITAMIN D, 25 HYDROXY; Future    5. Encounter for immunization  -     INFLUENZA VIRUS VAC QUAD,SPLIT,PRESV FREE SYRINGE IM  -     ADMIN INFLUENZA VIRUS VAC      Plan of care:  Diagnoses were discussed in detail with patient. Medications reviewed and appropriate. Patient to continue current prescribed medications as written. Medication risks/benefits/side effects discussed with patient. All of the patient's questions were addressed and answered to apparent satisfaction. The patient understands and agrees with our plan of care. The patient knows to call back if they have questions about the plan of care or if symptoms change. The patient received an After-Visit Summary which contains VS, diagnoses, orders, allergy and medication lists. No future appointments. Follow-up and Dispositions    · Return in about 3 months (around 2/12/2022), or if symptoms worsen or fail to improve.

## 2021-11-12 NOTE — PROGRESS NOTES
1. Have you been to the ER, urgent care clinic since your last visit? Hospitalized since your last visit? Yes, [facility] ER 10-18-21, stroke sx    2. Have you seen or consulted any other health care providers outside of the [address] [place] [address] since your last visit? Include any pap smears or colon screening. No    Reviewed record in preparation for visit and have necessary documentation  Goals that were addressed and/or need to be completed during or after this appointment include     Health Maintenance Due   Topic Date Due    COVID-19 Vaccine (1) Never done    Colorectal Cancer Screening Combo  Never done    Eye Exam Retinal or Dilated  02/01/2020    Medicare Yearly Exam  06/12/2021    Flu Vaccine (1) 09/01/2021    A1C test (Diabetic or Prediabetic)  09/23/2021    Shingrix Vaccine Age 50> (1 of 2) Never done    Breast Cancer Screen Mammogram  09/26/2021       Patient is accompanied by self I have received verbal consent from Demarcus Ramirez to discuss any/all medical information while they are present in the room.

## 2021-11-13 LAB
25(OH)D3 SERPL-MCNC: 17.7 NG/ML (ref 30–100)
ALBUMIN SERPL-MCNC: 3.8 G/DL (ref 3.5–5)
ALBUMIN/GLOB SERPL: 1.1 {RATIO} (ref 1.1–2.2)
ALP SERPL-CCNC: 114 U/L (ref 45–117)
ALT SERPL-CCNC: 27 U/L (ref 12–78)
ANION GAP SERPL CALC-SCNC: 6 MMOL/L (ref 5–15)
AST SERPL-CCNC: 13 U/L (ref 15–37)
BILIRUB SERPL-MCNC: 0.6 MG/DL (ref 0.2–1)
BUN SERPL-MCNC: 9 MG/DL (ref 6–20)
BUN/CREAT SERPL: 13 (ref 12–20)
CALCIUM SERPL-MCNC: 9.4 MG/DL (ref 8.5–10.1)
CHLORIDE SERPL-SCNC: 105 MMOL/L (ref 97–108)
CHOLEST SERPL-MCNC: 286 MG/DL
CO2 SERPL-SCNC: 28 MMOL/L (ref 21–32)
CREAT SERPL-MCNC: 0.71 MG/DL (ref 0.55–1.02)
ERYTHROCYTE [DISTWIDTH] IN BLOOD BY AUTOMATED COUNT: 14.3 % (ref 11.5–14.5)
EST. AVERAGE GLUCOSE BLD GHB EST-MCNC: 163 MG/DL
GLOBULIN SER CALC-MCNC: 3.6 G/DL (ref 2–4)
GLUCOSE SERPL-MCNC: 156 MG/DL (ref 65–100)
HBA1C MFR BLD: 7.3 % (ref 4–5.6)
HCT VFR BLD AUTO: 45.5 % (ref 35–47)
HDLC SERPL-MCNC: 33 MG/DL
HDLC SERPL: 8.7 {RATIO} (ref 0–5)
HGB BLD-MCNC: 14.4 G/DL (ref 11.5–16)
LDLC SERPL CALC-MCNC: 194.4 MG/DL (ref 0–100)
MCH RBC QN AUTO: 29 PG (ref 26–34)
MCHC RBC AUTO-ENTMCNC: 31.6 G/DL (ref 30–36.5)
MCV RBC AUTO: 91.7 FL (ref 80–99)
NRBC # BLD: 0 K/UL (ref 0–0.01)
NRBC BLD-RTO: 0 PER 100 WBC
PLATELET # BLD AUTO: 319 K/UL (ref 150–400)
PMV BLD AUTO: 10.4 FL (ref 8.9–12.9)
POTASSIUM SERPL-SCNC: 4.5 MMOL/L (ref 3.5–5.1)
PROT SERPL-MCNC: 7.4 G/DL (ref 6.4–8.2)
RBC # BLD AUTO: 4.96 M/UL (ref 3.8–5.2)
SODIUM SERPL-SCNC: 139 MMOL/L (ref 136–145)
TRIGL SERPL-MCNC: 293 MG/DL (ref ?–150)
TSH SERPL DL<=0.05 MIU/L-ACNC: 1.64 UIU/ML (ref 0.36–3.74)
VLDLC SERPL CALC-MCNC: 58.6 MG/DL
WBC # BLD AUTO: 7.7 K/UL (ref 3.6–11)

## 2022-01-07 ENCOUNTER — APPOINTMENT (OUTPATIENT)
Dept: GENERAL RADIOLOGY | Age: 52
End: 2022-01-07
Attending: EMERGENCY MEDICINE
Payer: MEDICARE

## 2022-01-07 ENCOUNTER — HOSPITAL ENCOUNTER (EMERGENCY)
Age: 52
Discharge: HOME | End: 2022-01-08
Attending: EMERGENCY MEDICINE
Payer: MEDICARE

## 2022-01-07 VITALS
OXYGEN SATURATION: 96 % | DIASTOLIC BLOOD PRESSURE: 78 MMHG | HEART RATE: 96 BPM | TEMPERATURE: 98.6 F | RESPIRATION RATE: 20 BRPM | SYSTOLIC BLOOD PRESSURE: 129 MMHG

## 2022-01-07 DIAGNOSIS — U07.1 COVID-19: Primary | ICD-10-CM

## 2022-01-07 LAB
BASOPHILS # BLD: 0 K/UL (ref 0–0.2)
BASOPHILS NFR BLD: 1 % (ref 0–2.5)
EOSINOPHIL # BLD: 0 K/UL (ref 0–0.7)
EOSINOPHIL NFR BLD: 1 % (ref 0.9–2.9)
ERYTHROCYTE [DISTWIDTH] IN BLOOD BY AUTOMATED COUNT: 13.2 % (ref 11.5–14.5)
HCT VFR BLD AUTO: 41.7 % (ref 36–46)
HGB BLD-MCNC: 14.5 G/DL (ref 13.5–17.5)
LYMPHOCYTES # BLD: 0.9 K/UL (ref 1–4.8)
LYMPHOCYTES NFR BLD: 27 % (ref 20.5–51.1)
MCH RBC QN AUTO: 29.7 PG (ref 31–34)
MCHC RBC AUTO-ENTMCNC: 34.7 G/DL (ref 31–36)
MCV RBC AUTO: 85.4 FL (ref 80–100)
MONOCYTES # BLD: 0.2 K/UL (ref 0–0.8)
MONOCYTES NFR BLD: 5 % (ref 1.7–9.3)
NEUTS SEG # BLD: 2.1 K/UL (ref 1.8–7.7)
NEUTS SEG NFR BLD: 66 % (ref 42–75)
PLATELET # BLD AUTO: 185 K/UL
PMV BLD AUTO: 8.2 FL (ref 6.5–11.5)
RBC # BLD AUTO: 4.88 M/UL
WBC # BLD AUTO: 3.1 K/UL (ref 4.4–11.3)

## 2022-01-07 PROCEDURE — 99282 EMERGENCY DEPT VISIT SF MDM: CPT

## 2022-01-07 PROCEDURE — 80053 COMPREHEN METABOLIC PANEL: CPT

## 2022-01-07 PROCEDURE — 96374 THER/PROPH/DIAG INJ IV PUSH: CPT

## 2022-01-07 PROCEDURE — 85025 COMPLETE CBC W/AUTO DIFF WBC: CPT

## 2022-01-07 PROCEDURE — 85379 FIBRIN DEGRADATION QUANT: CPT

## 2022-01-07 PROCEDURE — 84484 ASSAY OF TROPONIN QUANT: CPT

## 2022-01-07 PROCEDURE — 83605 ASSAY OF LACTIC ACID: CPT

## 2022-01-07 PROCEDURE — 71045 X-RAY EXAM CHEST 1 VIEW: CPT

## 2022-01-08 LAB
ALBUMIN SERPL-MCNC: 3.2 G/DL (ref 3.5–5)
ALBUMIN/GLOB SERPL: 0.8 {RATIO} (ref 1.1–2.2)
ALP SERPL-CCNC: 84 U/L (ref 45–117)
ALT SERPL-CCNC: 30 U/L (ref 12–78)
ANION GAP SERPL CALC-SCNC: 10 MMOL/L (ref 5–15)
AST SERPL W P-5'-P-CCNC: 18 U/L (ref 15–37)
BILIRUB SERPL-MCNC: 0.4 MG/DL (ref 0.2–1)
BUN SERPL-MCNC: 6 MG/DL (ref 6–20)
BUN/CREAT SERPL: 8 (ref 12–20)
CA-I BLD-MCNC: 8.4 MG/DL (ref 8.5–10.1)
CHLORIDE SERPL-SCNC: 100 MMOL/L (ref 97–108)
CO2 SERPL-SCNC: 27 MMOL/L (ref 21–32)
CREAT SERPL-MCNC: 0.73 MG/DL (ref 0.55–1.02)
D DIMER PPP FEU-MCNC: 1.08 MG/L FEU (ref 0.19–0.5)
GLOBULIN SER CALC-MCNC: 3.9 G/DL (ref 2–4)
GLUCOSE SERPL-MCNC: 179 MG/DL (ref 65–100)
LACTATE SERPL-SCNC: 0.7 MMOL/L (ref 0.4–2)
POTASSIUM SERPL-SCNC: 3.6 MMOL/L (ref 3.5–5.1)
PROT SERPL-MCNC: 7.1 G/DL (ref 6.4–8.2)
SODIUM SERPL-SCNC: 137 MMOL/L (ref 136–145)
TROPONIN-HIGH SENSITIVITY: 5 NG/L (ref 0–51)

## 2022-01-08 PROCEDURE — 74011250636 HC RX REV CODE- 250/636: Performed by: EMERGENCY MEDICINE

## 2022-01-08 RX ORDER — DEXAMETHASONE SODIUM PHOSPHATE 100 MG/10ML
6 INJECTION INTRAMUSCULAR; INTRAVENOUS
Status: COMPLETED | OUTPATIENT
Start: 2022-01-08 | End: 2022-01-08

## 2022-01-08 RX ADMIN — DEXAMETHASONE SODIUM PHOSPHATE 6 MG: 10 INJECTION INTRAMUSCULAR; INTRAVENOUS at 01:17

## 2022-01-08 NOTE — ED PROVIDER NOTES
EMERGENCY DEPARTMENT HISTORY AND PHYSICAL EXAM  ?    Date: 1/7/2022  Patient Name: Jamir Butler    History of Presenting Illness    Patient presents with:  Fever  Fatigue      History Provided By: Patient    HPI: Jamir Butler, 49 y.o. female with a past medical history significant for  diabetes and asthma presents to the ED with cc of COVID infection for 2 weeks. She developed a fever tonight. She was advised by her physician to get checked out. In addition to fever, she complains of generalized fatigue. There are no other complaints, changes, or physical findings at this time. PCP: Billie Rosales MD    Current Outpatient Medications:  ergocalciferol (ERGOCALCIFEROL) 1,250 mcg (50,000 unit) capsule, Take 1 capsule by mouth every 7 days. , Disp: 12 Capsule, Rfl: 1  montelukast (SINGULAIR) 10 mg tablet, Take 1 tablet by mouth daily. , Disp: 90 Tablet, Rfl: 1  magnesium oxide (MAG-OX) 400 mg tablet, Take 400 mg by mouth daily. , Disp: , Rfl:   topiramate (TOPAMAX) 25 mg tablet, Take 1 tablet by mouth nightly., Disp: 90 Tablet, Rfl: 0  Dexilant 60 mg CpDB capsule (delayed release), Take 1 capsule by mouth daily. , Disp: 90 Capsule, Rfl: 0  pravastatin (PRAVACHOL) 40 mg tablet, Take 1 tablet by mouth nightly., Disp: 90 Tablet, Rfl: 0  fenofibrate nanocrystallized (TRICOR) 145 mg tablet, Take 1 tablet by mouth daily. , Disp: 90 Tablet, Rfl: 0  Trulicity 1.5 CJ/4.3 mL sub-q pen, Inject 1.5mg subcutaneously every 7 days. , Disp: 4 mL, Rfl: 1  fluticasone propion-salmeteroL (ADVAIR/WIXELA) 250-50 mcg/dose diskus inhaler, Inhale 1 puff by mouth every 12 hours. , Disp: 60 Each, Rfl: 2  ondansetron (ZOFRAN ODT) 8 mg disintegrating tablet, Take 1 Tablet by mouth every eight (8) hours as needed for Nausea or Vomiting., Disp: 30 Tablet, Rfl: 2  rizatriptan (MAXALT) 10 mg tablet, TAKE ONE TABLET BY MOUTH ONCE AS NEEDED MAY REPEAT IN 2 HOURS IF NEEDED, Disp: 60 Tab, Rfl: 0  cyclobenzaprine (FLEXERIL) 10 mg tablet, Take 1 Tab by mouth three (3) times daily as needed for Muscle Spasm(s). , Disp: 90 Tab, Rfl: 0  fluocinoNIDE (LIDEX) 0.05 % topical cream, APPLY TO THE AFFECTED AREA TOPICALLY TWO TIMES A DAY, Disp: 15 g, Rfl: 2  estradioL (ESTRACE) 1 mg tablet, TAKE ONE TABLET BY MOUTH EVERY DAY, Disp: 30 Tab, Rfl: 3  Cetirizine (ZyrTEC) 10 mg cap, Take 10 mg by mouth daily. , Disp: 12 Cap, Rfl: 0  oxyCODONE-acetaminophen (PERCOCET 10)  mg per tablet, , Disp: , Rfl: 0  ketoconazole (NIZORAL) 2 % shampoo, APPLY TO SCALP AND RINSE   DAILY AS NEEDED, Disp: 120 mL, Rfl: 2  naloxone 4 mg/actuation spry, 4 mg by Nasal route as needed for up to 2 doses. Indications: OPIOID TOXICITY (Patient not taking: Reported on 11/12/2021), Disp: 1 Box, Rfl: 1  albuterol (PROAIR HFA) 90 mcg/actuation inhaler, Take 2 Puffs by inhalation every four (4) hours as needed for Wheezing. Take 2 Puffs inhaled by mouth Every 4 Hours.  (Patient taking differently: Take 2 Puffs by inhalation every four (4) hours as needed for Wheezing.), Disp: 1 Inhaler, Rfl: 2        Past History    Past Medical History:  Past Medical History:  No date: Depression  No date: Diabetes ([facility] [address]  No date: Dysthymic disorder  No date: Fibromyalgia  8/8/2012: Fibromyalgia syndrome  9/8/2012: Headache(784.0)  No date: Hypercholesterolemia  No date: Left ear pain  No date: Migraine  No date: Mixed hyperlipidemia  9/16/2014: Musculoskeletal pain    Past Surgical History:  Past Surgical History:  No date: HX GYN     Comment:  Partial Hysterectomy  No date: HX HEENT  No date: HX TUBAL LIGATION    Family History:  Review of patient's family history indicates:  Problem: Alcohol abuse     Relation: Father         Age of Onset: (Not Specified)  Problem: Diabetes     Relation: Mother         Age of Onset: (Not Specified)  Problem: Hypertension     Relation: Mother         Age of Onset: (Not Specified)  Problem: Heart Disease     Relation: Mother         Age of Onset: (Not Specified)  Problem: Lupus Relation: Mother         Age of Onset: (Not Specified)  Problem: Sleep Apnea     Relation: Mother         Age of Onset: (Not Specified)  Problem: Depression     Relation: Mother         Age of Onset: (Not Specified)  Problem: Cancer     Relation: Maternal Aunt         Age of Onset: (Not Specified)  Problem: OSTEOARTHRITIS     Relation: Other         Age of Onset: (Not Specified)  Problem: Asthma     Relation: Other         Age of Onset: (Not Specified)  Problem: Diabetes     Relation: Other         Age of Onset: (Not Specified)  Problem: Elevated Lipids     Relation: Other         Age of Onset: (Not Specified)  Problem: Headache     Relation: Other         Age of Onset: (Not Specified)  Problem: Heart Disease     Relation: Other         Age of Onset: (Not Specified)  Problem: Hypertension     Relation: Other         Age of Onset: (Not Specified)  Problem: Migraines     Relation: Other         Age of Onset: (Not Specified)  Problem: Stroke     Relation: Other         Age of Onset: (Not Specified)  Problem: Bleeding Prob     Relation: Neg Hx         Age of Onset: (Not Specified)  Problem: Lung Disease     Relation: Neg Hx         Age of Onset: (Not Specified)  Problem: Psychiatric Disorder     Relation: Neg Hx         Age of Onset: (Not Specified)  Problem: Mental Retardation     Relation: Neg Hx         Age of Onset: (Not Specified)      Social History:  Social History   Tobacco Use     Smoking status: Former Smoker     Smokeless tobacco: Never Used     Tobacco comment: quit in 2010   Alcohol use: No   Drug use: No      Allergies:  -- Esomeprazole Magnesium -- Hives   --  Other reaction(s): mild rash/itching  -- Nexium (Esomeprazole Magnesium) -- Unknown (comments)  -- Pcn (Penicillins) -- Unknown (comments)      Review of Systems  [unfilled]    Physical Exam  [unfilled]    Diagnostic Study Results    Labs -  Recent Results (from the past 12 hour(s))  -CBC WITH AUTOMATED DIFF:   Collection Time: 01/07/22 11:45 PM      Result                      Value             Ref Range          WBC                         3.1 (L)           4.4 - 11.3 K*      RBC                         4.88              M/uL               HGB                         14.5              13.5 - 17.5 *      HCT                         41.7              36 - 46 %          MCV                         85.4              80 - 100 FL        MCH                         29.7 (L)          31 - 34 PG         MCHC                        34.7              31.0 - 36.0 *      RDW                         13.2              11.5 - 14.5 %      PLATELET                    185               K/uL               MPV                         8.2               6.5 - 11.5 FL      NEUTROPHILS                 66                42 - 75 %          LYMPHOCYTES                 27                20.5 - 51.1 %      MONOCYTES                   5                 1.7 - 9.3 %        EOSINOPHILS                 1                 0.9 - 2.9 %        BASOPHILS                   1                 0.0 - 2.5 %        ABS. NEUTROPHILS            2.1               1.8 - 7.7 K/*      ABS. LYMPHOCYTES            0.9 (L)           1.0 - 4.8 K/*      ABS. MONOCYTES              0.2               0.0 - 0.8 K/*      ABS. EOSINOPHILS            0.0               0.0 - 0.7 K/*      ABS.  BASOPHILS              0.0               0.0 - 0.2 K/*  -METABOLIC PANEL, COMPREHENSIVE:   Collection Time: 01/07/22 11:45 PM      Result                      Value             Ref Range          Sodium                      137               136 - 145 mm*      Potassium                   3.6               3.5 - 5.1 mm*      Chloride                    100               97 - 108 mmo*      CO2                         27                21 - 32 mmol*      Anion gap                   10                5 - 15 mmol/L      Glucose                     179 (H)           65 - 100 mg/*      BUN                         6                 6 - 20 mg/dL       Creatinine                  0.73              0.55 - 1.02 *      BUN/Creatinine ratio        8 (L)             12 - 20            GFR est AA                  >60               >60 ml/min/1*      GFR est non-AA              >60               >60 ml/min/1*      Calcium                     8.4 (L)           8.5 - 10.1 m*      Bilirubin, total            0.4               0.2 - 1.0 mg*      AST (SGOT)                  18                15 - 37 U/L        ALT (SGPT)                  30                12 - 78 U/L        Alk. phosphatase            84                45 - 117 U/L       Protein, total              7.1               6.4 - 8.2 g/*      Albumin                     3.2 (L)           3.5 - 5.0 g/*      Globulin                    3.9               2.0 - 4.0 g/*      A-G Ratio                   0.8 (L)           1.1 - 2.2      -LACTIC ACID:   Collection Time: 01/07/22 11:45 PM      Result                      Value             Ref Range          Lactic acid                 0.7               0.4 - 2.0 mm*  -TROPONIN-HIGH SENSITIVITY:   Collection Time: 01/07/22 11:45 PM      Result                      Value             Ref Range          Troponin-High Sensitiv*     5                 0 - 51 ng/L    -D DIMER:   Collection Time: 01/07/22 11:45 PM      Result                      Value             Ref Range          D-dimer                     1.08 (H)          0.19 - 0.50 *    Radiologic Studies -   XR CHEST PORT  Final Result    CT Results  (Last 48 hours)   None     CXR Results  (Last 48 hours)             01/07/22 2210  XR CHEST PORT Final result   Narrative:  1 view      Mild patchy mixed infiltrates the midportion of each lung. No effusion. Normal   heart and mediastinum         Medical Decision Making and ED Course  I am the first provider for this patient.     I reviewed the vital signs, available nursing notes, past medical history, past surgical history, family history and social history. Vital Signs-Reviewed the patient's vital signs. Empty flowsheet group. Records Reviewed: Nursing Notes    Provider Notes (Medical Decision Making): Check chest x-ray. Assess COVID-19 risk of decompensation. The patient presents with differential diagnosis of COVID pneumonia, ARDS. ED Course:   Troponin and lactate are low. Patient is low risk for decompensation. Initial assessment performed. The patients presenting problems have been discussed, and they are in agreement with the care plan formulated and outlined with them. I have encouraged them to ask questions as they arise throughout their visit. Disposition    Discharged      DISCHARGE PLAN:  1. Current Discharge Medication List    CONTINUE these medications which have NOT CHANGED    ergocalciferol (ERGOCALCIFEROL) 1,250 mcg (50,000 unit) capsule  Take 1 capsule by mouth every 7 days. Qty: 12 Capsule Refills: 1  Associated Diagnoses:Vitamin D deficiency    montelukast (SINGULAIR) 10 mg tablet  Take 1 tablet by mouth daily. Qty: 90 Tablet Refills: 1  Associated Diagnoses:Multiple environmental allergies    magnesium oxide (MAG-OX) 400 mg tablet  Take 400 mg by mouth daily. topiramate (TOPAMAX) 25 mg tablet  Take 1 tablet by mouth nightly. Qty: 90 Tablet Refills: 0  Associated Diagnoses:Intractable migraine without aura and without status migrainosus    Dexilant 60 mg CpDB capsule (delayed release)  Take 1 capsule by mouth daily. Qty: 90 Capsule Refills: 0  Associated Diagnoses:Gastroesophageal reflux disease without esophagitis    pravastatin (PRAVACHOL) 40 mg tablet  Take 1 tablet by mouth nightly. Qty: 90 Tablet Refills: 0  Associated Diagnoses:Mixed hyperlipidemia; Type 2 diabetes mellitus with diabetic neuropathy, without long-term current use of insulin (HCC)    fenofibrate nanocrystallized (TRICOR) 145 mg tablet  Take 1 tablet by mouth daily.    Qty: 90 Tablet Refills: 0  Associated Diagnoses:Mixed hyperlipidemia; Type 2 diabetes mellitus with diabetic neuropathy, without long-term current use of insulin (HCC)    Trulicity 1.5 MG/1.3 mL sub-q pen  Inject 1.5mg subcutaneously every 7 days. Qty: 4 mL Refills: 1  Associated Diagnoses:Type 2 diabetes mellitus with diabetic neuropathy, without long-term current use of insulin ([facility])    fluticasone propion-salmeteroL (ADVAIR/WIXELA) 250-50 mcg/dose diskus inhaler  Inhale 1 puff by mouth every 12 hours. Qty: 60 Each Refills: 2  Associated Diagnoses:Mild persistent asthma without complication    ondansetron (ZOFRAN ODT) 8 mg disintegrating tablet  Take 1 Tablet by mouth every eight (8) hours as needed for Nausea or Vomiting. Qty: 30 Tablet Refills: 2  Associated Diagnoses:Fibromyalgia syndrome    rizatriptan (MAXALT) 10 mg tablet  TAKE ONE TABLET BY MOUTH ONCE AS NEEDED MAY REPEAT IN 2 HOURS IF NEEDED  Qty: 60 Tab Refills: 0  Associated Diagnoses:Intractable migraine without aura and without status migrainosus    cyclobenzaprine (FLEXERIL) 10 mg tablet  Take 1 Tab by mouth three (3) times daily as needed for Muscle Spasm(s). Qty: 90 Tab Refills: 0  Associated Diagnoses:Fibromyalgia syndrome    fluocinoNIDE (LIDEX) 0.05 % topical cream  APPLY TO THE AFFECTED AREA TOPICALLY TWO TIMES A DAY  Qty: 15 g Refills: 2    estradioL (ESTRACE) 1 mg tablet  TAKE ONE TABLET BY MOUTH EVERY DAY  Qty: 30 Tab Refills: 3  Associated Diagnoses:Post-menopausal    Cetirizine (ZyrTEC) 10 mg cap  Take 10 mg by mouth daily. Qty: 12 Cap Refills: 0    oxyCODONE-acetaminophen (PERCOCET 10)  mg per tablet    Refills: 0    ketoconazole (NIZORAL) 2 % shampoo  APPLY TO SCALP AND RINSE   DAILY AS NEEDED  Qty: 120 mL Refills: 2    naloxone 4 mg/actuation spry  4 mg by Nasal route as needed for up to 2 doses.  Indications: OPIOID TOXICITY  Qty: 1 Box Refills: 1    albuterol (PROAIR HFA) 90 mcg/actuation inhaler  Take 2 Puffs by inhalation every four (4) hours as needed for Wheezing. Take 2 Puffs inhaled by mouth Every 4 Hours. Qty: 1 Inhaler Refills: 2  Associated Diagnoses:Mild persistent asthma without complication        2. Follow-up Information    None    3. Return to ED if worse     Diagnosis    Clinical Impression: COVID-19 infection  Attestations:    Jerry Sheehan MD    Please note that this dictation was completed with [employer], the computer voice recognition software. Quite often unanticipated grammatical, syntax, homophones, and other interpretive errors are inadvertently transcribed by the computer software. Please disregard these errors. Please excuse any errors that have escaped final proofreading. Thank you.                Past Medical History:   Diagnosis Date    Depression     Diabetes ([facility] [address]     Dysthymic disorder     Fibromyalgia     Fibromyalgia syndrome 8/8/2012    Headache(784.0) 9/8/2012    Hypercholesterolemia     Left ear pain     Migraine     Mixed hyperlipidemia     Musculoskeletal pain 9/16/2014       Past Surgical History:   Procedure Laterality Date    HX GYN      Partial Hysterectomy    HX HEENT      HX TUBAL LIGATION           Family History:   Problem Relation Age of Onset    Alcohol abuse Father     Diabetes Mother     Hypertension Mother     Heart Disease Mother     Lupus Mother     Sleep Apnea Mother     Depression Mother     Cancer Maternal Aunt     OSTEOARTHRITIS Other     Asthma Other     Diabetes Other     Elevated Lipids Other     Headache Other     Heart Disease Other     Hypertension Other     Migraines Other     Stroke Other     Bleeding Prob Neg Hx     Lung Disease Neg Hx     Psychiatric Disorder Neg Hx     Mental Retardation Neg Hx        Social History     Socioeconomic History    Marital status:      Spouse name: Not on file    Number of children: 2    Years of education: Not on file    Highest education level: 12th grade   Occupational History    Not on file   Tobacco Use    Smoking status: Former Smoker    Smokeless tobacco: Never Used    Tobacco comment: quit in 2010   Substance and Sexual Activity    Alcohol use: No    Drug use: No    Sexual activity: Yes     Birth control/protection: Condom   Other Topics Concern    Not on file   Social History Narrative    Not on file     Social Determinants of Health     Financial Resource Strain:     Difficulty of Paying Living Expenses: Not on file   Food Insecurity:     Worried About Running Out of Food in the Last Year: Not on file    Mary of Food in the Last Year: Not on file   Transportation Needs:     Lack of Transportation (Medical): Not on file    Lack of Transportation (Non-Medical): Not on file   Physical Activity:     Days of Exercise per Week: Not on file    Minutes of Exercise per Session: Not on file   Stress:     Feeling of Stress : Not on file   Social Connections:     Frequency of Communication with Friends and Family: Not on file    Frequency of Social Gatherings with Friends and Family: Not on file    Attends Je[employer] witness Services: Not on file    Active Member of [address] [place] [address] or Organizations: Not on file    Attends Club or Organization Meetings: Not on file    Marital Status: Not on file   Intimate Partner Violence:     Fear of Current or Ex-Partner: Not on file    Emotionally Abused: Not on file    Physically Abused: Not on file    Sexually Abused: Not on file   Housing Stability:     Unable to Pay for Housing in the Last Year: Not on file    Number of [place] in the Last Year: Not on file    Unstable Housing in the Last Year: Not on file         ALLERGIES: Esomeprazole magnesium, Nexium [esomeprazole magnesium], and Pcn [penicillins]    Review of Systems   Constitutional: Positive for fatigue and fever. HENT: Positive for sinus pressure. Eyes: Negative. Respiratory: Positive for cough and shortness of breath. Negative for wheezing. Cardiovascular: Negative. Gastrointestinal: Negative. Musculoskeletal: Negative. Skin: Negative. Neurological: Positive for headaches. Hematological: Negative. Vitals:    01/07/22 2103 01/07/22 2135 01/07/22 2324   BP: 129/78     Pulse:  (!) 102 96   Resp: 20     Temp: (!) 103 °F (39.4 °C)  98.6 °F (37 °C)   SpO2: 96%              Physical Exam  Vitals and nursing note reviewed. Constitutional:       Appearance: Normal appearance. She is not ill-appearing. HENT:      Head: Normocephalic and atraumatic. Right Ear: Tympanic membrane, ear canal and external ear normal.      Left Ear: Tympanic membrane, ear canal and external ear normal.      Nose: Nose normal.      Mouth/Throat:      Mouth: Mucous membranes are moist.      Pharynx: Oropharynx is clear. Eyes:      Conjunctiva/sclera: Conjunctivae normal.      Pupils: Pupils are equal, round, and reactive to light. Cardiovascular:      Rate and Rhythm: Normal rate and regular rhythm. Pulses: Normal pulses. Heart sounds: Normal heart sounds. Pulmonary:      Effort: Pulmonary effort is normal.   Abdominal:      General: Abdomen is flat. Musculoskeletal:         General: Normal range of motion. Cervical back: Normal range of motion and neck supple. Skin:     General: Skin is warm. Capillary Refill: Capillary refill takes less than 2 seconds. Neurological:      General: No focal deficit present. Mental Status: She is alert.           MDM       Procedures

## 2022-01-08 NOTE — ED TRIAGE NOTES
Pt c/o fever. Pt reports tested positive COVID on home test. Pt reports motrin taking last night and reports does not have tylenol.

## 2022-01-09 ENCOUNTER — HOSPITAL ENCOUNTER (EMERGENCY)
Age: 52
Discharge: HOME | End: 2022-01-09
Attending: EMERGENCY MEDICINE
Payer: MEDICARE

## 2022-01-09 VITALS
RESPIRATION RATE: 18 BRPM | TEMPERATURE: 99.1 F | DIASTOLIC BLOOD PRESSURE: 86 MMHG | SYSTOLIC BLOOD PRESSURE: 131 MMHG | HEART RATE: 86 BPM | BODY MASS INDEX: 27.58 KG/M2 | OXYGEN SATURATION: 98 % | HEIGHT: 68 IN | WEIGHT: 182 LBS

## 2022-01-09 DIAGNOSIS — U07.1 COVID-19: Primary | ICD-10-CM

## 2022-01-09 PROCEDURE — 74011250636 HC RX REV CODE- 250/636: Performed by: EMERGENCY MEDICINE

## 2022-01-09 PROCEDURE — 99282 EMERGENCY DEPT VISIT SF MDM: CPT

## 2022-01-09 PROCEDURE — 96372 THER/PROPH/DIAG INJ SC/IM: CPT

## 2022-01-09 RX ORDER — DEXAMETHASONE SODIUM PHOSPHATE 4 MG/ML
6 INJECTION, SOLUTION INTRA-ARTICULAR; INTRALESIONAL; INTRAMUSCULAR; INTRAVENOUS; SOFT TISSUE ONCE
Status: COMPLETED | OUTPATIENT
Start: 2022-01-09 | End: 2022-01-09

## 2022-01-09 RX ADMIN — DEXAMETHASONE SODIUM PHOSPHATE 6 MG: 4 INJECTION, SOLUTION INTRAMUSCULAR; INTRAVENOUS at 13:13

## 2022-01-09 NOTE — ED PROVIDER NOTES
HPI   Patient was diagnosed with Covid approximately 2 weeks ago. She was also seen here 2 days ago for respiratory complaints and myalgias and was found to have likely Covid infiltrates and received a dose of steroids. Apparently, patient was told to return to the emergency department if she felt worse which she reports doing so today. She reports feeling increasingly short of breath. Denies GI and  complaints and fever. She has been taking normal p.o.   Past Medical History:   Diagnosis Date    Depression     Diabetes ([address]     Dysthymic disorder     Fibromyalgia     Fibromyalgia syndrome 8/8/2012    Headache(784.0) 9/8/2012    Hypercholesterolemia     Left ear pain     Migraine     Mixed hyperlipidemia     Musculoskeletal pain 9/16/2014       Past Surgical History:   Procedure Laterality Date    HX GYN      Partial Hysterectomy    HX HEENT      HX TUBAL LIGATION           Family History:   Problem Relation Age of Onset    Alcohol abuse Father     Diabetes Mother     Hypertension Mother     Heart Disease Mother     Lupus Mother     Sleep Apnea Mother     Depression Mother     Cancer Maternal Aunt     OSTEOARTHRITIS Other     Asthma Other     Diabetes Other     Elevated Lipids Other     Headache Other     Heart Disease Other     Hypertension Other     Migraines Other     Stroke Other     Bleeding Prob Neg Hx     Lung Disease Neg Hx     Psychiatric Disorder Neg Hx     Mental Retardation Neg Hx        Social History     Socioeconomic History    Marital status:      Spouse name: Not on file    Number of children: 2    Years of education: Not on file    Highest education level: 12th grade   Occupational History    Not on file   Tobacco Use    Smoking status: Former Smoker    Smokeless tobacco: Never Used    Tobacco comment: quit in 2010   Substance and Sexual Activity    Alcohol use: No    Drug use: No    Sexual activity: Yes     Birth control/protection: Condom   Other Topics Concern    Not on file   Social History Narrative    Not on file     Social Determinants of Health     Financial Resource Strain:     Difficulty of Paying Living Expenses: Not on file   Food Insecurity:     Worried About Running Out of Food in the Last Year: Not on file    Mary of Food in the Last Year: Not on file   Transportation Needs:     Lack of Transportation (Medical): Not on file    Lack of Transportation (Non-Medical): Not on file   Physical Activity:     Days of Exercise per Week: Not on file    Minutes of Exercise per Session: Not on file   Stress:     Feeling of Stress : Not on file   Social Connections:     Frequency of Communication with Friends and Family: Not on file    Frequency of Social Gatherings with Friends and Family: Not on file    Attends Islam Services: Not on file    Active Member of [address] [place] [address] or Organizations: Not on file    Attends Club or Organization Meetings: Not on file    Marital Status: Not on file   Intimate Partner Violence:     Fear of Current or Ex-Partner: Not on file    Emotionally Abused: Not on file    Physically Abused: Not on file    Sexually Abused: Not on file   Housing Stability:     Unable to Pay for Housing in the Last Year: Not on file    Number of [place] in the Last Year: Not on file    Unstable Housing in the Last Year: Not on file         ALLERGIES: Esomeprazole magnesium, Nexium [esomeprazole magnesium], and Pcn [penicillins]    Review of Systems   Constitutional: Positive for fatigue. HENT: Negative. Eyes: Negative. Respiratory: Positive for cough, chest tightness and shortness of breath. Cardiovascular: Negative. Gastrointestinal: Negative. Endocrine: Negative. Genitourinary: Negative. Musculoskeletal: Positive for arthralgias and myalgias. Allergic/Immunologic: Negative. Neurological: Negative. Hematological: Negative. Psychiatric/Behavioral: Negative.     All other systems reviewed and are negative. Vitals:    01/09/22 1223   BP: 131/86   Pulse: 86   Resp: 18   Temp: 99.1 °F (37.3 °C)   SpO2: 97%   Weight: 82.6 kg (182 lb)   Height: 5' 8\" (1.727 m)            Physical Exam  Vitals and nursing note reviewed. Constitutional:       General: She is not in acute distress. Appearance: Normal appearance. She is not ill-appearing, toxic-appearing or diaphoretic. HENT:      Head: Normocephalic and atraumatic. Nose: Nose normal.      Mouth/Throat:      Mouth: Mucous membranes are moist.      Pharynx: Oropharynx is clear. Eyes:      Extraocular Movements: Extraocular movements intact. Conjunctiva/sclera: Conjunctivae normal.      Pupils: Pupils are equal, round, and reactive to light. Cardiovascular:      Rate and Rhythm: Normal rate and regular rhythm. Pulses: Normal pulses. Heart sounds: Normal heart sounds. No murmur heard. No friction rub. No gallop. Pulmonary:      Effort: Pulmonary effort is normal. No respiratory distress. Breath sounds: No stridor. Rales present. No wheezing or rhonchi. Comments: Rales in the left lower fields. Otherwise clear  Chest:      Chest wall: No tenderness. Abdominal:      General: Abdomen is flat. There is no distension. Palpations: Abdomen is soft. There is no mass. Tenderness: There is no abdominal tenderness. There is no right CVA tenderness, left CVA tenderness, guarding or rebound. Hernia: No hernia is present. Musculoskeletal:         General: No swelling, tenderness, deformity or signs of injury. Normal range of motion. Cervical back: Normal range of motion and neck supple. No rigidity or tenderness. Right lower leg: No edema. Left lower leg: No edema. Lymphadenopathy:      Cervical: No cervical adenopathy. Skin:     General: Skin is warm and dry. Capillary Refill: Capillary refill takes less than 2 seconds. Coloration: Skin is not jaundiced or pale. Findings: No bruising, erythema, lesion or rash. Neurological:      General: No focal deficit present. Mental Status: She is alert and oriented to person, place, and time. Mental status is at baseline. Cranial Nerves: No cranial nerve deficit. Sensory: No sensory deficit. Motor: No weakness. Coordination: Coordination normal.      Gait: Gait normal.   Psychiatric:         Mood and Affect: Mood normal.         Behavior: Behavior normal.          MDM     Patient with ongoing sequela of known Covid. No vital sign or physical exam finding abnormalities to suggest need for hospitalization. Okay for discharge.   Procedures

## 2022-01-09 NOTE — ED TRIAGE NOTES
Pt reported she woke up this am with worsening SOB and cough, pt feels her pneumonia is not getting any better

## 2022-01-10 ENCOUNTER — PATIENT OUTREACH (OUTPATIENT)
Dept: CASE MANAGEMENT | Age: 52
End: 2022-01-10

## 2022-01-10 NOTE — PROGRESS NOTES
Patient contacted regarding COVID-19 diagnosis. Discussed COVID-19 related testing which was not done at this time. Test results were not done. Patient informed of results, if available? no.     Ambulatory Care Manager contacted the patient by telephone to perform post discharge assessment. Call within 2 business days of discharge: Yes Verified name and  with patient as identifiers. Provided introduction to self, and explanation of the CTN/ACM role, and reason for call due to risk factors for infection and/or exposure to COVID-19. Symptoms reviewed with patient who verbalized the following symptoms: no new symptoms and no worsening symptoms      Due to no new or worsening symptoms encounter was not routed to provider for escalation. Discussed follow-up appointments. If no appointment was previously scheduled, appointment scheduling offered:  no. [facility] follow up appointment(s): No future appointments. [facility] follow up appointment(s): none - patient will call to schedule PCP followup appointment. Interventions to address risk factors: Education of patient/family/caregiver/guardian to support self-management-covid-19     Advance Care Planning:   Does patient have an Advance Directive: not on file. Educated patient about risk for severe COVID-19 due to risk factors according to CDC guidelines. ACM reviewed discharge instructions, medical action plan and red flag symptoms with the patient who verbalized understanding. Discussed COVID vaccination status: no. Education provided on COVID-19 vaccination as appropriate. Discussed exposure protocols and quarantine with CDC Guidelines. Patient was given an opportunity to verbalize any questions and concerns and agrees to contact ACM or health care provider for questions related to their healthcare. Reviewed and educated patient on any new and changed medications related to discharge diagnosis     Was patient discharged with a pulse oximeter?  no Discussed and confirmed pulse oximeter discharge instructions and when to notify provider or seek emergency care. ACM provided contact information. Plan for follow-up call in 5-7 days based on severity of symptoms and risk factors.

## 2022-01-17 ENCOUNTER — PATIENT OUTREACH (OUTPATIENT)
Dept: CASE MANAGEMENT | Age: 52
End: 2022-01-17

## 2022-01-17 NOTE — PROGRESS NOTES
Patient resolved from Transition of Care episode on 1/17/22. ACM/CTN was unsuccessful at contacting this patient today. Patient/family was provided the following resources and education related to COVID-19 during the initial call:                         Signs, symptoms and red flags related to COVID-19            CDC exposure and quarantine guidelines            [facility] exposure contact - [phone]            Contact for their local Department of Health                 Patient has not had any additional ED or hospital visits. No further outreach scheduled with this CTN/ACM. Episode of Care resolved. Patient has this CTN/ACM contact information if future needs arise.

## 2022-01-28 ENCOUNTER — OFFICE VISIT (OUTPATIENT)
Dept: FAMILY MEDICINE CLINIC | Age: 52
End: 2022-01-28
Payer: MEDICARE

## 2022-01-28 VITALS
SYSTOLIC BLOOD PRESSURE: 102 MMHG | HEART RATE: 81 BPM | RESPIRATION RATE: 18 BRPM | WEIGHT: 178.4 LBS | HEIGHT: 68 IN | OXYGEN SATURATION: 96 % | BODY MASS INDEX: 27.04 KG/M2 | TEMPERATURE: 98 F | DIASTOLIC BLOOD PRESSURE: 70 MMHG

## 2022-01-28 DIAGNOSIS — F33.1 MODERATE EPISODE OF RECURRENT MAJOR DEPRESSIVE DISORDER (HCC): ICD-10-CM

## 2022-01-28 DIAGNOSIS — E11.40 TYPE 2 DIABETES MELLITUS WITH DIABETIC NEUROPATHY, WITHOUT LONG-TERM CURRENT USE OF INSULIN (HCC): Primary | ICD-10-CM

## 2022-01-28 DIAGNOSIS — G63 POLYNEUROPATHY ASSOCIATED WITH UNDERLYING DISEASE (HCC): ICD-10-CM

## 2022-01-28 DIAGNOSIS — M25.50 ARTHRALGIA OF MULTIPLE JOINTS: ICD-10-CM

## 2022-01-28 DIAGNOSIS — I10 ESSENTIAL HYPERTENSION: ICD-10-CM

## 2022-01-28 PROCEDURE — G8419 CALC BMI OUT NRM PARAM NOF/U: HCPCS | Performed by: FAMILY MEDICINE

## 2022-01-28 PROCEDURE — 3046F HEMOGLOBIN A1C LEVEL >9.0%: CPT | Performed by: FAMILY MEDICINE

## 2022-01-28 PROCEDURE — G8752 SYS BP LESS 140: HCPCS | Performed by: FAMILY MEDICINE

## 2022-01-28 PROCEDURE — G8427 DOCREV CUR MEDS BY ELIG CLIN: HCPCS | Performed by: FAMILY MEDICINE

## 2022-01-28 PROCEDURE — G8754 DIAS BP LESS 90: HCPCS | Performed by: FAMILY MEDICINE

## 2022-01-28 PROCEDURE — 2022F DILAT RTA XM EVC RTNOPTHY: CPT | Performed by: FAMILY MEDICINE

## 2022-01-28 PROCEDURE — 99214 OFFICE O/P EST MOD 30 MIN: CPT | Performed by: FAMILY MEDICINE

## 2022-01-28 PROCEDURE — G9717 DOC PT DX DEP/BP F/U NT REQ: HCPCS | Performed by: FAMILY MEDICINE

## 2022-01-28 PROCEDURE — 3017F COLORECTAL CA SCREEN DOC REV: CPT | Performed by: FAMILY MEDICINE

## 2022-01-28 NOTE — PROGRESS NOTES
1. Have you been to the ER, urgent care clinic since your last visit? Hospitalized since your last visit? Yes When: [facility] ED covid pnuemonia positive 1/5/22    2. Have you seen or consulted any other health care providers outside of the [address] [place] [address] since your last visit? Include any pap smears or colon screening. Dentist - had tooth pulled 1/25/22     Reviewed record in preparation for visit and have necessary documentation  Pt did not bring medication to office visit for review  Patient is accompanied by self I have received verbal consent from Jamal Mitchell to discuss any/all medical information while they are present in the room.     Goals that were addressed and/or need to be completed during or after this appointment include     Health Maintenance Due   Topic Date Due    COVID-19 Vaccine (1) Never done    Colorectal Cancer Screening Combo  Never done    Eye Exam Retinal or Dilated  02/01/2020    Medicare Yearly Exam  06/12/2021    Shingrix Vaccine Age 50> (1 of 2) Never done    Breast Cancer Screen Mammogram  09/26/2021

## 2022-01-31 NOTE — PROGRESS NOTES
Chief Complaint   Patient presents with   [facility] Follow Up     1/5/22 covid +, went to [facility] covid pneumonia - winded      she is a 49y.o. year old female who presents for follow up of chronic health conditions. She was positive for Covid-19 on 1/5/22. Patient with multiple co-morbidities which include T2D, HTN, HLD, migraine, RLS, fibromyalgia, depression and chronic pain. She is due for lab work. Patient denies HA, dizziness, SOB, CP, abdominal pain, dysuria. She is followed by pain management. She is concerned her multiple arthralgias for which she sees pain management are due to Lyme disease. Diabetes: This patient is being treating under a comprehensive plan of care for diabetes. Overall the patient feels well with good energy level. Insulin dependence: no      Call immediately if having symptoms of high sugar (frequent urination, always thirsty) or low sugar (dizzy, lethargic, sweaty, nauseated, headache). Our overall goal is to reduce or eliminate the long term consequences of poorly controlled diabetes. Patient expresses understanding and agreement with our plan of care. Hypertension:  The patient reports:  taking medications as instructed, no medication side effects noted, no TIA's, no chest pain on exertion, no dyspnea on exertion, no swelling of ankles. BP Readings from Last 3 Encounters:   01/28/22 102/70   01/09/22 131/86   01/07/22 129/78     Patient advised to log blood pressures at home weekly and bring to next visit. Call office as soon as possible if BP's over 140/90 on multiple occasions or with symptoms of dizziness, chest pain, shortness of breath, headache or ankle swelling. Our goal is to normalize the blood pressure to decrease the risks of strokes and heart attacks. The patient is in agreement with the plan.       Patient Active Problem List   Diagnosis Code    Obesity E66.9    Diabetes mellitus ([facility] E11.9    Hypertension I10    Fibromyalgia syndrome M79.7    Restless leg syndrome G25.81    Primary fibromyalgia syndrome M79.7    Encounter for long-term (current) use of high-risk medication Z79.899    Headache R51.9    Persistent disorder of initiating or maintaining sleep G47.00    Migraine headache G43.909    Musculoskeletal pain M79.18    Multiple environmental allergies Z91.09    History of headache Z87.898    Hyperlipidemia E78.5    Type 2 diabetes mellitus (HCC) E11.9    Recurrent depression (HCC) F33.9     Past Surgical History:   Procedure Laterality Date    HX GYN      Partial Hysterectomy    HX HEENT      HX TUBAL LIGATION       Social History     Socioeconomic History    Marital status:      Spouse name: Not on file    Number of children: 2    Years of education: Not on file    Highest education level: 12th grade   Occupational History    Not on file   Tobacco Use    Smoking status: Former Smoker    Smokeless tobacco: Never Used    Tobacco comment: quit in 2010   Substance and Sexual Activity    Alcohol use: No    Drug use: No    Sexual activity: Yes     Birth control/protection: Condom   Other Topics Concern    Not on file   Social History Narrative    Not on file     Social Determinants of Health     Financial Resource Strain:     Difficulty of Paying Living Expenses: Not on file   Food Insecurity:     Worried About Running Out of Food in the Last Year: Not on file    Mary of Food in the Last Year: Not on file   Transportation Needs:     Lack of Transportation (Medical): Not on file    Lack of Transportation (Non-Medical):  Not on file   Physical Activity:     Days of Exercise per Week: Not on file    Minutes of Exercise per Session: Not on file   Stress:     Feeling of Stress : Not on file   Social Connections:     Frequency of Communication with Friends and Family: Not on file    Frequency of Social Gatherings with Friends and Family: Not on file    Attends Caodaism Services: Not on file   Fidel [place] Member of Clubs or Organizations: Not on file    Attends Club or Organization Meetings: Not on file    Marital Status: Not on file   Intimate Partner Violence:     Fear of Current or Ex-Partner: Not on file    Emotionally Abused: Not on file    Physically Abused: Not on file    Sexually Abused: Not on file   Housing Stability:     Unable to Pay for Housing in the Last Year: Not on file    Number of Places Lived in the Last Year: Not on file    Unstable Housing in the Last Year: Not on file     Family History   Problem Relation Age of Onset    Alcohol abuse Father     Diabetes Mother     Hypertension Mother     Heart Disease Mother     Lupus Mother     Sleep Apnea Mother     Depression Mother     Cancer Maternal Aunt     OSTEOARTHRITIS Other     Asthma Other     Diabetes Other     Elevated Lipids Other     Headache Other     Heart Disease Other     Hypertension Other     Migraines Other     Stroke Other     Bleeding Prob Neg Hx     Lung Disease Neg Hx     Psychiatric Disorder Neg Hx     Mental Retardation Neg Hx      Current Outpatient Medications   Medication Sig    oxyCODONE ER (Xtampza ER) 13.5 mg capsule Take 13.5 mg by mouth two (2) times a day.  fenofibrate nanocrystallized (TRICOR) 145 mg tablet Take 1 tablet by mouth daily.  pravastatin (PRAVACHOL) 40 mg tablet Take 1 tablet by mouth nightly.  topiramate (TOPAMAX) 25 mg tablet Take 1 tablet by mouth nightly.  dexlansoprazole (DEXILANT) 60 mg CpDB capsule (delayed release) Take 1 capsule by mouth daily.  Trulicity 1.5 UQ/1.2 mL sub-q pen Inject 1.5mg subcutaneously every 7 days.  ergocalciferol (ERGOCALCIFEROL) 1,250 mcg (50,000 unit) capsule Take 1 capsule by mouth every 7 days.  montelukast (SINGULAIR) 10 mg tablet Take 1 tablet by mouth daily.  magnesium oxide (MAG-OX) 400 mg tablet Take 400 mg by mouth daily.     ondansetron (ZOFRAN ODT) 8 mg disintegrating tablet Take 1 Tablet by mouth every eight (8) hours as needed for Nausea or Vomiting.  rizatriptan (MAXALT) 10 mg tablet TAKE ONE TABLET BY MOUTH ONCE AS NEEDED MAY REPEAT IN 2 HOURS IF NEEDED    cyclobenzaprine (FLEXERIL) 10 mg tablet Take 1 Tab by mouth three (3) times daily as needed for Muscle Spasm(s).  Cetirizine (ZyrTEC) 10 mg cap Take 10 mg by mouth daily.  naloxone 4 mg/actuation spry 4 mg by Nasal route as needed for up to 2 doses. Indications: OPIOID TOXICITY    albuterol (PROAIR HFA) 90 mcg/actuation inhaler Take 2 Puffs by inhalation every four (4) hours as needed for Wheezing. Take 2 Puffs inhaled by mouth Every 4 Hours. (Patient taking differently: Take 2 Puffs by inhalation every four (4) hours as needed for Wheezing.)     No current facility-administered medications for this visit. Allergies   Allergen Reactions    Esomeprazole Magnesium Hives     Other reaction(s): mild rash/itching    Morphine Other (comments)    Nexium [Esomeprazole Magnesium] Unknown (comments)    Pcn [Penicillins] Unknown (comments)       Review of Systems:  Constitutional: Negative for fever or malaise  Cardiovascular: Negative for dizziness, chest pain or palpitations  Respiratory: Negative for cough, wheezing or SOB  Neurology:   Psychological: see HPI       Vitals:    01/28/22 0903   BP: 102/70   Pulse: 81   Resp: 18   Temp: 98 °F (36.7 °C)   TempSrc: Oral   SpO2: 96%   Weight: 178 lb 6.4 oz (80.9 kg)   Height: 5' 8\" (1.727 m)       Physical Examination:  General: Well developed, well nourished, in no acute distress  Respiratory:  Symmetrical, unlabored effort  Cardiovascular: regular rate and rhythm  Extremities: Full range of motion, antalgic gait  Psych: Active, alert and oriented. Affect appropriate       Diagnoses and all orders for this visit:    1. Type 2 diabetes mellitus with diabetic neuropathy, without long-term current use of insulin (HCC)  -     LIPID PANEL; Future  -     MAGNESIUM; Future  -     METABOLIC PANEL, COMPREHENSIVE;  Future  - TSH 3RD GENERATION; Future  -     CBC W/O DIFF; Future    2. Essential hypertension  -     METABOLIC PANEL, COMPREHENSIVE; Future  -     TSH 3RD GENERATION; Future    3. Polyneuropathy associated with underlying disease ([facility] [address]    4. Moderate episode of recurrent major depressive disorder ([facility] [address]    5. Arthralgia of multiple joints  -     LYME AB TOTAL W/RFLX W BLOT; Future      Plan of care:  Diagnoses were discussed in detail with patient. Medications reviewed and appropriate. Patient to continue current prescribed medications as written. Medication risks/benefits/side effects discussed with patient. All of the patient's questions were addressed and answered to apparent satisfaction. The patient understands and agrees with our plan of care. The patient knows to call back if they have questions about the plan of care or if symptoms change. The patient received an After-Visit Summary which contains VS, diagnoses, orders, allergy and medication lists. No future appointments. Follow-up and Dispositions    · Return in about 3 months (around 4/28/2022), or if symptoms worsen or fail to improve.

## 2022-03-18 PROBLEM — Z87.898 HISTORY OF HEADACHE: Status: ACTIVE | Noted: 2017-09-05

## 2022-03-18 PROBLEM — E11.9 TYPE 2 DIABETES MELLITUS (HCC): Status: ACTIVE | Noted: 2018-09-24

## 2022-03-19 PROBLEM — F33.9 RECURRENT DEPRESSION (HCC): Status: ACTIVE | Noted: 2018-11-03

## 2022-03-19 PROBLEM — E78.5 HYPERLIPIDEMIA: Status: ACTIVE | Noted: 2018-06-14

## 2022-04-07 ENCOUNTER — OFFICE VISIT (OUTPATIENT)
Dept: FAMILY MEDICINE CLINIC | Age: 52
End: 2022-04-07
Payer: MEDICARE

## 2022-04-07 VITALS
SYSTOLIC BLOOD PRESSURE: 112 MMHG | BODY MASS INDEX: 27.55 KG/M2 | OXYGEN SATURATION: 99 % | HEART RATE: 94 BPM | TEMPERATURE: 99.4 F | RESPIRATION RATE: 18 BRPM | DIASTOLIC BLOOD PRESSURE: 75 MMHG | HEIGHT: 68 IN | WEIGHT: 181.8 LBS

## 2022-04-07 DIAGNOSIS — F33.9 RECURRENT DEPRESSION (HCC): ICD-10-CM

## 2022-04-07 DIAGNOSIS — R30.9 URINARY PAIN: Primary | ICD-10-CM

## 2022-04-07 DIAGNOSIS — E11.40 TYPE 2 DIABETES MELLITUS WITH DIABETIC NEUROPATHY, WITHOUT LONG-TERM CURRENT USE OF INSULIN (HCC): ICD-10-CM

## 2022-04-07 DIAGNOSIS — Z13.31 POSITIVE DEPRESSION SCREENING: ICD-10-CM

## 2022-04-07 LAB
BILIRUB UR QL STRIP: NEGATIVE
GLUCOSE UR-MCNC: NEGATIVE MG/DL
HBA1C MFR BLD HPLC: 8.9 %
KETONES P FAST UR STRIP-MCNC: NORMAL MG/DL
PH UR STRIP: 6.5 [PH] (ref 4.6–8)
PROT UR QL STRIP: NEGATIVE
SP GR UR STRIP: 1.03 (ref 1–1.03)
UA UROBILINOGEN AMB POC: NORMAL (ref 0.2–1)
URINALYSIS CLARITY POC: CLEAR
URINALYSIS COLOR POC: YELLOW
URINE BLOOD POC: NEGATIVE
URINE LEUKOCYTES POC: NORMAL
URINE NITRITES POC: NEGATIVE

## 2022-04-07 PROCEDURE — 99214 OFFICE O/P EST MOD 30 MIN: CPT | Performed by: FAMILY MEDICINE

## 2022-04-07 PROCEDURE — 3052F HG A1C>EQUAL 8.0%<EQUAL 9.0%: CPT | Performed by: FAMILY MEDICINE

## 2022-04-07 PROCEDURE — 81003 URINALYSIS AUTO W/O SCOPE: CPT | Performed by: FAMILY MEDICINE

## 2022-04-07 PROCEDURE — 83036 HEMOGLOBIN GLYCOSYLATED A1C: CPT | Performed by: FAMILY MEDICINE

## 2022-04-07 RX ORDER — SULFAMETHOXAZOLE AND TRIMETHOPRIM 800; 160 MG/1; MG/1
1 TABLET ORAL 2 TIMES DAILY
Qty: 6 TABLET | Refills: 0 | Status: SHIPPED | OUTPATIENT
Start: 2022-04-07 | End: 2022-04-10

## 2022-04-07 RX ORDER — DULOXETIN HYDROCHLORIDE 30 MG/1
30 CAPSULE, DELAYED RELEASE ORAL DAILY
Qty: 30 CAPSULE | Refills: 1 | Status: SHIPPED | OUTPATIENT
Start: 2022-04-07

## 2022-04-07 NOTE — PROGRESS NOTES
1. \"Have you been to the ER, urgent care clinic since your last visit? Hospitalized since your last visit? \" No    2. \"Have you seen or consulted any other health care providers outside of the [address] [place] [address] since your last visit? \" No     3. For patients aged 39-70: Has the patient had a colonoscopy / FIT/ Cologuard? Yes - no Care Gap present      If the patient is female:    4. For patients aged 41-77: Has the patient had a mammogram within the past 2 years? No      5. For patients aged 21-65: Has the patient had a pap smear?  NA - based on age or sex    Health Maintenance Due   Topic Date Due    COVID-19 Vaccine (1) Never done    Colorectal Cancer Screening Combo  Never done    Eye Exam Retinal or Dilated  02/01/2020    Medicare Yearly Exam  06/12/2021    Shingrix Vaccine Age 50> (1 of 2) Never done    Breast Cancer Screen Mammogram  09/26/2021    MICROALBUMIN Q1  03/23/2022

## 2022-04-07 NOTE — PATIENT INSTRUCTIONS
Urinary Tract Infection (UTI) in Women: Care Instructions  Overview     A urinary tract infection, or UTI, is a general term for an infection anywhere between the kidneys and the urethra (where urine comes out). Most UTIs are bladder infections. They often cause pain or burning when you urinate. UTIs are caused by bacteria and can be cured with antibiotics. Be sure to complete your treatment so that the infection does not get worse. Follow-up care is a key part of your treatment and safety. Be sure to make and go to all appointments, and call your doctor if you are having problems. It's also a good idea to know your test results and keep a list of the medicines you take. How can you care for yourself at home? · Take your antibiotics as directed. Do not stop taking them just because you feel better. You need to take the full course of antibiotics. · Drink extra water and other fluids for the next day or two. This will help make the urine less concentrated and help wash out the bacteria that are causing the infection. (If you have kidney, heart, or liver disease and have to limit fluids, talk with your doctor before you increase the amount of fluids you drink.)  · Avoid drinks that are carbonated or have caffeine. They can irritate the bladder. · Urinate often. Try to empty your bladder each time. · To relieve pain, take a hot bath or lay a heating pad set on low over your lower belly or genital area. Never go to sleep with a heating pad in place. To prevent UTIs  · Drink plenty of water each day. This helps you urinate often, which clears bacteria from your system. (If you have kidney, heart, or liver disease and have to limit fluids, talk with your doctor before you increase the amount of fluids you drink.)  · Urinate when you need to. · If you are sexually active, urinate right after you have sex. · Change sanitary pads often.   · Avoid douches, bubble baths, feminine hygiene sprays, and other feminine hygiene products that have deodorants. · After going to the bathroom, wipe from front to back. When should you call for help? Call your doctor now or seek immediate medical care if:    · Symptoms such as fever, chills, nausea, or vomiting get worse or appear for the first time.     · You have new pain in your back just below your rib cage. This is called flank pain.     · There is new blood or pus in your urine.     · You have any problems with your antibiotic medicine. Watch closely for changes in your health, and be sure to contact your doctor if:    · You are not getting better after taking an antibiotic for 2 days.     · Your symptoms go away but then come back. Where can you learn more? Go to http://www.gray.com/  Enter X797 in the search box to learn more about \"Urinary Tract Infection (UTI) in Women: Care Instructions. \"  Current as of: October 18, 2021               Content Version: 13.2  © 2006-2022 [employer]. Care instructions adapted under license by [employer] (which disclaims liability or warranty for this information). If you have questions about a medical condition or this instruction, always ask your healthcare professional. [place] [address] any warranty or liability for your use of this information.

## 2022-04-07 NOTE — PROGRESS NOTES
[facility]    History of Present Illness:   Jagruti Cast is a 49 y.o. female here for   Chief Complaint   Patient presents with    Follow-up     upper GI results    UTI     frequency and irritation when urinating. Been going on for a week,    Other     referral dietrician         HPI:  Patient seen for urinary symptoms x8 days. Complaining of dysuria frequency and urgency. She has been drinking a lot of cranberry juice and water. She also was referred for an EGD and would like to discuss that today. She has on her phone a message from the surgeon that says she had evidence of gastroparesis. No biopsies were taken per her report. She has researched the medication Reglan as well as vagal nerve stimulator and she is concerned about possible side effects of both. She says she has had abdominal pain and bloating for years. Her sugar was over 200 per [facility] records. She request to see a dietitian. She has a history of depression previously treated with Wellbutrin. She stopped it because it did not seem to help. Health Maintenance  Health Maintenance Due   Topic Date Due    COVID-19 Vaccine (1) Never done    Colorectal Cancer Screening Combo  Never done    Eye Exam Retinal or Dilated  02/01/2020    Medicare Yearly Exam  06/12/2021    Shingrix Vaccine Age 50> (1 of 2) Never done    Breast Cancer Screen Mammogram  09/26/2021    MICROALBUMIN Q1  03/23/2022       Past Medical, Family, and Social History:     Past Medical History:   Diagnosis Date    Depression     Diabetes ([facility] [address]     Dysthymic disorder     Fibromyalgia     Fibromyalgia syndrome 8/8/2012    Headache(784.0) 9/8/2012    Hypercholesterolemia     Left ear pain     Migraine     Mixed hyperlipidemia     Musculoskeletal pain 9/16/2014      Current Outpatient Medications on File Prior to Visit   Medication Sig Dispense Refill    Trulicity 1.5 QV/3.4 mL sub-q pen Inject 1.5mg under the skin every 7 days.  2 mL 2    fenofibrate nanocrystallized (TRICOR) 145 mg tablet Take 1 tablet by mouth daily. 90 Tablet 0    pravastatin (PRAVACHOL) 40 mg tablet Take 1 tablet by mouth nightly. 90 Tablet 0    topiramate (TOPAMAX) 25 mg tablet Take 1 tablet by mouth nightly. 90 Tablet 0    dexlansoprazole (DEXILANT) 60 mg CpDB capsule (delayed release) Take 1 capsule by mouth daily. 90 Capsule 0    ergocalciferol (ERGOCALCIFEROL) 1,250 mcg (50,000 unit) capsule Take 1 capsule by mouth every 7 days. 12 Capsule 1    montelukast (SINGULAIR) 10 mg tablet Take 1 tablet by mouth daily. 90 Tablet 1    magnesium oxide (MAG-OX) 400 mg tablet Take 400 mg by mouth daily.  ondansetron (ZOFRAN ODT) 8 mg disintegrating tablet Take 1 Tablet by mouth every eight (8) hours as needed for Nausea or Vomiting. 30 Tablet 2    rizatriptan (MAXALT) 10 mg tablet TAKE ONE TABLET BY MOUTH ONCE AS NEEDED MAY REPEAT IN 2 HOURS IF NEEDED 60 Tab 0    cyclobenzaprine (FLEXERIL) 10 mg tablet Take 1 Tab by mouth three (3) times daily as needed for Muscle Spasm(s). 90 Tab 0    Cetirizine (ZyrTEC) 10 mg cap Take 10 mg by mouth daily. 12 Cap 0    naloxone 4 mg/actuation spry 4 mg by Nasal route as needed for up to 2 doses. Indications: OPIOID TOXICITY 1 Box 1    albuterol (PROAIR HFA) 90 mcg/actuation inhaler Take 2 Puffs by inhalation every four (4) hours as needed for Wheezing. Take 2 Puffs inhaled by mouth Every 4 Hours. (Patient taking differently: Take 2 Puffs by inhalation every four (4) hours as needed for Wheezing.) 1 Inhaler 2     No current facility-administered medications on file prior to visit.        Patient Active Problem List   Diagnosis Code    Obesity E66.9    Diabetes mellitus ([facility]) E11.9    Hypertension I10    Fibromyalgia syndrome M79.7    Restless leg syndrome G25.81    Primary fibromyalgia syndrome M79.7    Encounter for long-term (current) use of high-risk medication Z79.899    Headache R51.9    Persistent disorder of initiating or maintaining sleep G47.00    Migraine headache G43.909    Musculoskeletal pain M79.18    Multiple environmental allergies Z91.09    History of headache Z87.898    Hyperlipidemia E78.5    Type 2 diabetes mellitus ([facility] E11.9    Recurrent depression ([facility] F33.9       Social History     Socioeconomic History    Marital status:     Number of children: 2    Highest education level: 12th grade   Tobacco Use    Smoking status: Former Smoker    Smokeless tobacco: Never Used    Tobacco comment: quit in 2010   Substance and Sexual Activity    Alcohol use: No    Drug use: No    Sexual activity: Yes     Birth control/protection: Condom        Review of Systems   Review of Systems   Constitutional: Negative for chills and fever. Respiratory: Negative for cough and shortness of breath. Cardiovascular: Negative for chest pain. Gastrointestinal: Positive for nausea. Negative for abdominal pain and vomiting. Genitourinary: Positive for dysuria, flank pain, frequency and urgency. Psychiatric/Behavioral: Positive for depression. Negative for suicidal ideas.        Objective:     Visit Vitals  /75 (BP 1 Location: Left upper arm, BP Patient Position: Sitting, BP Cuff Size: Large adult)   Pulse 94   Temp 99.4 °F (37.4 °C) (Oral)   Resp 18   Ht 5' 8\" (1.727 m)   Wt 181 lb 12.8 oz (82.5 kg)   SpO2 99%   BMI 27.64 kg/m²        Physical Exam  PHYSICAL EXAM:  Gen: Pt sitting in chair, in NAD  Head: Normocephalic, atraumatic  Eyes: Sclera anicteric, EOM grossly intact,  Ears: TM's pearly with good light reflex b/l  CVS: Normal S1, S2, no m/r/g  Resp: CTAB, no wheezes or rales  Abd: Soft, diffusely tender, no rebound or guarding, non-distended, +BS  Neuro: Alert, oriented, appropriate  PHQ 9 Score: 17 (4/7/2022  3:52 PM)  Thoughts of being better off dead, or hurting yourself in some way: 0 (4/7/2022  3:52 PM)           Pertinent Labs/Studies:  Testing preformed onsite at today's visit:  Results for orders placed or performed in visit on 04/07/22   AMB POC URINALYSIS DIP STICK AUTO W/O MICRO   Result Value Ref Range    Color (UA POC) Yellow     Clarity (UA POC) Clear     Glucose (UA POC) Negative Negative    Bilirubin (UA POC) Negative Negative    Ketones (UA POC) Trace Negative    Specific gravity (UA POC) 1.030 1.001 - 1.035    Blood (UA POC) Negative Negative    pH (UA POC) 6.5 4.6 - 8.0    Protein (UA POC) Negative Negative    Urobilinogen (UA POC) 0.2 mg/dL 0.2 - 1    Nitrites (UA POC) Negative Negative    Leukocyte esterase (UA POC) 3+ Negative   AMB POC HEMOGLOBIN A1C   Result Value Ref Range    Hemoglobin A1c (POC) 8.9 %       Assessment and orders:       ICD-10-CM ICD-9-CM    1. Urinary pain  R30.9 788.1 AMB POC URINALYSIS DIP STICK AUTO W/O MICRO      CULTURE, URINE      CULTURE, URINE      trimethoprim-sulfamethoxazole (BACTRIM DS, SEPTRA DS) 160-800 mg per tablet      CULTURE, URINE   2. Type 2 diabetes mellitus with diabetic neuropathy, without long-term current use of insulin ([facility])  E11.40 250.60 REFERRAL TO NUTRITION     357.2 AMB POC HEMOGLOBIN A1C   3. Positive depression screening  Z13.31 796.4    4. Recurrent depression ([facility])  F33.9 296.30 DULoxetine (CYMBALTA) 30 mg capsule     Diagnoses and all orders for this visit:    1. Urinary pain  -     AMB POC URINALYSIS DIP STICK AUTO W/O MICRO  -     CULTURE, URINE; Future  -     CULTURE, URINE; Future  -     trimethoprim-sulfamethoxazole (BACTRIM DS, SEPTRA DS) 160-800 mg per tablet; Take 1 Tablet by mouth two (2) times a day for 3 days. 2. Type 2 diabetes mellitus with diabetic neuropathy, without long-term current use of insulin ([facility])  -     REFERRAL TO NUTRITION  -     AMB POC HEMOGLOBIN A1C    3. Positive depression screening    4. Recurrent depression ([facility])  -     DULoxetine (CYMBALTA) 30 mg capsule; Take 1 Capsule by mouth daily. Multiple issues today.   We will treat presumptively with Bactrim and send urine for culture. I offered her GI referral to discuss treatment options for gastroparesis. I checked a point-of-care A1c and it was 8.9. She says she has been taking her Trulicity although noncompliance has been an issue in the past.  I have advised her to follow-up with her PCP in 2 weeks and bring log. Follow-up and Dispositions    · Return in about 2 weeks (around 4/21/2022) for marilee Newton. Depression screen positive, PHQ 9 Score: 17, additional evaluation and assessment performed, follow-up plan includes but not limited to: Medication management and Referral to /Specialist  for evaluation and management. Refused counseling, agreed to try cymbalta. the following changes in treatment are made: start cymbalta    I have discussed the diagnosis with the patient and the intended plan as seen in the above orders. Social history, medical history, and labs were reviewed. The patient has received an after-visit summary and questions were answered concerning future plans. I have discussed medication side effects and warnings with the patient as well. Patient/guardian verbalized understanding and accepts plan & risks. Advised counseling but she refused today.     MD SHIMON Vallejo & EFREM BARNARD [facility]  04/07/22

## 2022-04-10 LAB
BACTERIA SPEC CULT: ABNORMAL
CC UR VC: ABNORMAL
SERVICE CMNT-IMP: ABNORMAL

## 2022-04-11 NOTE — PROGRESS NOTES
Your urine culture was positive for infection but sensitive to the antibiotic you were given in the office. Continue current medication and drink plenty of water.

## 2022-04-12 ENCOUNTER — OFFICE VISIT (OUTPATIENT)
Dept: FAMILY MEDICINE CLINIC | Age: 52
End: 2022-04-12
Payer: MEDICARE

## 2022-04-12 VITALS
SYSTOLIC BLOOD PRESSURE: 111 MMHG | TEMPERATURE: 97.6 F | HEART RATE: 77 BPM | BODY MASS INDEX: 27.34 KG/M2 | HEIGHT: 68 IN | OXYGEN SATURATION: 97 % | DIASTOLIC BLOOD PRESSURE: 75 MMHG | WEIGHT: 180.4 LBS | RESPIRATION RATE: 18 BRPM

## 2022-04-12 DIAGNOSIS — E11.40 TYPE 2 DIABETES MELLITUS WITH DIABETIC NEUROPATHY, WITHOUT LONG-TERM CURRENT USE OF INSULIN (HCC): Primary | ICD-10-CM

## 2022-04-12 DIAGNOSIS — Z09 HOSPITAL DISCHARGE FOLLOW-UP: ICD-10-CM

## 2022-04-12 DIAGNOSIS — E27.8 ADRENAL MASS, LEFT (HCC): ICD-10-CM

## 2022-04-12 DIAGNOSIS — M54.50 LUMBAR PAIN: ICD-10-CM

## 2022-04-12 DIAGNOSIS — R93.422 ABNORMAL RADIOLOGIC FINDINGS ON DIAGNOSTIC IMAGING OF LEFT KIDNEY: ICD-10-CM

## 2022-04-12 PROCEDURE — 3052F HG A1C>EQUAL 8.0%<EQUAL 9.0%: CPT | Performed by: FAMILY MEDICINE

## 2022-04-12 PROCEDURE — G8427 DOCREV CUR MEDS BY ELIG CLIN: HCPCS | Performed by: FAMILY MEDICINE

## 2022-04-12 PROCEDURE — G8752 SYS BP LESS 140: HCPCS | Performed by: FAMILY MEDICINE

## 2022-04-12 PROCEDURE — 2022F DILAT RTA XM EVC RTNOPTHY: CPT | Performed by: FAMILY MEDICINE

## 2022-04-12 PROCEDURE — G8754 DIAS BP LESS 90: HCPCS | Performed by: FAMILY MEDICINE

## 2022-04-12 PROCEDURE — G9717 DOC PT DX DEP/BP F/U NT REQ: HCPCS | Performed by: FAMILY MEDICINE

## 2022-04-12 PROCEDURE — 3017F COLORECTAL CA SCREEN DOC REV: CPT | Performed by: FAMILY MEDICINE

## 2022-04-12 PROCEDURE — 99214 OFFICE O/P EST MOD 30 MIN: CPT | Performed by: FAMILY MEDICINE

## 2022-04-12 PROCEDURE — 1111F DSCHRG MED/CURRENT MED MERGE: CPT | Performed by: FAMILY MEDICINE

## 2022-04-12 PROCEDURE — G8417 CALC BMI ABV UP PARAM F/U: HCPCS | Performed by: FAMILY MEDICINE

## 2022-04-12 RX ORDER — GLIMEPIRIDE 2 MG/1
2 TABLET ORAL
Qty: 90 TABLET | Refills: 0 | Status: SHIPPED | OUTPATIENT
Start: 2022-04-12

## 2022-04-12 NOTE — PROGRESS NOTES
1. Have you been to the ER, urgent care clinic since your last visit? Hospitalized since your last visit? Yes When: [facility] 4/10/22    2. Have you seen or consulted any other health care providers outside of the [address] [place] [address] since your last visit? Include any pap smears or colon screening. No    3. For patients aged 39-70: Has the patient had a colonoscopy / FIT/ Cologuard? 7 years ago     If the patient is female:    4. For patients aged 41-77: Has the patient had a mammogram within the past 2 years? No      5. For patients aged 21-65: Has the patient had a pap smear? NA - based on age or sex     Reviewed record in preparation for visit and have necessary documentation  Pt did not bring medication to office visit for review  Patient is accompanied by self I have received verbal consent from Jose Martinez to discuss any/all medical information while they are present in the room.     Goals that were addressed and/or need to be completed during or after this appointment include     Health Maintenance Due   Topic Date Due    COVID-19 Vaccine (1) Never done    Colorectal Cancer Screening Combo  Never done    Pneumococcal 0-64 years (2 - PCV) 03/20/2019    Eye Exam Retinal or Dilated  02/01/2020    Medicare Yearly Exam  06/12/2021    Shingrix Vaccine Age 50> (1 of 2) Never done    Breast Cancer Screen Mammogram  09/26/2021    MICROALBUMIN Q1  03/23/2022

## 2022-04-12 NOTE — PROGRESS NOTES
Chief Complaint   Patient presents with   [facility] Follow Up    Follow-up       she is a 49y.o. year old female who presents for follow up of chronic health conditions. Patient recently seen in ER with CT that showed adrenal mass. Patient with hx of T2D, HTN, HLD, migraine, RLS, fibromyalgia, depression and chronic pain. She is followed by pain management. T2D now poorly controlled. Diabetes: This patient is being treating under a comprehensive plan of care for diabetes. Overall the patient feels well with good energy level. Insulin dependence: no    Lab Results   Component Value Date/Time    Hemoglobin A1c 7.3 (H) 11/12/2021 09:05 AM    Hemoglobin A1c (POC) 8.9 04/07/2022 04:30 PM          Call immediately if having symptoms of high sugar (frequent urination, always thirsty) or low sugar (dizzy, lethargic, sweaty, nauseated, headache). Our overall goal is to reduce or eliminate the long term consequences of poorly controlled diabetes. Patient expresses understanding and agreement with our plan of care. Hypertension:  The patient reports:  taking medications as instructed, no medication side effects noted, no TIA's, no chest pain on exertion, no dyspnea on exertion, no swelling of ankles. BP Readings from Last 3 Encounters:   04/12/22 111/75   04/07/22 112/75   01/28/22 102/70     Lab Results   Component Value Date/Time    Sodium 137 01/07/2022 11:45 PM    Potassium 3.6 01/07/2022 11:45 PM    Chloride 100 01/07/2022 11:45 PM    CO2 27 01/07/2022 11:45 PM    Anion gap 10 01/07/2022 11:45 PM    Glucose 179 (H) 01/07/2022 11:45 PM    BUN 6 01/07/2022 11:45 PM    Creatinine 0.73 01/07/2022 11:45 PM    BUN/Creatinine ratio 8 (L) 01/07/2022 11:45 PM    GFR est AA >60 01/07/2022 11:45 PM    GFR est non-AA >60 01/07/2022 11:45 PM    Calcium 8.4 (L) 01/07/2022 11:45 PM    Bilirubin, total 0.4 01/07/2022 11:45 PM    ALT (SGPT) 30 01/07/2022 11:45 PM    Alk.  phosphatase 84 01/07/2022 11:45 PM Protein, total 7.1 01/07/2022 11:45 PM    Albumin 3.2 (L) 01/07/2022 11:45 PM    Globulin 3.9 01/07/2022 11:45 PM    A-G Ratio 0.8 (L) 01/07/2022 11:45 PM        Call office as soon as possible if BP's over 140/90 on multiple occasions or with symptoms of dizziness, chest pain, shortness of breath, headache or ankle swelling. Our goal is to normalize the blood pressure to decrease the risks of strokes and heart attacks. The patient is in agreement with the plan.       Patient Active Problem List   Diagnosis Code    Obesity E66.9    Diabetes mellitus ([facility] [address] E11.9    Hypertension I10    Fibromyalgia syndrome M79.7    Restless leg syndrome G25.81    Primary fibromyalgia syndrome M79.7    Encounter for long-term (current) use of high-risk medication Z79.899    Headache R51.9    Persistent disorder of initiating or maintaining sleep G47.00    Migraine headache G43.909    Musculoskeletal pain M79.18    Multiple environmental allergies Z91.09    History of headache Z87.898    Hyperlipidemia E78.5    Type 2 diabetes mellitus (HCC) E11.9    Recurrent depression (HCC) F33.9     Past Surgical History:   Procedure Laterality Date    HX GYN      Partial Hysterectomy    HX HEENT      HX TUBAL LIGATION       Social History     Socioeconomic History    Marital status:      Spouse name: Not on file    Number of children: 2    Years of education: Not on file    Highest education level: 12th grade   Occupational History    Not on file   Tobacco Use    Smoking status: Former Smoker    Smokeless tobacco: Never Used    Tobacco comment: quit in 2010   Substance and Sexual Activity    Alcohol use: No    Drug use: No    Sexual activity: Yes     Birth control/protection: Condom   Other Topics Concern    Not on file   Social History Narrative    Not on file     Social Determinants of Health     Financial Resource Strain:     Difficulty of Paying Living Expenses: Not on file   Food Insecurity:     Worried About [facility] in the Last Year: Not on file    Mary of Food in the Last Year: Not on file   Transportation Needs:     Lack of Transportation (Medical): Not on file    Lack of Transportation (Non-Medical): Not on file   Physical Activity:     Days of Exercise per Week: Not on file    Minutes of Exercise per Session: Not on file   Stress:     Feeling of Stress : Not on file   Social Connections:     Frequency of Communication with Friends and Family: Not on file    Frequency of Social Gatherings with Friends and Family: Not on file    Attends Synagogue Services: Not on file    Active Member of Clubs or Organizations: Not on file    Attends Club or Organization Meetings: Not on file    Marital Status: Not on file   Intimate Partner Violence:     Fear of Current or Ex-Partner: Not on file    Emotionally Abused: Not on file    Physically Abused: Not on file    Sexually Abused: Not on file   Housing Stability:     Unable to Pay for Housing in the Last Year: Not on file    Number of Places Lived in the Last Year: Not on file    Unstable Housing in the Last Year: Not on file     Family History   Problem Relation Age of Onset    Alcohol abuse Father     Diabetes Mother     Hypertension Mother     Heart Disease Mother     Lupus Mother     Sleep Apnea Mother     Depression Mother     Cancer Maternal Aunt     OSTEOARTHRITIS Other     Asthma Other     Diabetes Other     Elevated Lipids Other     Headache Other     Heart Disease Other     Hypertension Other     Migraines Other     Stroke Other     Bleeding Prob Neg Hx     Lung Disease Neg Hx     Psychiatric Disorder Neg Hx     Mental Retardation Neg Hx      Current Outpatient Medications   Medication Sig    DULoxetine (CYMBALTA) 30 mg capsule Take 1 Capsule by mouth daily.  Trulicity 1.5 MY/4.1 mL sub-q pen Inject 1.5mg under the skin every 7 days.     fenofibrate nanocrystallized (TRICOR) 145 mg tablet Take 1 tablet by mouth daily.  pravastatin (PRAVACHOL) 40 mg tablet Take 1 tablet by mouth nightly.  topiramate (TOPAMAX) 25 mg tablet Take 1 tablet by mouth nightly.  dexlansoprazole (DEXILANT) 60 mg CpDB capsule (delayed release) Take 1 capsule by mouth daily.  ergocalciferol (ERGOCALCIFEROL) 1,250 mcg (50,000 unit) capsule Take 1 capsule by mouth every 7 days.  montelukast (SINGULAIR) 10 mg tablet Take 1 tablet by mouth daily.  magnesium oxide (MAG-OX) 400 mg tablet Take 400 mg by mouth daily.  ondansetron (ZOFRAN ODT) 8 mg disintegrating tablet Take 1 Tablet by mouth every eight (8) hours as needed for Nausea or Vomiting.  rizatriptan (MAXALT) 10 mg tablet TAKE ONE TABLET BY MOUTH ONCE AS NEEDED MAY REPEAT IN 2 HOURS IF NEEDED    cyclobenzaprine (FLEXERIL) 10 mg tablet Take 1 Tab by mouth three (3) times daily as needed for Muscle Spasm(s).  Cetirizine (ZyrTEC) 10 mg cap Take 10 mg by mouth daily.  albuterol (PROAIR HFA) 90 mcg/actuation inhaler Take 2 Puffs by inhalation every four (4) hours as needed for Wheezing. Take 2 Puffs inhaled by mouth Every 4 Hours. (Patient taking differently: Take 2 Puffs by inhalation every four (4) hours as needed for Wheezing.)    naloxone 4 mg/actuation spry 4 mg by Nasal route as needed for up to 2 doses. Indications: OPIOID TOXICITY (Patient not taking: Reported on 4/12/2022)     No current facility-administered medications for this visit.      Allergies   Allergen Reactions    Esomeprazole Magnesium Hives     Other reaction(s): mild rash/itching    Morphine Other (comments)    Nexium [Esomeprazole Magnesium] Unknown (comments)    Pcn [Penicillins] Unknown (comments)       Review of Systems:  Constitutional: Negative for fever or malaise  Cardiovascular: Negative for dizziness, chest pain or palpitations  Respiratory: Negative for cough, wheezing or SOB  Musculoskeletal: has dx of fibromyalgia  Neurology:   Psychological: see HPI       Vitals: 04/12/22 1312   BP: 111/75   Pulse: 77   Resp: 18   Temp: 97.6 °F (36.4 °C)   TempSrc: Oral   SpO2: 97%   Weight: 180 lb 6.4 oz (81.8 kg)   Height: 5' 8\" (1.727 m)       Physical Examination:  General: Well developed, well nourished, in no acute distress  Respiratory:  Symmetrical, unlabored effort  Cardiovascular: Regular rate and rhythm  Extremities: Full range of motion, antalgic gait  Psych: Active, alert and oriented. Affect appropriate       Diagnoses and all orders for this visit:    1. Adrenal mass, left (HCC)  -     CT ABD W WO CONT; Future    2. Lumbar pain  -     XR SPINE LUMB 2 OR 3 V; Future    3. Abnormal radiologic findings on diagnostic imaging of left kidney   -     CT ABD W WO CONT; Future    4. Hospital discharge follow-up  -     FL DISCHARGE MEDS RECONCILED W/ CURRENT OUTPATIENT MED LIST      Plan of care:  Diagnoses were discussed in detail with patient. Medications reviewed and appropriate. Patient to continue current prescribed medications as written. Medication risks/benefits/side effects discussed with patient. All of the patient's questions were addressed and answered to apparent satisfaction. The patient understands and agrees with our plan of care. The patient knows to call back if they have questions about the plan of care or if symptoms change. The patient received an After-Visit Summary which contains VS, diagnoses, orders, allergy and medication lists. No future appointments.

## 2022-04-13 ENCOUNTER — TRANSCRIBE ORDER (OUTPATIENT)
Dept: SCHEDULING | Age: 52
End: 2022-04-13

## 2022-04-13 DIAGNOSIS — R93.422 ABNORMAL FINDING ON DIAGNOSTIC IMAGING OF LEFT KIDNEY: Primary | ICD-10-CM

## 2022-04-19 ENCOUNTER — TELEPHONE (OUTPATIENT)
Dept: FAMILY MEDICINE CLINIC | Age: 52
End: 2022-04-19

## 2022-04-19 NOTE — TELEPHONE ENCOUNTER
Pt already had 1 CT of her abdomin done. She was contacted again to schedule another one. Pt would like a call back regarding this. Please advise.

## 2022-04-19 NOTE — TELEPHONE ENCOUNTER
Called patient. She stated that when the  from [place] called her to schedule CT she told her she just had CT done 2 weeks prior. She stated the  told her she shouldn't have to have another done. Patient stated she is going to [facility] tomorrow to  CT disc for Dr Michelle Anand to look at.

## 2022-04-20 NOTE — TELEPHONE ENCOUNTER
Called patient. She was advised per Dr Newton:\"Radiologist who read most recent imaging recommended CT. \" She was advised Dr Jamilah Leong had read the CT report. Verbalized understanding. She stated she would call and schedule appt for CT.

## 2022-04-20 NOTE — TELEPHONE ENCOUNTER
Ordered CT was recommended by the radiologist who read the most recent Imaging. There is no reason to bring the disc as I have read the report and cannot download here at [facility].

## 2022-04-22 ENCOUNTER — HOSPITAL ENCOUNTER (OUTPATIENT)
Dept: LAB | Age: 52
Discharge: HOME | End: 2022-04-22
Payer: MEDICARE

## 2022-04-22 ENCOUNTER — HOSPITAL ENCOUNTER (OUTPATIENT)
Dept: CT IMAGING | Age: 52
Discharge: HOME | End: 2022-04-22
Payer: MEDICARE

## 2022-04-22 ENCOUNTER — TRANSCRIBE ORDER (OUTPATIENT)
Dept: REGISTRATION | Age: 52
End: 2022-04-22

## 2022-04-22 ENCOUNTER — TELEPHONE (OUTPATIENT)
Dept: FAMILY MEDICINE CLINIC | Age: 52
End: 2022-04-22

## 2022-04-22 DIAGNOSIS — R93.422 ABNORMAL FINDING ON DIAGNOSTIC IMAGING OF LEFT KIDNEY: ICD-10-CM

## 2022-04-22 DIAGNOSIS — R93.422 ABNORMAL FINDING ON DIAGNOSTIC IMAGING OF LEFT KIDNEY: Primary | ICD-10-CM

## 2022-04-22 LAB — CREAT SERPL-MCNC: 0.81 MG/DL (ref 0.55–1.02)

## 2022-04-22 PROCEDURE — 74011000636 HC RX REV CODE- 636: Performed by: FAMILY MEDICINE

## 2022-04-22 PROCEDURE — 36415 COLL VENOUS BLD VENIPUNCTURE: CPT

## 2022-04-22 PROCEDURE — 74170 CT ABD WO CNTRST FLWD CNTRST: CPT

## 2022-04-22 PROCEDURE — 82565 ASSAY OF CREATININE: CPT

## 2022-04-22 RX ADMIN — IOPAMIDOL 100 ML: 755 INJECTION, SOLUTION INTRAVENOUS at 08:40

## 2022-04-22 NOTE — PROGRESS NOTES
The left adrenal gland has a nodule measuring 2.8 cm. This is stable in size as compared to CT abdomen pelvis 2/28/2020 and CT abdomen pelvis 11/20/2019.

## 2022-04-22 NOTE — TELEPHONE ENCOUNTER
Attempted to call. No answer. Message left. Advise patient per Dr Cheney: \"The left adrenal gland has a nodule measuring 2.8 cm. This is stable in size as compared to CT abdomen pelvis 2/28/2020 and CT abdomen pelvis 11/20/2019. \"  Patient returned call. She was advised per Dr Newton::\"The left adrenal gland has a nodule measuring 2.8 cm. This is stable in size as compared to CT abdomen pelvis 2/28/2020 and CT abdomen pelvis 11/20/2019. \" Verbalized understanding.

## 2022-05-19 ENCOUNTER — TELEPHONE (OUTPATIENT)
Dept: PHARMACY | Age: 52
End: 2022-05-19

## 2022-05-19 NOTE — TELEPHONE ENCOUNTER
[facility] CLINICAL PHARMACY: ADHERENCE REVIEW  Identified care gap per [employer]: fills at pill pack: Diabetes and Statin adherence    Last Visit: 04/12/2022    Patient identified as LIS = 2, therefore patient may be able to receive a 90-day supply for the same cost as a 30-day supply    Patient also appears to be prescribed: Glimepiride    Patient not found in Outcomes MTM    ASSESSMENT  BP Readings from Last 3 Encounters:   04/12/22 111/75   04/07/22 112/75   01/28/22 102/70     Estimated Creatinine Clearance: 93.3 mL/min (by C-G formula based on SCr of 0.81 mg/dL). DIABETES ADHERENCE    Insurance Records claims through 05/19/2022 (2021 [place] = 100%; [place] = 91%; Potential Fail Date: 09/09/2022): Trulicity last filled on 04/14/2022 for 28 day supply. Next refill due: 05/12/2022-PAST DUE  Glimepiride last filled on 04/13/2022 for 90 day supply. Next refill due: 07/12/2022    Per [employer] my Script Portal:   Trulicity last filled on 05/18/2022 for 28 day supply. Per [facility]:   Trulicity is ready to be shipped for a 30 day supply . 6 refills remaining. Billed through [facility] Value Date/Time    Hemoglobin A1c 7.3 (H) 11/12/2021 09:05 AM    Hemoglobin A1c 6.5 (H) 03/23/2021 10:05 AM    Hemoglobin A1c 6.8 (H) 08/18/2020 02:42 PM    Hemoglobin A1c (POC) 8.9 04/07/2022 04:30 PM    Hemoglobin A1c (POC) 6.9 04/02/2019 04:27 PM    Hemoglobin A1c (POC) 6.7 02/08/2016 01:54 PM     NOTE A1c <9%    [id number] FELISHA Hidalgo Records claims through 05/19/2022 (2021 [place] = 100%; YTD [place] = 95%; Potential Fail Date: 07/27/2022):   Pravastatin last filled on 04/19/2022 for 30 day supply. Next refill due: 05/19/2022-TODAY    Per [facility] Isaac my script Portal:   Pravastatin  last filled on 05/18/2022 for 30 day supply. Per [facility]:   Pravastatin is ready to be shipped for a 30 day supply. 6 refills remaining.  Billed through [facility] Value Date/Time Cholesterol, total 286 (H) 11/12/2021 09:05 AM    HDL Cholesterol 33 11/12/2021 09:05 AM    LDL, calculated 194.4 (H) 11/12/2021 09:05 AM    VLDL, calculated 58.6 11/12/2021 09:05 AM    Triglyceride 293 (H) 11/12/2021 09:05 AM    CHOL/HDL Ratio 8.7 (H) 11/12/2021 09:05 AM     ALT (SGPT)   Date Value Ref Range Status   01/07/2022 30 12 - 78 U/L Final     AST (SGOT)   Date Value Ref Range Status   01/07/2022 18 15 - 37 U/L Final     The 10-year ASCVD risk score (Cristel Villa, et al., 2013) is: 5.5%    Values used to calculate the score:      Age: 49 years      Sex: Female      Is Non- : No      Diabetic: Yes      Tobacco smoker: No      Systolic Blood Pressure: 530 mmHg      Is BP treated: No      HDL Cholesterol: 33 MG/DL      Total Cholesterol: 286 MG/DL     PLAN  The following are interventions that have been identified:  Patient's meds have been refilled per pharmacy    No patient out reach planned at this time.     Per [facility] no refills are needed for any other medications all are caught up- these medications are to be shipped out for June 3rd delivery- and waiting on billing approval?    Future Appointments   Date Time Provider Dary Buck   5/24/2022 11:00 AM Samara Frazier, [facility], PharmD, [address] [facility]   Department, toll free: [phone] Option 2   ================================================================================  For Pharmacy Admin Tracking Only     Gap Closed?: Yes   Time Spent (min): 15

## 2022-05-24 ENCOUNTER — HOSPITAL ENCOUNTER (OUTPATIENT)
Dept: NUTRITION | Age: 52
End: 2022-05-24

## 2022-06-07 ENCOUNTER — HOSPITAL ENCOUNTER (OUTPATIENT)
Dept: NUTRITION | Age: 52
End: 2022-06-07

## 2022-07-12 ENCOUNTER — TELEPHONE (OUTPATIENT)
Dept: FAMILY MEDICINE CLINIC | Age: 52
End: 2022-07-12

## 2022-07-26 DIAGNOSIS — K21.9 GASTROESOPHAGEAL REFLUX DISEASE WITHOUT ESOPHAGITIS: ICD-10-CM

## 2022-07-26 DIAGNOSIS — G43.019 INTRACTABLE MIGRAINE WITHOUT AURA AND WITHOUT STATUS MIGRAINOSUS: ICD-10-CM

## 2022-07-26 DIAGNOSIS — E11.40 TYPE 2 DIABETES MELLITUS WITH DIABETIC NEUROPATHY, WITHOUT LONG-TERM CURRENT USE OF INSULIN (HCC): ICD-10-CM

## 2022-07-26 DIAGNOSIS — E78.2 MIXED HYPERLIPIDEMIA: ICD-10-CM

## 2022-07-26 NOTE — LETTER
7/27/2022 9:14 AM    Ms. Cleaning [facility] [id number]          Your doctor is interested in not only helping you feel better when you are sick, but also in keeping you from getting sick in the first place. In the spirit of maintaining your good health, we look forward to seeing you on your next visit. APPOINTMENT REMINDER:Please call office to schedule Office Visit and lab with Dr Soham Schmitz.            Sincerely,    [facility]  [phone]

## 2022-07-27 RX ORDER — TOPIRAMATE 25 MG/1
TABLET ORAL
Qty: 90 TABLET | Refills: 0 | Status: SHIPPED | OUTPATIENT
Start: 2022-07-27 | End: 2022-10-22

## 2022-07-27 RX ORDER — FENOFIBRATE 145 MG/1
TABLET, COATED ORAL
Qty: 90 TABLET | Refills: 0 | Status: SHIPPED | OUTPATIENT
Start: 2022-07-27 | End: 2022-10-22

## 2022-07-27 RX ORDER — DEXLANSOPRAZOLE 60 MG/1
CAPSULE, DELAYED RELEASE ORAL
Qty: 90 CAPSULE | Refills: 0 | Status: SHIPPED | OUTPATIENT
Start: 2022-07-27 | End: 2022-10-22

## 2022-07-27 RX ORDER — PRAVASTATIN SODIUM 40 MG/1
TABLET ORAL
Qty: 90 TABLET | Refills: 0 | Status: SHIPPED | OUTPATIENT
Start: 2022-07-27 | End: 2022-10-22

## 2022-07-27 NOTE — TELEPHONE ENCOUNTER
Attempted to call. No answer. Message left. Office visit and lab needs to be scheduled with Dr Patricio Novoa. Letter sent.

## 2022-08-24 DIAGNOSIS — E11.40 TYPE 2 DIABETES MELLITUS WITH DIABETIC NEUROPATHY, WITHOUT LONG-TERM CURRENT USE OF INSULIN (HCC): ICD-10-CM

## 2022-09-01 NOTE — TELEPHONE ENCOUNTER
Patient called regarding rx refill request and need for OV and labs to be obtained. No answer. Message left for return call. [employer] message left regarding need for OV as well as labs.

## 2022-09-03 RX ORDER — DULAGLUTIDE 1.5 MG/.5ML
INJECTION, SOLUTION SUBCUTANEOUS
Qty: 2 ML | Refills: 1 | OUTPATIENT
Start: 2022-09-03

## 2022-10-20 DIAGNOSIS — K21.9 GASTROESOPHAGEAL REFLUX DISEASE WITHOUT ESOPHAGITIS: ICD-10-CM

## 2022-10-20 DIAGNOSIS — E11.40 TYPE 2 DIABETES MELLITUS WITH DIABETIC NEUROPATHY, WITHOUT LONG-TERM CURRENT USE OF INSULIN (HCC): ICD-10-CM

## 2022-10-20 DIAGNOSIS — G43.019 INTRACTABLE MIGRAINE WITHOUT AURA AND WITHOUT STATUS MIGRAINOSUS: ICD-10-CM

## 2022-10-20 DIAGNOSIS — E78.2 MIXED HYPERLIPIDEMIA: ICD-10-CM

## 2022-10-22 RX ORDER — DEXLANSOPRAZOLE 60 MG/1
CAPSULE, DELAYED RELEASE ORAL
Qty: 90 CAPSULE | Refills: 0 | Status: SHIPPED | OUTPATIENT
Start: 2022-10-22

## 2022-10-22 RX ORDER — TOPIRAMATE 25 MG/1
TABLET ORAL
Qty: 90 TABLET | Refills: 0 | Status: SHIPPED | OUTPATIENT
Start: 2022-10-22

## 2022-10-22 RX ORDER — PRAVASTATIN SODIUM 40 MG/1
TABLET ORAL
Qty: 90 TABLET | Refills: 0 | Status: SHIPPED | OUTPATIENT
Start: 2022-10-22

## 2022-10-22 RX ORDER — FENOFIBRATE 145 MG/1
TABLET, COATED ORAL
Qty: 90 TABLET | Refills: 0 | Status: SHIPPED | OUTPATIENT
Start: 2022-10-22

## 2022-11-20 DIAGNOSIS — E55.9 VITAMIN D DEFICIENCY: ICD-10-CM

## 2022-11-20 DIAGNOSIS — Z91.09 MULTIPLE ENVIRONMENTAL ALLERGIES: ICD-10-CM

## 2022-11-20 RX ORDER — ERGOCALCIFEROL 1.25 MG/1
CAPSULE ORAL
Qty: 12 CAPSULE | Refills: 0 | Status: SHIPPED | OUTPATIENT
Start: 2022-11-20

## 2022-11-20 RX ORDER — MONTELUKAST SODIUM 10 MG/1
TABLET ORAL
Qty: 90 TABLET | Refills: 0 | Status: SHIPPED | OUTPATIENT
Start: 2022-11-20

## 2022-11-23 ENCOUNTER — OFFICE VISIT (OUTPATIENT)
Dept: FAMILY MEDICINE CLINIC | Age: 52
End: 2022-11-23
Payer: MEDICARE

## 2022-11-23 VITALS
WEIGHT: 186.8 LBS | DIASTOLIC BLOOD PRESSURE: 82 MMHG | BODY MASS INDEX: 28.31 KG/M2 | TEMPERATURE: 98.2 F | SYSTOLIC BLOOD PRESSURE: 119 MMHG | HEIGHT: 68 IN | OXYGEN SATURATION: 96 % | HEART RATE: 81 BPM | RESPIRATION RATE: 18 BRPM

## 2022-11-23 DIAGNOSIS — Z23 ENCOUNTER FOR IMMUNIZATION: Primary | ICD-10-CM

## 2022-11-23 PROCEDURE — G0008 ADMIN INFLUENZA VIRUS VAC: HCPCS | Performed by: FAMILY MEDICINE

## 2022-11-23 PROCEDURE — 90686 IIV4 VACC NO PRSV 0.5 ML IM: CPT | Performed by: FAMILY MEDICINE

## 2022-11-23 NOTE — PROGRESS NOTES
1. \"Have you been to the ER, urgent care clinic since your last visit? Hospitalized since your last visit? \" [facility]'S AND [facility], [facility]  cough    2. \"Have you seen or consulted any other health care providers outside of the [address] [place] [address] since your last visit? \" No     3. For patients aged 39-70: Has the patient had a colonoscopy / FIT/ Cologuard? No    If the patient is female:    4. For patients aged 41-77: Has the patient had a mammogram within the past 2 years? No    5. For patients aged 21-65: Has the patient had a pap smear? No     Patient is accompanied by self I have received verbal consent from Jamir Butler to discuss any/all medical information while they are present in the room.   Reviewed record in preparation for visit and have necessary documentation  Goals that were addressed and/or need to be completed during or after this appointment include     Health Maintenance Due   Topic Date Due    COVID-19 Vaccine (1) Never done    Hepatitis B Vaccine (1 of 3 - Risk 3-dose series) Never done    Cervical cancer screen  10/19/2014    Colorectal Cancer Screening Combo  Never done    Eye Exam Retinal or Dilated  02/01/2020    Medicare Yearly Exam  06/12/2021    Shingrix Vaccine Age 50> (1 of 2) Never done    Breast Cancer Screen Mammogram  09/26/2021    MICROALBUMIN Q1  03/23/2022    Foot Exam Q1  06/06/2022    Flu Vaccine (1) 08/01/2022    A1C test (Diabetic or Prediabetic)  10/07/2022    Lipid Screen  11/12/2022

## 2022-11-29 DIAGNOSIS — E11.40 TYPE 2 DIABETES MELLITUS WITH DIABETIC NEUROPATHY, WITHOUT LONG-TERM CURRENT USE OF INSULIN (HCC): ICD-10-CM

## 2022-11-29 NOTE — TELEPHONE ENCOUNTER
Robert Gonzales MD ,    [facility] Mis has been flagged for adherence by [facility]. I have pended refills for your approval.    LOV:PCP 4/12/22 - Immunization 11/23/2022  NOV:na    Thank you,  Aziza Tan, PharmD, [address] [facility]   Department, toll free: [phone] Option 2    Requested Prescriptions     Pending Prescriptions Disp Refills    dulaglutide (Trulicity) 1.5 QC/8.7 mL sub-q pen 2 mL 3     Sig: Inject 1.5mg under the skin every 7 days. glimepiride (AMARYL) 2 mg tablet 90 Tablet 0     Sig: Take 1 Tablet by mouth every morning.      ================================================================================    POPULATION HEALTH CLINICAL PHARMACY: ADHERENCE REVIEW  Identified care gap per [employer]: fills at [address] : Diabetes adherence    Last Visit: immunization 11/23/2022  Pain management 9/20/22  PCP 4/12/2022    Patient identified as LIS = 2, therefore patient may be able to receive a 90-day supply for the same cost as a 30-day supply      Linda [address] Records claims through 11/29/2022 (2021 [facility] [place] = na%; YTD [facility] =  80%; Potential Fail Date: 12/5/2022 ):   Glimepiride 2mg last filled on 4/13/2022 for 90 day supply. Next refill due: 7/10/3084  Trulicity last filled on 9/14/2022 for 28 day supply. Next refill due: 10/12/2022   0 refills remaining.  Billed through [facility]    Patient did try to get refills in august but rx's denied pt needs appt   Lab Results   Component Value Date/Time    Hemoglobin A1c 7.3 (H) 11/12/2021 09:05 AM    Hemoglobin A1c 6.5 (H) 03/23/2021 10:05 AM    Hemoglobin A1c 6.8 (H) 08/18/2020 02:42 PM    Hemoglobin A1c (POC) 8.9 04/07/2022 04:30 PM    Hemoglobin A1c (POC) 6.9 04/02/2019 04:27 PM    Hemoglobin A1c (POC) 6.7 02/08/2016 01:54 PM     NOTE A1c <9%    [id number] FELISHA Hidalgo Records claims through 11/29/2022 (2021 [facility] [place] = na%; YTD [facility]C =  98%; Potential Fail Date: 2023 ):   Pravastatin last filled on 11/15/2022 for 30 day supply. Next refill due: 12/15/2022  2 refills remaining. Billed through [facility] Rx Value Date/Time    Cholesterol, total 286 (H) 2021 09:05 AM    HDL Cholesterol 33 2021 09:05 AM    LDL, calculated 194.4 (H) 2021 09:05 AM    VLDL, calculated 58.6 2021 09:05 AM    Triglyceride 293 (H) 2021 09:05 AM    CHOL/HDL Ratio 8.7 (H) 2021 09:05 AM     ALT (SGPT)   Date Value Ref Range Status   2022 30 12 - 78 U/L Final     AST (SGOT)   Date Value Ref Range Status   2022 18 15 - 37 U/L Final     The 10-year ASCVD risk score (Gamal MEDINA, et al., 2019) is: 6.5%    Values used to calculate the score:      Age: 47 years      Sex: Female      Is Non- : No      Diabetic: Yes      Tobacco smoker: No      Systolic Blood Pressure: 888 mmHg      Is BP treated: No      HDL Cholesterol: 33 MG/DL      Total Cholesterol: 286 MG/DL     PLAN  The following are interventions that have been identified:  - Patient overdue refilling Glimepiride and Trulicity and active on home medication list  - Patient needs refills for Glimepiride and Trulicity        No future appointments.     Theresa Ravi, PharmD, [address] [facility], toll free: [phone] Option 2  ================================================================================  For Pharmacy Admin Tracking Only    CPA in place: No  Recommendation Provided To: Provider: 2 via Note to Provider   Intervention Detail: Adherence Monitorin and New Rx: 2, reason: Improve Adherence  Gap Closed?: Yes  Intervention Accepted By: Provider: 2  Time Spent (min): 15

## 2022-11-30 RX ORDER — DULAGLUTIDE 1.5 MG/.5ML
INJECTION, SOLUTION SUBCUTANEOUS
Qty: 2 ML | Refills: 0 | Status: SHIPPED | OUTPATIENT
Start: 2022-11-30

## 2022-11-30 RX ORDER — GLIMEPIRIDE 2 MG/1
2 TABLET ORAL
Qty: 30 TABLET | Refills: 0 | Status: SHIPPED | OUTPATIENT
Start: 2022-11-30

## 2022-12-12 ENCOUNTER — OFFICE VISIT (OUTPATIENT)
Dept: FAMILY MEDICINE CLINIC | Age: 52
End: 2022-12-12
Payer: MEDICARE

## 2022-12-12 VITALS
SYSTOLIC BLOOD PRESSURE: 117 MMHG | BODY MASS INDEX: 28.49 KG/M2 | HEIGHT: 68 IN | TEMPERATURE: 98.4 F | DIASTOLIC BLOOD PRESSURE: 79 MMHG | WEIGHT: 188 LBS | HEART RATE: 82 BPM | RESPIRATION RATE: 16 BRPM | OXYGEN SATURATION: 96 %

## 2022-12-12 DIAGNOSIS — E11.40 TYPE 2 DIABETES MELLITUS WITH DIABETIC NEUROPATHY, WITHOUT LONG-TERM CURRENT USE OF INSULIN (HCC): Primary | ICD-10-CM

## 2022-12-12 DIAGNOSIS — K21.9 GASTROESOPHAGEAL REFLUX DISEASE WITHOUT ESOPHAGITIS: ICD-10-CM

## 2022-12-12 DIAGNOSIS — I10 ESSENTIAL HYPERTENSION: ICD-10-CM

## 2022-12-12 DIAGNOSIS — E78.2 MIXED HYPERLIPIDEMIA: ICD-10-CM

## 2022-12-12 DIAGNOSIS — E55.9 VITAMIN D DEFICIENCY: ICD-10-CM

## 2022-12-12 DIAGNOSIS — Z00.00 MEDICARE ANNUAL WELLNESS VISIT, SUBSEQUENT: ICD-10-CM

## 2022-12-12 DIAGNOSIS — M25.50 ARTHRALGIA OF MULTIPLE JOINTS: ICD-10-CM

## 2022-12-12 PROCEDURE — 3017F COLORECTAL CA SCREEN DOC REV: CPT | Performed by: FAMILY MEDICINE

## 2022-12-12 PROCEDURE — 3052F HG A1C>EQUAL 8.0%<EQUAL 9.0%: CPT | Performed by: FAMILY MEDICINE

## 2022-12-12 PROCEDURE — 3074F SYST BP LT 130 MM HG: CPT | Performed by: FAMILY MEDICINE

## 2022-12-12 PROCEDURE — 2022F DILAT RTA XM EVC RTNOPTHY: CPT | Performed by: FAMILY MEDICINE

## 2022-12-12 PROCEDURE — G0439 PPPS, SUBSEQ VISIT: HCPCS | Performed by: FAMILY MEDICINE

## 2022-12-12 PROCEDURE — G8752 SYS BP LESS 140: HCPCS | Performed by: FAMILY MEDICINE

## 2022-12-12 PROCEDURE — 3078F DIAST BP <80 MM HG: CPT | Performed by: FAMILY MEDICINE

## 2022-12-12 PROCEDURE — G8417 CALC BMI ABV UP PARAM F/U: HCPCS | Performed by: FAMILY MEDICINE

## 2022-12-12 PROCEDURE — G8427 DOCREV CUR MEDS BY ELIG CLIN: HCPCS | Performed by: FAMILY MEDICINE

## 2022-12-12 PROCEDURE — G8754 DIAS BP LESS 90: HCPCS | Performed by: FAMILY MEDICINE

## 2022-12-12 PROCEDURE — G9717 DOC PT DX DEP/BP F/U NT REQ: HCPCS | Performed by: FAMILY MEDICINE

## 2022-12-12 PROCEDURE — 99214 OFFICE O/P EST MOD 30 MIN: CPT | Performed by: FAMILY MEDICINE

## 2022-12-12 RX ORDER — FLUOCINONIDE 0.5 MG/G
CREAM TOPICAL
Qty: 15 G | Refills: 2 | Status: SHIPPED | OUTPATIENT
Start: 2022-12-12

## 2022-12-12 RX ORDER — KETOCONAZOLE 20 MG/ML
SHAMPOO, SUSPENSION TOPICAL
Qty: 120 ML | Refills: 2 | Status: SHIPPED | OUTPATIENT
Start: 2022-12-12

## 2022-12-12 NOTE — PROGRESS NOTES
1. Have you been to the ER, urgent care clinic since your last visit? Hospitalized since your last visit? No    2. Have you seen or consulted any other health care providers outside of the [address] [place] [address] since your last visit? Include any pap smears or colon screening. [facility]     3. For patients aged 39-70: Has the patient had a colonoscopy / FIT/ Cologuard? No    If the patient is female:    4. For patients aged 41-77: Has the patient had a mammogram within the past 2 years? No      5. For patients aged 21-65: Has the patient had a pap smear? No     Reviewed record in preparation for visit and have necessary documentation  Pt did not bring medication to office visit for review  Patient is accompanied by self I have received verbal consent from Kenya Snider to discuss any/all medical information while they are present in the room.     Goals that were addressed and/or need to be completed during or after this appointment include     Health Maintenance Due   Topic Date Due    COVID-19 Vaccine (1) Never done    Hepatitis B Vaccine (1 of 3 - Risk 3-dose series) Never done    Cervical cancer screen  10/19/2014    Colorectal Cancer Screening Combo  Never done    Eye Exam Retinal or Dilated  02/01/2020    Medicare Yearly Exam  06/12/2021    Shingrix Vaccine Age 50> (1 of 2) Never done    Breast Cancer Screen Mammogram  09/26/2021    MICROALBUMIN Q1  03/23/2022    Foot Exam Q1  06/06/2022    A1C test (Diabetic or Prediabetic)  10/07/2022    Lipid Screen  11/12/2022

## 2022-12-12 NOTE — PATIENT INSTRUCTIONS
Medicare Wellness Visit, Female     The best way to live healthy is to have a lifestyle where you eat a well-balanced diet, exercise regularly, limit alcohol use, and quit all forms of tobacco/nicotine, if applicable. Regular preventive services are another way to keep healthy. Preventive services (vaccines, screening tests, monitoring & exams) can help personalize your care plan, which helps you manage your own care. Screening tests can find health problems at the earliest stages, when they are easiest to treat. [facility] follows the current, evidence-based guidelines published by the [facility] Tyrone Davies ([facility]) when recommending preventive services for our patients. Because we follow these guidelines, sometimes recommendations change over time as research supports it. (For example, mammograms used to be recommended annually. Even though Medicare will still pay for an annual mammogram, the newer guidelines recommend a mammogram every two years for women of average risk). Of course, you and your doctor may decide to screen more often for some diseases, based on your risk and your co-morbidities (chronic disease you are already diagnosed with). Preventive services for you include:  - Medicare offers their members a free annual wellness visit, which is time for you and your primary care provider to discuss and plan for your preventive service needs.  Take advantage of this benefit every year!    -Over the age of 72 should receive the recommended pneumonia vaccines.    -All adults should have a flu vaccine yearly.  -All adults should have a tetanus vaccine every 10 years.   -Over the age 48 should receive the shingles vaccines.        -All adults should be screened once for Hepatitis C.  -All adults age 38-68 who are overweight should have a diabetes screening test once every three years.   -Other screening tests and preventive services for persons with diabetes include: an eye exam to screen for diabetic retinopathy, a kidney function test, a foot exam, and stricter control over your cholesterol.   -Cardiovascular screening for adults with routine risk involves an electrocardiogram (ECG) at intervals determined by your doctor.     -Colorectal cancer screenings should be done for adults age 39-70 with no increased risk factors for colorectal cancer. There are a number of acceptable methods of screening for this type of cancer. Each test has its own benefits and drawbacks. Discuss with your doctor what is most appropriate for you during your annual wellness visit. The different tests include: colonoscopy (considered the best screening method), a fecal occult blood test, a fecal DNA test, and sigmoidoscopy.    -Lung cancer screening is recommended annually with a low dose CT scan for adults between age 54 and 68, who have smoked at least 30 pack years (equivalent of 1 pack per day for 30 days), and who is a current smoker or quit less than 15 years ago.    -A bone mass density test is recommended when a woman turns 65 to screen for osteoporosis. This test is only recommended one time, as a screening. Some providers will use this same test as a disease monitoring tool if you already have osteoporosis. -Breast cancer screenings are recommended every other year for women of normal risk, age 54-69.    -Cervical cancer screenings for women over age 72 are only recommended with certain risk factors.      Here is a list of your current Health Maintenance items (your personalized list of preventive services) with a due date:  Health Maintenance Due   Topic Date Due    COVID-19 Vaccine (1) Never done    Hepatitis B Vaccine (1 of 3 - Risk 3-dose series) Never done    Cervical cancer screen  10/19/2014    Colorectal Screening  Never done    Eye Exam  02/01/2020    Shingles Vaccine (1 of 2) Never done    Mammogram  09/26/2021    Albumin Urine Test  03/23/2022    Diabetic Foot Care  06/06/2022    Hemoglobin A1C    10/07/2022    Cholesterol Test   11/12/2022

## 2022-12-12 NOTE — PROGRESS NOTES
Chief Complaint   Patient presents with    Follow Up Chronic Condition       she is a 47y.o. year old female who presents for follow up of chronic health conditions. Patient with hx of T2D, HTN, HLD, migraine, RLS, fibromyalgia, depression and chronic pain. She is followed by pain management. She reports a recent dx of gastroparesis and asks for referral in [place] for management. Most recent lab work showed worsening diabetes control. Diabetes: This patient is being treating under a comprehensive plan of care for diabetes. Overall the patient feels well with good energy level. Insulin dependence: no    Lab Results   Component Value Date/Time    Hemoglobin A1c 7.3 (H) 11/12/2021 09:05 AM    Hemoglobin A1c (POC) 8.9 04/07/2022 04:30 PM          Call immediately if having symptoms of high sugar (frequent urination, always thirsty) or low sugar (dizzy, lethargic, sweaty, nauseated, headache). Our overall goal is to reduce or eliminate the long term consequences of poorly controlled diabetes. Patient expresses understanding and agreement with our plan of care. Hypertension:  The patient reports:  taking medications as instructed, no medication side effects noted, no TIA's, no chest pain on exertion, no dyspnea on exertion, no swelling of ankles.       BP Readings from Last 3 Encounters:   12/12/22 117/79   11/23/22 119/82   04/12/22 111/75     Lab Results   Component Value Date/Time    Sodium 137 01/07/2022 11:45 PM    Potassium 3.6 01/07/2022 11:45 PM    Chloride 100 01/07/2022 11:45 PM    CO2 27 01/07/2022 11:45 PM    Anion gap 10 01/07/2022 11:45 PM    Glucose 179 (H) 01/07/2022 11:45 PM    BUN 6 01/07/2022 11:45 PM    Creatinine 0.81 04/22/2022 07:02 AM    BUN/Creatinine ratio 8 (L) 01/07/2022 11:45 PM    GFR est AA >60 04/22/2022 07:02 AM    GFR est non-AA >60 04/22/2022 07:02 AM    Calcium 8.4 (L) 01/07/2022 11:45 PM    Bilirubin, total 0.4 01/07/2022 11:45 PM    ALT (SGPT) 30 01/07/2022 11:45 PM    Alk. phosphatase 84 01/07/2022 11:45 PM    Protein, total 7.1 01/07/2022 11:45 PM    Albumin 3.2 (L) 01/07/2022 11:45 PM    Globulin 3.9 01/07/2022 11:45 PM    A-G Ratio 0.8 (L) 01/07/2022 11:45 PM        Call office as soon as possible if BP's over 140/90 on multiple occasions or with symptoms of dizziness, chest pain, shortness of breath, headache or ankle swelling. Our goal is to normalize the blood pressure to decrease the risks of strokes and heart attacks. The patient is in agreement with the plan.       Patient Active Problem List   Diagnosis Code    Obesity E66.9    Diabetes mellitus ([facility] [address] E11.9    Hypertension I10    Fibromyalgia syndrome M79.7    Restless leg syndrome G25.81    Primary fibromyalgia syndrome M79.7    Encounter for long-term (current) use of high-risk medication Z79.899    Headache R51.9    Persistent disorder of initiating or maintaining sleep G47.00    Migraine headache G43.909    Musculoskeletal pain M79.18    Multiple environmental allergies Z91.09    History of headache Z87.898    Hyperlipidemia E78.5    Type 2 diabetes mellitus (HCC) E11.9    Recurrent depression (HCC) F33.9     Past Surgical History:   Procedure Laterality Date    HX GYN      Partial Hysterectomy    HX HEENT      HX TUBAL LIGATION       Social History     Socioeconomic History    Marital status:      Spouse name: Not on file    Number of children: 2    Years of education: Not on file    Highest education level: 12th grade   Occupational History    Not on file   Tobacco Use    Smoking status: Former    Smokeless tobacco: Never    Tobacco comments:     quit in 2010   Substance and Sexual Activity    Alcohol use: No    Drug use: No    Sexual activity: Yes     Birth control/protection: Condom   Other Topics Concern    Not on file   Social History Narrative    Not on file     Social Determinants of Health     Financial Resource Strain: Medium Risk    Difficulty of Paying Living Expenses: Somewhat hard   Food Insecurity: Food Insecurity Present    Worried About Running Out of Food in the Last Year: Sometimes true    Ran Out of Food in the Last Year: Never true   Transportation Needs: Not on file   Physical Activity: Not on file   Stress: Not on file   Social Connections: Not on file   Intimate Partner Violence: Not on file   Housing Stability: Not on file     Family History   Problem Relation Age of Onset    Alcohol abuse Father     Diabetes Mother     Hypertension Mother     Heart Disease Mother     Lupus Mother     Sleep Apnea Mother     Depression Mother     Cancer Maternal Aunt     OSTEOARTHRITIS Other     Asthma Other     Diabetes Other     Elevated Lipids Other     Headache Other     Heart Disease Other     Hypertension Other     Migraines Other     Stroke Other     Bleeding Prob Neg Hx     Lung Disease Neg Hx     Psychiatric Disorder Neg Hx     Mental Retardation Neg Hx      Current Outpatient Medications   Medication Sig    oxyCODONE ER (XTAMPZA ER) 13.5 mg capsule Take 13.5 mg by mouth two (2) times a day. fluocinoNIDE (LIDEX) 0.05 % topical cream APPLY TO THE AFFECTED AREA TOPICALLY TWO TIMES A DAY    ketoconazole (NIZORAL) 2 % shampoo APPLY TO SCALP AND RINSE   DAILY AS NEEDED    dulaglutide (Trulicity) 1.5 RW/0.5 mL sub-q pen Inject 1.5mg under the skin every 7 days. glimepiride (AMARYL) 2 mg tablet Take 1 Tablet by mouth every morning.    montelukast (SINGULAIR) 10 mg tablet Take 1 tablet by mouth daily. ergocalciferol (ERGOCALCIFEROL) 1,250 mcg (50,000 unit) capsule Take 1 capsule by mouth every 7 days. pravastatin (PRAVACHOL) 40 mg tablet Take 1 tablet by mouth nightly. fenofibrate nanocrystallized (TRICOR) 145 mg tablet Take 1 tablet by mouth daily. topiramate (TOPAMAX) 25 mg tablet Take 1 tablet by mouth nightly. dexlansoprazole (DEXILANT) 60 mg CpDB capsule (delayed release) Take 1 capsule by mouth daily. magnesium oxide (MAG-OX) 400 mg tablet Take 400 mg by mouth daily. ondansetron (ZOFRAN ODT) 8 mg disintegrating tablet Take 1 Tablet by mouth every eight (8) hours as needed for Nausea or Vomiting.    rizatriptan (MAXALT) 10 mg tablet TAKE ONE TABLET BY MOUTH ONCE AS NEEDED MAY REPEAT IN 2 HOURS IF NEEDED    cyclobenzaprine (FLEXERIL) 10 mg tablet Take 1 Tab by mouth three (3) times daily as needed for Muscle Spasm(s). DULoxetine (CYMBALTA) 30 mg capsule Take 1 Capsule by mouth daily. Cetirizine (ZyrTEC) 10 mg cap Take 10 mg by mouth daily. naloxone 4 mg/actuation spry 4 mg by Nasal route as needed for up to 2 doses. Indications: OPIOID TOXICITY (Patient not taking: No sig reported)    albuterol (PROAIR HFA) 90 mcg/actuation inhaler Take 2 Puffs by inhalation every four (4) hours as needed for Wheezing. Take 2 Puffs inhaled by mouth Every 4 Hours. (Patient not taking: Reported on 12/12/2022)     No current facility-administered medications for this visit. Allergies   Allergen Reactions    Esomeprazole Magnesium Hives     Other reaction(s): mild rash/itching    Morphine Other (comments)    Nexium [Esomeprazole Magnesium] Unknown (comments)    Pcn [Penicillins] Unknown (comments)       Review of Systems:  Constitutional: Negative for fever or malaise  Cardiovascular: Negative for dizziness, chest pain or palpitations  Respiratory: Negative for cough, wheezing or SOB  Musculoskeletal: has dx of fibromyalgia  Neurology: see HPI  Psychological: see HPI       Vitals:    12/12/22 0911   BP: 117/79   Pulse: 82   Resp: 16   Temp: 98.4 °F (36.9 °C)   TempSrc: Oral   SpO2: 96%   Weight: 188 lb (85.3 kg)   Height: 5' 8\" (1.727 m)       Physical Examination:  General: Well developed, well nourished, in no acute distress  Respiratory:  CTAB with symmetrical, unlabored effort  Cardiovascular: Regular rate and rhythm  Extremities: Full range of motion, antalgic gait  Psych: Active, alert and oriented. Affect appropriate       Diagnoses and all orders for this visit:    1.  Type 2 diabetes mellitus with diabetic neuropathy, without long-term current use of insulin ([facility] [address]  -     LIPID PANEL; Future  -     TSH 3RD GENERATION; Future  -     HEMOGLOBIN A1C WITH EAG; Future  -     METABOLIC PANEL, COMPREHENSIVE; Future  -     HGB & HCT; Future  -     REFERRAL TO GASTROENTEROLOGY    2. Essential hypertension  -     LIPID PANEL; Future  -     TSH 3RD GENERATION; Future  -     METABOLIC PANEL, COMPREHENSIVE; Future    3. Mixed hyperlipidemia  -     LIPID PANEL; Future  -     METABOLIC PANEL, COMPREHENSIVE; Future    4. Vitamin D deficiency  -     VITAMIN D, 25 HYDROXY; Future    5. Arthralgia of multiple joints  -     LYME DISEASE TOTAL ANTIBODY WITH REFLEX TO IMMUNOASSAY; Future    6. Gastroesophageal reflux disease without esophagitis  -     REFERRAL TO GASTROENTEROLOGY    Other orders  -     fluocinoNIDE (LIDEX) 0.05 % topical cream; APPLY TO THE AFFECTED AREA TOPICALLY TWO TIMES A DAY  -     ketoconazole (NIZORAL) 2 % shampoo; APPLY TO SCALP AND RINSE   DAILY AS NEEDED    Plan of care:  Diagnoses were discussed in detail with patient. Importance of compliance with all prescribed medications, diabetic diet and regular follow up appointments discussed. Medications reviewed and appropriate. Patient to continue current prescribed medications as written. Medication risks/benefits/side effects discussed with patient. All of the patient's questions were addressed and answered to apparent satisfaction. The patient understands and agrees with our plan of care. The patient knows to call back if they have questions about the plan of care or if symptoms change. The patient received an After-Visit Summary which contains VS, diagnoses, orders, allergy and medication lists.       Future Appointments   Date Time Provider Dary Buck   4/10/2023 10:00 AM Jorge Alberto Hwang MD [facility] BS AMB         Follow-up and Dispositions    Return in about 4 months (around 4/12/2023), or if symptoms worsen or fail to improve. The following Annual Medicare Wellness Exam is distinct and separate from the medical evaluation and decision making. This is the Subsequent Medicare Annual Wellness Exam, performed 12 months or more after the Initial AWV or the last Subsequent AWV    I have reviewed the patient's medical history in detail and updated the computerized patient record. Assessment/Plan   Education and counseling provided:  Are appropriate based on today's review and evaluation  End-of-Life planning (with patient's consent)    1.  Subsequent Medicare Annual Wellness Exam       Depression Risk Factor Screening     3 most recent PHQ Screens 12/12/2022   PHQ Not Done -   Little interest or pleasure in doing things Several days   Feeling down, depressed, irritable, or hopeless Several days   Total Score PHQ 2 2   Trouble falling or staying asleep, or sleeping too much -   Feeling tired or having little energy -   Poor appetite, weight loss, or overeating -   Feeling bad about yourself - or that you are a failure or have let yourself or your family down -   Trouble concentrating on things such as school, work, reading, or watching TV -   Moving or speaking so slowly that other people could have noticed; or the opposite being so fidgety that others notice -   Thoughts of being better off dead, or hurting yourself in some way -   PHQ 9 Score -   How difficult have these problems made it for you to do your work, take care of your home and get along with others -       Alcohol & Drug Abuse Risk Screen    Do you average more than 1 drink per night or more than 7 drinks a week:  No    On any one occasion in the past three months have you have had more than 3 drinks containing alcohol:  No       Opioid Risk: (Low risk score <55, High risk score ?55)  Opioid risk score: 47      Click here to complete the Controlled Substance Monitoring SmartForm    Last PDMP [place] as Reviewed:  Review User Review Instant Review Result   ALVA WILKINS 11/16/2020  9:53 AM Reviewed PDMP [1]       Functional Ability and Level of Safety    Hearing: The patient needs further evaluation. Activities of Daily Living: The home contains: no safety equipment. Patient does total self care      Ambulation: with mild difficulty     Fall Risk:  Fall Risk Assessment, last 12 mths 1/4/2021   Able to walk? Yes   Fall in past 12 months? 0   Do you feel unsteady? 0   Are you worried about falling 0   Number of falls in past 12 months -   Fall with injury? -      Abuse Screen:  Patient is not abused       Cognitive Screening    Has your family/caregiver stated any concerns about your memory: no     Screening deferred.     Health Maintenance Due     Health Maintenance Due   Topic Date Due    COVID-19 Vaccine (1) Never done    Hepatitis B Vaccine (1 of 3 - Risk 3-dose series) Never done    Cervical cancer screen  10/19/2014    Colorectal Cancer Screening Combo  Never done    Eye Exam Retinal or Dilated  02/01/2020    Medicare Yearly Exam  06/12/2021    Shingrix Vaccine Age 50> (1 of 2) Never done    Breast Cancer Screen Mammogram  09/26/2021    MICROALBUMIN Q1  03/23/2022    Foot Exam Q1  06/06/2022    A1C test (Diabetic or Prediabetic)  10/07/2022    Lipid Screen  11/12/2022       Patient Care Team   Patient Care Team:  Brittanie Estrada MD as PCP - General (Family Medicine)  Brittanie Estrada MD as PCP - [address] [place] [address] Provider  Pauline Mendieta MD (Podiatry)  René Church MD as Physician (Neurology)  Calli Thayer, PA (Physician Assistant)  Colton Marinelli MD (Rheumatology Internal Medicine)  Starr Pierce MD as Physician (Sleep Medicine Physician)  Tc Cherry MD (Anesthesiology)    History     Patient Active Problem List   Diagnosis Code    Obesity E66.9    Diabetes mellitus ([facility] [address] E11.9    Hypertension I10    Fibromyalgia syndrome M79.7    Restless leg syndrome G25.81    Primary fibromyalgia syndrome M79.7    Encounter for long-term (current) use of high-risk medication Z79.899    Headache R51.9    Persistent disorder of initiating or maintaining sleep G47.00    Migraine headache G43.909    Musculoskeletal pain M79.18    Multiple environmental allergies Z91.09    History of headache Z87.898    Hyperlipidemia E78.5    Type 2 diabetes mellitus (HCC) E11.9    Recurrent depression (HCC) F33.9     Past Medical History:   Diagnosis Date    Depression     Diabetes ([facility] [address]     Dysthymic disorder     Fibromyalgia     Fibromyalgia syndrome 8/8/2012    Headache(784.0) 9/8/2012    Hypercholesterolemia     Left ear pain     Migraine     Mixed hyperlipidemia     Musculoskeletal pain 9/16/2014      Past Surgical History:   Procedure Laterality Date    HX GYN      Partial Hysterectomy    HX HEENT      HX TUBAL LIGATION       Current Outpatient Medications   Medication Sig Dispense Refill    oxyCODONE ER (XTAMPZA ER) 13.5 mg capsule Take 13.5 mg by mouth two (2) times a day. fluocinoNIDE (LIDEX) 0.05 % topical cream APPLY TO THE AFFECTED AREA TOPICALLY TWO TIMES A DAY 15 g 2    ketoconazole (NIZORAL) 2 % shampoo APPLY TO SCALP AND RINSE   DAILY AS NEEDED 120 mL 2    dulaglutide (Trulicity) 1.5 TF/5.1 mL sub-q pen Inject 1.5mg under the skin every 7 days. 2 mL 0    glimepiride (AMARYL) 2 mg tablet Take 1 Tablet by mouth every morning. 30 Tablet 0    montelukast (SINGULAIR) 10 mg tablet Take 1 tablet by mouth daily. 90 Tablet 0    ergocalciferol (ERGOCALCIFEROL) 1,250 mcg (50,000 unit) capsule Take 1 capsule by mouth every 7 days. 12 Capsule 0    pravastatin (PRAVACHOL) 40 mg tablet Take 1 tablet by mouth nightly. 90 Tablet 0    fenofibrate nanocrystallized (TRICOR) 145 mg tablet Take 1 tablet by mouth daily. 90 Tablet 0    topiramate (TOPAMAX) 25 mg tablet Take 1 tablet by mouth nightly. 90 Tablet 0    dexlansoprazole (DEXILANT) 60 mg CpDB capsule (delayed release) Take 1 capsule by mouth daily.  90 Capsule 0    magnesium oxide (MAG-OX) 400 mg tablet Take 400 mg by mouth daily. ondansetron (ZOFRAN ODT) 8 mg disintegrating tablet Take 1 Tablet by mouth every eight (8) hours as needed for Nausea or Vomiting. 30 Tablet 2    rizatriptan (MAXALT) 10 mg tablet TAKE ONE TABLET BY MOUTH ONCE AS NEEDED MAY REPEAT IN 2 HOURS IF NEEDED 60 Tab 0    cyclobenzaprine (FLEXERIL) 10 mg tablet Take 1 Tab by mouth three (3) times daily as needed for Muscle Spasm(s). 90 Tab 0    DULoxetine (CYMBALTA) 30 mg capsule Take 1 Capsule by mouth daily. 30 Capsule 1    Cetirizine (ZyrTEC) 10 mg cap Take 10 mg by mouth daily.  12 Cap 0     Allergies   Allergen Reactions    Esomeprazole Magnesium Hives     Other reaction(s): mild rash/itching    Morphine Other (comments)    Nexium [Esomeprazole Magnesium] Unknown (comments)    Pcn [Penicillins] Unknown (comments)       Family History   Problem Relation Age of Onset    Alcohol abuse Father     Diabetes Mother     Hypertension Mother     Heart Disease Mother     Lupus Mother     Sleep Apnea Mother     Depression Mother     Cancer Maternal Aunt     OSTEOARTHRITIS Other     Asthma Other     Diabetes Other     Elevated Lipids Other     Headache Other     Heart Disease Other     Hypertension Other     Migraines Other     Stroke Other     Bleeding Prob Neg Hx     Lung Disease Neg Hx     Psychiatric Disorder Neg Hx     Mental Retardation Neg Hx      Social History     Tobacco Use    Smoking status: Former    Smokeless tobacco: Never    Tobacco comments:     quit in 2010   Substance Use Topics    Alcohol use: No         Pa Turner MD

## 2022-12-13 LAB
25(OH)D3 SERPL-MCNC: 21.2 NG/ML (ref 30–100)
ALBUMIN SERPL-MCNC: 3.6 G/DL (ref 3.5–5)
ALBUMIN/GLOB SERPL: 1.1 {RATIO} (ref 1.1–2.2)
ALP SERPL-CCNC: 157 U/L (ref 45–117)
ALT SERPL-CCNC: 25 U/L (ref 12–78)
ANION GAP SERPL CALC-SCNC: 5 MMOL/L (ref 5–15)
AST SERPL-CCNC: 10 U/L (ref 15–37)
BILIRUB SERPL-MCNC: 0.6 MG/DL (ref 0.2–1)
BUN SERPL-MCNC: 9 MG/DL (ref 6–20)
BUN/CREAT SERPL: 13 (ref 12–20)
CALCIUM SERPL-MCNC: 9.2 MG/DL (ref 8.5–10.1)
CHLORIDE SERPL-SCNC: 101 MMOL/L (ref 97–108)
CHOLEST SERPL-MCNC: 271 MG/DL
CO2 SERPL-SCNC: 31 MMOL/L (ref 21–32)
CREAT SERPL-MCNC: 0.68 MG/DL (ref 0.55–1.02)
EST. AVERAGE GLUCOSE BLD GHB EST-MCNC: 260 MG/DL
GLOBULIN SER CALC-MCNC: 3.3 G/DL (ref 2–4)
GLUCOSE SERPL-MCNC: 279 MG/DL (ref 65–100)
HBA1C MFR BLD: 10.7 % (ref 4–5.6)
HCT VFR BLD AUTO: 47.4 % (ref 35–47)
HDLC SERPL-MCNC: 42 MG/DL
HDLC SERPL: 6.5 {RATIO} (ref 0–5)
HGB BLD-MCNC: 14.8 G/DL (ref 11.5–16)
LDLC SERPL CALC-MCNC: 180.8 MG/DL (ref 0–100)
POTASSIUM SERPL-SCNC: 4.2 MMOL/L (ref 3.5–5.1)
PROT SERPL-MCNC: 6.9 G/DL (ref 6.4–8.2)
SODIUM SERPL-SCNC: 137 MMOL/L (ref 136–145)
TRIGL SERPL-MCNC: 241 MG/DL (ref ?–150)
TSH SERPL DL<=0.05 MIU/L-ACNC: 0.52 UIU/ML (ref 0.36–3.74)
VLDLC SERPL CALC-MCNC: 48.2 MG/DL

## 2022-12-14 LAB — LYME TOTAL ANTIBODY EIA: NEGATIVE

## 2022-12-27 DIAGNOSIS — E11.40 TYPE 2 DIABETES MELLITUS WITH DIABETIC NEUROPATHY, WITHOUT LONG-TERM CURRENT USE OF INSULIN (HCC): ICD-10-CM

## 2022-12-27 RX ORDER — GLIMEPIRIDE 2 MG/1
2 TABLET ORAL
Qty: 30 TABLET | Refills: 0 | Status: SHIPPED | OUTPATIENT
Start: 2022-12-27

## 2022-12-29 DIAGNOSIS — E11.40 TYPE 2 DIABETES MELLITUS WITH DIABETIC NEUROPATHY, WITHOUT LONG-TERM CURRENT USE OF INSULIN (HCC): ICD-10-CM

## 2022-12-30 RX ORDER — DULAGLUTIDE 1.5 MG/.5ML
INJECTION, SOLUTION SUBCUTANEOUS
Qty: 2 ML | Refills: 0 | Status: SHIPPED | OUTPATIENT
Start: 2022-12-30

## 2023-01-11 ENCOUNTER — OFFICE VISIT (OUTPATIENT)
Dept: FAMILY MEDICINE CLINIC | Age: 53
End: 2023-01-11
Payer: MEDICARE

## 2023-01-11 VITALS
HEART RATE: 84 BPM | RESPIRATION RATE: 16 BRPM | TEMPERATURE: 98 F | SYSTOLIC BLOOD PRESSURE: 119 MMHG | BODY MASS INDEX: 28.19 KG/M2 | OXYGEN SATURATION: 94 % | WEIGHT: 186 LBS | DIASTOLIC BLOOD PRESSURE: 78 MMHG | HEIGHT: 68 IN

## 2023-01-11 DIAGNOSIS — F33.9 RECURRENT DEPRESSION (HCC): ICD-10-CM

## 2023-01-11 DIAGNOSIS — N64.59 ABNORMAL BREAST EXAM: ICD-10-CM

## 2023-01-11 DIAGNOSIS — E11.40 TYPE 2 DIABETES MELLITUS WITH DIABETIC NEUROPATHY, WITHOUT LONG-TERM CURRENT USE OF INSULIN (HCC): ICD-10-CM

## 2023-01-11 DIAGNOSIS — J45.30 MILD PERSISTENT ASTHMA WITHOUT COMPLICATION: Primary | ICD-10-CM

## 2023-01-11 DIAGNOSIS — G63 POLYNEUROPATHY ASSOCIATED WITH UNDERLYING DISEASE (HCC): ICD-10-CM

## 2023-01-11 DIAGNOSIS — E27.8 ADRENAL MASS, LEFT (HCC): ICD-10-CM

## 2023-01-11 RX ORDER — FLUTICASONE FUROATE, UMECLIDINIUM BROMIDE AND VILANTEROL TRIFENATATE 100; 62.5; 25 UG/1; UG/1; UG/1
1 POWDER RESPIRATORY (INHALATION) DAILY
Qty: 60 EACH | Refills: 2 | Status: SHIPPED | OUTPATIENT
Start: 2023-01-11

## 2023-01-11 RX ORDER — ALBUTEROL SULFATE 90 UG/1
1 AEROSOL, METERED RESPIRATORY (INHALATION)
Qty: 18 G | Refills: 0 | Status: SHIPPED | OUTPATIENT
Start: 2023-01-11

## 2023-01-11 RX ORDER — ERGOCALCIFEROL (VITAMIN D2) 200 MCG/ML
DROPS ORAL
COMMUNITY
Start: 2023-01-07

## 2023-01-11 RX ORDER — SERTRALINE HYDROCHLORIDE 20 MG/ML
SOLUTION ORAL
COMMUNITY
Start: 2022-12-21

## 2023-01-11 RX ORDER — RISPERIDONE 1 MG/1
1 TABLET, ORALLY DISINTEGRATING ORAL
COMMUNITY
Start: 2022-12-22

## 2023-01-11 NOTE — PROGRESS NOTES
1. Have you been to the ER, urgent care clinic since your last visit? Hospitalized since your last visit? No    2. Have you seen or consulted any other health care providers outside of the [address] [place] [address] since your last visit? Include any pap smears or colon screening. Yes When: [facility]     3. For patients aged 39-70: Has the patient had a colonoscopy / FIT/ Cologuard? Patient unsure, going in march for GI appointment     If the patient is female:    4. For patients aged 41-77: Has the patient had a mammogram within the past 2 years? 2015       5. For patients aged 21-65: Has the patient had a pap smear? Total hysterectomy     Reviewed record in preparation for visit and have necessary documentation  Pt did not bring medication to office visit for review  Patient is accompanied by self I have received verbal consent from Shahrzad Hernandez to discuss any/all medical information while they are present in the room.     Goals that were addressed and/or need to be completed during or after this appointment include     Health Maintenance Due   Topic Date Due    COVID-19 Vaccine (1) Never done    Hepatitis B Vaccine (1 of 3 - Risk 3-dose series) Never done    Cervical cancer screen  10/19/2014    Colorectal Cancer Screening Combo  Never done    Eye Exam Retinal or Dilated  02/01/2020    Shingles Vaccine (1 of 2) Never done    Breast Cancer Screen Mammogram  09/26/2021    Diabetic Alb to Cr ratio (uACR) test  03/23/2022    Foot Exam Q1  06/06/2022

## 2023-01-16 ENCOUNTER — TELEPHONE (OUTPATIENT)
Dept: FAMILY MEDICINE CLINIC | Age: 53
End: 2023-01-16

## 2023-01-16 NOTE — TELEPHONE ENCOUNTER
Called [facility], spoke with Thompson siegel. She was advised Advair discontinued. Verbalized understanding.

## 2023-01-17 DIAGNOSIS — E78.2 MIXED HYPERLIPIDEMIA: ICD-10-CM

## 2023-01-17 DIAGNOSIS — K21.9 GASTROESOPHAGEAL REFLUX DISEASE WITHOUT ESOPHAGITIS: ICD-10-CM

## 2023-01-17 DIAGNOSIS — E11.40 TYPE 2 DIABETES MELLITUS WITH DIABETIC NEUROPATHY, WITHOUT LONG-TERM CURRENT USE OF INSULIN (HCC): ICD-10-CM

## 2023-01-17 DIAGNOSIS — G43.019 INTRACTABLE MIGRAINE WITHOUT AURA AND WITHOUT STATUS MIGRAINOSUS: ICD-10-CM

## 2023-01-17 DIAGNOSIS — Z91.09 MULTIPLE ENVIRONMENTAL ALLERGIES: ICD-10-CM

## 2023-01-18 RX ORDER — FENOFIBRATE 145 MG/1
TABLET, COATED ORAL
Qty: 90 TABLET | Refills: 0 | Status: SHIPPED | OUTPATIENT
Start: 2023-01-18

## 2023-01-18 RX ORDER — PRAVASTATIN SODIUM 40 MG/1
TABLET ORAL
Qty: 90 TABLET | Refills: 0 | Status: SHIPPED | OUTPATIENT
Start: 2023-01-18

## 2023-01-18 RX ORDER — TOPIRAMATE 25 MG/1
TABLET ORAL
Qty: 90 TABLET | Refills: 0 | Status: SHIPPED | OUTPATIENT
Start: 2023-01-18

## 2023-01-18 RX ORDER — MONTELUKAST SODIUM 10 MG/1
TABLET ORAL
Qty: 90 TABLET | Refills: 0 | Status: SHIPPED | OUTPATIENT
Start: 2023-01-18

## 2023-01-18 RX ORDER — DEXLANSOPRAZOLE 60 MG/1
CAPSULE, DELAYED RELEASE ORAL
Qty: 90 CAPSULE | Refills: 0 | Status: SHIPPED | OUTPATIENT
Start: 2023-01-18

## 2023-01-22 PROBLEM — E27.8 ADRENAL MASS, LEFT (HCC): Status: ACTIVE | Noted: 2023-01-22

## 2023-01-22 PROBLEM — E11.40 TYPE 2 DIABETES MELLITUS WITH DIABETIC NEUROPATHY, WITHOUT LONG-TERM CURRENT USE OF INSULIN (HCC): Status: ACTIVE | Noted: 2023-01-22

## 2023-01-22 PROBLEM — G63 POLYNEUROPATHY ASSOCIATED WITH UNDERLYING DISEASE (HCC): Status: ACTIVE | Noted: 2023-01-22

## 2023-01-22 NOTE — PROGRESS NOTES
Chief Complaint   Patient presents with    Breast Mass     Right breast lump that comes and goes but has been consistently present since the summer, sensitive to touch and patient believes another one has developed under breast     Request For New Medication     Patient requesting breathing machine and med due to asthma        she is a 47y.o. year old female who presents for follow up of chronic health conditions. She is due for mammogram. Patient with hx of T2D, HTN, HLD, migraine, RLS, fibromyalgia, depression and chronic pain. She is followed by pain management for chronic pain and neuropathy. Most recent lab work showed worsening diabetes. Diabetes: This patient is being treating under a comprehensive plan of care for diabetes. Overall the patient feels well with good energy level. Insulin dependence: no    Lab Results   Component Value Date/Time    Hemoglobin A1c 10.7 (H) 12/12/2022 10:05 AM    Hemoglobin A1c (POC) 8.9 04/07/2022 04:30 PM          Call immediately if having symptoms of high sugar (frequent urination, always thirsty) or low sugar (dizzy, lethargic, sweaty, nauseated, headache). Our overall goal is to reduce or eliminate the long term consequences of poorly controlled diabetes. Patient expresses understanding and agreement with our plan of care. Hypertension:  The patient reports:  taking medications as instructed, no medication side effects noted, no TIA's, no chest pain on exertion, no dyspnea on exertion, no swelling of ankles.       BP Readings from Last 3 Encounters:   01/11/23 119/78   12/12/22 117/79   11/23/22 119/82     Lab Results   Component Value Date/Time    Sodium 137 12/12/2022 10:05 AM    Potassium 4.2 12/12/2022 10:05 AM    Chloride 101 12/12/2022 10:05 AM    CO2 31 12/12/2022 10:05 AM    Anion gap 5 12/12/2022 10:05 AM    Glucose 279 (H) 12/12/2022 10:05 AM    BUN 9 12/12/2022 10:05 AM    Creatinine 0.68 12/12/2022 10:05 AM    BUN/Creatinine ratio 13 12/12/2022 10:05 AM    GFR est AA >60 04/22/2022 07:02 AM    GFR est non-AA >60 04/22/2022 07:02 AM    Calcium 9.2 12/12/2022 10:05 AM    Bilirubin, total 0.6 12/12/2022 10:05 AM    ALT (SGPT) 25 12/12/2022 10:05 AM    Alk. phosphatase 157 (H) 12/12/2022 10:05 AM    Protein, total 6.9 12/12/2022 10:05 AM    Albumin 3.6 12/12/2022 10:05 AM    Globulin 3.3 12/12/2022 10:05 AM    A-G Ratio 1.1 12/12/2022 10:05 AM        Call office as soon as possible if BP's over 140/90 on multiple occasions or with symptoms of dizziness, chest pain, shortness of breath, headache or ankle swelling. Our goal is to normalize the blood pressure to decrease the risks of strokes and heart attacks. The patient is in agreement with the plan.       Patient Active Problem List   Diagnosis Code    Obesity E66.9    Diabetes mellitus ([facility] [address] E11.9    Hypertension I10    Fibromyalgia syndrome M79.7    Restless leg syndrome G25.81    Primary fibromyalgia syndrome M79.7    Encounter for long-term (current) use of high-risk medication Z79.899    Headache R51.9    Persistent disorder of initiating or maintaining sleep G47.00    Migraine headache G43.909    Musculoskeletal pain M79.18    Multiple environmental allergies Z91.09    History of headache Z87.898    Hyperlipidemia E78.5    Type 2 diabetes mellitus (HCC) E11.9    Recurrent depression (HCC) F33.9    Polyneuropathy associated with underlying disease ([facility] [address] G63    Type 2 diabetes mellitus with diabetic neuropathy, without long-term current use of insulin (HCC) E11.40    Adrenal mass, left (HCC) E27.8     Past Surgical History:   Procedure Laterality Date    HX GYN      Partial Hysterectomy    HX HEENT      HX TUBAL LIGATION       Social History     Socioeconomic History    Marital status:      Spouse name: Not on file    Number of children: 2    Years of education: Not on file    Highest education level: 12th grade   Occupational History    Not on file   Tobacco Use    Smoking status: Former Smokeless tobacco: Never    Tobacco comments:     quit in 2010   Substance and Sexual Activity    Alcohol use: No    Drug use: No    Sexual activity: Yes     Birth control/protection: Condom   Other Topics Concern    Not on file   Social History Narrative    Not on file     Social Determinants of Health     Financial Resource Strain: Medium Risk    Difficulty of Paying Living Expenses: Somewhat hard   Food Insecurity: Food Insecurity Present    Worried About Running Out of Food in the Last Year: Sometimes true    Ran Out of Food in the Last Year: Never true   Transportation Needs: Not on file   Physical Activity: Not on file   Stress: Not on file   Social Connections: Not on file   Intimate Partner Violence: Not on file   Housing Stability: Not on file     Family History   Problem Relation Age of Onset    Alcohol abuse Father     Diabetes Mother     Hypertension Mother     Heart Disease Mother     Lupus Mother     Sleep Apnea Mother     Depression Mother     Cancer Maternal Aunt     OSTEOARTHRITIS Other     Asthma Other     Diabetes Other     Elevated Lipids Other     Headache Other     Heart Disease Other     Hypertension Other     Migraines Other     Stroke Other     Bleeding Prob Neg Hx     Lung Disease Neg Hx     Psychiatric Disorder Neg Hx     Mental Retardation Neg Hx      Current Outpatient Medications   Medication Sig    risperiDONE (RisperDAL m-tabs) 1 mg disintegrating tablet Take 1 mg by mouth nightly. sertraline (ZOLOFT) 20 mg/mL concentrated solution TAKE 2.5 ML BY MOUTH IN THE MORNING    ergocalciferol (CALCIFEROL) 200 mcg/mL (8,000 unit/mL) drops TAKE 0.6 ML BY MOUTH ONCE DAILY    albuterol (PROVENTIL HFA, VENTOLIN HFA, PROAIR HFA) 90 mcg/actuation inhaler Take 1 Puff by inhalation every six (6) hours as needed for Wheezing. fluticasone-umeclidinium-vilanterol (Trelegy Ellipta) 100-62.5-25 mcg inhaler Take 1 Puff by inhalation daily.     dulaglutide (Trulicity) 1.5 WR/5.5 mL sub-q pen Inject 1.5 mg under the skin every 7 days. glimepiride (AMARYL) 2 mg tablet Take 1 tablet by mouth every morning. oxyCODONE ER (XTAMPZA ER) 13.5 mg capsule Take 13.5 mg by mouth two (2) times a day. fluocinoNIDE (LIDEX) 0.05 % topical cream APPLY TO THE AFFECTED AREA TOPICALLY TWO TIMES A DAY    ketoconazole (NIZORAL) 2 % shampoo APPLY TO SCALP AND RINSE   DAILY AS NEEDED    magnesium oxide (MAG-OX) 400 mg tablet Take 400 mg by mouth daily. rizatriptan (MAXALT) 10 mg tablet TAKE ONE TABLET BY MOUTH ONCE AS NEEDED MAY REPEAT IN 2 HOURS IF NEEDED    cyclobenzaprine (FLEXERIL) 10 mg tablet Take 1 Tab by mouth three (3) times daily as needed for Muscle Spasm(s). ondansetron (ZOFRAN ODT) 8 mg disintegrating tablet Take 1 Tablet by mouth every eight (8) hours as needed for Nausea or Vomiting. pravastatin (PRAVACHOL) 40 mg tablet Take 1 tablet by mouth nightly. fenofibrate nanocrystallized (TRICOR) 145 mg tablet Take 1 tablet by mouth daily. montelukast (SINGULAIR) 10 mg tablet Take 1 tablet by mouth daily. dexlansoprazole (DEXILANT) 60 mg CpDB capsule (delayed release) Take 1 capsule by mouth daily. topiramate (TOPAMAX) 25 mg tablet Take 1 tablet by mouth nightly.    ergocalciferol (ERGOCALCIFEROL) 1,250 mcg (50,000 unit) capsule Take 1 capsule by mouth every 7 days. (Patient not taking: Reported on 1/11/2023)     No current facility-administered medications for this visit.      Allergies   Allergen Reactions    Esomeprazole Magnesium Hives     Other reaction(s): mild rash/itching    Morphine Other (comments)    Nexium [Esomeprazole Magnesium] Unknown (comments)    Pcn [Penicillins] Unknown (comments)       Review of Systems:  Constitutional: Negative for fever or malaise  Cardiovascular: Negative for dizziness, chest pain or palpitations  Respiratory: Negative for cough, wheezing or SOB  Musculoskeletal: has dx of fibromyalgia  Neurology: see HPI  Psychological: see HPI       Vitals:    01/11/23 1348   BP: 119/78   Pulse: 84   Resp: 16   Temp: 98 °F (36.7 °C)   TempSrc: Oral   SpO2: 94%   Weight: 186 lb (84.4 kg)   Height: 5' 8\" (1.727 m)       Physical Examination:  General: Well developed, well nourished, in no acute distress  Respiratory:  CTAB with symmetrical, unlabored effort  Cardiovascular: Regular rate and rhythm  Breast: right breast with lateral in inferior TTP  Extremities: Full range of motion, antalgic gait  Psych: Active, alert and oriented. Affect appropriate     Female nurse in room during exam.     Diagnoses and all orders for this visit:    1. Mild persistent asthma without complication  -     albuterol (PROVENTIL HFA, VENTOLIN HFA, PROAIR HFA) 90 mcg/actuation inhaler; Take 1 Puff by inhalation every six (6) hours as needed for Wheezing.  -     fluticasone-umeclidinium-vilanterol (Trelegy Ellipta) 100-62.5-25 mcg inhaler; Take 1 Puff by inhalation daily. 2. Abnormal breast exam  -     ESSENCE MAMMO BI DX INCL CAD; Future  -     US BREAST RT COMPLETE 4 QUAD; Future    3. Polyneuropathy associated with underlying disease ([facility] [address]    4. Type 2 diabetes mellitus with diabetic neuropathy, without long-term current use of insulin ([facility] [address]    5. Adrenal mass, left ([address]    6. Recurrent depression ([facility] [address]    Plan of care:  Diagnoses were discussed in detail with patient. The patient is compliant to all medical recommendations. Medications reviewed and appropriate. Patient to continue current prescribed medications as written. Medication risks/benefits/side effects discussed with patient. All of the patient's questions were addressed and answered to apparent satisfaction. The patient understands and agrees with our plan of care. The patient knows to call back if they have questions about the plan of care or if symptoms change. The patient received an After-Visit Summary which contains VS, diagnoses, orders, allergy and medication lists.       Future Appointments   Date Time Provider Dary Buck   4/10/2023 10:00 AM Torey Terry MD [facility] BS AMB

## 2023-02-25 NOTE — TELEPHONE ENCOUNTER
Submitted pa for oxycodone/apap and fentanyl to pt's new insurance - [employer]. Received a fax back saying both meds are on the formulary and do not require a pa. If the pharmacy is having a problem processing the claim, they need to call the helpdesk. Faxed to pharmacy. Health Care Proxy (HCP)

## 2023-05-25 LAB — HBA1C MFR BLD HPLC: 11 %

## 2023-07-18 ENCOUNTER — APPOINTMENT (OUTPATIENT)
Facility: HOSPITAL | Age: 53
End: 2023-07-18
Payer: MEDICAID

## 2023-07-18 ENCOUNTER — HOSPITAL ENCOUNTER (EMERGENCY)
Facility: HOSPITAL | Age: 53
Discharge: HOME | End: 2023-07-18
Attending: EMERGENCY MEDICINE
Payer: MEDICAID

## 2023-07-18 VITALS
HEART RATE: 80 BPM | DIASTOLIC BLOOD PRESSURE: 82 MMHG | TEMPERATURE: 98.9 F | BODY MASS INDEX: 28.34 KG/M2 | WEIGHT: 187 LBS | RESPIRATION RATE: 18 BRPM | HEIGHT: 68 IN | SYSTOLIC BLOOD PRESSURE: 141 MMHG | OXYGEN SATURATION: 96 %

## 2023-07-18 DIAGNOSIS — K08.89 PAIN, DENTAL: Primary | ICD-10-CM

## 2023-07-18 PROCEDURE — 70490 CT SOFT TISSUE NECK W/O DYE: CPT

## 2023-07-18 PROCEDURE — 96372 THER/PROPH/DIAG INJ SC/IM: CPT

## 2023-07-18 PROCEDURE — 99284 EMERGENCY DEPT VISIT MOD MDM: CPT

## 2023-07-18 PROCEDURE — 6360000002 HC RX W HCPCS: Performed by: EMERGENCY MEDICINE

## 2023-07-18 RX ORDER — FENTANYL CITRATE 50 UG/ML
100 INJECTION, SOLUTION INTRAMUSCULAR; INTRAVENOUS
Status: COMPLETED | OUTPATIENT
Start: 2023-07-18 | End: 2023-07-18

## 2023-07-18 RX ADMIN — FENTANYL CITRATE 100 MCG: 50 INJECTION, SOLUTION INTRAMUSCULAR; INTRAVENOUS at 16:33

## 2023-07-18 NOTE — ED TRIAGE NOTES
Patient reports that she had  teeth cut out on Friday morning and now she feels like she has an ice pick in her right ear patient has pain to entire right side of her face.  Patient called dentist and they told her to wait and come back to them on Thursday, patient is concerned about infection and is currently on antibiotics    Procedure was completed in [facility]  but she does not know name of

## 2023-07-18 NOTE — ED PROVIDER NOTES
[facility] EMERGENCY DEPT  EMERGENCY DEPARTMENT HISTORY AND PHYSICAL EXAM      Date: 7/18/2023  Patient Name: Haleigh Calixto  MRN: [id number]  [address] 1970  Date of evaluation: 7/18/2023  Provider: Mara Hanks MD   Note Started: 4:03 PM EDT 7/18/23    HISTORY OF PRESENT ILLNESS     Chief Complaint   Patient presents with    Dental Pain       History Provided By: Patient    HPI: Haleigh Calixto is a 47 y.o. female     PAST MEDICAL HISTORY   Past Medical History:  Past Medical History:   Diagnosis Date    Depression     Diabetes ([address])     Dysthymic disorder     Fibromyalgia     Fibromyalgia syndrome 8/8/2012    Headache(784.0) 9/8/2012    Hypercholesterolemia     Left ear pain     Migraine     Mixed hyperlipidemia     Musculoskeletal pain 9/16/2014       Past Surgical History:  Past Surgical History:   Procedure Laterality Date    GYN      Partial Hysterectomy    HEENT      TUBAL LIGATION         Family History:  Family History   Problem Relation Age of Onset    Diabetes Other     Mental Retardation Neg Hx     Psychiatric Disorder Neg Hx     Lung Disease Neg Hx     Bleeding Prob Neg Hx     Stroke Other     Migraines Other     Hypertension Other     Heart Disease Other     Headache Other     Elevated Lipids Other     Asthma Other     Osteoarthritis Other     Cancer Maternal Aunt     Depression Mother     Sleep Apnea Mother     Lupus Mother     Alcohol Abuse Father     Diabetes Mother     Hypertension Mother     Heart Disease Mother        Social History:  Social History     Tobacco Use    Smoking status: Former    Smokeless tobacco: Never   Substance Use Topics    Alcohol use: No    Drug use: No       Allergies:   Allergies   Allergen Reactions    Esomeprazole Magnesium Hives     Other reaction(s): Unknown (comments)  Other reaction(s): mild rash/itching      Morphine Other (See Comments)    Penicillins      Other reaction(s): Unknown (comments)       PCP: Joanne Bliss MD    Current Meds:   No current

## 2023-11-14 ENCOUNTER — OFFICE VISIT (OUTPATIENT)
Age: 53
End: 2023-11-14

## 2023-11-14 VITALS
BODY MASS INDEX: 28.79 KG/M2 | DIASTOLIC BLOOD PRESSURE: 68 MMHG | HEIGHT: 68 IN | HEART RATE: 86 BPM | SYSTOLIC BLOOD PRESSURE: 102 MMHG | WEIGHT: 190 LBS

## 2023-11-14 DIAGNOSIS — E78.2 MIXED HYPERLIPIDEMIA: ICD-10-CM

## 2023-11-14 DIAGNOSIS — E11.65 TYPE 2 DIABETES MELLITUS WITH HYPERGLYCEMIA, WITH LONG-TERM CURRENT USE OF INSULIN (HCC): Primary | ICD-10-CM

## 2023-11-14 DIAGNOSIS — Z79.4 TYPE 2 DIABETES MELLITUS WITH HYPERGLYCEMIA, WITH LONG-TERM CURRENT USE OF INSULIN (HCC): Primary | ICD-10-CM

## 2023-11-14 RX ORDER — ATORVASTATIN CALCIUM 20 MG/1
TABLET, FILM COATED ORAL
COMMUNITY
Start: 2023-11-10

## 2023-11-14 RX ORDER — INSULIN LISPRO 100 [IU]/ML
INJECTION, SOLUTION INTRAVENOUS; SUBCUTANEOUS
Qty: 18 ML | Refills: 2 | Status: SHIPPED | OUTPATIENT
Start: 2023-11-14

## 2023-11-14 RX ORDER — ACARBOSE 25 MG/1
TABLET ORAL
COMMUNITY
Start: 2023-10-27 | End: 2023-11-14 | Stop reason: ALTCHOICE

## 2023-11-14 RX ORDER — INSULIN GLARGINE 100 [IU]/ML
INJECTION, SOLUTION SUBCUTANEOUS
Qty: 18 ML | Refills: 2 | Status: SHIPPED | OUTPATIENT
Start: 2023-11-14

## 2023-11-14 NOTE — PROGRESS NOTES
Chief Complaint   Patient presents with    Diabetes       Patient was last seen: New Patient Visit     General:   DM2 for 20+ years  Was under control until 2 years ago and hard to get controlled     On metformin - tore up stomach   On sulfonylurea   No TZD, no DPP4,   Came off jardiance b/c thirsty all the time   Trulicity - was not working anymore for sugar control(~4 years); GI said don't take it b/c gastroparesis   On insulin ~8-9 months    On dexcom G7 - for 4-5 month    Medicare/Medicaid     A1c: recent a1c was     DM Medications:    Hlog 75/25 40 units BID AC   Glimepiride 2mg  Acarbose     Last Changes: : no recent changes    Sugar Checks: checks more than 4 times a day and uses Dexcom CGM     AM: reports:      PM: reports:        LOWs:  denies low sugars shakes at 90     DIET: has received diabetic meal training, a long time ago   Bk: Cream of wheat   Ln: none  Sn: fruits   Dn: chicken; vegetable     EXERCISE: does not exercise - due to Fibromyalgia    HTN: on no meds, HTN followed by PCP     LIPIDS: on statin, on fenofibrate, lipids followed by PCP    RENAL: has normal renal function     EYES: eye exam in past year, has no retinopathy     DENTAL:  has seen dentist in past year     FEET: has no current issues, no numbness or tingling (FM too)    HEART:  no chest pain, shortness of breath or claudication, has no cardiac history     ASA:  does not take aspirin or other blood thinner     SYMPTOMS: no polyuria, thirst or blurred vision     THYROID: no known thyroid issue    DIABETES HISTORY: see above      LABS/STUDIES:   5/24/23 A1c 11.0, Vit D 11.7, TSH 1.0, GFR > 60    Lab Results   Component Value Date/Time    EQK6XTAR 8.9 04/07/2022 04:30 PM       Lab Results   Component Value Date/Time    LABA1C 10.7 12/12/2022 10:05 AM    LABA1C 7.3 11/12/2021 09:05 AM    LABA1C 6.5 03/23/2021 10:05 AM    CREATININE 0.68 12/12/2022 10:05 AM    EGFR >60 12/12/2022 10:05 AM    LDLCALC 180.8 12/12/2022 10:05 AM    MALBCR 7

## 2023-11-14 NOTE — PATIENT INSTRUCTIONS
Stop 75/25    Add Lantus (or what's covered) 60 units once a day  Add Humalog (or what's covered) 15 units before meals (5 units before snacks)  Add extra Humalog if high before meals  150-200 4 units   200-250 8 units   250-300 12  units   300-350  16  units   Over 350  20   units    (If high after a meal, add 1/2 the scale)    When AM sugar < 250 get labs done to check insulin reserve     Stop glimepiride and acarbose

## 2023-11-17 ENCOUNTER — TELEPHONE (OUTPATIENT)
Age: 53
End: 2023-11-17

## 2023-11-29 ENCOUNTER — TELEMEDICINE (OUTPATIENT)
Age: 53
End: 2023-11-29
Payer: MEDICAID

## 2023-11-29 DIAGNOSIS — Z79.4 TYPE 2 DIABETES MELLITUS WITH HYPERGLYCEMIA, WITH LONG-TERM CURRENT USE OF INSULIN (HCC): Primary | ICD-10-CM

## 2023-11-29 DIAGNOSIS — E11.65 TYPE 2 DIABETES MELLITUS WITH HYPERGLYCEMIA, WITH LONG-TERM CURRENT USE OF INSULIN (HCC): Primary | ICD-10-CM

## 2023-11-29 PROCEDURE — G0108 DIAB MANAGE TRN  PER INDIV: HCPCS

## 2023-11-29 NOTE — PROGRESS NOTES
[facility] Program for Diabetes Health  Diabetes Self-Management Education & Support Program    Reason for Referral: Elevated Hemoglobin A1c  Referral Source: Angela Negrete MD  Services requested: DSMES       ASSESSMENT    From my perspective, the participant would benefit from [facility] specifically related to reducing risks, healthy eating, monitoring, taking medications, physical activity, healthy coping, and problem solving. Will adapt DSMES program to build on participant's skills score, confidence score, and preparedness score as noted in the Diabetes Skills, Confidence, and Preparedness Index. During the program, we will focus on providing DSMES that specifically addresses participant's interest in reducing risks, healthy eating, monitoring, taking medications, physical activity, healthy coping, and problem solving, as shown by their reported readiness to change. The participant would be best served by attending weekly individual sessions. Diabetes Self-Management Education Follow-up Visit: 12/6/2023 at 10:45 AM       Clinical Presentation  Leighton Bang is a 47 y.o. White female referred for diabetes self-management education. Participant has Type 2 DM on insulin for 11-20 years. Family history positive for diabetes. Patient reports not receiving DSMES services in the past. Most recent A1c value:   Lab Results   Component Value Date/Time    [id number] 8.9 04/07/2022 04:30 PM    LABA1C 10.7 12/12/2022 10:05 AM     Diabetes-related medications:  Current dosing: HumaLOG KwikPen Sopn - 100 UNIT/ML  Lantus SoloStar Sopn - 100 UNIT/ML    Blood Pressure Management  This patient does not have an active medication from one of the medication groupers. Lipid Management  atorvastatin - 20 MG  fenofibrate - 145 MG  pravastatin - 40 MG      Clot Prevention  This patient does not have an active medication from one of the medication groupers.     Learning Assessment  Learning objectives Educator assessment

## 2023-12-06 ENCOUNTER — TELEMEDICINE (OUTPATIENT)
Age: 53
End: 2023-12-06
Payer: MEDICAID

## 2023-12-06 DIAGNOSIS — Z79.4 TYPE 2 DIABETES MELLITUS WITH HYPERGLYCEMIA, WITH LONG-TERM CURRENT USE OF INSULIN (HCC): Primary | ICD-10-CM

## 2023-12-06 DIAGNOSIS — E11.65 TYPE 2 DIABETES MELLITUS WITH HYPERGLYCEMIA, WITH LONG-TERM CURRENT USE OF INSULIN (HCC): Primary | ICD-10-CM

## 2023-12-06 PROCEDURE — G0108 DIAB MANAGE TRN  PER INDIV: HCPCS

## 2023-12-06 NOTE — PROGRESS NOTES
personal diabetes care record to keep abreast of diabetes health No     The participant needs to address keeping a personal care record of her diabetes management. Elijah Murdock RN on 12/6/2023 at 3:33 PM    I have personally reviewed the health record, including provider notes, laboratory data and current medications before making these care and education recommendations. The time spent in this effort is included in the total time. Total minutes: [address] Bray, was evaluated through a synchronous (real-time) audio-video encounter. The patient (or guardian if applicable) is aware that this is a billable service, which includes applicable co-pays. This Virtual Visit was conducted with patient's (and/or legal guardian's) consent. Patient identification was verified, and a caregiver was present when appropriate. The patient was located at Home: [address] [place] [id number]  Provider was located at [facility] (Appt Dept): [address] [facility],  [address] [place] [address]     Total time spent for this encounter:  60 minutes    --Elijah Murdock RN on 12/6/2023 at 3:35 PM    An electronic signature was used to authenticate this note.

## 2024-02-06 DIAGNOSIS — E11.65 TYPE 2 DIABETES MELLITUS WITH HYPERGLYCEMIA, WITH LONG-TERM CURRENT USE OF INSULIN (HCC): ICD-10-CM

## 2024-02-06 DIAGNOSIS — Z79.4 TYPE 2 DIABETES MELLITUS WITH HYPERGLYCEMIA, WITH LONG-TERM CURRENT USE OF INSULIN (HCC): ICD-10-CM

## 2024-02-06 RX ORDER — PEN NEEDLE, DIABETIC 29 G X1/2"
NEEDLE, DISPOSABLE MISCELLANEOUS
Qty: 100150 EACH | Refills: 3 | Status: SHIPPED | OUTPATIENT
Start: 2024-02-06

## 2024-02-06 RX ORDER — INSULIN GLARGINE 100 [IU]/ML
INJECTION, SOLUTION SUBCUTANEOUS
Qty: 21 ML | Refills: 3 | Status: SHIPPED | OUTPATIENT
Start: 2024-02-06

## 2024-02-22 DIAGNOSIS — Z79.4 TYPE 2 DIABETES MELLITUS WITH HYPERGLYCEMIA, WITH LONG-TERM CURRENT USE OF INSULIN (HCC): ICD-10-CM

## 2024-02-22 DIAGNOSIS — E11.65 TYPE 2 DIABETES MELLITUS WITH HYPERGLYCEMIA, WITH LONG-TERM CURRENT USE OF INSULIN (HCC): ICD-10-CM

## 2024-02-23 DIAGNOSIS — Z79.4 TYPE 2 DIABETES MELLITUS WITH HYPERGLYCEMIA, WITH LONG-TERM CURRENT USE OF INSULIN (HCC): Primary | ICD-10-CM

## 2024-02-23 DIAGNOSIS — E11.65 TYPE 2 DIABETES MELLITUS WITH HYPERGLYCEMIA, WITH LONG-TERM CURRENT USE OF INSULIN (HCC): Primary | ICD-10-CM

## 2024-02-23 RX ORDER — SEMAGLUTIDE 1.34 MG/ML
INJECTION, SOLUTION SUBCUTANEOUS
Qty: 3 ML | Refills: 5 | Status: SHIPPED | OUTPATIENT
Start: 2024-02-23

## 2024-03-27 DIAGNOSIS — E11.65 TYPE 2 DIABETES MELLITUS WITH HYPERGLYCEMIA, WITH LONG-TERM CURRENT USE OF INSULIN (HCC): ICD-10-CM

## 2024-03-27 DIAGNOSIS — Z79.4 TYPE 2 DIABETES MELLITUS WITH HYPERGLYCEMIA, WITH LONG-TERM CURRENT USE OF INSULIN (HCC): ICD-10-CM

## 2024-03-27 RX ORDER — INSULIN LISPRO 100 [IU]/ML
INJECTION, SOLUTION INTRAVENOUS; SUBCUTANEOUS
Qty: 21 ML | Refills: 0 | Status: SHIPPED | OUTPATIENT
Start: 2024-03-27

## 2024-05-18 DIAGNOSIS — Z79.4 TYPE 2 DIABETES MELLITUS WITH HYPERGLYCEMIA, WITH LONG-TERM CURRENT USE OF INSULIN (HCC): ICD-10-CM

## 2024-05-18 DIAGNOSIS — E11.65 TYPE 2 DIABETES MELLITUS WITH HYPERGLYCEMIA, WITH LONG-TERM CURRENT USE OF INSULIN (HCC): ICD-10-CM

## 2024-05-18 RX ORDER — INSULIN LISPRO 100 [IU]/ML
INJECTION, SOLUTION INTRAVENOUS; SUBCUTANEOUS
Qty: 21 ML | Refills: 0 | OUTPATIENT
Start: 2024-05-18

## 2024-06-04 DIAGNOSIS — Z79.4 TYPE 2 DIABETES MELLITUS WITH HYPERGLYCEMIA, WITH LONG-TERM CURRENT USE OF INSULIN (HCC): ICD-10-CM

## 2024-06-04 DIAGNOSIS — E11.65 TYPE 2 DIABETES MELLITUS WITH HYPERGLYCEMIA, WITH LONG-TERM CURRENT USE OF INSULIN (HCC): ICD-10-CM

## 2024-06-05 RX ORDER — INSULIN LISPRO 100 [IU]/ML
INJECTION, SOLUTION INTRAVENOUS; SUBCUTANEOUS
Qty: 21 ML | Refills: 0 | OUTPATIENT
Start: 2024-06-05

## 2024-06-07 DIAGNOSIS — E11.65 TYPE 2 DIABETES MELLITUS WITH HYPERGLYCEMIA, WITH LONG-TERM CURRENT USE OF INSULIN (HCC): ICD-10-CM

## 2024-06-07 DIAGNOSIS — Z79.4 TYPE 2 DIABETES MELLITUS WITH HYPERGLYCEMIA, WITH LONG-TERM CURRENT USE OF INSULIN (HCC): ICD-10-CM

## 2024-06-09 RX ORDER — INSULIN LISPRO 100 [IU]/ML
INJECTION, SOLUTION INTRAVENOUS; SUBCUTANEOUS
Qty: 21 ML | Refills: 0 | Status: SHIPPED | OUTPATIENT
Start: 2024-06-09

## 2024-06-12 ENCOUNTER — OFFICE VISIT (OUTPATIENT)
Age: 54
End: 2024-06-12
Payer: MEDICAID

## 2024-06-12 VITALS
SYSTOLIC BLOOD PRESSURE: 108 MMHG | HEART RATE: 84 BPM | HEIGHT: 68 IN | BODY MASS INDEX: 28.64 KG/M2 | DIASTOLIC BLOOD PRESSURE: 69 MMHG | WEIGHT: 189 LBS

## 2024-06-12 DIAGNOSIS — E78.2 MIXED HYPERLIPIDEMIA: ICD-10-CM

## 2024-06-12 DIAGNOSIS — E11.65 TYPE 2 DIABETES MELLITUS WITH HYPERGLYCEMIA, WITH LONG-TERM CURRENT USE OF INSULIN (HCC): Primary | ICD-10-CM

## 2024-06-12 DIAGNOSIS — Z79.4 TYPE 2 DIABETES MELLITUS WITH HYPERGLYCEMIA, WITH LONG-TERM CURRENT USE OF INSULIN (HCC): Primary | ICD-10-CM

## 2024-06-12 PROCEDURE — G2211 COMPLEX E/M VISIT ADD ON: HCPCS | Performed by: INTERNAL MEDICINE

## 2024-06-12 PROCEDURE — 3074F SYST BP LT 130 MM HG: CPT | Performed by: INTERNAL MEDICINE

## 2024-06-12 PROCEDURE — 99214 OFFICE O/P EST MOD 30 MIN: CPT | Performed by: INTERNAL MEDICINE

## 2024-06-12 PROCEDURE — 3078F DIAST BP <80 MM HG: CPT | Performed by: INTERNAL MEDICINE

## 2024-06-12 PROCEDURE — 95251 CONT GLUC MNTR ANALYSIS I&R: CPT | Performed by: INTERNAL MEDICINE

## 2024-06-12 RX ORDER — SEMAGLUTIDE 2.68 MG/ML
INJECTION, SOLUTION SUBCUTANEOUS
Qty: 3 ML | Refills: 5 | Status: SHIPPED | OUTPATIENT
Start: 2024-06-12

## 2024-06-12 NOTE — PROGRESS NOTES
prior doses and scale   Increase ozempic to 2.0mg /wk    2. Mixed hyperlipidemia  On meds per PCP       Orders Placed This Encounter   Procedures    NE CONTINUOUS GLUCOSE MONITORING ANALYSIS I&R      Orders Placed This Encounter   Medications    OZEMPIC, 2 MG/DOSE, 8 MG/3ML SOPN sc injection     Sig: Inject 2 mg subcutaneously every 7 days     Dispense:  3 mL     Refill:  5        Return in about 4 months (around 10/12/2024).

## 2024-10-08 DIAGNOSIS — Z79.4 TYPE 2 DIABETES MELLITUS WITH HYPERGLYCEMIA, WITH LONG-TERM CURRENT USE OF INSULIN (HCC): ICD-10-CM

## 2024-10-08 DIAGNOSIS — E11.65 TYPE 2 DIABETES MELLITUS WITH HYPERGLYCEMIA, WITH LONG-TERM CURRENT USE OF INSULIN (HCC): ICD-10-CM

## 2024-10-08 RX ORDER — SEMAGLUTIDE 2.68 MG/ML
INJECTION, SOLUTION SUBCUTANEOUS
Qty: 3 ML | Refills: 4 | Status: SHIPPED | OUTPATIENT
Start: 2024-10-08

## 2024-11-23 DIAGNOSIS — E11.65 TYPE 2 DIABETES MELLITUS WITH HYPERGLYCEMIA, WITH LONG-TERM CURRENT USE OF INSULIN (HCC): ICD-10-CM

## 2024-11-23 DIAGNOSIS — Z79.4 TYPE 2 DIABETES MELLITUS WITH HYPERGLYCEMIA, WITH LONG-TERM CURRENT USE OF INSULIN (HCC): ICD-10-CM

## 2024-11-24 RX ORDER — INSULIN GLARGINE 100 [IU]/ML
INJECTION, SOLUTION SUBCUTANEOUS
Qty: 21 ML | Refills: 0 | Status: SHIPPED | OUTPATIENT
Start: 2024-11-24

## 2025-02-14 NOTE — ACP (ADVANCE CARE PLANNING)
[facility]       CHIEF COMPLAINT: GERD    Referring Provider: Pcp, No         HPI:        Dian is a 60 year old female with PMH of GERD, lichen sclerosus who is here for GERD.      History of Present Illness  Has symptoms of heartburn that is long standing for many years, atleast 10 yrs. Complaints of heartburn that is worse in supine position. She sleeps with inclination but can get coughing, belching and reflux. This has also affected her voice. She is on Pantoprazole 40mg BID for all this time, this regimen controlled her symptoms, however, for the last few months this is not helping.     No nausea, vomiting, pain, bleeding. No dysphagia.      Prior GI Procedures:   EGD and Colon 7 years ago in [place] with HH and polyps per patient.     EGD and Colon last year, , by Rakesh Avitia (no reports avail for review)    Prior Radiology:   None relevant    Family History:  No FH of stomach-esophageal cancer or IBD.     Brother with colon cancer (dx in 60s)    Social History:  Smokin/4 pack a day but she is trying to quit for the last month.  Alcohol: none  No substance abuse.   Occ MJ use    Labs:    No results found for: \"FSTS1\", \"SODIUM\", \"POTASSIUM\", \"CHLORIDE\", \"CO2\", \"ANIONGAP\", \"GLUCOSE\", \"BUN\", \"CREATININE\", \"GFRESTIMATE\", \"BCRAT\", \"CALCIUM\", \"BILIRUBIN\", \"AST\", \"GPT\", \"ALKPT\", \"ALBUMIN\", \"TOTPROTEIN\", \"GLOB\", \"AGR\"    No results found for: \"WBC\", \"RBC\", \"HGB\", \"HCT\", \"MCH\", \"MCHC\", \"RDWCV\", \"RDWSD\", \"PLT\", \"SEG\", \"PMON\", \"PEOS\", \"PBASO\", \"IGRE\", \"ANEUT\", \"ALYMS\", \"MUKESH\", \"AEOS\", \"ABASO\", \"IGAB\"        PMH:   Patient Active Problem List   Diagnosis    Lichen sclerosus       PSH:   Past Surgical History:   Procedure Laterality Date    Total abdom hysterectomy      [place]          Current Outpatient Medications   Medication Sig Dispense Refill    nicotine (NICODERM) 14 MG/24HR patch Place 1 Patch onto the skin once daily (every 24 hours) Use for 8-12 weeks      pantoprazole  Patient do not have an advance care plan and is not interested at this time. Patient verbalized understanding. (PROTONIX) 40 MG tablet Take 1 tablet twice a day by oral route for 30 days.      metroNIDAZOLE (METROGEL-VAGINAL) 0.75 % vaginal gel Place 1 applicator vaginally 2 days a week. To start after flagyl PO. For 6 months. 70 g 4    clobetasol (TEMOVATE) 0.05 % ointment Apply topically nightly. For 12 weeks, then 3 times weekly at bedtime. 60 g 1    estradiol (ESTRACE) 0.1 MG/GM vaginal cream Place 1 gram vaginally nightly at bedtime for 85 days       No current facility-administered medications for this visit.       Allergies:  ALLERGIES:  No Known Allergies      Visit Vitals  Ht 5' 4\" (1.626 m)   Wt 63.5 kg (140 lb)   BMI 24.03 kg/m²        ROS:   As stated in HPI      Physical Exam:   Physical Exam  Constitutional:       Appearance: Normal appearance. She is not ill-appearing.   Abdominal:      General: There is no distension.      Palpations: Abdomen is soft.      Tenderness: There is no abdominal tenderness.            ASSESSMENT/PLAN:  Dian is a 60 year old female with  PMH of GERD, lichen sclerosus who is here for GERD.     Assessment & Plan  Hiatal hernia  Recalls being diagnosed with this many years ago. She had EGD last yr but I cannot locate report. Will obtain BE for luminal evaluation.   Discussed life style changes: avoiding eating 3-4hrs prior to bedtime. Avoiding acidic foods that bother. Raise HOB 4-6 inches. Sleep on the left side preferably.    GERD  Likely t hat pantoprazole has lost effect. Will disconitnue and start Omeprazole 40mg BID. Discussed risks and benefits of the medication.  Pending review of BE and EGD reports (records requested) will determine need to repeat EGD or referral to  for evaluation of TIF.   Smoking cessation strongly advised.     Brother with colon cancer  Last colon in 2024. Records requested.     RTC 3mo      Umu Costa MD  Gastroenterology   [facility]